# Patient Record
Sex: MALE | Race: WHITE | Employment: OTHER | URBAN - METROPOLITAN AREA
[De-identification: names, ages, dates, MRNs, and addresses within clinical notes are randomized per-mention and may not be internally consistent; named-entity substitution may affect disease eponyms.]

---

## 2017-01-10 ENCOUNTER — HOSPITAL ENCOUNTER (OUTPATIENT)
Dept: PHYSICAL THERAPY | Age: 67
Discharge: HOME | End: 2017-01-10
Admitting: PREVENTIVE MEDICINE

## 2017-01-13 ENCOUNTER — HOSPITAL ENCOUNTER (OUTPATIENT)
Dept: MRI IMAGING | Age: 67
Discharge: HOME | End: 2017-01-13
Attending: PREVENTIVE MEDICINE | Admitting: PREVENTIVE MEDICINE

## 2017-01-13 ENCOUNTER — HOSPITAL ENCOUNTER (OUTPATIENT)
Dept: MRI IMAGING | Age: 67
Discharge: HOME | End: 2017-01-13
Admitting: PREVENTIVE MEDICINE

## 2017-01-13 DIAGNOSIS — S83.91XA SPRAIN OF RIGHT KNEE, UNSPECIFIED LIGAMENT, INITIAL ENCOUNTER: ICD-10-CM

## 2017-01-13 DIAGNOSIS — S83.91XA SPRAIN OF RIGHT KNEE: ICD-10-CM

## 2017-01-17 ENCOUNTER — HOSPITAL ENCOUNTER (OUTPATIENT)
Dept: PHYSICAL THERAPY | Age: 67
Discharge: HOME | End: 2017-01-17
Admitting: PREVENTIVE MEDICINE

## 2017-02-01 ENCOUNTER — OFFICE VISIT (OUTPATIENT)
Dept: ORTHOPEDIC SURGERY | Age: 67
End: 2017-02-01

## 2017-02-01 VITALS — BODY MASS INDEX: 35.22 KG/M2 | RESPIRATION RATE: 16 BRPM | HEIGHT: 70 IN | WEIGHT: 246 LBS

## 2017-02-01 DIAGNOSIS — R52 PAIN: ICD-10-CM

## 2017-02-01 DIAGNOSIS — S83.91XA SPRAIN OF RIGHT KNEE, UNSPECIFIED LIGAMENT, INITIAL ENCOUNTER: Primary | ICD-10-CM

## 2017-02-01 DIAGNOSIS — S80.01XA CONTUSION OF RIGHT KNEE, INITIAL ENCOUNTER: ICD-10-CM

## 2017-02-01 DIAGNOSIS — S83.241A ACUTE MEDIAL MENISCAL TEAR, RIGHT, INITIAL ENCOUNTER: ICD-10-CM

## 2017-02-01 PROCEDURE — 99244 OFF/OP CNSLTJ NEW/EST MOD 40: CPT | Performed by: ORTHOPAEDIC SURGERY

## 2017-02-01 PROCEDURE — 73560 X-RAY EXAM OF KNEE 1 OR 2: CPT | Performed by: ORTHOPAEDIC SURGERY

## 2017-02-01 RX ORDER — NICOTINE 21 MG/24HR
1 PATCH, TRANSDERMAL 24 HOURS TRANSDERMAL
COMMUNITY
Start: 2015-10-13 | End: 2017-04-07

## 2017-02-01 RX ORDER — ASPIRIN 81 MG/1
81 TABLET, CHEWABLE ORAL
COMMUNITY
Start: 2016-06-24 | End: 2017-02-01 | Stop reason: SDUPTHER

## 2017-02-01 RX ORDER — FAMOTIDINE 20 MG/1
20 TABLET, FILM COATED ORAL
COMMUNITY
Start: 2015-09-15 | End: 2017-04-07

## 2017-02-01 RX ORDER — FUROSEMIDE 20 MG/1
20 TABLET ORAL
COMMUNITY
Start: 2015-08-10 | End: 2017-03-13 | Stop reason: SDUPTHER

## 2017-02-01 RX ORDER — LISINOPRIL 5 MG/1
5 TABLET ORAL
COMMUNITY
Start: 2015-09-15 | End: 2017-03-13 | Stop reason: SDUPTHER

## 2017-02-01 RX ORDER — AMLODIPINE BESYLATE 5 MG/1
5 TABLET ORAL
COMMUNITY
Start: 2016-05-26 | End: 2017-03-13 | Stop reason: SDUPTHER

## 2017-02-01 RX ORDER — SILDENAFIL 100 MG/1
100 TABLET, FILM COATED ORAL
COMMUNITY
Start: 2015-02-05 | End: 2017-04-07

## 2017-02-01 RX ORDER — HYDROCODONE BITARTRATE AND ACETAMINOPHEN 5; 325 MG/1; MG/1
1 TABLET ORAL
COMMUNITY
Start: 2015-09-15 | End: 2017-04-07

## 2017-02-01 RX ORDER — TAMSULOSIN HYDROCHLORIDE 0.4 MG/1
0.4 CAPSULE ORAL
COMMUNITY
Start: 2016-04-14 | End: 2017-04-07

## 2017-02-01 RX ORDER — ELECTROLYTES/DEXTROSE
1 SOLUTION, ORAL ORAL
COMMUNITY
End: 2017-04-07

## 2017-02-01 RX ORDER — FOLIC ACID 1 MG/1
1 TABLET ORAL
COMMUNITY
End: 2017-04-07

## 2017-02-01 RX ORDER — ATORVASTATIN CALCIUM 10 MG/1
10 TABLET, FILM COATED ORAL
COMMUNITY
Start: 2016-05-26 | End: 2017-04-07

## 2017-02-01 RX ORDER — THIAMINE MONONITRATE (VIT B1) 100 MG
1 TABLET ORAL
COMMUNITY
End: 2017-02-01 | Stop reason: SDUPTHER

## 2017-02-01 RX ORDER — FLUOXETINE HYDROCHLORIDE 20 MG/1
20 CAPSULE ORAL
COMMUNITY
Start: 2016-05-16 | End: 2017-02-01 | Stop reason: SDUPTHER

## 2017-02-01 RX ORDER — ALBUTEROL SULFATE 90 UG/1
2 AEROSOL, METERED RESPIRATORY (INHALATION)
COMMUNITY
Start: 2015-09-15 | End: 2017-02-01 | Stop reason: SDUPTHER

## 2017-02-02 ASSESSMENT — ENCOUNTER SYMPTOMS
EYES NEGATIVE: 1
RESPIRATORY NEGATIVE: 1
GASTROINTESTINAL NEGATIVE: 1

## 2017-02-14 ENCOUNTER — TELEPHONE (OUTPATIENT)
Dept: ORTHOPEDIC SURGERY | Age: 67
End: 2017-02-14

## 2017-03-13 RX ORDER — LISINOPRIL 5 MG/1
5 TABLET ORAL DAILY
Qty: 30 TABLET | Refills: 5 | Status: SHIPPED | OUTPATIENT
Start: 2017-03-13 | End: 2017-07-25 | Stop reason: SDUPTHER

## 2017-03-13 RX ORDER — AMLODIPINE BESYLATE 5 MG/1
5 TABLET ORAL DAILY
Qty: 30 TABLET | Refills: 5 | Status: SHIPPED | OUTPATIENT
Start: 2017-03-13 | End: 2017-07-25 | Stop reason: SDUPTHER

## 2017-03-13 RX ORDER — FUROSEMIDE 20 MG/1
20 TABLET ORAL DAILY
Qty: 30 TABLET | Refills: 5 | Status: SHIPPED | OUTPATIENT
Start: 2017-03-13 | End: 2017-07-25 | Stop reason: SDUPTHER

## 2017-04-05 ENCOUNTER — OFFICE VISIT (OUTPATIENT)
Dept: ORTHOPEDIC SURGERY | Age: 67
End: 2017-04-05

## 2017-04-05 VITALS — DIASTOLIC BLOOD PRESSURE: 81 MMHG | HEART RATE: 75 BPM | SYSTOLIC BLOOD PRESSURE: 130 MMHG

## 2017-04-05 DIAGNOSIS — S83.241A ACUTE MEDIAL MENISCAL TEAR, RIGHT, INITIAL ENCOUNTER: Primary | ICD-10-CM

## 2017-04-05 PROBLEM — S83.249A ACUTE MEDIAL MENISCAL TEAR: Status: ACTIVE | Noted: 2017-04-05

## 2017-04-05 PROCEDURE — 99213 OFFICE O/P EST LOW 20 MIN: CPT | Performed by: ORTHOPAEDIC SURGERY

## 2017-04-05 ASSESSMENT — ENCOUNTER SYMPTOMS
GASTROINTESTINAL NEGATIVE: 1
RESPIRATORY NEGATIVE: 1
EYES NEGATIVE: 1

## 2017-04-07 ENCOUNTER — HOSPITAL ENCOUNTER (OUTPATIENT)
Dept: PREADMISSION TESTING | Age: 67
Discharge: HOME | End: 2017-04-07
Attending: ORTHOPAEDIC SURGERY | Admitting: ORTHOPAEDIC SURGERY

## 2017-04-07 VITALS
TEMPERATURE: 98.2 F | DIASTOLIC BLOOD PRESSURE: 87 MMHG | BODY MASS INDEX: 38.1 KG/M2 | HEIGHT: 69 IN | HEART RATE: 88 BPM | SYSTOLIC BLOOD PRESSURE: 148 MMHG | OXYGEN SATURATION: 95 % | RESPIRATION RATE: 16 BRPM | WEIGHT: 257.25 LBS

## 2017-04-07 LAB
ANION GAP SERPL CALCULATED.3IONS-SCNC: 15 MMOL/L (ref 4–16)
BUN BLDV-MCNC: 12 MG/DL (ref 6–23)
CALCIUM SERPL-MCNC: 9 MG/DL (ref 8.3–10.6)
CHLORIDE BLD-SCNC: 100 MMOL/L (ref 99–110)
CO2: 23 MMOL/L (ref 21–32)
CREAT SERPL-MCNC: 0.7 MG/DL (ref 0.9–1.3)
EKG ATRIAL RATE: 75 BPM
EKG DIAGNOSIS: NORMAL
EKG P AXIS: 55 DEGREES
EKG P-R INTERVAL: 154 MS
EKG Q-T INTERVAL: 416 MS
EKG QRS DURATION: 140 MS
EKG QTC CALCULATION (BAZETT): 464 MS
EKG R AXIS: 9 DEGREES
EKG T AXIS: 2 DEGREES
EKG VENTRICULAR RATE: 75 BPM
GFR AFRICAN AMERICAN: >60 ML/MIN/1.73M2
GFR NON-AFRICAN AMERICAN: >60 ML/MIN/1.73M2
GLUCOSE BLD-MCNC: 118 MG/DL (ref 70–140)
HCT VFR BLD CALC: 48.9 % (ref 42–52)
HEMOGLOBIN: 16.7 GM/DL (ref 13.5–18)
MCH RBC QN AUTO: 32.7 PG (ref 27–31)
MCHC RBC AUTO-ENTMCNC: 34.2 % (ref 32–36)
MCV RBC AUTO: 95.9 FL (ref 78–100)
PDW BLD-RTO: 12.6 % (ref 11.7–14.9)
PLATELET # BLD: 281 K/CU MM (ref 140–440)
PMV BLD AUTO: 9.7 FL (ref 7.5–11.1)
POTASSIUM SERPL-SCNC: 4.8 MMOL/L (ref 3.5–5.1)
RBC # BLD: 5.1 M/CU MM (ref 4.6–6.2)
SODIUM BLD-SCNC: 138 MMOL/L (ref 135–145)
WBC # BLD: 11.9 K/CU MM (ref 4–10.5)

## 2017-04-07 RX ORDER — RANITIDINE 150 MG/1
150 TABLET ORAL 2 TIMES DAILY
COMMUNITY
End: 2017-04-19 | Stop reason: ALTCHOICE

## 2017-04-07 ASSESSMENT — PAIN SCALES - GENERAL: PAINLEVEL_OUTOF10: 0

## 2017-04-13 ENCOUNTER — HOSPITAL ENCOUNTER (OUTPATIENT)
Dept: SURGERY | Age: 67
Discharge: HOME | End: 2017-04-13
Attending: ORTHOPAEDIC SURGERY | Admitting: ORTHOPAEDIC SURGERY

## 2017-04-13 VITALS
HEART RATE: 89 BPM | HEIGHT: 70 IN | OXYGEN SATURATION: 94 % | DIASTOLIC BLOOD PRESSURE: 79 MMHG | SYSTOLIC BLOOD PRESSURE: 132 MMHG | WEIGHT: 249 LBS | TEMPERATURE: 97 F | RESPIRATION RATE: 16 BRPM | BODY MASS INDEX: 35.65 KG/M2

## 2017-04-13 DIAGNOSIS — S83.271A COMPLEX TEAR OF LATERAL MENISCUS OF RIGHT KNEE AS CURRENT INJURY, INITIAL ENCOUNTER: ICD-10-CM

## 2017-04-13 DIAGNOSIS — M22.41 CHONDROMALACIA PATELLAE, RIGHT KNEE: ICD-10-CM

## 2017-04-13 DIAGNOSIS — M17.11 PRIMARY OSTEOARTHRITIS OF RIGHT KNEE: ICD-10-CM

## 2017-04-13 LAB — GLUCOSE BLD-MCNC: 132 MG/DL (ref 70–99)

## 2017-04-13 RX ORDER — ONDANSETRON 2 MG/ML
4 INJECTION INTRAMUSCULAR; INTRAVENOUS
Status: COMPLETED | OUTPATIENT
Start: 2017-04-13 | End: 2017-04-13

## 2017-04-13 RX ORDER — SODIUM CHLORIDE 0.9 % (FLUSH) 0.9 %
10 SYRINGE (ML) INJECTION EVERY 12 HOURS SCHEDULED
Status: DISCONTINUED | OUTPATIENT
Start: 2017-04-13 | End: 2017-04-14 | Stop reason: HOSPADM

## 2017-04-13 RX ORDER — SODIUM CHLORIDE, SODIUM LACTATE, POTASSIUM CHLORIDE, CALCIUM CHLORIDE 600; 310; 30; 20 MG/100ML; MG/100ML; MG/100ML; MG/100ML
INJECTION, SOLUTION INTRAVENOUS CONTINUOUS
Status: DISCONTINUED | OUTPATIENT
Start: 2017-04-13 | End: 2017-04-14 | Stop reason: HOSPADM

## 2017-04-13 RX ORDER — SODIUM CHLORIDE 0.9 % (FLUSH) 0.9 %
10 SYRINGE (ML) INJECTION PRN
Status: DISCONTINUED | OUTPATIENT
Start: 2017-04-13 | End: 2017-04-14 | Stop reason: HOSPADM

## 2017-04-13 RX ORDER — FENTANYL CITRATE 50 UG/ML
25 INJECTION, SOLUTION INTRAMUSCULAR; INTRAVENOUS EVERY 5 MIN PRN
Status: DISCONTINUED | OUTPATIENT
Start: 2017-04-13 | End: 2017-04-14 | Stop reason: HOSPADM

## 2017-04-13 RX ORDER — HYDROCODONE BITARTRATE AND ACETAMINOPHEN 5; 325 MG/1; MG/1
1 TABLET ORAL EVERY 4 HOURS PRN
Qty: 40 TABLET | Refills: 0 | Status: SHIPPED | OUTPATIENT
Start: 2017-04-13 | End: 2017-06-16 | Stop reason: ALTCHOICE

## 2017-04-13 RX ORDER — HYDROCODONE BITARTRATE AND ACETAMINOPHEN 5; 325 MG/1; MG/1
1 TABLET ORAL ONCE
Status: COMPLETED | OUTPATIENT
Start: 2017-04-13 | End: 2017-04-13

## 2017-04-13 RX ORDER — IBUPROFEN 400 MG/1
800 TABLET ORAL ONCE
Status: COMPLETED | OUTPATIENT
Start: 2017-04-13 | End: 2017-04-13

## 2017-04-13 RX ORDER — ONDANSETRON 2 MG/ML
4 INJECTION INTRAMUSCULAR; INTRAVENOUS EVERY 6 HOURS PRN
Status: DISCONTINUED | OUTPATIENT
Start: 2017-04-13 | End: 2017-04-14 | Stop reason: HOSPADM

## 2017-04-13 RX ORDER — DIPHENHYDRAMINE HYDROCHLORIDE 50 MG/ML
12.5 INJECTION INTRAMUSCULAR; INTRAVENOUS
Status: ACTIVE | OUTPATIENT
Start: 2017-04-13 | End: 2017-04-13

## 2017-04-13 RX ADMIN — HYDROCODONE BITARTRATE AND ACETAMINOPHEN 1 TABLET: 5; 325 TABLET ORAL at 15:47

## 2017-04-13 RX ADMIN — IBUPROFEN 800 MG: 400 TABLET ORAL at 15:47

## 2017-04-13 RX ADMIN — ONDANSETRON 4 MG: 2 INJECTION INTRAMUSCULAR; INTRAVENOUS at 16:25

## 2017-04-13 RX ADMIN — SODIUM CHLORIDE, SODIUM LACTATE, POTASSIUM CHLORIDE, CALCIUM CHLORIDE: 600; 310; 30; 20 INJECTION, SOLUTION INTRAVENOUS at 13:40

## 2017-04-13 ASSESSMENT — PAIN SCALES - GENERAL
PAINLEVEL_OUTOF10: 0

## 2017-04-13 ASSESSMENT — PAIN - FUNCTIONAL ASSESSMENT: PAIN_FUNCTIONAL_ASSESSMENT: 0-10

## 2017-04-13 ASSESSMENT — PAIN DESCRIPTION - DESCRIPTORS: DESCRIPTORS: ACHING

## 2017-04-14 ENCOUNTER — HOSPITAL ENCOUNTER (OUTPATIENT)
Dept: PHYSICAL THERAPY | Age: 67
Discharge: HOME | End: 2017-04-30
Attending: ORTHOPAEDIC SURGERY | Admitting: ORTHOPAEDIC SURGERY

## 2017-04-14 ASSESSMENT — PAIN DESCRIPTION - ORIENTATION: ORIENTATION: RIGHT

## 2017-04-14 ASSESSMENT — PAIN SCALES - GENERAL: PAINLEVEL_OUTOF10: 4

## 2017-04-14 ASSESSMENT — PAIN DESCRIPTION - PAIN TYPE: TYPE: SURGICAL PAIN

## 2017-04-14 ASSESSMENT — PAIN DESCRIPTION - FREQUENCY: FREQUENCY: INTERMITTENT

## 2017-04-14 ASSESSMENT — PAIN DESCRIPTION - DESCRIPTORS: DESCRIPTORS: ACHING;TENDER

## 2017-04-14 ASSESSMENT — PAIN DESCRIPTION - PROGRESSION: CLINICAL_PROGRESSION: GRADUALLY IMPROVING

## 2017-04-14 ASSESSMENT — PAIN DESCRIPTION - LOCATION: LOCATION: KNEE

## 2017-04-14 ASSESSMENT — PAIN DESCRIPTION - ONSET: ONSET: PROGRESSIVE

## 2017-04-17 ENCOUNTER — TELEPHONE (OUTPATIENT)
Dept: ORTHOPEDIC SURGERY | Age: 67
End: 2017-04-17

## 2017-04-17 DIAGNOSIS — S83.206A TEAR OF MENISCUS OF RIGHT KNEE AS CURRENT INJURY, UNSPECIFIED MENISCUS, UNSPECIFIED TEAR TYPE, INITIAL ENCOUNTER: Primary | ICD-10-CM

## 2017-04-19 ENCOUNTER — OFFICE VISIT (OUTPATIENT)
Dept: FAMILY MEDICINE CLINIC | Age: 67
End: 2017-04-19

## 2017-04-19 VITALS
HEART RATE: 54 BPM | OXYGEN SATURATION: 94 % | WEIGHT: 247.6 LBS | DIASTOLIC BLOOD PRESSURE: 84 MMHG | SYSTOLIC BLOOD PRESSURE: 132 MMHG | BODY MASS INDEX: 37.53 KG/M2 | TEMPERATURE: 97.3 F | HEIGHT: 68 IN

## 2017-04-19 DIAGNOSIS — M25.561 CHRONIC PAIN OF RIGHT KNEE: ICD-10-CM

## 2017-04-19 DIAGNOSIS — R53.82 CHRONIC FATIGUE: ICD-10-CM

## 2017-04-19 DIAGNOSIS — G89.29 CHRONIC PAIN OF RIGHT KNEE: ICD-10-CM

## 2017-04-19 DIAGNOSIS — L85.9 HYPERKERATOSIS OF SKIN: ICD-10-CM

## 2017-04-19 DIAGNOSIS — I10 ESSENTIAL HYPERTENSION: Primary | ICD-10-CM

## 2017-04-19 DIAGNOSIS — M67.40 MUCOID CYST OF JOINT: ICD-10-CM

## 2017-04-19 DIAGNOSIS — R06.09 DYSPNEA ON EXERTION: ICD-10-CM

## 2017-04-19 DIAGNOSIS — K21.9 GASTROESOPHAGEAL REFLUX DISEASE, ESOPHAGITIS PRESENCE NOT SPECIFIED: Chronic | ICD-10-CM

## 2017-04-19 PROCEDURE — 99214 OFFICE O/P EST MOD 30 MIN: CPT | Performed by: FAMILY MEDICINE

## 2017-04-19 PROCEDURE — 36415 COLL VENOUS BLD VENIPUNCTURE: CPT | Performed by: FAMILY MEDICINE

## 2017-04-19 RX ORDER — HYDROCODONE BITARTRATE AND ACETAMINOPHEN 5; 325 MG/1; MG/1
1 TABLET ORAL EVERY 6 HOURS PRN
Qty: 25 TABLET | Refills: 0 | Status: SHIPPED | OUTPATIENT
Start: 2017-04-19 | End: 2017-06-16 | Stop reason: ALTCHOICE

## 2017-04-19 RX ORDER — FAMOTIDINE 40 MG/1
40 TABLET, FILM COATED ORAL EVERY EVENING
Qty: 30 TABLET | Refills: 5 | Status: SHIPPED | OUTPATIENT
Start: 2017-04-19 | End: 2017-07-25 | Stop reason: SDUPTHER

## 2017-04-24 LAB
TSH SERPL DL<=0.05 MIU/L-ACNC: 1.01 MICRO IU/ML (ref 0.4–4)
VITAMIN B-12: 517 PG/ML (ref 200–1100)

## 2017-04-26 ENCOUNTER — OFFICE VISIT (OUTPATIENT)
Dept: ORTHOPEDIC SURGERY | Age: 67
End: 2017-04-26

## 2017-04-26 VITALS — HEIGHT: 68 IN | RESPIRATION RATE: 18 BRPM | WEIGHT: 247 LBS | BODY MASS INDEX: 37.44 KG/M2

## 2017-04-26 DIAGNOSIS — S83.206A TEAR OF MENISCUS OF RIGHT KNEE AS CURRENT INJURY, UNSPECIFIED MENISCUS, UNSPECIFIED TEAR TYPE, INITIAL ENCOUNTER: ICD-10-CM

## 2017-04-26 DIAGNOSIS — Z09 POSTOP CHECK: Primary | ICD-10-CM

## 2017-04-26 PROCEDURE — 99024 POSTOP FOLLOW-UP VISIT: CPT | Performed by: ORTHOPAEDIC SURGERY

## 2017-05-01 ENCOUNTER — HOSPITAL ENCOUNTER (OUTPATIENT)
Dept: OTHER | Age: 67
Discharge: HOME | End: 2017-05-31
Attending: ORTHOPAEDIC SURGERY | Admitting: ORTHOPAEDIC SURGERY

## 2017-05-08 ENCOUNTER — OFFICE VISIT (OUTPATIENT)
Dept: ORTHOPEDIC SURGERY | Age: 67
End: 2017-05-08

## 2017-05-08 VITALS — WEIGHT: 247 LBS | HEIGHT: 68 IN | BODY MASS INDEX: 37.44 KG/M2 | RESPIRATION RATE: 16 BRPM

## 2017-05-08 DIAGNOSIS — S80.01XA CONTUSION OF RIGHT KNEE, INITIAL ENCOUNTER: ICD-10-CM

## 2017-05-08 DIAGNOSIS — S83.91XA SPRAIN OF RIGHT KNEE, UNSPECIFIED LIGAMENT, INITIAL ENCOUNTER: Primary | ICD-10-CM

## 2017-05-08 DIAGNOSIS — S83.206A TEAR OF MENISCUS OF RIGHT KNEE AS CURRENT INJURY, UNSPECIFIED MENISCUS, UNSPECIFIED TEAR TYPE, INITIAL ENCOUNTER: ICD-10-CM

## 2017-05-08 DIAGNOSIS — S83.241A ACUTE MEDIAL MENISCAL TEAR, RIGHT, INITIAL ENCOUNTER: ICD-10-CM

## 2017-05-08 PROCEDURE — 99024 POSTOP FOLLOW-UP VISIT: CPT | Performed by: ORTHOPAEDIC SURGERY

## 2017-05-23 ENCOUNTER — HOSPITAL ENCOUNTER (OUTPATIENT)
Dept: PULMONOLOGY | Age: 67
Discharge: HOME | End: 2017-05-23
Attending: FAMILY MEDICINE | Admitting: FAMILY MEDICINE

## 2017-06-01 ENCOUNTER — HOSPITAL ENCOUNTER (OUTPATIENT)
Dept: OTHER | Age: 67
Discharge: HOME | End: 2017-06-13
Attending: ORTHOPAEDIC SURGERY | Admitting: ORTHOPAEDIC SURGERY

## 2017-06-12 ENCOUNTER — OFFICE VISIT (OUTPATIENT)
Dept: ORTHOPEDIC SURGERY | Age: 67
End: 2017-06-12

## 2017-06-12 ENCOUNTER — TELEPHONE (OUTPATIENT)
Dept: ORTHOPEDIC SURGERY | Age: 67
End: 2017-06-12

## 2017-06-12 VITALS
RESPIRATION RATE: 16 BRPM | DIASTOLIC BLOOD PRESSURE: 82 MMHG | HEIGHT: 68 IN | WEIGHT: 247 LBS | BODY MASS INDEX: 37.44 KG/M2 | HEART RATE: 81 BPM | SYSTOLIC BLOOD PRESSURE: 127 MMHG

## 2017-06-12 DIAGNOSIS — Z01.818 PRE-OP EXAM: ICD-10-CM

## 2017-06-12 DIAGNOSIS — M17.11 PRIMARY OSTEOARTHRITIS OF RIGHT KNEE: ICD-10-CM

## 2017-06-12 DIAGNOSIS — M65.351 TRIGGER LITTLE FINGER OF RIGHT HAND: Primary | ICD-10-CM

## 2017-06-12 PROCEDURE — 99214 OFFICE O/P EST MOD 30 MIN: CPT | Performed by: ORTHOPAEDIC SURGERY

## 2017-06-16 ENCOUNTER — HOSPITAL ENCOUNTER (OUTPATIENT)
Dept: PREADMISSION TESTING | Age: 67
Discharge: HOME | End: 2017-07-15
Attending: ORTHOPAEDIC SURGERY | Admitting: ORTHOPAEDIC SURGERY

## 2017-06-16 ENCOUNTER — HOSPITAL ENCOUNTER (OUTPATIENT)
Dept: PREADMISSION TESTING | Age: 67
Discharge: HOME | End: 2017-06-16
Attending: ORTHOPAEDIC SURGERY | Admitting: ORTHOPAEDIC SURGERY

## 2017-06-16 VITALS
TEMPERATURE: 98 F | BODY MASS INDEX: 37.44 KG/M2 | DIASTOLIC BLOOD PRESSURE: 80 MMHG | SYSTOLIC BLOOD PRESSURE: 143 MMHG | HEART RATE: 96 BPM | WEIGHT: 247 LBS | OXYGEN SATURATION: 93 % | RESPIRATION RATE: 20 BRPM | HEIGHT: 68 IN

## 2017-06-16 RX ORDER — IBUPROFEN 200 MG
800 TABLET ORAL DAILY
Status: ON HOLD | COMMUNITY
End: 2021-11-10 | Stop reason: HOSPADM

## 2017-06-16 ASSESSMENT — PAIN DESCRIPTION - PAIN TYPE: TYPE: ACUTE PAIN

## 2017-06-16 ASSESSMENT — PAIN SCALES - GENERAL: PAINLEVEL_OUTOF10: 0

## 2017-06-22 ENCOUNTER — HOSPITAL ENCOUNTER (OUTPATIENT)
Dept: SURGERY | Age: 67
Discharge: HOME | End: 2017-06-22
Attending: ORTHOPAEDIC SURGERY | Admitting: ORTHOPAEDIC SURGERY

## 2017-06-22 VITALS
RESPIRATION RATE: 16 BRPM | OXYGEN SATURATION: 95 % | BODY MASS INDEX: 36.83 KG/M2 | TEMPERATURE: 98.3 F | SYSTOLIC BLOOD PRESSURE: 132 MMHG | WEIGHT: 243 LBS | HEIGHT: 68 IN | HEART RATE: 83 BPM | DIASTOLIC BLOOD PRESSURE: 88 MMHG

## 2017-06-22 PROBLEM — M65.9 FLEXOR TENOSYNOVITIS OF FINGER: Status: ACTIVE | Noted: 2017-06-12

## 2017-06-22 PROBLEM — M65.949 FLEXOR TENOSYNOVITIS OF FINGER: Status: ACTIVE | Noted: 2017-06-12

## 2017-06-22 PROCEDURE — 26440 RELEASE PALM/FINGER TENDON: CPT | Performed by: ORTHOPAEDIC SURGERY

## 2017-06-22 RX ORDER — SODIUM CHLORIDE 0.9 % (FLUSH) 0.9 %
10 SYRINGE (ML) INJECTION PRN
Status: DISCONTINUED | OUTPATIENT
Start: 2017-06-22 | End: 2017-06-23 | Stop reason: HOSPADM

## 2017-06-22 RX ORDER — SODIUM CHLORIDE, SODIUM LACTATE, POTASSIUM CHLORIDE, CALCIUM CHLORIDE 600; 310; 30; 20 MG/100ML; MG/100ML; MG/100ML; MG/100ML
INJECTION, SOLUTION INTRAVENOUS CONTINUOUS
Status: DISCONTINUED | OUTPATIENT
Start: 2017-06-22 | End: 2017-06-23 | Stop reason: HOSPADM

## 2017-06-22 RX ORDER — SODIUM CHLORIDE 0.9 % (FLUSH) 0.9 %
10 SYRINGE (ML) INJECTION EVERY 12 HOURS SCHEDULED
Status: DISCONTINUED | OUTPATIENT
Start: 2017-06-22 | End: 2017-06-23 | Stop reason: HOSPADM

## 2017-06-22 RX ORDER — IPRATROPIUM BROMIDE AND ALBUTEROL SULFATE 2.5; .5 MG/3ML; MG/3ML
1 SOLUTION RESPIRATORY (INHALATION) ONCE
Status: COMPLETED | OUTPATIENT
Start: 2017-06-22 | End: 2017-06-22

## 2017-06-22 RX ORDER — HYDROCODONE BITARTRATE AND ACETAMINOPHEN 5; 325 MG/1; MG/1
1 TABLET ORAL EVERY 4 HOURS PRN
Qty: 40 TABLET | Refills: 0 | Status: SHIPPED | OUTPATIENT
Start: 2017-06-22 | End: 2018-09-04

## 2017-06-22 RX ORDER — HYDROCODONE BITARTRATE AND ACETAMINOPHEN 5; 325 MG/1; MG/1
1 TABLET ORAL ONCE
Status: DISCONTINUED | OUTPATIENT
Start: 2017-06-22 | End: 2017-06-23 | Stop reason: HOSPADM

## 2017-06-22 RX ORDER — ONDANSETRON 2 MG/ML
4 INJECTION INTRAMUSCULAR; INTRAVENOUS EVERY 6 HOURS PRN
Status: DISCONTINUED | OUTPATIENT
Start: 2017-06-22 | End: 2017-06-23 | Stop reason: HOSPADM

## 2017-06-22 RX ORDER — IBUPROFEN 400 MG/1
800 TABLET ORAL ONCE
Status: DISCONTINUED | OUTPATIENT
Start: 2017-06-22 | End: 2017-06-23 | Stop reason: HOSPADM

## 2017-06-22 RX ADMIN — SODIUM CHLORIDE, SODIUM LACTATE, POTASSIUM CHLORIDE, CALCIUM CHLORIDE: 600; 310; 30; 20 INJECTION, SOLUTION INTRAVENOUS at 08:56

## 2017-06-22 RX ADMIN — IPRATROPIUM BROMIDE AND ALBUTEROL SULFATE 1 AMPULE: 2.5; .5 SOLUTION RESPIRATORY (INHALATION) at 09:16

## 2017-06-22 ASSESSMENT — ENCOUNTER SYMPTOMS
DYSPNEA ACTIVITY LEVEL: AFTER AMBULATING 2 FLIGHTS OF STAIRS
SHORTNESS OF BREATH: 1

## 2017-06-22 ASSESSMENT — PAIN DESCRIPTION - DESCRIPTORS: DESCRIPTORS: ACHING;CONSTANT

## 2017-06-22 ASSESSMENT — PAIN SCALES - GENERAL
PAINLEVEL_OUTOF10: 0

## 2017-06-22 ASSESSMENT — PAIN - FUNCTIONAL ASSESSMENT: PAIN_FUNCTIONAL_ASSESSMENT: 0-10

## 2017-06-28 ENCOUNTER — OFFICE VISIT (OUTPATIENT)
Dept: ORTHOPEDIC SURGERY | Age: 67
End: 2017-06-28

## 2017-06-28 VITALS — BODY MASS INDEX: 36.83 KG/M2 | HEIGHT: 68 IN | RESPIRATION RATE: 16 BRPM | WEIGHT: 243 LBS

## 2017-06-28 DIAGNOSIS — Z09 POSTOP CHECK: Primary | ICD-10-CM

## 2017-06-28 DIAGNOSIS — Z98.890 S/P TRIGGER FINGER RELEASE: ICD-10-CM

## 2017-06-28 PROCEDURE — 99024 POSTOP FOLLOW-UP VISIT: CPT | Performed by: ORTHOPAEDIC SURGERY

## 2017-07-05 ENCOUNTER — OFFICE VISIT (OUTPATIENT)
Dept: ORTHOPEDIC SURGERY | Age: 67
End: 2017-07-05

## 2017-07-05 VITALS — BODY MASS INDEX: 36.83 KG/M2 | HEIGHT: 68 IN | RESPIRATION RATE: 16 BRPM | WEIGHT: 243 LBS

## 2017-07-05 DIAGNOSIS — Z09 POSTOP CHECK: ICD-10-CM

## 2017-07-05 DIAGNOSIS — Z98.890 S/P TRIGGER FINGER RELEASE: Primary | ICD-10-CM

## 2017-07-05 PROCEDURE — 99024 POSTOP FOLLOW-UP VISIT: CPT | Performed by: ORTHOPAEDIC SURGERY

## 2017-07-25 ENCOUNTER — OFFICE VISIT (OUTPATIENT)
Dept: FAMILY MEDICINE CLINIC | Age: 67
End: 2017-07-25

## 2017-07-25 VITALS
SYSTOLIC BLOOD PRESSURE: 138 MMHG | WEIGHT: 245.2 LBS | HEART RATE: 72 BPM | TEMPERATURE: 98.2 F | DIASTOLIC BLOOD PRESSURE: 66 MMHG | OXYGEN SATURATION: 98 % | BODY MASS INDEX: 37.28 KG/M2

## 2017-07-25 DIAGNOSIS — Z13.6 SCREENING FOR AAA (ABDOMINAL AORTIC ANEURYSM): ICD-10-CM

## 2017-07-25 DIAGNOSIS — Z11.59 NEED FOR HEPATITIS C SCREENING TEST: ICD-10-CM

## 2017-07-25 DIAGNOSIS — I10 ESSENTIAL HYPERTENSION: Primary | ICD-10-CM

## 2017-07-25 DIAGNOSIS — Z23 NEED FOR PNEUMOCOCCAL VACCINATION: ICD-10-CM

## 2017-07-25 DIAGNOSIS — L98.9 SKIN LESION OF LEFT LEG: ICD-10-CM

## 2017-07-25 DIAGNOSIS — F17.200 TOBACCO DEPENDENCE: ICD-10-CM

## 2017-07-25 DIAGNOSIS — R22.9 LOCALIZED SKIN MASS, LUMP, OR SWELLING: ICD-10-CM

## 2017-07-25 DIAGNOSIS — J41.1 MUCOPURULENT CHRONIC BRONCHITIS (HCC): ICD-10-CM

## 2017-07-25 DIAGNOSIS — K21.9 GASTROESOPHAGEAL REFLUX DISEASE, ESOPHAGITIS PRESENCE NOT SPECIFIED: Chronic | ICD-10-CM

## 2017-07-25 DIAGNOSIS — R60.1 GENERALIZED EDEMA: ICD-10-CM

## 2017-07-25 DIAGNOSIS — Z12.11 COLON CANCER SCREENING: ICD-10-CM

## 2017-07-25 DIAGNOSIS — Z11.4 SCREENING FOR HIV (HUMAN IMMUNODEFICIENCY VIRUS): ICD-10-CM

## 2017-07-25 PROCEDURE — 36415 COLL VENOUS BLD VENIPUNCTURE: CPT | Performed by: FAMILY MEDICINE

## 2017-07-25 PROCEDURE — 90471 IMMUNIZATION ADMIN: CPT | Performed by: FAMILY MEDICINE

## 2017-07-25 PROCEDURE — 99214 OFFICE O/P EST MOD 30 MIN: CPT | Performed by: FAMILY MEDICINE

## 2017-07-25 PROCEDURE — 90670 PCV13 VACCINE IM: CPT | Performed by: FAMILY MEDICINE

## 2017-07-25 PROCEDURE — 17110 DESTRUCTION B9 LES UP TO 14: CPT | Performed by: FAMILY MEDICINE

## 2017-07-25 RX ORDER — LISINOPRIL 5 MG/1
5 TABLET ORAL EVERY MORNING
Qty: 30 TABLET | Refills: 5 | Status: SHIPPED | OUTPATIENT
Start: 2017-07-25 | End: 2018-03-26 | Stop reason: SDUPTHER

## 2017-07-25 RX ORDER — FUROSEMIDE 20 MG/1
20 TABLET ORAL DAILY
Qty: 30 TABLET | Refills: 5 | Status: SHIPPED | OUTPATIENT
Start: 2017-07-25 | End: 2018-09-04

## 2017-07-25 RX ORDER — SILDENAFIL 100 MG/1
100 TABLET, FILM COATED ORAL PRN
Qty: 6 TABLET | Refills: 11 | Status: SHIPPED | OUTPATIENT
Start: 2017-07-25 | End: 2017-09-01 | Stop reason: SDUPTHER

## 2017-07-25 RX ORDER — AMLODIPINE BESYLATE 5 MG/1
5 TABLET ORAL EVERY MORNING
Qty: 30 TABLET | Refills: 5 | Status: SHIPPED | OUTPATIENT
Start: 2017-07-25 | End: 2018-03-26 | Stop reason: SDUPTHER

## 2017-07-25 RX ORDER — FAMOTIDINE 40 MG/1
40 TABLET, FILM COATED ORAL 2 TIMES DAILY
Qty: 60 TABLET | Refills: 5 | Status: SHIPPED | OUTPATIENT
Start: 2017-07-25 | End: 2018-03-01 | Stop reason: SDUPTHER

## 2017-07-25 RX ORDER — ALBUTEROL SULFATE 90 UG/1
2 AEROSOL, METERED RESPIRATORY (INHALATION) EVERY 6 HOURS PRN
Qty: 1 INHALER | Refills: 5 | Status: SHIPPED | OUTPATIENT
Start: 2017-07-25 | End: 2018-09-04 | Stop reason: SDUPTHER

## 2017-07-26 LAB
BUN / CREAT RATIO: 16 (CALC) (ref 7–25)
BUN BLDV-MCNC: 11 MG/DL (ref 3–29)
CALCIUM SERPL-MCNC: 9.5 MG/DL (ref 8.5–10.5)
CHLORIDE BLD-SCNC: 97 MEQ/L (ref 96–110)
CO2: 20 MEQ/L (ref 19–32)
COPY(IES) SENT TO:: NORMAL
CREAT SERPL-MCNC: 0.7 MG/DL
GFR SERPL CREATININE-BSD FRML MDRD: 99 ML/MIN/1.73M2
GLUCOSE BLD-MCNC: 92 MG/DL
HEPATITIS C ANTIBODY: POSITIVE
HIV AG/AB: NORMAL
POTASSIUM SERPL-SCNC: 4.7 MEQ/L (ref 3.4–5.3)
SODIUM BLD-SCNC: 142 MEQ/L (ref 135–148)

## 2017-07-27 DIAGNOSIS — R76.8 HEPATITIS C ANTIBODY TEST POSITIVE: Primary | ICD-10-CM

## 2017-08-18 ENCOUNTER — OFFICE VISIT (OUTPATIENT)
Dept: FAMILY MEDICINE CLINIC | Age: 67
End: 2017-08-18

## 2017-08-18 VITALS
TEMPERATURE: 97.9 F | OXYGEN SATURATION: 94 % | DIASTOLIC BLOOD PRESSURE: 68 MMHG | SYSTOLIC BLOOD PRESSURE: 126 MMHG | BODY MASS INDEX: 38.16 KG/M2 | HEART RATE: 90 BPM | WEIGHT: 251 LBS

## 2017-08-18 DIAGNOSIS — B07.9 VIRAL WARTS, UNSPECIFIED TYPE: Primary | ICD-10-CM

## 2017-08-18 DIAGNOSIS — N52.9 ERECTILE DYSFUNCTION, UNSPECIFIED ERECTILE DYSFUNCTION TYPE: ICD-10-CM

## 2017-08-18 PROCEDURE — 17110 DESTRUCTION B9 LES UP TO 14: CPT | Performed by: FAMILY MEDICINE

## 2017-08-18 PROCEDURE — 99213 OFFICE O/P EST LOW 20 MIN: CPT | Performed by: FAMILY MEDICINE

## 2017-08-18 RX ORDER — SILDENAFIL 100 MG/1
100 TABLET, FILM COATED ORAL PRN
Qty: 2 TABLET | Refills: 0 | COMMUNITY
Start: 2017-08-18 | End: 2017-09-01 | Stop reason: SDUPTHER

## 2017-08-18 ASSESSMENT — PATIENT HEALTH QUESTIONNAIRE - PHQ9
2. FEELING DOWN, DEPRESSED OR HOPELESS: 0
SUM OF ALL RESPONSES TO PHQ QUESTIONS 1-9: 0
SUM OF ALL RESPONSES TO PHQ9 QUESTIONS 1 & 2: 0
1. LITTLE INTEREST OR PLEASURE IN DOING THINGS: 0

## 2017-08-30 ENCOUNTER — OFFICE VISIT (OUTPATIENT)
Dept: ORTHOPEDIC SURGERY | Age: 67
End: 2017-08-30

## 2017-08-30 ENCOUNTER — TELEPHONE (OUTPATIENT)
Dept: ORTHOPEDIC SURGERY | Age: 67
End: 2017-08-30

## 2017-08-30 VITALS — BODY MASS INDEX: 38.04 KG/M2 | RESPIRATION RATE: 16 BRPM | WEIGHT: 251 LBS | HEIGHT: 68 IN

## 2017-08-30 DIAGNOSIS — M17.11 PRIMARY OSTEOARTHRITIS OF RIGHT KNEE: Primary | ICD-10-CM

## 2017-08-30 PROCEDURE — 99214 OFFICE O/P EST MOD 30 MIN: CPT | Performed by: ORTHOPAEDIC SURGERY

## 2017-08-30 RX ORDER — POLYETHYLENE GLYCOL-3350 AND ELECTROLYTES WITH FLAVOR PACK 240; 5.84; 2.98; 6.72; 22.72 G/278.26G; G/278.26G; G/278.26G; G/278.26G; G/278.26G
POWDER, FOR SOLUTION ORAL
COMMUNITY
Start: 2017-08-29 | End: 2018-09-04

## 2017-08-30 NOTE — TELEPHONE ENCOUNTER
Patient wants to submit approval to [facility] for gel injections, if approved and when in stock please call and schedule, for right knee

## 2017-09-01 ENCOUNTER — OFFICE VISIT (OUTPATIENT)
Dept: FAMILY MEDICINE CLINIC | Age: 67
End: 2017-09-01

## 2017-09-01 VITALS
DIASTOLIC BLOOD PRESSURE: 70 MMHG | HEART RATE: 74 BPM | BODY MASS INDEX: 37.16 KG/M2 | HEIGHT: 68 IN | WEIGHT: 245.2 LBS | SYSTOLIC BLOOD PRESSURE: 130 MMHG

## 2017-09-01 DIAGNOSIS — B07.9 VIRAL WARTS, UNSPECIFIED TYPE: Primary | ICD-10-CM

## 2017-09-01 DIAGNOSIS — N52.9 VASCULOGENIC ERECTILE DYSFUNCTION, UNSPECIFIED VASCULOGENIC ERECTILE DYSFUNCTION TYPE: ICD-10-CM

## 2017-09-01 PROCEDURE — 99213 OFFICE O/P EST LOW 20 MIN: CPT | Performed by: FAMILY MEDICINE

## 2017-09-01 PROCEDURE — 17110 DESTRUCTION B9 LES UP TO 14: CPT | Performed by: FAMILY MEDICINE

## 2017-09-01 RX ORDER — SILDENAFIL 100 MG/1
100 TABLET, FILM COATED ORAL PRN
Qty: 2 TABLET | Refills: 0 | COMMUNITY
Start: 2017-09-01 | End: 2018-05-29 | Stop reason: SDUPTHER

## 2017-09-11 NOTE — TELEPHONE ENCOUNTER
I called  and was informed that these injections will not be covered by [facility], but still requested that I submit the form. Form was submitted last week.

## 2017-09-15 ENCOUNTER — OFFICE VISIT (OUTPATIENT)
Dept: FAMILY MEDICINE CLINIC | Age: 67
End: 2017-09-15

## 2017-09-15 VITALS
BODY MASS INDEX: 37.86 KG/M2 | TEMPERATURE: 97.5 F | DIASTOLIC BLOOD PRESSURE: 72 MMHG | SYSTOLIC BLOOD PRESSURE: 118 MMHG | OXYGEN SATURATION: 97 % | HEART RATE: 92 BPM | WEIGHT: 249 LBS

## 2017-09-15 DIAGNOSIS — N52.03 COMBINED ARTERIAL INSUFFICIENCY AND CORPORO-VENOUS OCCLUSIVE ERECTILE DYSFUNCTION: ICD-10-CM

## 2017-09-15 DIAGNOSIS — R22.9 LOCALIZED SKIN MASS, LUMP, OR SWELLING: ICD-10-CM

## 2017-09-15 DIAGNOSIS — B07.9 VIRAL WARTS, UNSPECIFIED TYPE: Primary | ICD-10-CM

## 2017-09-15 PROCEDURE — 99213 OFFICE O/P EST LOW 20 MIN: CPT | Performed by: FAMILY MEDICINE

## 2017-09-15 PROCEDURE — 17110 DESTRUCTION B9 LES UP TO 14: CPT | Performed by: FAMILY MEDICINE

## 2017-09-15 RX ORDER — SILDENAFIL 100 MG/1
100 TABLET, FILM COATED ORAL PRN
Qty: 2 TABLET | Refills: 0 | COMMUNITY
Start: 2017-09-15 | End: 2018-09-04

## 2017-09-21 ENCOUNTER — HOSPITAL ENCOUNTER (OUTPATIENT)
Dept: ULTRASOUND IMAGING | Age: 67
Discharge: HOME | End: 2017-09-21
Attending: FAMILY MEDICINE | Admitting: FAMILY MEDICINE

## 2017-09-21 DIAGNOSIS — Z13.6 SCREENING FOR AAA (ABDOMINAL AORTIC ANEURYSM): ICD-10-CM

## 2017-10-17 ENCOUNTER — TELEPHONE (OUTPATIENT)
Dept: ORTHOPEDIC SURGERY | Age: 67
End: 2017-10-17

## 2017-10-17 ENCOUNTER — OFFICE VISIT (OUTPATIENT)
Dept: ORTHOPEDIC SURGERY | Age: 67
End: 2017-10-17

## 2017-10-17 VITALS — HEIGHT: 68 IN | WEIGHT: 249 LBS | BODY MASS INDEX: 37.74 KG/M2 | RESPIRATION RATE: 16 BRPM

## 2017-10-17 DIAGNOSIS — Z98.890 S/P TRIGGER FINGER RELEASE: Primary | ICD-10-CM

## 2017-10-17 DIAGNOSIS — M72.0 DUPUYTREN'S CONTRACTURE OF HAND: ICD-10-CM

## 2017-10-17 PROCEDURE — 99212 OFFICE O/P EST SF 10 MIN: CPT | Performed by: PHYSICIAN ASSISTANT

## 2017-10-17 NOTE — PROGRESS NOTES
(NORVASC) 5 MG tablet Take 1 tablet by mouth every morning 30 tablet 5    lisinopril (PRINIVIL;ZESTRIL) 5 MG tablet Take 1 tablet by mouth every morning 30 tablet 5    furosemide (LASIX) 20 MG tablet Take 1 tablet by mouth daily 30 tablet 5    fluticasone-salmeterol (ADVAIR DISKUS) 250-50 MCG/DOSE AEPB INHALE 1 PUFF INTO THE LUNGS EVERY 12 HOURS 60 each 5    albuterol sulfate HFA (PROAIR HFA) 108 (90 Base) MCG/ACT inhaler Inhale 2 puffs into the lungs every 6 hours as needed for Wheezing 1 Inhaler 5    HYDROcodone-acetaminophen (NORCO) 5-325 MG per tablet Take 1 tablet by mouth every 4 hours as needed for Pain . 40 tablet 0    ibuprofen (ADVIL;MOTRIN) 200 MG tablet Take 800 mg by mouth daily      Flurandrenolide (CORDRAN) 4 MCG/SQCM TAPE Apply 1/2 inch tape daily to lesion 1 each 5     No current facility-administered medications for this visit.         Past Medical History:   Diagnosis Date    Acid reflux     Anxiety     Arthritis     \"Right Knee\"    Asthma     Chronic back pain     Cough     Occ Productive Cough \"Milky\" Sputum    Diverticulitis Dx Early 2000's    Hepatitis Dx 18's    \"In The [facility]\"    [place] (hard of hearing)     Bilateral Ears    HTN (hypertension)     Hyperlipidemia     Nocturia     Shortness of breath on exertion     Slow urinary stream     Tingling     \"Tingling Right Hand\"    Wears dentures     Full Upper, Partial Lower    Wears glasses     Wears partial dentures     Lower       Past Surgical History:   Procedure Laterality Date    APPENDECTOMY  1960's    CARPAL TUNNEL RELEASE Right 06/22/2017    COLONOSCOPY  Last Done Early 2000's    Polyps Removed In Past    DENTAL SURGERY      Teeth Extracted In Past    EYE SURGERY Left Late 1980's    Cataract With Lens Implant    FRACTURE SURGERY Left 2000's    Broken Left Wrist    HERNIA REPAIR  2974'C    Umbilical Hernia Repair    KNEE ARTHROSCOPY Right 04/13/2017    meniscus repair    TONSILLECTOMY AND Ecchymosis:  [] Abrasion:  [] Laceration:   [] Scar / Surgical incision:  [] Orthopedic appliance:     Range of Motion:  [x] Normal Hand AROM     [x] Normal Hand PROM  [x]Can make a fist    [x]Thumb tip can touch pinky tip  [] Deferred due to fracture  Active Wrist Flexion:  []AROM = PROM   Active Wrist Extension:  []AROM = PROM   Active Radial Deviation:  []AROM = PROM   Active Ulnar Deviation:  []AROM = PROM   Passive Wrist Flexion:  []AROM = PROM   Passive Wrist Extension:  []AROM = PROM   Passive Radial Deviation:  []AROM = PROM   Passive Ulnar Deviation:  []AROM = PROM   Finger AROM:  []AROM = PROM   Finger PROM:  []AROM = PROM     Motor Function:   [x] No gross motor weakness of wrist  [x] No gross motor weakness of hand  []Thumbs Up    []Pointer finger    []OK sign    []Cross fingers    []Number 5  [] Motor weakness:     Neurologic:  [x] Sensation to light touch intact. [] Impaired:  [] Decreased sensation to light touch over median nerve distribution. [] Decreased sensation to light touch over ulnar nerve distribution. [x] Deep tendon reflexes intact. [] Impaired:  [x] Coordination / proprioception intact. [] Impaired:     Palpation: (Not tested if not marked)     Normal Tenderness Swelling Crepitation Calor   Thumb: [x] [] [] [] []   Index: [x] [] [] [] []   Middle: [x] [] [] [] []   Ring: [x] [] [] [] []   Small: [x] [] [] [] []   A-1 Pulley: [x] [] [] [] []   MCP Joint: [x] [] [] [] []   Metacarpal: [x] [] [] [] []   Palmar Hand: [] [x] [] [] []   Snuff Box: [] [] [] [] []   Other: [] [] [] [] []     Comment:       ASSESSMENT:     1. S/P trigger finger release     2. Dupuytren's contracture of hand           PLAN:   · Diagnostic and treatment options discussed. Diagnosis explained. Natural history discussed. Patient's questions answered.   · No signs of infection or stitch abscess  · May be residual scar tissue or secondary to Dupuytren's contracture  · Continue hand/finger ROM  · Return to office

## 2017-10-20 ENCOUNTER — OFFICE VISIT (OUTPATIENT)
Dept: FAMILY MEDICINE CLINIC | Age: 67
End: 2017-10-20

## 2017-10-20 VITALS
WEIGHT: 247 LBS | HEART RATE: 92 BPM | BODY MASS INDEX: 37.44 KG/M2 | SYSTOLIC BLOOD PRESSURE: 122 MMHG | OXYGEN SATURATION: 93 % | HEIGHT: 68 IN | DIASTOLIC BLOOD PRESSURE: 70 MMHG

## 2017-10-20 DIAGNOSIS — B07.8 OTHER VIRAL WARTS: Primary | ICD-10-CM

## 2017-10-20 DIAGNOSIS — N52.9 VASCULOGENIC ERECTILE DYSFUNCTION, UNSPECIFIED VASCULOGENIC ERECTILE DYSFUNCTION TYPE: ICD-10-CM

## 2017-10-20 PROCEDURE — 17110 DESTRUCTION B9 LES UP TO 14: CPT | Performed by: FAMILY MEDICINE

## 2017-10-20 PROCEDURE — 99214 OFFICE O/P EST MOD 30 MIN: CPT | Performed by: FAMILY MEDICINE

## 2017-10-20 RX ORDER — SILDENAFIL 50 MG/1
50 TABLET, FILM COATED ORAL PRN
Qty: 2 TABLET | Refills: 0 | COMMUNITY
Start: 2017-10-20 | End: 2018-09-04

## 2017-10-20 ASSESSMENT — PATIENT HEALTH QUESTIONNAIRE - PHQ9
1. LITTLE INTEREST OR PLEASURE IN DOING THINGS: 0
2. FEELING DOWN, DEPRESSED OR HOPELESS: 0
SUM OF ALL RESPONSES TO PHQ QUESTIONS 1-9: 0
SUM OF ALL RESPONSES TO PHQ9 QUESTIONS 1 & 2: 0

## 2017-10-20 NOTE — PROGRESS NOTES
S: The patient complains of wart on the right knee present for several month(s). This was treated with liquid nitrogen 2 weeks ago and wart has decreased in size significantly    Erectile Dysfunction: Patient complains of erectile dysfunction. Onset of dysfunction was several years ago and was gradual in onset. Patient states the nature of difficulty is both attaining and maintaining erection. Full erections occur with intercourse. Partial erections occur with intercourse. Libido is not affected. Risk factors for ED include cardiovascular disease. Patient denies history of cranial, spinal, or pelvic trauma and pelvic radiation. Patient's expectations as to sexual function to return to normal.  Patient's description of relationship w/partner good. Previous treatment of ED includes Viagra with good results. He wants another sample. ROS: No TIA's or unusual headaches, no dysphagia. No prolonged cough. No dyspnea or chest pain on exertion. No abdominal pain, change in bowel habits, black or bloody stools. No urinary tract or BPH symptoms. No new or unusual musculoskeletal symptoms.     Past Medical History:   Diagnosis Date    Acid reflux     Anxiety     Arthritis     \"Right Knee\"    Asthma     Chronic back pain     Cough     Occ Productive Cough \"Milky\" Sputum    Diverticulitis Dx Early 2000's    Hepatitis Dx 18's    \"In [facility]\"    [place] (hard of hearing)     Bilateral Ears    HTN (hypertension)     Hyperlipidemia     Nocturia     Shortness of breath on exertion     Slow urinary stream     Tingling     \"Tingling Right Hand\"    Wears dentures     Full Upper, Partial Lower    Wears glasses     Wears partial dentures     Lower     Past Surgical History:   Procedure Laterality Date    APPENDECTOMY  1960's    CARPAL TUNNEL RELEASE Right 06/22/2017    COLONOSCOPY  Last Done Early 2000's    Polyps Removed In Past    DENTAL SURGERY      Teeth Extracted In Past    EYE SURGERY Left Late 1980's    Cataract With Lens Implant    FRACTURE SURGERY Left 2000's    Broken Left Wrist    HERNIA REPAIR  4323'G    Umbilical Hernia Repair    KNEE ARTHROSCOPY Right 04/13/2017    meniscus repair    TONSILLECTOMY AND ADENOIDECTOMY  1960's          O: Exam discloses typical wart on right knee. A: Viral wart  ED     P: The treatments, side effects and failure rates are discussed. Liquid nitrogen was applied to each wart. The expected skin reaction including erythema, pain, scabbing, blistering and hypopigmented scar formation was discussed. See at intervals until warts resolved.   viagra sample given

## 2017-11-17 NOTE — TELEPHONE ENCOUNTER
Patient scheduled for an appointment on 11/28/17 for knee injections. Please ensure medication is in stock.

## 2017-11-21 NOTE — TELEPHONE ENCOUNTER
Is he scheduled for three injections or 1? What medication does patient think hes getting? Dont know what medication you need me to stock.

## 2017-11-28 ENCOUNTER — OFFICE VISIT (OUTPATIENT)
Dept: ORTHOPEDIC SURGERY | Age: 67
End: 2017-11-28

## 2017-11-28 VITALS — HEIGHT: 67 IN | RESPIRATION RATE: 16 BRPM | BODY MASS INDEX: 38.77 KG/M2 | WEIGHT: 247 LBS

## 2017-11-28 DIAGNOSIS — M54.50 ACUTE RIGHT-SIDED LOW BACK PAIN WITHOUT SCIATICA: ICD-10-CM

## 2017-11-28 DIAGNOSIS — M17.11 PRIMARY OSTEOARTHRITIS OF RIGHT KNEE: Primary | ICD-10-CM

## 2017-11-28 PROCEDURE — 99213 OFFICE O/P EST LOW 20 MIN: CPT | Performed by: PHYSICIAN ASSISTANT

## 2017-11-28 PROCEDURE — 20610 DRAIN/INJ JOINT/BURSA W/O US: CPT | Performed by: PHYSICIAN ASSISTANT

## 2017-11-28 PROCEDURE — 73502 X-RAY EXAM HIP UNI 2-3 VIEWS: CPT | Performed by: PHYSICIAN ASSISTANT

## 2017-11-28 RX ORDER — CYCLOBENZAPRINE HYDROCHLORIDE 7.5 MG/1
7.5 TABLET, FILM COATED ORAL 3 TIMES DAILY PRN
Qty: 30 TABLET | Refills: 0 | Status: SHIPPED | OUTPATIENT
Start: 2017-11-28 | End: 2017-12-08

## 2017-11-28 RX ORDER — METHYLPREDNISOLONE 4 MG/1
TABLET ORAL
Qty: 1 KIT | Refills: 0 | Status: SHIPPED | OUTPATIENT
Start: 2017-11-28 | End: 2017-12-04

## 2017-11-28 NOTE — PATIENT INSTRUCTIONS
Plan:  Needs letter for Temp handicap placard. Continue taking Advil or Aleve along with tylenol at the same time. Medrol misha script sent to pharmacy. Monovisc injection given today. Rest knee for 24-48 hrs. Apply a compressive ACE bandage. Rest and elevate the affected painful area. Apply cold compresses intermittently as needed. As pain recedes, begin normal activities slowly as tolerated. Call if symptoms persist.  Follow up as needed for persistent pain.

## 2017-11-28 NOTE — PROGRESS NOTES
Scribe Authentication Statement  Nikolas Giordano Chirag LuisMis, scribed portions of this documentation for and in the presence of Teodoro Rey PA-C on 11/28/17 at 10:21 AM.    ORTHOPEDIC EVALUATION      History of Present Illness (HPI):   Patient's PCP is listed as:  Cielo Fontaine MD.    This is a:  []Consult Visit     []New Patient Visit     [x]Established Patient Visit    Chief Complaint   Patient presents with    Follow-up     Right knee pain       Informant patient   Setting when problem first occurred home   Mechanism Had a fall to the floor    Location where pain is most intense Right  Posterior of the right hip area   Quality / Description sharp, stabbing, throbbing and constant   Severity / Intensity 10 on scale of 1-10   Onset 11-26-17   Timing gradually worsening   Radiates does not radiate   Aggravating factors activity, stair climbing, sleeping, weight bearing   Relieving factors rest, avoiding painful activities   Associated manifestations denies erythema or warmth and denies numbness, tingling, weakness   Previous tests and diagnostic procedures none and (see below for result details)   Previous problems in this area underlying chronic DJD likely   Previous treatment none   Effect on patient's life difficulty with walking and difficulty with getting dressed     Review of Systems (ROS):   Problem = 1 , Extended = 2-9 , Complete = 10  Unless otherwise specified in this note, the R.O.S. is reviewed today with the patient and is as above-      PAST HISTORY:   Unless otherwise specified in this note, the past history is reviewed today with the patient and is as follows-    Allergies   Allergen Reactions    Sulfa Antibiotics Rash       Current Outpatient Prescriptions   Medication Sig Dispense Refill    sildenafil (VIAGRA) 50 MG tablet Take 1 tablet by mouth as needed for Erectile Dysfunction 2 tablet 0    sildenafil (VIAGRA) 100 MG tablet Take 1 tablet by mouth as needed for Erectile Dysfunction 2 tablet 0    sildenafil (VIAGRA) 100 MG tablet Take 1 tablet by mouth as needed for Erectile Dysfunction 2 tablet 0    GAVILYTE-C 240 g solution       famotidine (PEPCID) 40 MG tablet Take 1 tablet by mouth 2 times daily 60 tablet 5    amLODIPine (NORVASC) 5 MG tablet Take 1 tablet by mouth every morning 30 tablet 5    lisinopril (PRINIVIL;ZESTRIL) 5 MG tablet Take 1 tablet by mouth every morning 30 tablet 5    furosemide (LASIX) 20 MG tablet Take 1 tablet by mouth daily 30 tablet 5    fluticasone-salmeterol (ADVAIR DISKUS) 250-50 MCG/DOSE AEPB INHALE 1 PUFF INTO THE LUNGS EVERY 12 HOURS 60 each 5    albuterol sulfate HFA (PROAIR HFA) 108 (90 Base) MCG/ACT inhaler Inhale 2 puffs into the lungs every 6 hours as needed for Wheezing 1 Inhaler 5    HYDROcodone-acetaminophen (NORCO) 5-325 MG per tablet Take 1 tablet by mouth every 4 hours as needed for Pain . 40 tablet 0    ibuprofen (ADVIL;MOTRIN) 200 MG tablet Take 800 mg by mouth daily      Flurandrenolide (CORDRAN) 4 MCG/SQCM TAPE Apply 1/2 inch tape daily to lesion 1 each 5     No current facility-administered medications for this visit.         Past Medical History:   Diagnosis Date    Acid reflux     Anxiety     Arthritis     \"Right Knee\"    Asthma     Chronic back pain     Cough     Occ Productive Cough \"Milky\" Sputum    Diverticulitis Dx Early 2000's    Hepatitis Dx 18's    \"In [facility]\"    [place] (hard of hearing)     Bilateral Ears    HTN (hypertension)     Hyperlipidemia     Nocturia     Shortness of breath on exertion     Slow urinary stream     Tingling     \"Tingling Right Hand\"    Wears dentures     Full Upper, Partial Lower    Wears glasses     Wears partial dentures     Lower       Past Surgical History:   Procedure Laterality Date    APPENDECTOMY  1960's    CARPAL TUNNEL RELEASE Right 06/22/2017    COLONOSCOPY  Last Done Early 2000's    Polyps Removed In Past    DENTAL SURGERY      Teeth Extracted In Past    EYE SURGERY Left Late 1980's    Cataract With Lens Implant    FRACTURE SURGERY Left 2000's    Broken Left Wrist    HERNIA REPAIR  1056'F    Umbilical Hernia Repair    KNEE ARTHROSCOPY Right 04/13/2017    meniscus repair    TONSILLECTOMY AND ADENOIDECTOMY  26's       Family History   Problem Relation Age of Onset    Stroke Mother     Heart Disease Mother     Cancer Father      Prostate Cancer    Cancer Son      Leukemia       Social History     Social History    Marital status:      Spouse name: N/A    Number of children: 3    Years of education: N/A     Social History Main Topics    Smoking status: Current Every Day Smoker     Packs/day: 1.00     Years: 52.00     Types: Cigarettes     Start date: 1965    Smokeless tobacco: Never Used    Alcohol use Yes      Comment: \"Drink About 2 To 5 Beers A Day\"    Drug use: No    Sexual activity: Not Currently     Other Topics Concern    None     Social History Narrative    Single, 3 kids, works for [employer] as a          ORTHOPEDIC CONSTITUTION EXAM:     Vital Signs:  Resp 16   Ht 5' 7\" (1.702 m)   Wt 247 lb (112 kg)   BMI 38.69 kg/m²     Constitution:  Generally, the patient is [x] alert, [x] appears stated age, and [x] in no distress. General body habitus is:   []Cachectic  []Thin  []Normal  []Overweight  []Obese  []Morbidly Obese     Psychiatric:   Judgement and insight is:  [x]Good    []Poor  Oriented to:  [x]person, [x]place, [x]time. Mood for circumstances is:  [x]Appropriate   []Inappropriate    Gait and Station:   Gait is:  []Normal  [x]Antalgic   []Trendelenburg   []Wide   []Unsteady   []Other:  Assistive Device:  [x]Cane    []Crutches    []Walker    []Wheelchair    []Gurney  Weight bearing on injured extremity:  [x]Is able   []Is not able      ORTHOPEDIC HIP EXAM:     HIP EXAM:  [x]Right []Left  Circulation:  [x] The limb is warm and well perfused. [x] Capillary refill is intact.   [] Edema:    Inspection:  [x] Skin intact without abrasion or lacerations. [x] Leg lengths equal  [] Unequal leg length:  [] Ecchymosis:  [] Abrasion:  [] Laceration:   [] Scar / Surgical incision:  [] Orthopedic appliance:     Range of Motion:  [x] Normal Hip AROM     [x] Normal Hip PROM     [] Deferred due to fracture  Active Hip Flexion:  []AROM = PROM   Active Hip Extension:  []AROM = PROM   Active Hip Abduction:  []AROM = PROM   Active Hip Internal Rotation:  []AROM = PROM   Active Hip External Rotation:  []AROM = PROM   Passive Hip Flexion:  []AROM = PROM   Passive Hip Extension:  []AROM = PROM   Passive Hip Abduction:  []AROM = PROM   Passive Hip Internal Rotation:  []AROM = PROM   Passive Hip External Rotation:  []AROM = PROM     Motor Function:  [x] No gross motor weakness of hip [x] No gross motor weakness of knee  [x] No gross motor weakness of ankle    [x] No gross motor weakness of great toe  [] Motor weakness:     Neurologic:  [x] Sensation to light touch intact. [] Impaired:  [x] Deep tendon reflexes intact. [] Impaired:  [x] Coordination / proprioception intact. [] Impaired:     Palpation: (Not tested if not marked)     Normal Tenderness Swelling Crepitation Calor   Greater Trochanter: [x] [] [] [] []   Piriformis: [x] [] [] [] []   Sacro-Iliac Joint: [] [x] [] [] []   ASIS [x] [] [] [] []   Ischial Tuberosity [x] [] [] [] []   Proximal Quadriceps: [x] [] [] [] []   Symphysis Pubis: [] [] [] [] []   Other: [] [] [] [] []     Comment:     Provocative Tests: (Not tested if not marked)   Negative Positive Positive Findings   Straight Leg Raise: [x] []    [place]field Test: [x] []    [place] Test: [x] []    Piriformis Test: [x] []    [place] Test: [x] []    MORGAN: [x] []    FADIR: [x] []    Rob Test: [x] []    90-90 St. Leg (Hamstrings): [] []    Other: [] []    Other: [] []      Contralateral Examination:  Examination of the contralateral side is performed for comparison.   Unless otherwise specified HYDROcodone-acetaminophen (NORCO) 5-325 MG per tablet Take 1 tablet by mouth every 4 hours as needed for Pain . 40 tablet 0    ibuprofen (ADVIL;MOTRIN) 200 MG tablet Take 800 mg by mouth daily      Flurandrenolide (CORDRAN) 4 MCG/SQCM TAPE Apply 1/2 inch tape daily to lesion 1 each 5     No current facility-administered medications for this visit. PHYSICAL EXAM:   Resp 16   Ht 5' 7\" (1.702 m)   Wt 247 lb (112 kg)   BMI 38.69 kg/m²     The right knee is examined:  Inspection:  Skin is intact. No erythema. Palpation:  No swelling or acute tenderness. Neuro/Vascular/Motor:  Sensation to light touch intact. Capillary refill brisk. No focal motor deficits. DIAGNOSIS:     1. Primary osteoarthritis of right knee        PROCEDURE:   Right Knee Aspiration / Injection Procedure:  Multiple treatment options were discussed. Joint injection was recommended. Details of the procedure, potential risks, and potential benefits were discussed. Patient's questions were answered. Patient elected to proceed with procedure. Medication: Lot #: [id number]  Procedure:  Sterile technique was used as the skin over the injection site was double preped with betadine and alcohol. The knee joint was then injected with the above listed medication. A sterile bandage was placed over the injection site. The patient tolerated the procedure well without complication. The patient is scheduled for the second injection in one week. Plan:  Needs letter for Temp handicap placard. Continue taking Advil or Aleve along with Tylenol at the same time. Medrol misha script sent to pharmacy. Monovisc injection given today. Rest knee for 24-48 hrs. Apply a compressive ACE bandage. Rest and elevate the affected painful area. Apply cold compresses intermittently as needed. As pain recedes, begin normal activities slowly as tolerated. Call if symptoms persist.  Follow-up as-needed for persistent pain.     Lisa Estrada PA-C

## 2017-12-05 ENCOUNTER — OFFICE VISIT (OUTPATIENT)
Dept: FAMILY MEDICINE CLINIC | Age: 67
End: 2017-12-05

## 2017-12-05 VITALS
HEART RATE: 72 BPM | SYSTOLIC BLOOD PRESSURE: 140 MMHG | DIASTOLIC BLOOD PRESSURE: 80 MMHG | TEMPERATURE: 96.3 F | OXYGEN SATURATION: 98 % | WEIGHT: 253 LBS | BODY MASS INDEX: 39.63 KG/M2

## 2017-12-05 DIAGNOSIS — B07.8 OTHER VIRAL WARTS: Primary | ICD-10-CM

## 2017-12-05 PROCEDURE — 17110 DESTRUCTION B9 LES UP TO 14: CPT | Performed by: FAMILY MEDICINE

## 2017-12-05 PROCEDURE — 99212 OFFICE O/P EST SF 10 MIN: CPT | Performed by: FAMILY MEDICINE

## 2017-12-05 ASSESSMENT — PATIENT HEALTH QUESTIONNAIRE - PHQ9
2. FEELING DOWN, DEPRESSED OR HOPELESS: 0
1. LITTLE INTEREST OR PLEASURE IN DOING THINGS: 0
SUM OF ALL RESPONSES TO PHQ9 QUESTIONS 1 & 2: 0
SUM OF ALL RESPONSES TO PHQ QUESTIONS 1-9: 0

## 2017-12-05 NOTE — PROGRESS NOTES
S: The patient is here for follow up of warts. O: Exam discloses wart(s) on the left knee decreased in size. A: Warts, improved, not yet resolved  P: Repeat Liquid Nitrogen was applied; continue to see at intervals until resolved.

## 2017-12-06 ENCOUNTER — TELEPHONE (OUTPATIENT)
Dept: FAMILY MEDICINE CLINIC | Age: 67
End: 2017-12-06

## 2017-12-06 DIAGNOSIS — N52.9 VASCULOGENIC ERECTILE DYSFUNCTION, UNSPECIFIED VASCULOGENIC ERECTILE DYSFUNCTION TYPE: Primary | ICD-10-CM

## 2017-12-06 NOTE — TELEPHONE ENCOUNTER
Cialis comes in 5, 10, and 20 mg pills. The only ED medication that comes 100 mg prescription is Viagra. I can send a prescription for that to his pharmacy if he would like.

## 2017-12-06 NOTE — TELEPHONE ENCOUNTER
Pt called state he forgot to ask you for an Rx for Cialis 100 mg     Pharmacy  [facility]

## 2017-12-07 RX ORDER — TADALAFIL 20 MG/1
20 TABLET ORAL PRN
Qty: 6 TABLET | Refills: 11 | Status: SHIPPED | OUTPATIENT
Start: 2017-12-07 | End: 2018-09-04

## 2017-12-07 NOTE — TELEPHONE ENCOUNTER
Pt called back and stated the Viagra is too expensive. He is requesting Cialis 20mg be sent to his pharmacy.

## 2018-01-22 ENCOUNTER — OFFICE VISIT (OUTPATIENT)
Dept: ORTHOPEDIC SURGERY | Age: 68
End: 2018-01-22

## 2018-01-22 ENCOUNTER — TELEPHONE (OUTPATIENT)
Dept: ORTHOPEDIC SURGERY | Age: 68
End: 2018-01-22

## 2018-01-22 VITALS — BODY MASS INDEX: 39.71 KG/M2 | HEIGHT: 67 IN | WEIGHT: 253 LBS | RESPIRATION RATE: 17 BRPM

## 2018-01-22 DIAGNOSIS — M17.11 PRIMARY OSTEOARTHRITIS OF RIGHT KNEE: Primary | ICD-10-CM

## 2018-01-22 DIAGNOSIS — Z01.818 PREOPERATIVE TESTING: ICD-10-CM

## 2018-01-22 PROCEDURE — 99214 OFFICE O/P EST MOD 30 MIN: CPT | Performed by: ORTHOPAEDIC SURGERY

## 2018-01-22 NOTE — PATIENT INSTRUCTIONS
PLAN:   Diagnostic and treatment options discussed. Diagnosis explained. Natural history discussed. Patient's questions answered. Surgery for right TKA discussed   Obtain medical clearance   Will need dental clearance   CT [employer] for pre op planning   FULL PAT  Follow up once clearances and testing are complete for pre op exam     Knee DJD, Failure of non-surgical treatment, wants TKA:  Patient returns having failed conservative treatment to date. Patient requests knee replacement surgery. Total knee arthroplasty is planned.   The patient is instructed to schedule an appointment with their PCP for preoperative medical clearance / risk stratification to include: CBC, CMP, HgA1c, Type and Screen, EKG, CXR, Coags, MRSA screening, UA with culture, ESR, CRP, and any other studies deemed appropriate by their PCP.     Target Lab Values and Patient Optimization:  · No active infection (Normal WBC, CRP, ESR, UA, MRSA)  · No previous local surgery or prior local infection at planned surgical site  · No immunosuppressant drugs  · anti-TNF agents (e.g. infliximab, etanercept, adalimumab, certolizumab, golimumab)  · antimetabolites (e.g leflunomide)  · corticosteroids  · No immunosuppressive conditions (dysplasia or neoplasia)  · poorly controlled diabetes mellitus (HBA1c >7)  · chronic renal disease  · acute liver failure  · malnutrition (eg. albumin <3.5; total serum leukocytes <800)  · HIV (CD4 counts <400)  · No inflammatory arthropathy  · rheumatoid arthritis  · psoriasis  · ankylosis spondylitis  · No \"at risk\" lifestyle factors  · morbid obesity  · smoking  · excessvice alcohol consumption  · intravenous drug use  · poor oral hygiene

## 2018-01-22 NOTE — PROGRESS NOTES
shortness of breath or cough. Heme:  Negative for easy bleeding, easy bruising, or DVT. Neuro:  Negative for numbness, tingling, weakness or seizures. Skin:  Negative for lacerations or abrasions. Endocrine:  Negative for polyuria, polydipsia, excess sweating, unintended weight changes. GI:  Negative for diarrhea, constipation, nausea, vomiting. :  Negative for frequency, hesitancy, hematuria, dysuria. Psych:  Negative for anxiety, depression, memory changes. MSK:  Other than the above listed chief complaint, negative for pain, stiffness, swelling, or instability.       PAST HISTORY:   Unless otherwise specified in this note, the past history is reviewed today with the patient and is as follows-    Allergies   Allergen Reactions    Sulfa Antibiotics Rash       Current Outpatient Prescriptions   Medication Sig Dispense Refill    tadalafil (CIALIS) 20 MG tablet Take 1 tablet by mouth as needed for Erectile Dysfunction 6 tablet 11    sildenafil (VIAGRA) 50 MG tablet Take 1 tablet by mouth as needed for Erectile Dysfunction 2 tablet 0    sildenafil (VIAGRA) 100 MG tablet Take 1 tablet by mouth as needed for Erectile Dysfunction 2 tablet 0    sildenafil (VIAGRA) 100 MG tablet Take 1 tablet by mouth as needed for Erectile Dysfunction 2 tablet 0    GAVILYTE-C 240 g solution       famotidine (PEPCID) 40 MG tablet Take 1 tablet by mouth 2 times daily 60 tablet 5    amLODIPine (NORVASC) 5 MG tablet Take 1 tablet by mouth every morning 30 tablet 5    lisinopril (PRINIVIL;ZESTRIL) 5 MG tablet Take 1 tablet by mouth every morning 30 tablet 5    furosemide (LASIX) 20 MG tablet Take 1 tablet by mouth daily 30 tablet 5    fluticasone-salmeterol (ADVAIR DISKUS) 250-50 MCG/DOSE AEPB INHALE 1 PUFF INTO THE LUNGS EVERY 12 HOURS 60 each 5    albuterol sulfate HFA (PROAIR HFA) 108 (90 Base) MCG/ACT inhaler Inhale 2 puffs into the lungs every 6 hours as needed for Wheezing 1 Inhaler 5    HYDROcodone-acetaminophen During surgery, your surgeon will make an incision (surgical cut) on the front of your knee. He or she will then replace the damaged part of your femur with a metal replacement piece, replace the damaged part of your tibia with a metal replacement piece and plastic surface, and may replace your kneecap with plastic kneecap. At the end of surgery, your incision will be closed with stitches or staples. There are two types of total knee replacements, cemented and uncemented. With cemented replacements, cement acts as a glue to attach the new artificial joint to the bone. Uncemented replacements have a metal coating with many small openings. Over time new bone grows and fills up the bone. The new bone growth attaches the bone to the artificial joint. Your surgeon may also use a combination of cemented and uncemented parts. The practitioner may have assistants to help during the procedure(s). These assistants may include other licensed physicians, doctors in training (residents), surgical assistants, nurses, medical students and students in other health care training programs (e.g., nursing, physician assistant, operating room technician) or other health care industry representatives which provide equipment support. These assistants may perform a variety of significant tasks while generally facilitating the procedure. I understand and it has been explained to me that conditions may arise during the procedure necessitating a different procedure or additional procedures to be performed and I authorize the practitioner and his/her designated assistants to perform such procedures as they deem necessary. I consent to any and all emergency treatment and medication that the practitioner or his/her agents may deem advisable. GENERAL RISKS OF A PROCEDURE  They include:         A. Small areas of the lungs may collapse, increasing the risk of chest                        infection (pneumonia). B.     Blood clots in the legs with pain and swelling. Rarely parts of this clot                  may break off and go to the lungs which can be fatal.       C.     A heart attack because of strain on the heart or a stroke. D. The possibility of death as a result of the procedure. E.     Infection of the wound and joint which may require further surgery       F. Damage to nerves at or near the procedure area (It is expected that                      there will be an area of residual skin numbness adjacent to the incision               site.)       G. Allergic reactions to medication or surgical materials. H.     Pain may be more than anticipated following the surgery. RISKS OF THIS PROCEDURE  They include:    a. The knee joint can dislocate. Re-operation is required to correct this. b. The bones around the joint may break during or after surgery. Further surgery may be required to repair the break. c. The artificial joint may fail or wear out. Surgical revision of the knee joint replacement may be required. d. Damage to the peroneal nerve around the knee during surgery. This may be temporary or permanent.   e. Damage to the blood vessel behind the knee during surgery. Surgery on the blood vessel will be needed and sometimes leg amputation. f. Damage to the nerves may cause a burning pain and inability to straighten the leg. A nerve block may be used to relieve the pain and allow for the leg to be manipulated. g. Stiffening of the knee after surgery causing difficulty in walking and sitting and pain on movement. Manipulation and possibly further surgery may be required.  h. In some people, healing of the wound may be abnormal and the wound can be thickened and red and the scar may be painful.  i. Infection to the prosthesis via the bloodstream in the years following joint replacement surgery. The knee may have to be removed if this occurs.   To prevent this, you will

## 2018-01-29 ENCOUNTER — OFFICE VISIT (OUTPATIENT)
Dept: FAMILY MEDICINE CLINIC | Age: 68
End: 2018-01-29

## 2018-01-29 VITALS
TEMPERATURE: 97.3 F | BODY MASS INDEX: 36.99 KG/M2 | OXYGEN SATURATION: 94 % | WEIGHT: 258.4 LBS | HEART RATE: 92 BPM | SYSTOLIC BLOOD PRESSURE: 120 MMHG | HEIGHT: 70 IN | DIASTOLIC BLOOD PRESSURE: 88 MMHG

## 2018-01-29 DIAGNOSIS — Z01.818 PRE-PROCEDURAL EXAMINATION: Primary | ICD-10-CM

## 2018-01-29 DIAGNOSIS — M17.11 PRIMARY OSTEOARTHRITIS OF RIGHT KNEE: ICD-10-CM

## 2018-01-29 LAB
A/G RATIO: 1.2 (CALC) (ref 0.8–2.6)
ALBUMIN SERPL-MCNC: 4.3 GM/DL (ref 3.5–5.2)
ALP BLD-CCNC: 77 U/L (ref 23–144)
ALT SERPL-CCNC: 23 U/L (ref 0–60)
AST SERPL-CCNC: 23 U/L (ref 0–46)
BASOPHILS ABSOLUTE: 0.1 K/MM3 (ref 0–0.3)
BASOPHILS RELATIVE PERCENT: 0.6 % (ref 0–2)
BILIRUB SERPL-MCNC: 0.2 MG/DL (ref 0–1.2)
BUN / CREAT RATIO: 15 (CALC) (ref 7–25)
BUN BLDV-MCNC: 12 MG/DL (ref 3–29)
CALCIUM SERPL-MCNC: 9.5 MG/DL (ref 8.5–10.5)
CHLORIDE BLD-SCNC: 100 MEQ/L (ref 96–110)
CO2: 26 MEQ/L (ref 19–32)
COPY(IES) SENT TO:: NORMAL
CREAT SERPL-MCNC: 0.8 MG/DL
EOSINOPHILS ABSOLUTE: 0.3 K/MM3 (ref 0–0.6)
EOSINOPHILS RELATIVE PERCENT: 2.6 % (ref 0–7)
GFR SERPL CREATININE-BSD FRML MDRD: 94 ML/MIN/1.73M2
GLOBULIN: 3.6 GM/DL (CALC) (ref 1.9–3.6)
GLUCOSE BLD-MCNC: 88 MG/DL
HCT VFR BLD CALC: 51.3 % (ref 41–50)
HEMOGLOBIN: 17.1 G/DL (ref 13.8–17.2)
LEUKOCYTES, UA: 11.9 K/MM3 (ref 3.8–10.8)
LYMPHOCYTES ABSOLUTE: 3.9 K/MM3 (ref 0.9–4.1)
LYMPHOCYTES RELATIVE PERCENT: 32.9 % (ref 18–47)
MCH RBC QN AUTO: 31.9 PG (ref 27–33)
MCHC RBC AUTO-ENTMCNC: 33.2 G/DL (ref 32–36)
MCV RBC AUTO: 95.8 FL (ref 80–100)
MONOCYTES ABSOLUTE: 1.5 K/MM3 (ref 0.2–1.1)
MONOCYTES RELATIVE PERCENT: 12.9 % (ref 0–14)
NEUTROPHILS ABSOLUTE: 6.1 K/MM3 (ref 1.5–7.8)
PDW BLD-RTO: 13.2 % (ref 9–15)
PLATELET # BLD: 298 K/MM3 (ref 130–400)
POTASSIUM SERPL-SCNC: 4.9 MEQ/L (ref 3.4–5.3)
RBC # BLD: 5.36 M/MM3 (ref 4.4–5.8)
SEGMENTED NEUTROPHILS RELATIVE PERCENT: 51 % (ref 40–75)
SODIUM BLD-SCNC: 141 MEQ/L (ref 135–148)
TOTAL PROTEIN: 7.9 GM/DL (ref 6–8.3)

## 2018-01-29 PROCEDURE — 99214 OFFICE O/P EST MOD 30 MIN: CPT | Performed by: FAMILY MEDICINE

## 2018-01-29 PROCEDURE — 93040 RHYTHM ECG WITH REPORT: CPT | Performed by: FAMILY MEDICINE

## 2018-01-29 ASSESSMENT — PATIENT HEALTH QUESTIONNAIRE - PHQ9
1. LITTLE INTEREST OR PLEASURE IN DOING THINGS: 0
SUM OF ALL RESPONSES TO PHQ9 QUESTIONS 1 & 2: 0
2. FEELING DOWN, DEPRESSED OR HOPELESS: 0
SUM OF ALL RESPONSES TO PHQ QUESTIONS 1-9: 0

## 2018-02-21 NOTE — TELEPHONE ENCOUNTER
Called the number for [id number] and spoke to a physician representative. The request was denied at the [id number] level. The representative stated that [facility] no longer approves CT pre-operatively for total knee arthroplasty. I requested the appeal get sent to the next level of review.

## 2018-02-21 NOTE — TELEPHONE ENCOUNTER
Called [facility] AIM member dept to obtain PA for CT, was denied, a peer to peer is necessary for further approval. Call [phone], member ID: Alex Rodrigez [phone]

## 2018-02-27 ENCOUNTER — TELEPHONE (OUTPATIENT)
Dept: ORTHOPEDIC SURGERY | Age: 68
End: 2018-02-27

## 2018-03-01 DIAGNOSIS — K21.9 GASTROESOPHAGEAL REFLUX DISEASE, ESOPHAGITIS PRESENCE NOT SPECIFIED: Chronic | ICD-10-CM

## 2018-03-01 RX ORDER — FAMOTIDINE 40 MG/1
40 TABLET, FILM COATED ORAL 2 TIMES DAILY
Qty: 60 TABLET | Refills: 5 | Status: SHIPPED | OUTPATIENT
Start: 2018-03-01 | End: 2018-08-08 | Stop reason: SDUPTHER

## 2018-03-13 DIAGNOSIS — M17.11 PRIMARY OSTEOARTHRITIS OF RIGHT KNEE: Primary | ICD-10-CM

## 2018-03-26 DIAGNOSIS — I10 ESSENTIAL HYPERTENSION: ICD-10-CM

## 2018-03-26 RX ORDER — LISINOPRIL 5 MG/1
5 TABLET ORAL EVERY MORNING
Qty: 30 TABLET | Refills: 5 | Status: SHIPPED | OUTPATIENT
Start: 2018-03-26 | End: 2018-09-04 | Stop reason: SDUPTHER

## 2018-03-26 RX ORDER — AMLODIPINE BESYLATE 5 MG/1
5 TABLET ORAL EVERY MORNING
Qty: 30 TABLET | Refills: 5 | Status: SHIPPED | OUTPATIENT
Start: 2018-03-26 | End: 2018-09-04 | Stop reason: SDUPTHER

## 2018-03-29 ENCOUNTER — TELEPHONE (OUTPATIENT)
Dept: FAMILY MEDICINE CLINIC | Age: 68
End: 2018-03-29

## 2018-03-29 DIAGNOSIS — N52.9 ERECTILE DYSFUNCTION, UNSPECIFIED ERECTILE DYSFUNCTION TYPE: Primary | ICD-10-CM

## 2018-03-29 RX ORDER — SILDENAFIL CITRATE 20 MG/1
20 TABLET ORAL PRN
Qty: 30 TABLET | Refills: 5 | Status: SHIPPED | OUTPATIENT
Start: 2018-03-29 | End: 2018-09-04

## 2018-04-09 ENCOUNTER — OFFICE VISIT (OUTPATIENT)
Dept: ORTHOPEDIC SURGERY | Age: 68
End: 2018-04-09

## 2018-04-09 VITALS — HEIGHT: 68 IN | WEIGHT: 258 LBS | BODY MASS INDEX: 39.1 KG/M2 | RESPIRATION RATE: 16 BRPM

## 2018-04-09 DIAGNOSIS — M17.11 PRIMARY OSTEOARTHRITIS OF RIGHT KNEE: Primary | ICD-10-CM

## 2018-04-09 PROCEDURE — 20610 DRAIN/INJ JOINT/BURSA W/O US: CPT | Performed by: ORTHOPAEDIC SURGERY

## 2018-04-09 PROCEDURE — 99999 PR OFFICE/OUTPT VISIT,PROCEDURE ONLY: CPT | Performed by: ORTHOPAEDIC SURGERY

## 2018-04-19 ENCOUNTER — OFFICE VISIT (OUTPATIENT)
Dept: FAMILY MEDICINE CLINIC | Age: 68
End: 2018-04-19

## 2018-04-19 VITALS
BODY MASS INDEX: 38.1 KG/M2 | OXYGEN SATURATION: 96 % | SYSTOLIC BLOOD PRESSURE: 130 MMHG | WEIGHT: 250.6 LBS | HEART RATE: 92 BPM | DIASTOLIC BLOOD PRESSURE: 60 MMHG | TEMPERATURE: 97.5 F

## 2018-04-19 DIAGNOSIS — N52.9 ERECTILE DYSFUNCTION, UNSPECIFIED ERECTILE DYSFUNCTION TYPE: ICD-10-CM

## 2018-04-19 DIAGNOSIS — Z72.0 TOBACCO USE: ICD-10-CM

## 2018-04-19 DIAGNOSIS — B07.8 OTHER VIRAL WARTS: Primary | ICD-10-CM

## 2018-04-19 DIAGNOSIS — M17.11 PRIMARY OSTEOARTHRITIS OF RIGHT KNEE: ICD-10-CM

## 2018-04-19 DIAGNOSIS — R22.9 LOCALIZED SKIN MASS, LUMP, OR SWELLING: ICD-10-CM

## 2018-04-19 PROCEDURE — 17111 DESTRUCTION B9 LESIONS 15/>: CPT | Performed by: FAMILY MEDICINE

## 2018-04-19 PROCEDURE — 99214 OFFICE O/P EST MOD 30 MIN: CPT | Performed by: FAMILY MEDICINE

## 2018-04-19 RX ORDER — SILDENAFIL 100 MG/1
100 TABLET, FILM COATED ORAL PRN
Qty: 2 TABLET | Refills: 0 | COMMUNITY
Start: 2018-04-19 | End: 2018-09-04

## 2018-04-19 ASSESSMENT — PATIENT HEALTH QUESTIONNAIRE - PHQ9
SUM OF ALL RESPONSES TO PHQ QUESTIONS 1-9: 0
1. LITTLE INTEREST OR PLEASURE IN DOING THINGS: 0
SUM OF ALL RESPONSES TO PHQ9 QUESTIONS 1 & 2: 0
2. FEELING DOWN, DEPRESSED OR HOPELESS: 0

## 2018-05-29 DIAGNOSIS — N52.9 VASCULOGENIC ERECTILE DYSFUNCTION, UNSPECIFIED VASCULOGENIC ERECTILE DYSFUNCTION TYPE: ICD-10-CM

## 2018-05-29 RX ORDER — SILDENAFIL 100 MG/1
100 TABLET, FILM COATED ORAL PRN
Qty: 9 TABLET | Refills: 5 | Status: SHIPPED | OUTPATIENT
Start: 2018-05-29 | End: 2018-09-04 | Stop reason: SDUPTHER

## 2018-08-08 ENCOUNTER — TELEPHONE (OUTPATIENT)
Dept: FAMILY MEDICINE CLINIC | Age: 68
End: 2018-08-08

## 2018-08-08 DIAGNOSIS — K21.9 GASTROESOPHAGEAL REFLUX DISEASE, ESOPHAGITIS PRESENCE NOT SPECIFIED: Chronic | ICD-10-CM

## 2018-08-08 RX ORDER — FAMOTIDINE 40 MG/1
40 TABLET, FILM COATED ORAL 2 TIMES DAILY
Qty: 60 TABLET | Refills: 0 | Status: SHIPPED | OUTPATIENT
Start: 2018-08-08 | End: 2018-08-28 | Stop reason: SDUPTHER

## 2018-08-28 DIAGNOSIS — K21.9 GASTROESOPHAGEAL REFLUX DISEASE, ESOPHAGITIS PRESENCE NOT SPECIFIED: Chronic | ICD-10-CM

## 2018-08-28 RX ORDER — FAMOTIDINE 40 MG/1
40 TABLET, FILM COATED ORAL 2 TIMES DAILY
Qty: 60 TABLET | Refills: 0 | Status: SHIPPED | OUTPATIENT
Start: 2018-08-28 | End: 2018-09-04 | Stop reason: SDUPTHER

## 2018-08-31 ENCOUNTER — TELEPHONE (OUTPATIENT)
Dept: ORTHOPEDIC SURGERY | Age: 68
End: 2018-08-31

## 2018-09-04 ENCOUNTER — OFFICE VISIT (OUTPATIENT)
Dept: FAMILY MEDICINE CLINIC | Age: 68
End: 2018-09-04

## 2018-09-04 VITALS
BODY MASS INDEX: 36.83 KG/M2 | TEMPERATURE: 97.9 F | OXYGEN SATURATION: 97 % | HEART RATE: 87 BPM | DIASTOLIC BLOOD PRESSURE: 80 MMHG | WEIGHT: 242.2 LBS | SYSTOLIC BLOOD PRESSURE: 122 MMHG

## 2018-09-04 DIAGNOSIS — J41.1 MUCOPURULENT CHRONIC BRONCHITIS (HCC): ICD-10-CM

## 2018-09-04 DIAGNOSIS — I10 ESSENTIAL HYPERTENSION: Primary | ICD-10-CM

## 2018-09-04 DIAGNOSIS — M17.11 PRIMARY OSTEOARTHRITIS OF RIGHT KNEE: ICD-10-CM

## 2018-09-04 DIAGNOSIS — K21.9 GASTROESOPHAGEAL REFLUX DISEASE, ESOPHAGITIS PRESENCE NOT SPECIFIED: Chronic | ICD-10-CM

## 2018-09-04 DIAGNOSIS — N52.9 VASCULOGENIC ERECTILE DYSFUNCTION, UNSPECIFIED VASCULOGENIC ERECTILE DYSFUNCTION TYPE: ICD-10-CM

## 2018-09-04 DIAGNOSIS — Z13.6 SCREENING FOR CARDIOVASCULAR CONDITION: ICD-10-CM

## 2018-09-04 DIAGNOSIS — M75.22 BICEPS TENDONITIS ON LEFT: ICD-10-CM

## 2018-09-04 DIAGNOSIS — K43.9 VENTRAL HERNIA WITHOUT OBSTRUCTION OR GANGRENE: ICD-10-CM

## 2018-09-04 LAB
ALBUMIN SERPL-MCNC: 3.9 G/DL
ALP BLD-CCNC: 63 U/L
ALT SERPL-CCNC: 25 U/L
ANION GAP SERPL CALCULATED.3IONS-SCNC: 1 MMOL/L
AST SERPL-CCNC: 25 U/L
BILIRUB SERPL-MCNC: 0.5 MG/DL (ref 0.1–1.4)
BUN BLDV-MCNC: 13 MG/DL
CALCIUM SERPL-MCNC: 9.2 MG/DL
CHLORIDE BLD-SCNC: 100 MMOL/L
CHOLESTEROL, TOTAL: 147 MG/DL
CHOLESTEROL/HDL RATIO: NORMAL
CO2: 22 MMOL/L
CREAT SERPL-MCNC: 0.7 MG/DL
GFR CALCULATED: 98
GLUCOSE BLD-MCNC: 94 MG/DL
HDLC SERPL-MCNC: 42 MG/DL (ref 35–70)
LDL CHOLESTEROL CALCULATED: 89 MG/DL (ref 0–160)
POTASSIUM SERPL-SCNC: 4.7 MMOL/L
SODIUM BLD-SCNC: 137 MMOL/L
TOTAL PROTEIN: 7.8
TRIGL SERPL-MCNC: 81 MG/DL
VLDLC SERPL CALC-MCNC: 16 MG/DL

## 2018-09-04 PROCEDURE — 99214 OFFICE O/P EST MOD 30 MIN: CPT | Performed by: FAMILY MEDICINE

## 2018-09-04 PROCEDURE — 90732 PPSV23 VACC 2 YRS+ SUBQ/IM: CPT | Performed by: FAMILY MEDICINE

## 2018-09-04 PROCEDURE — 90471 IMMUNIZATION ADMIN: CPT | Performed by: FAMILY MEDICINE

## 2018-09-04 RX ORDER — FAMOTIDINE 40 MG/1
40 TABLET, FILM COATED ORAL 2 TIMES DAILY
Qty: 60 TABLET | Refills: 5 | Status: SHIPPED | OUTPATIENT
Start: 2018-09-04 | End: 2019-04-27 | Stop reason: SDUPTHER

## 2018-09-04 RX ORDER — MELOXICAM 15 MG/1
15 TABLET ORAL DAILY
Qty: 30 TABLET | Refills: 5 | Status: SHIPPED | OUTPATIENT
Start: 2018-09-04 | End: 2020-01-14 | Stop reason: SDUPTHER

## 2018-09-04 RX ORDER — SILDENAFIL 100 MG/1
100 TABLET, FILM COATED ORAL PRN
Qty: 9 TABLET | Refills: 5 | Status: SHIPPED | OUTPATIENT
Start: 2018-09-04 | End: 2019-07-09 | Stop reason: SDUPTHER

## 2018-09-04 RX ORDER — AMLODIPINE BESYLATE 5 MG/1
5 TABLET ORAL EVERY MORNING
Qty: 30 TABLET | Refills: 5 | Status: SHIPPED | OUTPATIENT
Start: 2018-09-04 | End: 2019-04-14 | Stop reason: SDUPTHER

## 2018-09-04 RX ORDER — LISINOPRIL 5 MG/1
5 TABLET ORAL EVERY MORNING
Qty: 30 TABLET | Refills: 5 | Status: SHIPPED | OUTPATIENT
Start: 2018-09-04 | End: 2019-04-14 | Stop reason: SDUPTHER

## 2018-09-04 RX ORDER — ALBUTEROL SULFATE 90 UG/1
2 AEROSOL, METERED RESPIRATORY (INHALATION) EVERY 6 HOURS PRN
Qty: 1 INHALER | Refills: 5 | Status: SHIPPED | OUTPATIENT
Start: 2018-09-04 | End: 2020-01-14 | Stop reason: SDUPTHER

## 2018-09-04 ASSESSMENT — PATIENT HEALTH QUESTIONNAIRE - PHQ9
2. FEELING DOWN, DEPRESSED OR HOPELESS: 0
SUM OF ALL RESPONSES TO PHQ9 QUESTIONS 1 & 2: 0
1. LITTLE INTEREST OR PLEASURE IN DOING THINGS: 0
SUM OF ALL RESPONSES TO PHQ QUESTIONS 1-9: 0
SUM OF ALL RESPONSES TO PHQ QUESTIONS 1-9: 0

## 2018-09-04 NOTE — PROGRESS NOTES
joint line and stiffness. Pain is aggravated by any weight bearing. Patient has had prior knee problems. Evaluation to date: Ortho consult: and he is scheduled to see a new orthopedist in 2 weeks. Treatment to date: corticosteroid injection which was somewhat effective and Synvisc. Shoulder Pain: Patient complaints of bilateral shoulder pain. This is evaluated as a personal injury. The pain is described as aching. The onset of the pain was sudden, related to increased yardwork. Mechanism of injury: repetitive use pattern. The pain occurs continuously. Location is anterior, acromioclavicular joint. No history of dislocation. Symptoms are aggravated by all activities, difficulty sleeping on affected side. Symptoms are diminished by  rest.   Limited activities include: no limitations. mild stiffness, no weakness, no swelling, crepitus left AC region noted is reported. Patient is retired . Patient thinks he has an abdominal hernia. Symptoms began several months ago. ROS: No TIA's or unusual headaches, no dysphagia. No prolonged cough. No dyspnea or chest pain on exertion. No abdominal pain, change in bowel habits, black or bloody stools. No urinary tract or BPH symptoms.          Current Outpatient Prescriptions   Medication Sig Dispense Refill    famotidine (PEPCID) 40 MG tablet Take 1 tablet by mouth 2 times daily 60 tablet 0    sildenafil (VIAGRA) 100 MG tablet Take 1 tablet by mouth as needed for Erectile Dysfunction 9 tablet 5    amLODIPine (NORVASC) 5 MG tablet TAKE 1 TABLET BY MOUTH EVERY MORNING 30 tablet 5    lisinopril (PRINIVIL;ZESTRIL) 5 MG tablet TAKE 1 TABLET BY MOUTH EVERY MORNING 30 tablet 5    fluticasone-salmeterol (ADVAIR DISKUS) 250-50 MCG/DOSE AEPB INHALE 1 PUFF INTO THE LUNGS EVERY 12 HOURS 60 each 5    albuterol sulfate HFA (PROAIR HFA) 108 (90 Base) MCG/ACT inhaler Inhale 2 puffs into the lungs every 6 hours as needed for Wheezing 1 Inhaler 5    ibuprofen (ADVIL;MOTRIN) 200

## 2018-09-17 ENCOUNTER — IMMUNIZATION (OUTPATIENT)
Dept: FAMILY MEDICINE CLINIC | Age: 68
End: 2018-09-17

## 2018-09-17 DIAGNOSIS — S83.241A ACUTE MEDIAL MENISCUS TEAR OF RIGHT KNEE, INITIAL ENCOUNTER: ICD-10-CM

## 2018-09-17 DIAGNOSIS — Z23 NEED FOR INFLUENZA VACCINATION: Primary | ICD-10-CM

## 2018-09-17 DIAGNOSIS — M17.11 PRIMARY OSTEOARTHRITIS OF RIGHT KNEE: Primary | ICD-10-CM

## 2018-09-17 PROCEDURE — 90662 IIV NO PRSV INCREASED AG IM: CPT | Performed by: FAMILY MEDICINE

## 2018-09-17 PROCEDURE — 90471 IMMUNIZATION ADMIN: CPT | Performed by: FAMILY MEDICINE

## 2018-09-17 NOTE — PROGRESS NOTES
Vaccine Information Sheet, \"Influenza - Inactivated\"  given to Justino Stevenson, or parent/legal guardian of  Justino Stevenson and verbalized understanding. Patient responses:    Have you ever had a reaction to a flu vaccine? No  Are you able to eat eggs without adverse effects? Yes  Do you have any current illness? No  Have you ever had Guillian [place] Syndrome? No    Flu vaccine given per order. Please see immunization tab.

## 2018-12-06 NOTE — TELEPHONE ENCOUNTER
CT was denied by [employer]. We will need to refer him out to somewhere that takes his insurance.
Pt called checking on status of surgery and CT scan.
5

## 2019-05-03 ENCOUNTER — TELEPHONE (OUTPATIENT)
Dept: FAMILY MEDICINE CLINIC | Age: 69
End: 2019-05-03

## 2019-05-03 NOTE — TELEPHONE ENCOUNTER
He cancelled his appointment for today, he is scheduled to have stem cell next Tuesday and they advised against him getting a steroid injection in his knee. He is asking if he can get a script for handicap placard? Please call and let him know if approved and when ready to .

## 2019-05-21 ENCOUNTER — TELEPHONE (OUTPATIENT)
Dept: FAMILY MEDICINE CLINIC | Age: 69
End: 2019-05-21

## 2019-05-21 DIAGNOSIS — I10 ESSENTIAL HYPERTENSION: ICD-10-CM

## 2019-05-21 RX ORDER — AMLODIPINE BESYLATE 5 MG/1
TABLET ORAL
Qty: 30 TABLET | Refills: 0 | Status: SHIPPED | OUTPATIENT
Start: 2019-05-21 | End: 2019-05-28 | Stop reason: SDUPTHER

## 2019-05-21 RX ORDER — LISINOPRIL 5 MG/1
TABLET ORAL
Qty: 30 TABLET | Refills: 0 | Status: SHIPPED | OUTPATIENT
Start: 2019-05-21 | End: 2019-05-28 | Stop reason: SDUPTHER

## 2019-05-21 NOTE — TELEPHONE ENCOUNTER
Patient LVM on the front line today stating that the pharmacy told him that they could not fill his medication until he was seen by us or we authorized it. Patient stated that he is out of his bp medication and has been the past two days.

## 2019-05-23 DIAGNOSIS — K21.9 GASTROESOPHAGEAL REFLUX DISEASE, ESOPHAGITIS PRESENCE NOT SPECIFIED: Chronic | ICD-10-CM

## 2019-05-23 RX ORDER — FAMOTIDINE 40 MG/1
TABLET, FILM COATED ORAL
Qty: 60 TABLET | Refills: 0 | Status: SHIPPED | OUTPATIENT
Start: 2019-05-23 | End: 2019-05-28 | Stop reason: SDUPTHER

## 2019-05-28 ENCOUNTER — HOSPITAL ENCOUNTER (OUTPATIENT)
Dept: ULTRASOUND IMAGING | Age: 69
Discharge: HOME | End: 2019-05-28
Payer: MEDICARE

## 2019-05-28 ENCOUNTER — HOSPITAL ENCOUNTER (OUTPATIENT)
Age: 69
Discharge: HOME | End: 2019-05-28
Payer: MEDICARE

## 2019-05-28 ENCOUNTER — OFFICE VISIT (OUTPATIENT)
Dept: FAMILY MEDICINE CLINIC | Age: 69
End: 2019-05-28
Payer: MEDICARE

## 2019-05-28 VITALS
BODY MASS INDEX: 37.37 KG/M2 | WEIGHT: 245.8 LBS | HEART RATE: 88 BPM | OXYGEN SATURATION: 95 % | SYSTOLIC BLOOD PRESSURE: 132 MMHG | DIASTOLIC BLOOD PRESSURE: 80 MMHG | TEMPERATURE: 94.8 F

## 2019-05-28 DIAGNOSIS — G62.9 NEUROPATHY: ICD-10-CM

## 2019-05-28 DIAGNOSIS — M79.89 RIGHT LEG SWELLING: ICD-10-CM

## 2019-05-28 DIAGNOSIS — K21.9 GASTROESOPHAGEAL REFLUX DISEASE, ESOPHAGITIS PRESENCE NOT SPECIFIED: Chronic | ICD-10-CM

## 2019-05-28 DIAGNOSIS — F51.04 PSYCHOPHYSIOLOGICAL INSOMNIA: ICD-10-CM

## 2019-05-28 DIAGNOSIS — F41.9 ANXIETY: ICD-10-CM

## 2019-05-28 DIAGNOSIS — R60.0 EDEMA OF BOTH LOWER EXTREMITIES: Primary | ICD-10-CM

## 2019-05-28 DIAGNOSIS — F10.20 UNCOMPLICATED ALCOHOL DEPENDENCE (HCC): ICD-10-CM

## 2019-05-28 DIAGNOSIS — I10 ESSENTIAL HYPERTENSION: ICD-10-CM

## 2019-05-28 DIAGNOSIS — K21.9 GASTROESOPHAGEAL REFLUX DISEASE WITHOUT ESOPHAGITIS: ICD-10-CM

## 2019-05-28 DIAGNOSIS — Z13.6 SCREENING FOR CARDIOVASCULAR CONDITION: ICD-10-CM

## 2019-05-28 LAB
ALBUMIN SERPL-MCNC: 3.9 GM/DL (ref 3.4–5)
ALP BLD-CCNC: 60 IU/L (ref 40–128)
ALT SERPL-CCNC: 25 U/L (ref 10–40)
ANION GAP SERPL CALCULATED.3IONS-SCNC: 13 MMOL/L (ref 4–16)
AST SERPL-CCNC: 28 IU/L (ref 15–37)
BILIRUB SERPL-MCNC: 0.3 MG/DL (ref 0–1)
BUN BLDV-MCNC: 12 MG/DL (ref 6–23)
CALCIUM SERPL-MCNC: 8.7 MG/DL (ref 8.3–10.6)
CHLORIDE BLD-SCNC: 98 MMOL/L (ref 99–110)
CHOLESTEROL: 129 MG/DL
CO2: 22 MMOL/L (ref 21–32)
CREAT SERPL-MCNC: 0.7 MG/DL (ref 0.9–1.3)
GFR AFRICAN AMERICAN: >60 ML/MIN/1.73M2
GFR NON-AFRICAN AMERICAN: >60 ML/MIN/1.73M2
GLUCOSE FASTING: 85 MG/DL (ref 70–99)
HDLC SERPL-MCNC: 44 MG/DL
LDL CHOLESTEROL DIRECT: 82 MG/DL
POTASSIUM SERPL-SCNC: 4.9 MMOL/L (ref 3.5–5.1)
PRO-BNP: 59.52 PG/ML
SODIUM BLD-SCNC: 133 MMOL/L (ref 135–145)
TOTAL PROTEIN: 7 GM/DL (ref 6.4–8.2)
TRIGL SERPL-MCNC: 59 MG/DL
VITAMIN B-12: 463.9 PG/ML (ref 211–911)

## 2019-05-28 PROCEDURE — 80061 LIPID PANEL: CPT

## 2019-05-28 PROCEDURE — 4040F PNEUMOC VAC/ADMIN/RCVD: CPT | Performed by: FAMILY MEDICINE

## 2019-05-28 PROCEDURE — 4004F PT TOBACCO SCREEN RCVD TLK: CPT | Performed by: FAMILY MEDICINE

## 2019-05-28 PROCEDURE — 93971 EXTREMITY STUDY: CPT

## 2019-05-28 PROCEDURE — 83880 ASSAY OF NATRIURETIC PEPTIDE: CPT

## 2019-05-28 PROCEDURE — 82607 VITAMIN B-12: CPT

## 2019-05-28 PROCEDURE — 36415 COLL VENOUS BLD VENIPUNCTURE: CPT

## 2019-05-28 PROCEDURE — 99214 OFFICE O/P EST MOD 30 MIN: CPT | Performed by: FAMILY MEDICINE

## 2019-05-28 PROCEDURE — G8428 CUR MEDS NOT DOCUMENT: HCPCS | Performed by: FAMILY MEDICINE

## 2019-05-28 PROCEDURE — 80053 COMPREHEN METABOLIC PANEL: CPT

## 2019-05-28 PROCEDURE — 1123F ACP DISCUSS/DSCN MKR DOCD: CPT | Performed by: FAMILY MEDICINE

## 2019-05-28 PROCEDURE — G8417 CALC BMI ABV UP PARAM F/U: HCPCS | Performed by: FAMILY MEDICINE

## 2019-05-28 PROCEDURE — 83721 ASSAY OF BLOOD LIPOPROTEIN: CPT

## 2019-05-28 PROCEDURE — 3017F COLORECTAL CA SCREEN DOC REV: CPT | Performed by: FAMILY MEDICINE

## 2019-05-28 RX ORDER — LISINOPRIL 5 MG/1
TABLET ORAL
Qty: 30 TABLET | Refills: 5 | Status: SHIPPED | OUTPATIENT
Start: 2019-05-28 | End: 2019-08-16 | Stop reason: SDUPTHER

## 2019-05-28 RX ORDER — AMLODIPINE BESYLATE 5 MG/1
TABLET ORAL
Qty: 30 TABLET | Refills: 5 | Status: SHIPPED | OUTPATIENT
Start: 2019-05-28 | End: 2019-08-16 | Stop reason: SDUPTHER

## 2019-05-28 RX ORDER — TRAZODONE HYDROCHLORIDE 50 MG/1
50 TABLET ORAL NIGHTLY PRN
Qty: 30 TABLET | Refills: 5 | Status: SHIPPED | OUTPATIENT
Start: 2019-05-28 | End: 2020-01-14

## 2019-05-28 RX ORDER — FAMOTIDINE 40 MG/1
TABLET, FILM COATED ORAL
Qty: 60 TABLET | Refills: 5 | Status: SHIPPED | OUTPATIENT
Start: 2019-05-28 | End: 2019-08-16 | Stop reason: SDUPTHER

## 2019-05-28 ASSESSMENT — PATIENT HEALTH QUESTIONNAIRE - PHQ9
SUM OF ALL RESPONSES TO PHQ QUESTIONS 1-9: 0
SUM OF ALL RESPONSES TO PHQ QUESTIONS 1-9: 0
1. LITTLE INTEREST OR PLEASURE IN DOING THINGS: 0
SUM OF ALL RESPONSES TO PHQ9 QUESTIONS 1 & 2: 0
2. FEELING DOWN, DEPRESSED OR HOPELESS: 0

## 2019-05-28 NOTE — PROGRESS NOTES
disab  Subjective:      Yumi Burleson is a 81 y.o. male who presents for evaluation of hypertension. He indicates that he is feeling well and denies any symptoms referable to his elevated blood pressure. Specifically denies chest pain, palpitations, dyspnea, orthopnea, PND or peripheral edema. Current medication regimen is as listed below. Patient denies any side effects of medication. GERD: Patient complains of heartburn. This has been associated with no other symptoms. He denies abdominal bloating and chest pain. Symptoms have been present for several years. He denies dysphagia. He has not lost weight. He denies melena, hematochezia, hematemesis, and coffee ground emesis. Medical therapy in the past has included H2 antagonists. Right calf pain for the past week after stem cell injection into right knee three weeks ago. Mild swelling. Pain feels like a charley horse. deneis chest pain or shortness of breath. Bilateral lower extremity swelling for the past for years, slowly worsening. Numbness in bilateral feet for several months. Does not seem to be progressing. The patient complains of insomnia. Onset was several months ago. Patient describes symptoms as difficulty falling asleep. Patient has found minimal relief with decreasing caffeine consumption. Associated symptoms include: none. Patient denies irritability, leg cramps, restless legs, snoring and stress. Symptoms have progressed to a point and plateaued. \"A friend gave me some Valium and worked really well. \"    Anxiety: Patient complains of evaluation of anxiety. He has the following anxiety symptoms: difficulty concentrating, irritable, racing thoughts. Onset of symptoms was approximately several months ago, gradually worsening since that time. He denies current suicidal and homicidal ideation. Family history significant for no psychiatric illness. Possible organic causes contributing are: none.  Risk factors: none Previous treatment includes none . He complains of the following side effects from the treatment: none. ROS: No TIA's or unusual headaches, no dysphagia. No prolonged cough. No dyspnea or chest pain on exertion. No abdominal pain, change in bowel habits, black or bloody stools. No urinary tract or BPH symptoms. No new or unusual musculoskeletal symptoms.     Past Medical History:   Diagnosis Date    Acid reflux     Anxiety     Arthritis     \"Right Knee\"    Asthma     Chronic back pain     Cough     Occ Productive Cough \"Milky\" Sputum    Diverticulitis Dx Early 2000's    Hepatitis Dx 18's    \"In The Hospital A Few Days\"    [place] (hard of hearing)     Bilateral Ears    HTN (hypertension)     Hyperlipidemia     Nocturia     Shortness of breath on exertion     Slow urinary stream     Tingling     \"Tingling Right Hand\"    Wears dentures     Full Upper, Partial Lower    Wears glasses     Wears partial dentures     Lower     Past Surgical History:   Procedure Laterality Date    APPENDECTOMY  1960's    CARPAL TUNNEL RELEASE Right 06/22/2017    COLONOSCOPY  Last Done Early 2000's    Polyps Removed In Past    DENTAL SURGERY      Teeth Extracted In Past    EYE SURGERY Left Late 1980's    Cataract With Lens Implant    FRACTURE SURGERY Left 2000's    Broken Left Wrist    HERNIA REPAIR  7291'L    Umbilical Hernia Repair    KNEE ARTHROSCOPY Right 04/13/2017    meniscus repair    TONSILLECTOMY AND ADENOIDECTOMY  1960's           Current Outpatient Medications   Medication Sig Dispense Refill    famotidine (PEPCID) 40 MG tablet TAKE 1 TABLET BY MOUTH TWICE A DAY 60 tablet 0    amLODIPine (NORVASC) 5 MG tablet TAKE 1 TABLET BY MOUTH EVERY DAY IN THE MORNING 30 tablet 0    lisinopril (PRINIVIL;ZESTRIL) 5 MG tablet TAKE 1 TABLET BY MOUTH EVERY DAY IN THE MORNING 30 tablet 0    fluticasone-salmeterol (ADVAIR DISKUS) 250-50 MCG/DOSE AEPB INHALE 1 PUFF INTO THE LUNGS EVERY 12 HOURS 60 each 5    albuterol sulfate HFA (PROAIR HFA) 108 (90 Base) MCG/ACT inhaler Inhale 2 puffs into the lungs every 6 hours as needed for Wheezing 1 Inhaler 5    sildenafil (VIAGRA) 100 MG tablet Take 1 tablet by mouth as needed for Erectile Dysfunction 9 tablet 5    meloxicam (MOBIC) 15 MG tablet Take 1 tablet by mouth daily 30 tablet 5    ibuprofen (ADVIL;MOTRIN) 200 MG tablet Take 800 mg by mouth daily       No current facility-administered medications for this visit. Allergies   Allergen Reactions    Sulfa Antibiotics Rash       Social History     Tobacco Use    Smoking status: Current Every Day Smoker     Packs/day: 1.00     Years: 52.00     Pack years: 52.00     Types: Cigarettes     Start date: 1965    Smokeless tobacco: Never Used   Substance Use Topics    Alcohol use: Yes     Comment: \"Drink About 2 To 5 Beers A Day\"          Objective:      /80 (Site: Left Upper Arm, Position: Sitting, Cuff Size: Large Adult)   Pulse 88   Temp 94.8 °F (34.9 °C) (Temporal)   Wt 245 lb 12.8 oz (111.5 kg)   SpO2 95%   BMI 37.37 kg/m²   General: Appears well, in no apparent distress  S1 and S2 normal, no murmurs, clicks, gallops or rubs. Regular rate and rhythm. Chest is clear; no wheezes or rales. Bilateral LE with edema, 2+ on right, 1+ on left. Right calf with tenderness. Assessment:       Diagnosis Orders   1. Edema of both lower extremities  Brain Natriuretic Peptide   2. Neuropathy  Vitamin B12   3. Essential hypertension  Comprehensive Metabolic Panel    amLODIPine (NORVASC) 5 MG tablet    lisinopril (PRINIVIL;ZESTRIL) 5 MG tablet   4. Screening for cardiovascular condition  Lipid Panel   5. Gastroesophageal reflux disease without esophagitis     6. Psychophysiological insomnia  traZODone (DESYREL) 50 MG tablet   7. Right leg swelling     8.  Gastroesophageal reflux disease, esophagitis presence not specified  famotidine (PEPCID) 40 MG tablet            Plan:     DVT U/S right leg today at 1430  Trazodone for sleep  1)  Per Orders  2)  Regular aerobic exercise  3)  Sodium Restriction in diet     Sai received counseling on the following healthy behaviors: nutrition, exercise and medication adherence    Patient given educational materials on Hypertension    I have instructed Isaías Castle to complete a self tracking handout on Blood Pressures  and instructed them to bring it with them to his next appointment. Discussed use, benefit, and side effects of prescribed medications. Barriers to medication compliance addressed. All patient questions answered. Pt voiced understanding.

## 2019-05-28 NOTE — PATIENT INSTRUCTIONS
Patient Education        Leg and Ankle Edema: Care Instructions  Your Care Instructions  Swelling in the legs, ankles, and feet is called edema. It is common after you sit or stand for a while. Long plane flights or car rides often cause swelling in the legs and feet. You may also have swelling if you have to stand for long periods of time at your job. Problems with the veins in the legs (varicose veins) and changes in hormones can also cause swelling. Sometimes the swelling in the ankles and feet is caused by a more serious problem, such as heart failure, infection, blood clots, or liver or kidney disease. Follow-up care is a key part of your treatment and safety. Be sure to make and go to all appointments, and call your doctor if you are having problems. It's also a good idea to know your test results and keep a list of the medicines you take. How can you care for yourself at home? · If your doctor gave you medicine, take it as prescribed. Call your doctor if you think you are having a problem with your medicine. · Whenever you are resting, raise your legs up. Try to keep the swollen area higher than the level of your heart. · Take breaks from standing or sitting in one position. ? Walk around to increase the blood flow in your lower legs. ? Move your feet and ankles often while you stand, or tighten and relax your leg muscles. · Wear support stockings. Put them on in the morning, before swelling gets worse. · Eat a balanced diet. Lose weight if you need to. · Limit the amount of salt (sodium) in your diet. Salt holds fluid in the body and may increase swelling. When should you call for help? Call 911 anytime you think you may need emergency care. For example, call if:    · You have symptoms of a blood clot in your lung (called a pulmonary embolism). These may include:  ? Sudden chest pain. ? Trouble breathing. ?  Coughing up blood.    Call your doctor now or seek immediate medical care if:    · You have signs of a blood clot, such as:  ? Pain in your calf, back of the knee, thigh, or groin. ? Redness and swelling in your leg or groin.     · You have symptoms of infection, such as:  ? Increased pain, swelling, warmth, or redness. ? Red streaks or pus. ? A fever.    Watch closely for changes in your health, and be sure to contact your doctor if:    · Your swelling is getting worse.     · You have new or worsening pain in your legs.     · You do not get better as expected. Where can you learn more? Go to https://RECESS.[employer]CommonKeyeb.[employer]. org and sign in to your [employer] account. Enter L012 in the [employer] box to learn more about \"Leg and Ankle Edema: Care Instructions. \"     If you do not have an account, please click on the \"Sign Up Now\" link. Current as of: September 23, 2018  Content Version: 12.0  © 0248-4889 [employer], Incorporated. Care instructions adapted under license by [facility] [place] ([facility]). If you have questions about a medical condition or this instruction, always ask your healthcare professional. [place] [address] any warranty or liability for your use of this information.

## 2019-07-09 ENCOUNTER — OFFICE VISIT (OUTPATIENT)
Dept: FAMILY MEDICINE CLINIC | Age: 69
End: 2019-07-09
Payer: MEDICARE

## 2019-07-09 VITALS
TEMPERATURE: 96.3 F | BODY MASS INDEX: 38.07 KG/M2 | WEIGHT: 250.4 LBS | SYSTOLIC BLOOD PRESSURE: 124 MMHG | OXYGEN SATURATION: 98 % | HEART RATE: 74 BPM | DIASTOLIC BLOOD PRESSURE: 80 MMHG

## 2019-07-09 DIAGNOSIS — Z12.5 SCREENING PSA (PROSTATE SPECIFIC ANTIGEN): ICD-10-CM

## 2019-07-09 DIAGNOSIS — F10.20 UNCOMPLICATED ALCOHOL DEPENDENCE (HCC): ICD-10-CM

## 2019-07-09 DIAGNOSIS — N52.9 VASCULOGENIC ERECTILE DYSFUNCTION, UNSPECIFIED VASCULOGENIC ERECTILE DYSFUNCTION TYPE: ICD-10-CM

## 2019-07-09 DIAGNOSIS — R60.0 EDEMA OF LOWER EXTREMITY: Primary | ICD-10-CM

## 2019-07-09 DIAGNOSIS — R35.1 BPH ASSOCIATED WITH NOCTURIA: ICD-10-CM

## 2019-07-09 DIAGNOSIS — N40.1 BPH ASSOCIATED WITH NOCTURIA: ICD-10-CM

## 2019-07-09 PROCEDURE — 99214 OFFICE O/P EST MOD 30 MIN: CPT | Performed by: FAMILY MEDICINE

## 2019-07-09 PROCEDURE — 1123F ACP DISCUSS/DSCN MKR DOCD: CPT | Performed by: FAMILY MEDICINE

## 2019-07-09 PROCEDURE — G8427 DOCREV CUR MEDS BY ELIG CLIN: HCPCS | Performed by: FAMILY MEDICINE

## 2019-07-09 PROCEDURE — 4004F PT TOBACCO SCREEN RCVD TLK: CPT | Performed by: FAMILY MEDICINE

## 2019-07-09 PROCEDURE — 4040F PNEUMOC VAC/ADMIN/RCVD: CPT | Performed by: FAMILY MEDICINE

## 2019-07-09 PROCEDURE — 3017F COLORECTAL CA SCREEN DOC REV: CPT | Performed by: FAMILY MEDICINE

## 2019-07-09 PROCEDURE — G8417 CALC BMI ABV UP PARAM F/U: HCPCS | Performed by: FAMILY MEDICINE

## 2019-07-09 RX ORDER — TAMSULOSIN HYDROCHLORIDE 0.4 MG/1
0.4 CAPSULE ORAL DAILY
Qty: 30 CAPSULE | Refills: 5 | Status: SHIPPED | OUTPATIENT
Start: 2019-07-09 | End: 2020-01-14 | Stop reason: SDUPTHER

## 2019-07-09 RX ORDER — GABAPENTIN 300 MG/1
300 CAPSULE ORAL 2 TIMES DAILY
Qty: 60 CAPSULE | Refills: 0 | Status: CANCELLED | OUTPATIENT
Start: 2019-07-09 | End: 2019-08-08

## 2019-07-09 RX ORDER — SILDENAFIL 100 MG/1
100 TABLET, FILM COATED ORAL PRN
Qty: 30 TABLET | Refills: 2 | Status: SHIPPED | OUTPATIENT
Start: 2019-07-09 | End: 2020-07-14 | Stop reason: SDUPTHER

## 2019-07-09 RX ORDER — FUROSEMIDE 20 MG/1
20 TABLET ORAL DAILY
Qty: 10 TABLET | Refills: 0 | Status: SHIPPED | OUTPATIENT
Start: 2019-07-09 | End: 2020-01-14

## 2019-07-09 ASSESSMENT — PATIENT HEALTH QUESTIONNAIRE - PHQ9
2. FEELING DOWN, DEPRESSED OR HOPELESS: 0
SUM OF ALL RESPONSES TO PHQ QUESTIONS 1-9: 0
1. LITTLE INTEREST OR PLEASURE IN DOING THINGS: 0
SUM OF ALL RESPONSES TO PHQ9 QUESTIONS 1 & 2: 0
SUM OF ALL RESPONSES TO PHQ QUESTIONS 1-9: 0

## 2019-07-09 NOTE — PROGRESS NOTES
Edema: Patient complains of edema. The location of the edema is lower leg(s) bilateral.  The edema has been mild. Onset of symptoms was several months ago, unchanged since that time. The edema is present in the afternoon and in the evening. The patient states the problem is long-standing. The swelling has been aggravated by dependency of involved area and increased salt intake, relieved by elevation of involved area, and been associated with nothing. Cardiac risk factors include advanced age (older than 54 for men, 72 for women), dyslipidemia, hypertension, male gender, obesity (BMI >= 30 kg/m2) and smoking/ tobacco exposure. Patient drinks 6-10 beers daily. Benign Prostatic Hypertrophy: Patient complains of lower urinary tract symptoms. Patient reports moderate symptoms of BPH. Onset of symptoms was several years ago and was gradual in onset. He has no personal history and no family history of prostate cancer. He reports frequency, nocturia three times a night, urgency and weak stream.  He denies incomplete emptying and intermittency. He reports a  history of no complicating symptoms. He denies flank pain, gross hematuria, kidney stones and recurrent UTI. Patient is concerned about prostate cancer. He recently has a friend that was diagnosed with prostate cancer. He wants to be checked. Erectile Dysfunction: Patient complains of erectile dysfunction. Onset of dysfunction was several years ago and was gradual in onset. Patient states the nature of difficulty is both attaining and maintaining erection. Full erections occur never. Partial erections occur with intercourse. Libido is not affected. Risk factors for ED include cardiovascular disease. Patient denies history of cranial, spinal, or pelvic trauma and pelvic radiation. Patient's expectations as to sexual function to return to normal.  Patient's description of relationship w/partner good. Previous treatment of ED includes Viagra. .     ROS: No
at this time  HTN:  Continue norvasc 5 mg tablet, one po daily, continue lisinopril 5mg tablet one po daily  ED:  Patient requesting samples of Viagra, because the drug is generic will send rx at pts request    Follow up: 2 months

## 2019-07-11 LAB — PROSTATE SPECIFIC ANTIGEN: 2.3 NG/ML

## 2019-08-06 ENCOUNTER — OFFICE VISIT (OUTPATIENT)
Dept: FAMILY MEDICINE CLINIC | Age: 69
End: 2019-08-06
Payer: MEDICARE

## 2019-08-06 VITALS
WEIGHT: 248 LBS | BODY MASS INDEX: 37.71 KG/M2 | OXYGEN SATURATION: 97 % | HEART RATE: 104 BPM | DIASTOLIC BLOOD PRESSURE: 66 MMHG | TEMPERATURE: 96.3 F | SYSTOLIC BLOOD PRESSURE: 112 MMHG

## 2019-08-06 DIAGNOSIS — M17.12 PRIMARY OSTEOARTHRITIS OF LEFT KNEE: Primary | ICD-10-CM

## 2019-08-06 DIAGNOSIS — M17.0 PRIMARY OSTEOARTHRITIS OF BOTH KNEES: ICD-10-CM

## 2019-08-06 PROCEDURE — G8417 CALC BMI ABV UP PARAM F/U: HCPCS | Performed by: FAMILY MEDICINE

## 2019-08-06 PROCEDURE — 99213 OFFICE O/P EST LOW 20 MIN: CPT | Performed by: FAMILY MEDICINE

## 2019-08-06 PROCEDURE — 20610 DRAIN/INJ JOINT/BURSA W/O US: CPT | Performed by: FAMILY MEDICINE

## 2019-08-06 PROCEDURE — 1123F ACP DISCUSS/DSCN MKR DOCD: CPT | Performed by: FAMILY MEDICINE

## 2019-08-06 PROCEDURE — 3017F COLORECTAL CA SCREEN DOC REV: CPT | Performed by: FAMILY MEDICINE

## 2019-08-06 PROCEDURE — G8428 CUR MEDS NOT DOCUMENT: HCPCS | Performed by: FAMILY MEDICINE

## 2019-08-06 PROCEDURE — 4040F PNEUMOC VAC/ADMIN/RCVD: CPT | Performed by: FAMILY MEDICINE

## 2019-08-06 PROCEDURE — 4004F PT TOBACCO SCREEN RCVD TLK: CPT | Performed by: FAMILY MEDICINE

## 2019-08-16 ENCOUNTER — TELEPHONE (OUTPATIENT)
Dept: FAMILY MEDICINE CLINIC | Age: 69
End: 2019-08-16

## 2019-08-16 DIAGNOSIS — K21.9 GASTROESOPHAGEAL REFLUX DISEASE, ESOPHAGITIS PRESENCE NOT SPECIFIED: Chronic | ICD-10-CM

## 2019-08-16 DIAGNOSIS — I10 ESSENTIAL HYPERTENSION: ICD-10-CM

## 2019-08-16 RX ORDER — FAMOTIDINE 40 MG/1
TABLET, FILM COATED ORAL
Qty: 180 TABLET | Refills: 1 | Status: SHIPPED | OUTPATIENT
Start: 2019-08-16 | End: 2020-01-14 | Stop reason: SDUPTHER

## 2019-08-16 RX ORDER — LISINOPRIL 5 MG/1
TABLET ORAL
Qty: 90 TABLET | Refills: 1 | Status: SHIPPED | OUTPATIENT
Start: 2019-08-16 | End: 2020-01-14 | Stop reason: SDUPTHER

## 2019-08-16 RX ORDER — AMLODIPINE BESYLATE 5 MG/1
TABLET ORAL
Qty: 90 TABLET | Refills: 1 | Status: SHIPPED | OUTPATIENT
Start: 2019-08-16 | End: 2020-01-14 | Stop reason: SDUPTHER

## 2019-08-16 NOTE — TELEPHONE ENCOUNTER
[facility] mail away faxed a request for 90 supply for patient's medications:  Amlodipine 5 mg, Famotidine 40 mg, Lisinopril 5 mg.   This is patient's new mail away pharmacy

## 2019-10-01 ENCOUNTER — TELEPHONE (OUTPATIENT)
Dept: FAMILY MEDICINE CLINIC | Age: 69
End: 2019-10-01

## 2019-10-07 ENCOUNTER — TELEPHONE (OUTPATIENT)
Dept: FAMILY MEDICINE CLINIC | Age: 69
End: 2019-10-07

## 2019-10-07 ENCOUNTER — NURSE ONLY (OUTPATIENT)
Dept: FAMILY MEDICINE CLINIC | Age: 69
End: 2019-10-07
Payer: MEDICARE

## 2019-10-07 DIAGNOSIS — Z23 NEED FOR INFLUENZA VACCINATION: Primary | ICD-10-CM

## 2019-10-07 PROCEDURE — 90653 IIV ADJUVANT VACCINE IM: CPT | Performed by: FAMILY MEDICINE

## 2019-10-07 PROCEDURE — G0008 ADMIN INFLUENZA VIRUS VAC: HCPCS | Performed by: FAMILY MEDICINE

## 2020-01-14 ENCOUNTER — OFFICE VISIT (OUTPATIENT)
Dept: FAMILY MEDICINE CLINIC | Age: 70
End: 2020-01-14
Payer: MEDICARE

## 2020-01-14 VITALS
BODY MASS INDEX: 38.32 KG/M2 | TEMPERATURE: 97 F | WEIGHT: 252 LBS | OXYGEN SATURATION: 95 % | SYSTOLIC BLOOD PRESSURE: 122 MMHG | HEART RATE: 82 BPM | DIASTOLIC BLOOD PRESSURE: 80 MMHG

## 2020-01-14 PROCEDURE — 3023F SPIROM DOC REV: CPT | Performed by: FAMILY MEDICINE

## 2020-01-14 PROCEDURE — 4004F PT TOBACCO SCREEN RCVD TLK: CPT | Performed by: FAMILY MEDICINE

## 2020-01-14 PROCEDURE — 99214 OFFICE O/P EST MOD 30 MIN: CPT | Performed by: FAMILY MEDICINE

## 2020-01-14 PROCEDURE — G0296 VISIT TO DETERM LDCT ELIG: HCPCS | Performed by: FAMILY MEDICINE

## 2020-01-14 PROCEDURE — 20610 DRAIN/INJ JOINT/BURSA W/O US: CPT | Performed by: FAMILY MEDICINE

## 2020-01-14 PROCEDURE — 3017F COLORECTAL CA SCREEN DOC REV: CPT | Performed by: FAMILY MEDICINE

## 2020-01-14 PROCEDURE — G8428 CUR MEDS NOT DOCUMENT: HCPCS | Performed by: FAMILY MEDICINE

## 2020-01-14 PROCEDURE — G8482 FLU IMMUNIZE ORDER/ADMIN: HCPCS | Performed by: FAMILY MEDICINE

## 2020-01-14 PROCEDURE — G8417 CALC BMI ABV UP PARAM F/U: HCPCS | Performed by: FAMILY MEDICINE

## 2020-01-14 PROCEDURE — G8926 SPIRO NO PERF OR DOC: HCPCS | Performed by: FAMILY MEDICINE

## 2020-01-14 PROCEDURE — 1123F ACP DISCUSS/DSCN MKR DOCD: CPT | Performed by: FAMILY MEDICINE

## 2020-01-14 PROCEDURE — 4040F PNEUMOC VAC/ADMIN/RCVD: CPT | Performed by: FAMILY MEDICINE

## 2020-01-14 RX ORDER — LISINOPRIL 5 MG/1
TABLET ORAL
Qty: 90 TABLET | Refills: 1 | Status: SHIPPED | OUTPATIENT
Start: 2020-01-14 | End: 2020-07-14 | Stop reason: SDUPTHER

## 2020-01-14 RX ORDER — ALBUTEROL SULFATE 90 UG/1
2 AEROSOL, METERED RESPIRATORY (INHALATION) EVERY 6 HOURS PRN
Qty: 3 INHALER | Refills: 1 | Status: SHIPPED | OUTPATIENT
Start: 2020-01-14

## 2020-01-14 RX ORDER — AMLODIPINE BESYLATE 5 MG/1
TABLET ORAL
Qty: 90 TABLET | Refills: 1 | Status: SHIPPED | OUTPATIENT
Start: 2020-01-14 | End: 2020-07-14 | Stop reason: SDUPTHER

## 2020-01-14 RX ORDER — MELOXICAM 15 MG/1
15 TABLET ORAL DAILY
Qty: 90 TABLET | Refills: 1 | Status: SHIPPED | OUTPATIENT
Start: 2020-01-14 | End: 2020-07-14

## 2020-01-14 RX ORDER — TAMSULOSIN HYDROCHLORIDE 0.4 MG/1
0.4 CAPSULE ORAL DAILY
Qty: 90 CAPSULE | Refills: 1 | Status: SHIPPED | OUTPATIENT
Start: 2020-01-14 | End: 2020-07-14

## 2020-01-14 RX ORDER — FAMOTIDINE 40 MG/1
TABLET, FILM COATED ORAL
Qty: 180 TABLET | Refills: 1 | Status: SHIPPED | OUTPATIENT
Start: 2020-01-14 | End: 2020-07-06 | Stop reason: SDUPTHER

## 2020-01-14 ASSESSMENT — PATIENT HEALTH QUESTIONNAIRE - PHQ9
SUM OF ALL RESPONSES TO PHQ QUESTIONS 1-9: 0
2. FEELING DOWN, DEPRESSED OR HOPELESS: 0
1. LITTLE INTEREST OR PLEASURE IN DOING THINGS: 0
SUM OF ALL RESPONSES TO PHQ9 QUESTIONS 1 & 2: 0
SUM OF ALL RESPONSES TO PHQ QUESTIONS 1-9: 0

## 2020-01-14 NOTE — PROGRESS NOTES
Hypertension: Patient here for follow-up of elevated blood pressure. He is not exercising and is not adherent to low salt diet. Blood pressure is well controlled at home. Cardiac symptoms none. Patient denies chest pain and dyspnea. Cardiovascular risk factors: advanced age (older than 54 for men, 72 for women), hypertension, male gender, obesity (BMI >= 30 kg/m2) and sedentary lifestyle. Use of agents associated with hypertension: none. History of target organ damage: none. Knee Pain: Patient presents with knee pain involving the  right knee. Onset of the symptoms was several years ago. Inciting event: none known. Current symptoms include pain located globally and stiffness. Pain is aggravated by any weight bearing. Patient has had prior knee problems. Evaluation to date: Ortho consult: He has known osteoarthritis. . Treatment to date: He had stem cell injections last year which did not help. Patient would like to have a corticosteroid injection to his knee today. They have helped before. .    Edema: Patient complains of edema. The location of the edema is lower leg(s) bilateral.  The edema has been mild. Onset of symptoms was several months ago, unchanged since that time. The edema is present in the afternoon and in the evening. The patient states the problem is long-standing. The swelling has been aggravated by dependency of involved area and increased salt intake, relieved by elevation of involved area, and been associated with nothing. Cardiac risk factors include advanced age (older than 54 for men, 72 for women), dyslipidemia, hypertension, male gender, obesity (BMI >= 30 kg/m2) and smoking/ tobacco exposure. Patient drinks 6-10 beers daily. Benign Prostatic Hypertrophy: Patient complains of lower urinary tract symptoms. Patient reports moderate symptoms of BPH. Onset of symptoms was several years ago and was gradual in onset. He has no personal history and no family history of prostate cancer.  Acid reflux     Anxiety     Arthritis     \"Right Knee\"    Asthma     Chronic back pain     Cough     Occ Productive Cough \"Milky\" Sputum    Diverticulitis Dx Early 2000's    Hepatitis Dx 18's    \"In The Hospital A Few Days\"    [place]     Bilateral Ears    HTN (hypertension)     Hyperlipidemia     Nocturia     Shortness of breath on exertion     Slow urinary stream     Tingling     \"Tingling Right Hand\"    Wears dentures     Full Upper, Partial Lower    Wears glasses     Wears partial dentures     Lower     Past Surgical History:   Procedure Laterality Date    APPENDECTOMY  1960's    CARPAL TUNNEL RELEASE Right 06/22/2017    COLONOSCOPY  Last Done Early 2000's    Polyps Removed In Past    DENTAL SURGERY      Teeth Extracted In Past    EYE SURGERY Left Late 1980's    Cataract With Lens Implant    FRACTURE SURGERY Left 2000's    Broken Left Wrist    HERNIA REPAIR  5147'D    Umbilical Hernia Repair    KNEE ARTHROSCOPY Right 04/13/2017    meniscus repair    TONSILLECTOMY AND ADENOIDECTOMY  1960's     O:   Vitals:    01/14/20 0757   BP: 122/80   Pulse: 82   Temp: 97 °F (36.1 °C)   SpO2: 95%     No acute distress. Alert and Oriented x 3  HEENT: Atraumatic. Normocephalic. PERRLA, EOMI, Conjunctiva clear  NECK: without thyromegaly, lymphadenopathy, JVD  LUNGS:inspiratory and expiratory wheezing throughout. Breathing comfortably  CARDIOVASCULAR:  Regular rate and rhythm, no murmurs, rubs, or gallops  EXTREMITY: Full range of motion. No clubbing/cyanosis. NEURO: Cranial nerves II-XII grossly intact. Strength 5/5, DTR 2/4. SKIN: Warm, Dry, No rash. PSYCH: Mood and Affect normal.    A:    Diagnosis Orders   1. Essential hypertension  amLODIPine (NORVASC) 5 MG tablet    lisinopril (PRINIVIL;ZESTRIL) 5 MG tablet    Comprehensive Metabolic Panel   2. Gastroesophageal reflux disease, esophagitis presence not specified  famotidine (PEPCID) 40 MG tablet   3.  Primary osteoarthritis of right knee  meloxicam (MOBIC) 15 MG tablet   4. Biceps tendonitis on left  meloxicam (MOBIC) 15 MG tablet   5. BPH associated with nocturia  tamsulosin (FLOMAX) 0.4 MG capsule   6. Mucopurulent chronic bronchitis (HCC)  fluticasone-salmeterol (ADVAIR DISKUS) 250-50 MCG/DOSE AEPB    albuterol sulfate HFA (PROAIR HFA) 108 (90 Base) MCG/ACT inhaler   7. Personal history of tobacco use  UT VISIT TO DISCUSS LUNG CA SCREEN W LDCT    CT Lung Screen (Annual)   8. Encounter for screening for cancer of respiratory organs      [facility] [place] [facility]   9. Screening for cardiovascular condition  Lipid Panel   10. Screening PSA (prostate specific antigen)  Psa screening     P: see orders  Recommend patient decrease alcohol intake and preferably discontinue. After sterile prep,a steroid injection was performed at right knee using 1% plain Lidocaine and 40 mg of Kenalog. This was well tolerated. Follow-up 6 months. Darrius Torres received counseling on the following healthy behaviors: nutrition, exercise and medication adherence    Patient given educational materials on Hypertension    I have instructed Darrius Torres to complete a self tracking handout on Blood Pressures  and instructed them to bring it with them to his next appointment. Discussed use, benefit, and side effects of prescribed medications. Barriers to medication compliance addressed. All patient questions answered. Pt voiced understanding. Low Dose CT (LDCT) Lung Screening criteria met   Age 50-69   Pack year smoking >30   Still smoking or less than 15 year since quit   No sign or symptoms of lung cancer   > 11 months since last LDCT     Risks and benefits of lung cancer screening with LDCT scans discussed:    Significance of positive screen - False-positive LDCT results often occur. 95% of all positive results do not lead to a diagnosis of cancer.  Usually further imaging can resolve most false-positive results; however, some patients may require invasive procedures. Over diagnosis risk - 10% to 12% of screen-detected lung cancer cases are over diagnosed--that is, the cancer would not have been detected in the patient's lifetime without the screening. Need for follow up screens annually to continue lung cancer screening effectiveness     Risks associated with radiation from annual LDCT- Radiation exposure is about the same as for a mammogram, which is about 1/3 of the annual background radiation exposure from everyday life. Starting screening at age 54 is not likely to increase cancer risk from radiation exposure. Patients with comorbidities resulting in life expectancy of < 10 years, or that would preclude treatment of an abnormality identified on CT, should not be screened due to lack of benefit.     To obtain maximal benefit from this screening, smoking cessation and long-term abstinence from smoking is critical

## 2020-01-15 ENCOUNTER — TELEPHONE (OUTPATIENT)
Dept: FAMILY MEDICINE CLINIC | Age: 70
End: 2020-01-15

## 2020-01-15 LAB
A/G RATIO: 1 (CALC) (ref 0.8–2.6)
ALBUMIN SERPL-MCNC: 3.9 GM/DL (ref 3.5–5.2)
ALP BLD-CCNC: 68 U/L (ref 23–144)
ALT SERPL-CCNC: 20 U/L (ref 0–60)
AST SERPL-CCNC: 28 U/L (ref 0–55)
BILIRUB SERPL-MCNC: 0.4 MG/DL (ref 0–1.2)
BUN / CREAT RATIO: 21 (CALC) (ref 7–25)
BUN BLDV-MCNC: 15 MG/DL (ref 3–29)
CALCIUM SERPL-MCNC: 9 MG/DL (ref 8.5–10.5)
CHLORIDE BLD-SCNC: 100 MEQ/L (ref 96–110)
CHOLESTEROL, TOTAL: 150 MG/DL
CO2: 19 MEQ/L (ref 19–32)
CREAT SERPL-MCNC: 0.7 MG/DL
GFR SERPL CREATININE-BSD FRML MDRD: 98 ML/MIN/1.73M2
GLOBULIN: 4 GM/DL (CALC) (ref 1.9–3.6)
GLUCOSE BLD-MCNC: 98 MG/DL
HDLC SERPL-MCNC: 43 MG/DL
LDL CHOLESTEROL: 94 MG/DL (CALC)
POTASSIUM SERPL-SCNC: 4.7 MEQ/L (ref 3.4–5.3)
PROSTATE SPECIFIC ANTIGEN: 2.24 NG/ML
SODIUM BLD-SCNC: 136 MEQ/L (ref 135–148)
TOTAL PROTEIN: 7.9 GM/DL (ref 6–8.3)
TRIGL SERPL-MCNC: 65 MG/DL
VLDLC SERPL CALC-MCNC: 13 MG/DL (CALC) (ref 4–38)

## 2020-01-15 NOTE — TELEPHONE ENCOUNTER
Yris with [facility] Scheduling left a message stating she scheduled Denise Ing for his lung CT. She stated when scheduling it the appointment instructions said results of chest x-ray needed. She wanted to know if a chest x-ray needed to be ordered. She is able to be reached at [phone] if there is any questions.

## 2020-01-22 ENCOUNTER — HOSPITAL ENCOUNTER (OUTPATIENT)
Dept: CT IMAGING | Age: 70
Discharge: HOME | End: 2020-01-22
Payer: MEDICARE

## 2020-01-22 PROCEDURE — G0297 LDCT FOR LUNG CA SCREEN: HCPCS

## 2020-07-06 RX ORDER — FAMOTIDINE 40 MG/1
TABLET, FILM COATED ORAL
Qty: 60 TABLET | Refills: 0 | Status: SHIPPED | OUTPATIENT
Start: 2020-07-06 | End: 2020-07-14 | Stop reason: ALTCHOICE

## 2020-07-13 ENCOUNTER — TELEPHONE (OUTPATIENT)
Dept: FAMILY MEDICINE CLINIC | Age: 70
End: 2020-07-13

## 2020-07-14 ENCOUNTER — OFFICE VISIT (OUTPATIENT)
Dept: FAMILY MEDICINE CLINIC | Age: 70
End: 2020-07-14
Payer: MEDICARE

## 2020-07-14 VITALS
BODY MASS INDEX: 41.51 KG/M2 | DIASTOLIC BLOOD PRESSURE: 88 MMHG | SYSTOLIC BLOOD PRESSURE: 138 MMHG | OXYGEN SATURATION: 97 % | TEMPERATURE: 97.8 F | WEIGHT: 273 LBS | HEART RATE: 69 BPM

## 2020-07-14 PROCEDURE — 4040F PNEUMOC VAC/ADMIN/RCVD: CPT | Performed by: FAMILY MEDICINE

## 2020-07-14 PROCEDURE — 3023F SPIROM DOC REV: CPT | Performed by: FAMILY MEDICINE

## 2020-07-14 PROCEDURE — 4004F PT TOBACCO SCREEN RCVD TLK: CPT | Performed by: FAMILY MEDICINE

## 2020-07-14 PROCEDURE — G8427 DOCREV CUR MEDS BY ELIG CLIN: HCPCS | Performed by: FAMILY MEDICINE

## 2020-07-14 PROCEDURE — 99214 OFFICE O/P EST MOD 30 MIN: CPT | Performed by: FAMILY MEDICINE

## 2020-07-14 PROCEDURE — G8926 SPIRO NO PERF OR DOC: HCPCS | Performed by: FAMILY MEDICINE

## 2020-07-14 PROCEDURE — 1123F ACP DISCUSS/DSCN MKR DOCD: CPT | Performed by: FAMILY MEDICINE

## 2020-07-14 PROCEDURE — G8417 CALC BMI ABV UP PARAM F/U: HCPCS | Performed by: FAMILY MEDICINE

## 2020-07-14 PROCEDURE — 3017F COLORECTAL CA SCREEN DOC REV: CPT | Performed by: FAMILY MEDICINE

## 2020-07-14 RX ORDER — SILDENAFIL 100 MG/1
100 TABLET, FILM COATED ORAL PRN
Qty: 30 TABLET | Refills: 2 | Status: SHIPPED | OUTPATIENT
Start: 2020-07-14

## 2020-07-14 RX ORDER — LISINOPRIL 5 MG/1
TABLET ORAL
Qty: 90 TABLET | Refills: 1 | Status: SHIPPED | OUTPATIENT
Start: 2020-07-14 | End: 2021-01-12 | Stop reason: SDUPTHER

## 2020-07-14 RX ORDER — PANTOPRAZOLE SODIUM 40 MG/1
40 TABLET, DELAYED RELEASE ORAL
Qty: 90 TABLET | Refills: 1 | Status: SHIPPED | OUTPATIENT
Start: 2020-07-14 | End: 2020-12-08 | Stop reason: SDUPTHER

## 2020-07-14 RX ORDER — AMLODIPINE BESYLATE 5 MG/1
TABLET ORAL
Qty: 90 TABLET | Refills: 1 | Status: SHIPPED | OUTPATIENT
Start: 2020-07-14 | End: 2021-01-12 | Stop reason: SDUPTHER

## 2020-07-14 SDOH — HEALTH STABILITY: MENTAL HEALTH: HOW MANY STANDARD DRINKS CONTAINING ALCOHOL DO YOU HAVE ON A TYPICAL DAY?: 3 OR 4

## 2020-07-14 SDOH — HEALTH STABILITY: MENTAL HEALTH: HOW OFTEN DO YOU HAVE A DRINK CONTAINING ALCOHOL?: 4 OR MORE TIMES A WEEK

## 2020-07-14 ASSESSMENT — PATIENT HEALTH QUESTIONNAIRE - PHQ9
SUM OF ALL RESPONSES TO PHQ QUESTIONS 1-9: 0
SUM OF ALL RESPONSES TO PHQ QUESTIONS 1-9: 0
SUM OF ALL RESPONSES TO PHQ9 QUESTIONS 1 & 2: 0
2. FEELING DOWN, DEPRESSED OR HOPELESS: 0
1. LITTLE INTEREST OR PLEASURE IN DOING THINGS: 0

## 2020-07-14 NOTE — PROGRESS NOTES
Hypertension: Patient here for follow-up of elevated blood pressure. He is not exercising and is not adherent to low salt diet. Blood pressure is well controlled at home. Cardiac symptoms none. Patient denies chest pain and dyspnea. Cardiovascular risk factors: advanced age (older than 54 for men, 72 for women), hypertension, male gender, obesity (BMI >= 30 kg/m2) and sedentary lifestyle. Use of agents associated with hypertension: none. History of target organ damage: none. Knee Pain: Patient presents with knee pain involving the  right knee. Onset of the symptoms was several years ago. Inciting event: none known. Current symptoms include pain located globally and stiffness. Pain is aggravated by any weight bearing. Patient has had prior knee problems. Evaluation to date: Ortho consult: He has known osteoarthritis. . Treatment to date: He had stem cell injections about 18 months which did not help. Patient would like to have a corticosteroid injection to his knee today. They have helped before. Edema: Patient complains of edema. The location of the edema is lower leg(s) bilateral.  The edema has been mild. Onset of symptoms was several months ago, unchanged since that time. The edema is present in the afternoon and in the evening. The patient states the problem is long-standing. The swelling has been aggravated by dependency of involved area and increased salt intake, relieved by elevation of involved area, and been associated with nothing. Cardiac risk factors include advanced age (older than 54 for men, 72 for women), dyslipidemia, hypertension, male gender, obesity (BMI >= 30 kg/m2) and smoking/ tobacco exposure. Patient drinks 3-4 beers daily. Benign Prostatic Hypertrophy: Patient complains of lower urinary tract symptoms. Patient reports moderate symptoms of BPH. Onset of symptoms was several years ago and was gradual in onset.  He has no personal history and no family history of prostate cancer. He reports frequency, nocturia three times a night, urgency and weak stream.  He denies incomplete emptying and intermittency. He reports a  history of no complicating symptoms. He denies flank pain, gross hematuria, kidney stones and recurrent UTI. He is not not taking Flomax. He states his symptoms are OK with him without it. .    Erectile Dysfunction: Patient complains of erectile dysfunction. Onset of dysfunction was several years ago and was gradual in onset. Patient states the nature of difficulty is both attaining and maintaining erection. Full erections occur never. Partial erections occur with intercourse. Libido is not affected. Risk factors for ED include cardiovascular disease. Patient denies history of cranial, spinal, or pelvic trauma and pelvic radiation. Patient's expectations as to sexual function to return to normal.  Patient's description of relationship w/partner good. Previous treatment of ED includes Viagra. The patient complains of insomnia. Onset was several months ago. Patient describes symptoms as difficulty falling asleep. Patient has found minimal relief with decreasing caffeine consumption. Associated symptoms include: none. Patient denies irritability, leg cramps, restless legs, snoring and stress. Symptoms have progressed to a point and plateaued. He did not like the way trazodone made hm feel, so he stopped taking it. GERD: Patient complains of heartburn. This has been associated with no other symptoms. He denies abdominal bloating and chest pain. Symptoms have been present for several years. He denies dysphagia. He has not lost weight. He denies melena, hematochezia, hematemesis, and coffee ground emesis. Medical therapy in the past has included H2 antagonists but these no longer work. COPD: Patient complains of dyspnea. Symptoms began several years ago. Symptoms chronic dyspnea does worsen with exertion. Sputum is white in small amounts.  Fever has been throughout. Breathing comfortably  CARDIOVASCULAR:  Regular rate and rhythm, no murmurs, rubs, or gallops  EXTREMITY: Full range of motion. No clubbing/cyanosis. NEURO: Cranial nerves II-XII grossly intact. Strength 5/5, DTR 2/4. SKIN: Warm, Dry, No rash. Multiple benign nevi and seborrheic keratosis. No suspicious lesions  PSYCH: Mood and Affect normal.    A:    Diagnosis Orders   1. Gastroesophageal reflux disease, esophagitis presence not specified     We will stop Pepcid and start Protonix pantoprazole (PROTONIX) 40 MG tablet   2. Essential hypertension  amLODIPine (NORVASC) 5 MG tablet   Controlled lisinopril (PRINIVIL;ZESTRIL) 5 MG tablet    Comprehensive Metabolic Panel   3. Mucopurulent chronic bronchitis ([facility] [address]     4. Vasculogenic erectile dysfunction, unspecified vasculogenic erectile dysfunction type  sildenafil (VIAGRA) 100 MG tablet   5. Screening for cardiovascular condition  Lipid Panel   6. Uncomplicated alcohol dependence ([facility] [address]     7. Tobacco dependence     8. Primary osteoarthritis of right knee       P: see orders  Strongly encouraged to stop smoking. Unable to perform knee injection at this time due to time. Patient will reschedule in the near future for injection. Recommend patient decrease alcohol intake and preferably discontinue. Follow-up 6 months. Muriel Bernard received counseling on the following healthy behaviors: nutrition, exercise and medication adherence    Patient given educational materials on Hypertension    I have instructed Muriel Bernard to complete a self tracking handout on Blood Pressures  and instructed them to bring it with them to his next appointment. Discussed use, benefit, and side effects of prescribed medications. Barriers to medication compliance addressed. All patient questions answered. Pt voiced understanding.

## 2020-07-15 LAB
ALBUMIN SERPL-MCNC: 4 G/DL
ALP BLD-CCNC: 66 U/L
ALT SERPL-CCNC: 25 U/L
ANION GAP SERPL CALCULATED.3IONS-SCNC: 1.2 MMOL/L
AST SERPL-CCNC: 25 U/L
BILIRUB SERPL-MCNC: 0.3 MG/DL (ref 0.1–1.4)
BUN BLDV-MCNC: 9 MG/DL
CALCIUM SERPL-MCNC: 9.1 MG/DL
CHLORIDE BLD-SCNC: 99 MMOL/L
CHOLESTEROL, TOTAL: 144 MG/DL
CHOLESTEROL/HDL RATIO: NORMAL
CO2: 27 MMOL/L
CREAT SERPL-MCNC: 0.8 MG/DL
GFR CALCULATED: 92
GLUCOSE BLD-MCNC: 117 MG/DL
HDLC SERPL-MCNC: 42 MG/DL (ref 35–70)
LDL CHOLESTEROL CALCULATED: 90 MG/DL (ref 0–160)
POTASSIUM SERPL-SCNC: 4.6 MMOL/L
SODIUM BLD-SCNC: 138 MMOL/L
TOTAL PROTEIN: 7.4
TRIGL SERPL-MCNC: 58 MG/DL
VLDLC SERPL CALC-MCNC: 12 MG/DL

## 2020-07-30 ENCOUNTER — OFFICE VISIT (OUTPATIENT)
Dept: FAMILY MEDICINE CLINIC | Age: 70
End: 2020-07-30
Payer: MEDICARE

## 2020-07-30 VITALS
TEMPERATURE: 98.1 F | WEIGHT: 277 LBS | BODY MASS INDEX: 42.12 KG/M2 | OXYGEN SATURATION: 97 % | SYSTOLIC BLOOD PRESSURE: 144 MMHG | HEART RATE: 78 BPM | DIASTOLIC BLOOD PRESSURE: 82 MMHG

## 2020-07-30 PROCEDURE — 20610 DRAIN/INJ JOINT/BURSA W/O US: CPT | Performed by: FAMILY MEDICINE

## 2020-10-06 ENCOUNTER — TELEPHONE (OUTPATIENT)
Dept: FAMILY MEDICINE CLINIC | Age: 70
End: 2020-10-06

## 2020-10-06 NOTE — TELEPHONE ENCOUNTER
He needs to have x-rays done of his knees. Last x-rays were for several years ago and it just showed mild arthritis. X-rays have been ordered. Or would he like to have them done?

## 2020-10-07 ENCOUNTER — HOSPITAL ENCOUNTER (OUTPATIENT)
Age: 70
Discharge: HOME | End: 2020-10-07
Payer: MEDICARE

## 2020-10-07 ENCOUNTER — HOSPITAL ENCOUNTER (OUTPATIENT)
Dept: GENERAL RADIOLOGY | Age: 70
Discharge: HOME | End: 2020-10-07
Payer: MEDICARE

## 2020-10-07 PROCEDURE — 73562 X-RAY EXAM OF KNEE 3: CPT

## 2020-10-08 ENCOUNTER — TELEPHONE (OUTPATIENT)
Dept: FAMILY MEDICINE CLINIC | Age: 70
End: 2020-10-08

## 2020-10-08 NOTE — TELEPHONE ENCOUNTER
Please see previous telephone consult. It is too soon to have another injection. x-rays show worsening arthritis.   I sent a consult to Dr. Johanna Bearden

## 2020-10-08 NOTE — TELEPHONE ENCOUNTER
Pt called requesting results of xray's and wanting to know if he can get a cortisone injection in his knees because he is in a lot of pain.

## 2020-10-15 ENCOUNTER — OFFICE VISIT (OUTPATIENT)
Dept: ORTHOPEDIC SURGERY | Age: 70
End: 2020-10-15
Payer: MEDICARE

## 2020-10-15 VITALS — HEART RATE: 86 BPM | BODY MASS INDEX: 42.59 KG/M2 | OXYGEN SATURATION: 96 % | WEIGHT: 281 LBS | HEIGHT: 68 IN

## 2020-10-15 PROCEDURE — G8427 DOCREV CUR MEDS BY ELIG CLIN: HCPCS | Performed by: PHYSICIAN ASSISTANT

## 2020-10-15 PROCEDURE — G8484 FLU IMMUNIZE NO ADMIN: HCPCS | Performed by: PHYSICIAN ASSISTANT

## 2020-10-15 PROCEDURE — 20610 DRAIN/INJ JOINT/BURSA W/O US: CPT | Performed by: PHYSICIAN ASSISTANT

## 2020-10-15 PROCEDURE — G8417 CALC BMI ABV UP PARAM F/U: HCPCS | Performed by: PHYSICIAN ASSISTANT

## 2020-10-15 PROCEDURE — 99202 OFFICE O/P NEW SF 15 MIN: CPT | Performed by: PHYSICIAN ASSISTANT

## 2020-10-15 ASSESSMENT — ENCOUNTER SYMPTOMS
GASTROINTESTINAL NEGATIVE: 1
RESPIRATORY NEGATIVE: 1
EYES NEGATIVE: 1

## 2020-10-15 NOTE — PROGRESS NOTES
 Wears dentures     Full Upper, Partial Lower    Wears glasses     Wears partial dentures     Lower       Past Surgical History:   Procedure Laterality Date    APPENDECTOMY  1960's    CARPAL TUNNEL RELEASE Right 06/22/2017    COLONOSCOPY  Last Done Early 2000's    Polyps Removed In Past    DENTAL SURGERY      Teeth Extracted In Past    EYE SURGERY Left Late 1980's    Cataract With Lens Implant    FRACTURE SURGERY Left 2000's    Broken Left Wrist    HERNIA REPAIR  2358'I    Umbilical Hernia Repair    KNEE ARTHROSCOPY Right 04/13/2017    meniscus repair    TONSILLECTOMY AND ADENOIDECTOMY  26's       Family History   Problem Relation Age of Onset    Stroke Mother     Heart Disease Mother     Cancer Father         Prostate Cancer    Cancer Son         Leukemia       Social History     Socioeconomic History    Marital status:      Spouse name: None    Number of children: 3    Years of education: None    Highest education level: None   Occupational History    None   Social Needs    Financial resource strain: None    Food insecurity     Worry: None     Inability: None    Transportation needs     Medical: None     Non-medical: None   Tobacco Use    Smoking status: Current Every Day Smoker     Packs/day: 0.50     Years: 52.00     Pack years: 26.00     Types: Cigarettes     Start date: 1965    Smokeless tobacco: Never Used   Substance and Sexual Activity    Alcohol use:  Yes     Alcohol/week: 3.0 - 6.0 standard drinks     Types: 3 - 6 Cans of beer per week     Frequency: 4 or more times a week     Drinks per session: 3 or 4     Binge frequency: Weekly     Comment: \"Drink About 2 To 5 Beers A Day\"    Drug use: No    Sexual activity: Not Currently   Lifestyle    Physical activity     Days per week: None     Minutes per session: None    Stress: None   Relationships    Social connections     Talks on phone: None     Gets together: None     Attends Hindu service: None     Active nourished. Body habitus is obese     Lymph:  No lymphedema in bilateral lower extremities     Skin intact in bilateral lower extremities with no ulcerations, lesions, rash, erythema. Vascular: There are no varicosities in bilateral lower extremities, sensation intact to light touch over bilateral lower extremities. bilateral leg/knee exam:  Leg alignment:     neutral  Quadriceps/hamstring atrophy:   no  Knee effusion:    no   Knee erythema:   no  ROM:     0-125 degrees  Varus laxity at 0 and 30 deg's: no  Valguslaxity at 0 and 30 deg's: no  Recurvatum:    no  Tenderness at:   Diffuse tenderness    Bilateral Knee strength is 5/5 flexion and extension  There is + L2-S1 motor and sensory function in bilateral lower extremities    Outside record review: office notes and outside x-rays reviewed     Imaging studies:  3 views of the left knee reviewed in the office today which show severe tricompartmental osteoarthritis primarily affecting the medial compartment the most where there is near bone-on-bone articulation and osteophyte formation. 3 views of the right knee reviewed in the office today show severe tricompartmental osteoarthritis where there is bone-on-bone articulation in the medial compartment. There are no fractures or dislocations. Impression:  right knee DJD-severe, left knee DJD-moderate to severe      Plan:  Natural history and expected course discussed. Questions answered. Patient Instructions   Steroid injection   Rest left knee for 24-48 hours  Work on ROM and strengthening of knees and legs  May take OTC pain relievers or anti-inflammatories as needed  Weightbearing and activities as tolerated  Follow up with Dr. Jonathan Darden to discuss surgery for the right knee      Left knee injection procedure note    Pre-procedure diagnosis:  Left knee DJD    Post-procedure diagnosis:  same    Procedure: The planned procedure/risks/benefits/alternatives were discussed with the patient.   Risks include, but are not limited to, increased pain, drug reaction, infection, bleeding, lack of improvement, neurovascular injury, and increased blood sugar levels. The patient understood and all of their questions were answered. The Left knee was prepped with alcohol then a 22 gauge needle was advanced into the inferior-lateral joint without difficulty. The joint was then injected with 3 ml 1% lidocaine, 2ml of Kenalog (40 mg/ml). The injection site was cleansed with isopropyl alcohol and a band-aid was placed. Complications:  None, the patient tolerated the procedure well. Instructions: The patient was advised to rest the knee and decrease activity for the next 24 to 48 hours. May use prescription or OTC pain relievers as needed for any post-injection pain as well as ice.

## 2020-10-15 NOTE — PATIENT INSTRUCTIONS
Steroid injection   Rest left knee for 24-48 hours  Work on ROM and strengthening of knees and legs  May take OTC pain relievers or anti-inflammatories as needed  Weightbearing and activities as tolerated  Follow up with Dr. Tom Johnson to discuss surgery for the right knee

## 2020-10-29 ENCOUNTER — OFFICE VISIT (OUTPATIENT)
Dept: ORTHOPEDIC SURGERY | Age: 70
End: 2020-10-29
Payer: MEDICARE

## 2020-10-29 VITALS
RESPIRATION RATE: 20 BRPM | OXYGEN SATURATION: 99 % | BODY MASS INDEX: 42.59 KG/M2 | HEIGHT: 68 IN | WEIGHT: 281 LBS | HEART RATE: 100 BPM

## 2020-10-29 PROCEDURE — 4004F PT TOBACCO SCREEN RCVD TLK: CPT | Performed by: ORTHOPAEDIC SURGERY

## 2020-10-29 PROCEDURE — 20610 DRAIN/INJ JOINT/BURSA W/O US: CPT | Performed by: ORTHOPAEDIC SURGERY

## 2020-10-29 PROCEDURE — 99214 OFFICE O/P EST MOD 30 MIN: CPT | Performed by: ORTHOPAEDIC SURGERY

## 2020-10-29 PROCEDURE — 4040F PNEUMOC VAC/ADMIN/RCVD: CPT | Performed by: ORTHOPAEDIC SURGERY

## 2020-10-29 PROCEDURE — G8427 DOCREV CUR MEDS BY ELIG CLIN: HCPCS | Performed by: ORTHOPAEDIC SURGERY

## 2020-10-29 PROCEDURE — G8484 FLU IMMUNIZE NO ADMIN: HCPCS | Performed by: ORTHOPAEDIC SURGERY

## 2020-10-29 PROCEDURE — G8417 CALC BMI ABV UP PARAM F/U: HCPCS | Performed by: ORTHOPAEDIC SURGERY

## 2020-10-29 PROCEDURE — 1123F ACP DISCUSS/DSCN MKR DOCD: CPT | Performed by: ORTHOPAEDIC SURGERY

## 2020-10-29 PROCEDURE — 3017F COLORECTAL CA SCREEN DOC REV: CPT | Performed by: ORTHOPAEDIC SURGERY

## 2020-10-29 NOTE — PATIENT INSTRUCTIONS
Steroid injection  Rest the right knee for 24-48 hours  Weightbearing and activities as tolerated  Work on ROM and strengthening of the right knee and leg  May take OTC pain relievers as needed for pain  Rest, ice, and elevate as needed  Referred to Dr. Mis Wallace for weight loss management  Follow up in 3 months

## 2020-10-29 NOTE — PROGRESS NOTES
Patient presents in office to discuss treatment options regarding his chronic right knee issues. Patient continues to have sharp pain along the lateral aspect of the right knee and patella which is usually worse when it rains. The knee continues to act if it wants to shift sideways leaving the knee unstable causing patient to lose his balance. History of gel injections, steroid injections and meniscus repair performed by Dr. Rebel Gong in 2018 which offered him no relief for chronic knee issues. Patient rates his pain at a 10/10 today and wishes to discuss replacement to improve his quality of life so he can return to his previous active lifestyle.

## 2020-10-30 ASSESSMENT — ENCOUNTER SYMPTOMS
BACK PAIN: 1
SHORTNESS OF BREATH: 1
WHEEZING: 1
EYE PAIN: 0
CHEST TIGHTNESS: 0
EYE REDNESS: 0
VOMITING: 0
COLOR CHANGE: 0

## 2020-10-30 NOTE — PROGRESS NOTES
10/29/2020   Chief Complaint   Patient presents with    Knee Pain     right        History of Present Illness:                             Thee Chalres is a 78 y.o. male who presents today for evaluation of his bilateral right more severe than left knee pain. He has a long history of knee pain and stiffness issues related to degenerative joint disease. He has had multiple treatments in the past but feels that his knee has continued to progress. His pain is constant throughout the day and worse with any types of prolonged standing, walking, or deep knee flexion activities. He feels like his knee is unstable and gets grinding and catching sensations in the knee. He has previously had arthroscopic surgery on the knee in 2018 by Dr. Carolee Brown. He has also tried steroid injections and gel injections and what he states are stem cell injections. He feels that his quality of life is deteriorating and he is having difficult time remaining active because of constant severe right knee pain. He has noticed that he has become more sedentary and has gained weight recently which has complicated his problems. He is also a heavy smoker and heavy drinker. Patient presents in office to discuss treatment options regarding his chronic right knee issues. Patient continues to have sharp pain along the lateral aspect of the right knee and patella which is usually worse when it rains. The knee continues to act if it wants to shift sideways leaving the knee unstable causing patient to lose his balance. History of gel injections, steroid injections and meniscus repair performed by Dr. Leonor Arthur in 2018 which offered him no relief for chronic knee issues. Patient rates his pain at a 10/10 today and wishes to discuss replacement to improve his quality of life so he can return to his previous active lifestyle.          Medical History  Patient's medications, allergies, past medical, surgical, social and family histories were reviewed and updated as appropriate. Past Medical History:   Diagnosis Date    Acid reflux     Anxiety     Arthritis     \"Right Knee\"    Asthma     Chronic back pain     Cough     Occ Productive Cough \"Milky\" Sputum    Diverticulitis Dx Early 2000's    Hepatitis Dx 18's    \"In The Hospital A [facility] Days\"    [place] (hard of hearing)     Bilateral Ears    HTN (hypertension)     Hyperlipidemia     Nocturia     Obesity, Class II, BMI 35-39.9, with comorbidity 8/23/2016    Shortness of breath on exertion     Slow urinary stream     Tingling     \"Tingling Right Hand\"    Wears dentures     Full Upper, Partial Lower    Wears glasses     Wears partial dentures     Lower     Family History   Problem Relation Age of Onset    Stroke Mother     Heart Disease Mother     Cancer Father         Prostate Cancer    Cancer Son         Leukemia     Social History     Socioeconomic History    Marital status:      Spouse name: None    Number of children: 3    Years of education: None    Highest education level: None   Occupational History    None   Social Needs    Financial resource strain: None    Food insecurity     Worry: None     Inability: None    Transportation needs     Medical: None     Non-medical: None   Tobacco Use    Smoking status: Current Every Day Smoker     Packs/day: 0.50     Years: 52.00     Pack years: 26.00     Types: Cigarettes     Start date: 1965    Smokeless tobacco: Never Used   Substance and Sexual Activity    Alcohol use:  Yes     Alcohol/week: 3.0 - 6.0 standard drinks     Types: 3 - 6 Cans of beer per week     Frequency: 4 or more times a week     Drinks per session: 3 or 4     Binge frequency: Weekly     Comment: \"Drink About 2 To 5 Beers A Day\"    Drug use: No    Sexual activity: Not Currently   Lifestyle    Physical activity     Days per week: None     Minutes per session: None    Stress: None   Relationships    Social connections     Talks on phone: None Gets together: None     Attends Synagogue service: None     Active member of club or organization: None     Attends meetings of clubs or organizations: None     Relationship status: None    Intimate partner violence     Fear of current or ex partner: None     Emotionally abused: None     Physically abused: None     Forced sexual activity: None   Other Topics Concern    None   Social History Narrative    Single, 3 kids, works for [employer] as a      Current Outpatient Medications   Medication Sig Dispense Refill    pantoprazole (PROTONIX) 40 MG tablet Take 1 tablet by mouth every morning (before breakfast) 90 tablet 1    amLODIPine (NORVASC) 5 MG tablet TAKE 1 TABLET BY MOUTH EVERY DAY IN THE MORNING 90 tablet 1    lisinopril (PRINIVIL;ZESTRIL) 5 MG tablet TAKE 1 TABLET BY MOUTH EVERY DAY IN THE MORNING 90 tablet 1    sildenafil (VIAGRA) 100 MG tablet Take 1 tablet by mouth as needed for Erectile Dysfunction 30 tablet 2    fluticasone-salmeterol (ADVAIR DISKUS) 250-50 MCG/DOSE AEPB INHALE 1 PUFF INTO THE LUNGS EVERY 12 HOURS 180 each 1    albuterol sulfate HFA (PROAIR HFA) 108 (90 Base) MCG/ACT inhaler Inhale 2 puffs into the lungs every 6 hours as needed for Wheezing 3 Inhaler 1    Handicap Placard MISC by Does not apply route Please provide disability placard for five years for an orthopedic condition. 1 each 0    ibuprofen (ADVIL;MOTRIN) 200 MG tablet Take 800 mg by mouth daily       No current facility-administered medications for this visit. Allergies   Allergen Reactions    Sulfa Antibiotics Rash       Review of Systems:   Review of Systems   Constitutional: Negative for chills and fever. HENT: Negative for congestion and sneezing. Eyes: Negative for pain and redness. Respiratory: Positive for shortness of breath and wheezing. Negative for chest tightness. Cardiovascular: Negative for chest pain and palpitations.    Gastrointestinal: Negative for vomiting. Musculoskeletal: Positive for arthralgias, back pain, gait problem and joint swelling. Skin: Negative for color change and rash. Neurological: Negative for weakness and numbness. Psychiatric/Behavioral: Negative for agitation. The patient is not nervous/anxious. Examination:  General Exam:  Vitals: Pulse 100   Resp 20   Ht 5' 8\" (1.727 m)   Wt 281 lb (127.5 kg)   SpO2 99%   BMI 42.73 kg/m²    Physical Exam  Vitals signs and nursing note reviewed. Constitutional:       Appearance: Normal appearance. He is obese. HENT:      Head: Normocephalic and atraumatic. Eyes:      Conjunctiva/sclera: Conjunctivae normal.      Pupils: Pupils are equal, round, and reactive to light. Neck:      Musculoskeletal: Normal range of motion. Pulmonary:      Effort: Pulmonary effort is normal.   Musculoskeletal:      Right hip: He exhibits normal range of motion, normal strength, no tenderness, no bony tenderness, no swelling, no crepitus and no deformity. Left hip: Normal.      Left knee: He exhibits decreased range of motion and swelling. He exhibits no effusion, no ecchymosis, no deformity, no laceration, normal alignment, no LCL laxity, normal meniscus and no MCL laxity. Tenderness found. Medial joint line and lateral joint line tenderness noted. Comments: Right Lower Extremity:    There is moderate to severe tenderness to palpation diffusely throughout the knee, most significant along the medial joint line. There is a moderate knee joint effusion present and mild global swelling anteriorly. Mild restricted range of motion at the knee with approximately 5 degrees lack of full extension and knee flexion up to 120 degrees with pain at the extremes of motion. There is mild crepitation during active range of motion. There is mild varus knee alignment. There is 5 out of 5 strength with knee flexion and extension.   There is no instability to his primary care physician to determine if any prescriptions may be helpful for him. I also addressed his obesity and explained that I believe he would need to lose weight prior to undergoing elective right total knee replacement. I sent a referral to Dr. Gerardo Duarte for him to be evaluated by the bariatric team for assistance in weight loss prior to surgery. I also addressed his alcohol intake. I explained that I am concerned about the amount that he is consuming as believe that this will be a problem for him in the perioperative timeframe. I explained that he would be at risk to have falls or instability on his feet if he is a heavy drinker around the time of surgery. Due to these multiple issues I recommend that we hold off on surgical intervention. I recommended that we continue with conservative measures at this time. We discussed injection therapy and he would like to proceed with an injection today. Procedure Note, Right Knee Intra-articular Injection:    The right knee was prepped with alcohol. A 22 gauge needle was inserted into the knee joint. The knee was then injected with 80 mg of Kenalog and 4 mL of 1% plain lidocaine. A sterile Band-Aid was applied. The patient tolerated the procedure well with no complications. I would like him to follow-up in 3 months for check of his response to weight loss, smoking cessation, and decreased alcohol intake.     Electronically signed by Katrin Weaver MD on 10/30/2020 at 8:38 AM

## 2020-11-03 ENCOUNTER — VIRTUAL VISIT (OUTPATIENT)
Dept: FAMILY MEDICINE CLINIC | Age: 70
End: 2020-11-03
Payer: MEDICARE

## 2020-11-03 PROBLEM — I10 HTN (HYPERTENSION): Status: RESOLVED | Noted: 2020-11-03 | Resolved: 2020-11-03

## 2020-11-03 PROCEDURE — G8428 CUR MEDS NOT DOCUMENT: HCPCS | Performed by: FAMILY MEDICINE

## 2020-11-03 PROCEDURE — 4040F PNEUMOC VAC/ADMIN/RCVD: CPT | Performed by: FAMILY MEDICINE

## 2020-11-03 PROCEDURE — 1123F ACP DISCUSS/DSCN MKR DOCD: CPT | Performed by: FAMILY MEDICINE

## 2020-11-03 PROCEDURE — 99214 OFFICE O/P EST MOD 30 MIN: CPT | Performed by: FAMILY MEDICINE

## 2020-11-03 PROCEDURE — 3017F COLORECTAL CA SCREEN DOC REV: CPT | Performed by: FAMILY MEDICINE

## 2020-11-03 RX ORDER — NICOTINE 21 MG/24HR
1 PATCH, TRANSDERMAL 24 HOURS TRANSDERMAL DAILY
Qty: 30 PATCH | Refills: 0 | Status: SHIPPED | OUTPATIENT
Start: 2020-11-03 | End: 2021-01-12 | Stop reason: SDUPTHER

## 2020-11-03 ASSESSMENT — ENCOUNTER SYMPTOMS
EYES NEGATIVE: 1
GASTROINTESTINAL NEGATIVE: 1
ALLERGIC/IMMUNOLOGIC NEGATIVE: 1
RESPIRATORY NEGATIVE: 1

## 2020-11-03 ASSESSMENT — PATIENT HEALTH QUESTIONNAIRE - PHQ9
SUM OF ALL RESPONSES TO PHQ9 QUESTIONS 1 & 2: 0
2. FEELING DOWN, DEPRESSED OR HOPELESS: 0
SUM OF ALL RESPONSES TO PHQ QUESTIONS 1-9: 0
1. LITTLE INTEREST OR PLEASURE IN DOING THINGS: 0

## 2020-11-03 NOTE — PROGRESS NOTES
11/3/2020    TELEHEALTH EVALUATION -- Audio/Visual (During HQREJ-42 public health emergency)    HPI:    Kameron Waldron (:  1950) has requested an audio/video evaluation for the following concern(s):    Tobacco use:  Currently smoking 1 ppd. Is hoping to have a right knee replacement, but the orthopedist wants him to stop smoking prior to surgery. He was given a Kenalog injection on 10/29/2020 and is due to follow-up in 3 months. Alcohol abuse: Patient states he was told he needed to quit drinking prior to his surgery. He is working on this. Review of Systems   Constitutional: Negative. HENT: Negative. Eyes: Negative. Respiratory: Negative. Cardiovascular: Negative. Gastrointestinal: Negative. Endocrine: Negative. Genitourinary: Negative. Musculoskeletal: Positive for arthralgias. Skin: Negative. Allergic/Immunologic: Negative. Neurological: Negative. Hematological: Negative. Psychiatric/Behavioral: Negative. Prior to Visit Medications    Medication Sig Taking? Authorizing Provider   pantoprazole (PROTONIX) 40 MG tablet Take 1 tablet by mouth every morning (before breakfast)  Angela Segundo MD   amLODIPine (NORVASC) 5 MG tablet TAKE 1 TABLET BY MOUTH EVERY DAY IN THE MORNING  Angela Segundo MD   lisinopril (PRINIVIL;ZESTRIL) 5 MG tablet TAKE 1 TABLET BY MOUTH EVERY DAY IN THE MORNING  Angela Segundo MD   sildenafil (VIAGRA) 100 MG tablet Take 1 tablet by mouth as needed for Erectile Dysfunction  Angela Segundo MD   fluticasone-salmeterol (ADVAIR DISKUS) 250-50 MCG/DOSE AEPB INHALE 1 PUFF INTO THE LUNGS EVERY 12 HOURS  Angela Segundo MD   albuterol sulfate HFA (PROAIR HFA) 108 (90 Base) MCG/ACT inhaler Inhale 2 puffs into the lungs every 6 hours as needed for Wheezing  Angela Segundo MD   Handicap Placard MISC by Does not apply route Please provide disability placard for five years for an orthopedic condition.   Angela Segundo MD   ibuprofen 26.00     Types: Cigarettes     Start date: 1965    Smokeless tobacco: Never Used   Substance Use Topics    Alcohol use: Yes     Alcohol/week: 3.0 - 6.0 standard drinks     Types: 3 - 6 Cans of beer per week     Frequency: 4 or more times a week     Drinks per session: 3 or 4     Binge frequency: Weekly     Comment: \"Drink About 2 To 5 Beers A Day\"    Drug use: No       PHYSICAL EXAMINATION:  [ INSTRUCTIONS:  \"[x]\" Indicates a positive item  \"[]\" Indicates a negative item  -- DELETE ALL ITEMS NOT EXAMINED]  Vital Signs: (As obtained by patient/caregiver or practitioner observation)    Not available    Constitutional: [x] Appears well-developed and well-nourished [x] No apparent distress      [] Abnormal-   Mental status  [x] Alert and awake  [x] Oriented to person/place/time [x]Able to follow commands      Eyes:  EOM    [x]  Normal  [] Abnormal-  Sclera  [x]  Normal  [] Abnormal -         Discharge [x]  None visible  [] Abnormal -    HENT:   [x] Normocephalic, atraumatic. [] Abnormal   [x] Mouth/Throat: Mucous membranes are moist.     External Ears [x] Normal  [] Abnormal-     Neck: [x] No visualized mass     Pulmonary/Chest: [x] Respiratory effort normal.  [x] No visualized signs of difficulty breathing or respiratory distress        [] Abnormal-      Musculoskeletal:            [x] Normal range of motion of neck        [] Abnormal-       Neurological:        [x] No Facial Asymmetry (Cranial nerve 7 motor function) (limited exam to video visit)          [x] No gaze palsy        [] Abnormal-         Skin:        [x] No significant exanthematous lesions or discoloration noted on facial skin         [] Abnormal-            Psychiatric:       [x] Normal Affect [x] No Hallucinations        [] Abnormal-         ASSESSMENT/PLAN:  1. Tobacco dependence  Information given on smoking cessation classes and greatly encouraged patient to attend  - nicotine (NICODERM CQ) 21 MG/24HR;  Place 1 patch onto the skin daily Dispense: 30 patch; Refill: 0    2. Primary osteoarthritis of right knee  Follow-up with orthopedist    3. Uncomplicated alcohol dependence ([address]  Work on weaning off alcohol. Return if symptoms worsen or fail to improve. Lord Herrmann is a 78 y.o. male being evaluated by a Virtual Visit (video visit) encounter to address concerns as mentioned above. A caregiver was present when appropriate. Due to this being a TeleHealth encounter (During [facility]), evaluation of the following organ systems was limited: Vitals/Constitutional/EENT/Resp/CV/GI//MS/Neuro/Skin/Heme-Lymph-Imm. Pursuant to the emergency declaration under the [address] [place] [address] authority and the [employer] Act, this Virtual Visit was conducted with patient's (and/or legal guardian's) consent, to reduce the patient's risk of exposure to COVID-19 and provide necessary medical care. The patient (and/or legal guardian) has also been advised to contact this office for worsening conditions or problems, and seek emergency medical treatment and/or call 911 if deemed necessary. Patient identification was verified at the start of the visit: Yes    Total time spent on this encounter: Not billed by time    Services were provided through a video synchronous discussion virtually to substitute for in-person clinic visit. Patient and provider were located at their individual homes. --Dontae Bobby MD on 11/3/2020 at 8:10 AM    An electronic signature was used to authenticate this note.

## 2020-11-03 NOTE — PATIENT INSTRUCTIONS
Patient Education        Stopping Smoking: Care Instructions  Your Care Instructions     Cigarette smokers crave the nicotine in cigarettes. Giving it up is much harder than simply changing a habit. Your body has to stop craving the nicotine. It is hard to quit, but you can do it. There are many tools that people use to quit smoking. You may find that combining tools works best for you. There are several steps to quitting. First you get ready to quit. Then you get support to help you. After that, you learn new skills and behaviors to become a nonsmoker. For many people, a necessary step is getting and using medicine. Your doctor will help you set up the plan that best meets your needs. You may want to attend a smoking cessation program to help you quit smoking. When you choose a program, look for one that has proven success. Ask your doctor for ideas. You will greatly increase your chances of success if you take medicine as well as get counseling or join a cessation program.  Some of the changes you feel when you first quit tobacco are uncomfortable. Your body will miss the nicotine at first, and you may feel short-tempered and grumpy. You may have trouble sleeping or concentrating. Medicine can help you deal with these symptoms. You may struggle with changing your smoking habits and rituals. The last step is the tricky one: Be prepared for the smoking urge to continue for a time. This is a lot to deal with, but keep at it. You will feel better. Follow-up care is a key part of your treatment and safety. Be sure to make and go to all appointments, and call your doctor if you are having problems. It's also a good idea to know your test results and keep a list of the medicines you take. How can you care for yourself at home? · Ask your family, friends, and coworkers for support. You have a better chance of quitting if you have help and support.   · Join a support group, such as Nicotine Anonymous, for people who are trying to quit smoking. · Consider signing up for a smoking cessation program, such as the American Lung Association's Freedom from Smoking program.  · Get text messaging support. Go to the website at www.smokefree. gov to sign up for the [facility] program.  · Set a quit date. Pick your date carefully so that it is not right in the middle of a big deadline or stressful time. Once you quit, do not even take a puff. Get rid of all ashtrays and lighters after your last cigarette. Clean your house and your clothes so that they do not smell of smoke. · Learn how to be a nonsmoker. Think about ways you can avoid those things that make you reach for a cigarette. ? Avoid situations that put you at greatest risk for smoking. For some people, it is hard to have a drink with friends without smoking. For others, they might skip a coffee break with coworkers who smoke. ? Change your daily routine. Take a different route to work or eat a meal in a different place. · Cut down on stress. Calm yourself or release tension by doing an activity you enjoy, such as reading a book, taking a hot bath, or gardening. · Talk to your doctor or pharmacist about nicotine replacement therapy, which replaces the nicotine in your body. You still get nicotine but you do not use tobacco. Nicotine replacement products help you slowly reduce the amount of nicotine you need. These products come in several forms, many of them available over-the-counter:  ? Nicotine patches  ? Nicotine gum and lozenges  ? Nicotine inhaler  · Ask your doctor about bupropion (Wellbutrin) or varenicline (Chantix), which are prescription medicines. They do not contain nicotine. They help you by reducing withdrawal symptoms, such as stress and anxiety. · Some people find hypnosis, acupuncture, and massage helpful for ending the smoking habit. · Eat a healthy diet and get regular exercise.  Having healthy habits will help your body move past its craving for nicotine. · Be prepared to keep trying. Most people are not successful the first few times they try to quit. Do not get mad at yourself if you smoke again. Make a list of things you learned and think about when you want to try again, such as next week, next month, or next year. Where can you learn more? Go to https://[employer]. org and sign in to your [employer] account. Enter M987 in the [employer] box to learn more about \"Stopping Smoking: Care Instructions. \"     If you do not have an account, please click on the \"Sign Up Now\" link. Current as of: March 12, 2020               Content Version: 12.6  © 2006-2020 [employer]. Care instructions adapted under license by [facility] ([facility]). If you have questions about a medical condition or this instruction, always ask your healthcare professional. [address] any warranty or liability for your use of this information.

## 2020-11-19 ENCOUNTER — NURSE ONLY (OUTPATIENT)
Dept: FAMILY MEDICINE CLINIC | Age: 70
End: 2020-11-19
Payer: MEDICARE

## 2020-11-19 ENCOUNTER — HOSPITAL ENCOUNTER (OUTPATIENT)
Age: 70
Setting detail: SPECIMEN
Discharge: HOME | End: 2020-11-19
Payer: MEDICARE

## 2020-11-19 PROCEDURE — U0002 COVID-19 LAB TEST NON-CDC: HCPCS

## 2020-11-19 PROCEDURE — 99211 OFF/OP EST MAY X REQ PHY/QHP: CPT | Performed by: FAMILY MEDICINE

## 2020-11-21 LAB
SARS-COV-2: NOT DETECTED
SOURCE: NORMAL

## 2020-12-08 ENCOUNTER — TELEPHONE (OUTPATIENT)
Dept: FAMILY MEDICINE CLINIC | Age: 70
End: 2020-12-08

## 2020-12-08 RX ORDER — PANTOPRAZOLE SODIUM 40 MG/1
40 TABLET, DELAYED RELEASE ORAL
Qty: 90 TABLET | Refills: 1 | Status: SHIPPED | OUTPATIENT
Start: 2020-12-08 | End: 2021-06-10 | Stop reason: SDUPTHER

## 2020-12-08 NOTE — TELEPHONE ENCOUNTER
Patient requesting refill on Pantoprazole. States he only has 3 tablets left. Scheduled for f/u 1/12/21. This is the only medication he is in need of.  Would like this sent to [facility]

## 2021-01-12 ENCOUNTER — OFFICE VISIT (OUTPATIENT)
Dept: FAMILY MEDICINE CLINIC | Age: 71
End: 2021-01-12
Payer: MEDICARE

## 2021-01-12 VITALS
DIASTOLIC BLOOD PRESSURE: 78 MMHG | OXYGEN SATURATION: 94 % | HEART RATE: 65 BPM | SYSTOLIC BLOOD PRESSURE: 120 MMHG | TEMPERATURE: 95.6 F | BODY MASS INDEX: 44.61 KG/M2 | WEIGHT: 293.4 LBS

## 2021-01-12 DIAGNOSIS — F17.200 TOBACCO DEPENDENCE: ICD-10-CM

## 2021-01-12 DIAGNOSIS — Z13.6 SCREENING FOR CARDIOVASCULAR CONDITION: ICD-10-CM

## 2021-01-12 DIAGNOSIS — I10 ESSENTIAL HYPERTENSION: Primary | ICD-10-CM

## 2021-01-12 DIAGNOSIS — F10.20 UNCOMPLICATED ALCOHOL DEPENDENCE (HCC): ICD-10-CM

## 2021-01-12 DIAGNOSIS — E66.01 CLASS 3 SEVERE OBESITY DUE TO EXCESS CALORIES WITH SERIOUS COMORBIDITY AND BODY MASS INDEX (BMI) OF 40.0 TO 44.9 IN ADULT (HCC): ICD-10-CM

## 2021-01-12 DIAGNOSIS — J41.1 MUCOPURULENT CHRONIC BRONCHITIS (HCC): ICD-10-CM

## 2021-01-12 DIAGNOSIS — M17.12 PRIMARY OSTEOARTHRITIS OF LEFT KNEE: ICD-10-CM

## 2021-01-12 DIAGNOSIS — K21.9 GASTROESOPHAGEAL REFLUX DISEASE WITHOUT ESOPHAGITIS: ICD-10-CM

## 2021-01-12 DIAGNOSIS — M17.11 PRIMARY OSTEOARTHRITIS OF RIGHT KNEE: ICD-10-CM

## 2021-01-12 DIAGNOSIS — Z12.5 SCREENING PSA (PROSTATE SPECIFIC ANTIGEN): ICD-10-CM

## 2021-01-12 PROCEDURE — G8427 DOCREV CUR MEDS BY ELIG CLIN: HCPCS | Performed by: FAMILY MEDICINE

## 2021-01-12 PROCEDURE — G8926 SPIRO NO PERF OR DOC: HCPCS | Performed by: FAMILY MEDICINE

## 2021-01-12 PROCEDURE — G8484 FLU IMMUNIZE NO ADMIN: HCPCS | Performed by: FAMILY MEDICINE

## 2021-01-12 PROCEDURE — 99214 OFFICE O/P EST MOD 30 MIN: CPT | Performed by: FAMILY MEDICINE

## 2021-01-12 PROCEDURE — 4004F PT TOBACCO SCREEN RCVD TLK: CPT | Performed by: FAMILY MEDICINE

## 2021-01-12 PROCEDURE — 4040F PNEUMOC VAC/ADMIN/RCVD: CPT | Performed by: FAMILY MEDICINE

## 2021-01-12 PROCEDURE — 3017F COLORECTAL CA SCREEN DOC REV: CPT | Performed by: FAMILY MEDICINE

## 2021-01-12 PROCEDURE — 1123F ACP DISCUSS/DSCN MKR DOCD: CPT | Performed by: FAMILY MEDICINE

## 2021-01-12 PROCEDURE — G8417 CALC BMI ABV UP PARAM F/U: HCPCS | Performed by: FAMILY MEDICINE

## 2021-01-12 PROCEDURE — 3023F SPIROM DOC REV: CPT | Performed by: FAMILY MEDICINE

## 2021-01-12 RX ORDER — LISINOPRIL 5 MG/1
TABLET ORAL
Qty: 90 TABLET | Refills: 1 | Status: SHIPPED | OUTPATIENT
Start: 2021-01-12 | End: 2021-08-13 | Stop reason: SDUPTHER

## 2021-01-12 RX ORDER — AMLODIPINE BESYLATE 5 MG/1
TABLET ORAL
Qty: 90 TABLET | Refills: 1 | Status: SHIPPED | OUTPATIENT
Start: 2021-01-12 | End: 2021-08-13 | Stop reason: SDUPTHER

## 2021-01-12 RX ORDER — NICOTINE 21 MG/24HR
1 PATCH, TRANSDERMAL 24 HOURS TRANSDERMAL DAILY
Qty: 30 PATCH | Refills: 0 | Status: SHIPPED | OUTPATIENT
Start: 2021-01-12 | End: 2021-08-25 | Stop reason: SDUPTHER

## 2021-01-12 RX ORDER — TOPIRAMATE 50 MG/1
50 TABLET, FILM COATED ORAL 2 TIMES DAILY
Qty: 60 TABLET | Refills: 5 | Status: SHIPPED | OUTPATIENT
Start: 2021-01-12 | End: 2021-02-04 | Stop reason: SINTOL

## 2021-01-12 ASSESSMENT — PATIENT HEALTH QUESTIONNAIRE - PHQ9
SUM OF ALL RESPONSES TO PHQ9 QUESTIONS 1 & 2: 0
1. LITTLE INTEREST OR PLEASURE IN DOING THINGS: 0
SUM OF ALL RESPONSES TO PHQ QUESTIONS 1-9: 0

## 2021-01-12 NOTE — PATIENT INSTRUCTIONS
Patient Education        Learning About Chronic Bronchitis  What is chronic bronchitis? Chronic bronchitis is long-term swelling and the buildup of mucus in the airways of your lungs. The airways (bronchial tubes) get inflamed and make a lot of mucus. This can narrow or block the airways, making it hard for you to breathe. It is a form of COPD (chronic obstructive pulmonary disease). Chronic bronchitis is usually caused by smoking. But chemical fumes, dust, or air pollution also can cause it over time. What can you expect when you have chronic bronchitis? Chronic bronchitis gets worse over time. You cannot undo the damage to your lungs. Over time, you may find that:  · You get short of breath even when you do simple things like get dressed or fix a meal.  · It is hard to eat or exercise. · You lose weight and feel weaker. Over many years, the swelling and mucus from chronic bronchitis make it more likely that you will get lung infections. But there are things you can do to prevent more damage and feel better. What are the symptoms? The main symptoms of chronic bronchitis are:  · A cough that will not go away. · Mucus that comes up when you cough. · Shortness of breath that gets worse when you exercise. At times, your symptoms may suddenly flare up and get much worse. This is a called an exacerbation (say \"egg-SAYDA-er-BAY-lanette\"). When this happens, your usual symptoms quickly get worse and stay bad. This can be dangerous. You may have to go to the hospital.  How can you keep chronic bronchitis from getting worse? Don't smoke. That is the best way to keep chronic bronchitis from getting worse. If you already smoke, it is never too late to stop. If you need help quitting, talk to your doctor about stop-smoking programs and medicines. These can increase your chances of quitting for good. You can do other things to keep chronic bronchitis from getting worse:  · Avoid bad air.  Air pollution, chemical fumes, and dust also can make chronic bronchitis worse. · Get a flu shot every year. A shot may keep the flu from turning into something more serious, like pneumonia. A flu shot also may lower your chances of having a flare-up. · Get a pneumococcal shot. A shot can prevent some of the serious complications of pneumonia. Ask your doctor how often you should get this shot. How is chronic bronchitis treated? Chronic bronchitis is treated with medicines and oxygen. You also can take steps at home to stay healthy and keep your condition from getting worse. Medicines and oxygen therapy  · You may be taking medicines such as:  ? Bronchodilators. These help open your airways and make breathing easier. Bronchodilators are either short-acting (work for 6 to 9 hours) or long-acting (work for 24 hours). You inhale most bronchodilators, so they start to act quickly. Always carry your quick-relief inhaler with you in case you need it while you are away from home. ? Corticosteroids. These reduce airway inflammation. They come in pill or inhaled form. You must take these medicines every day for them to work well. ? Antibiotics. These medicines are used when you have a bacterial lung infection. · Take your medicines exactly as prescribed. Call your doctor if you think you are having a problem with your medicine. · Oxygen therapy boosts the amount of oxygen in your blood and helps you breathe easier. Use the flow rate your doctor has recommended, and do not change it without talking to your doctor first.  Other care at home  · If your doctor recommends it, get more exercise. Walking is a good choice. Bit by bit, increase the amount you walk every day. Try for at least 30 minutes on most days of the week. · Learn breathing methods--such as breathing through pursed lips--to help you become less short of breath.   · If your doctor has not set you up with a pulmonary rehabilitation program, talk to him or her about whether rehab is right for you. Rehab includes exercise programs, education about your disease and how to manage it, help with diet and other changes, and emotional support. · Eat regular, healthy meals. Use bronchodilators about 1 hour before you eat to make it easier to eat. Eat several small meals instead of three large ones. Drink beverages at the end of the meal. Avoid foods that are hard to chew. Follow-up care is a key part of your treatment and safety. Be sure to make and go to all appointments, and call your doctor if you are having problems. It's also a good idea to know your test results and keep a list of the medicines you take. Where can you learn more? Go to https://[employer]LegCyte. org and sign in to your Quandoo account. Enter L318 in the [employer] box to learn more about \"Learning About Chronic Bronchitis. \"     If you do not have an account, please click on the \"Sign Up Now\" link. Current as of: February 24, 2020               Content Version: 12.6  © 2006-2020 [employer]. Care instructions adapted under license by [facility] ([facility]). If you have questions about a medical condition or this instruction, always ask your healthcare professional. [place] [address] any warranty or liability for your use of this information.

## 2021-01-12 NOTE — PROGRESS NOTES
Subjective:      Hiro Brown is a 78 y.o. male who presents for evaluation of hypertension. He indicates that he is feeling well and denies any symptoms referable to his elevated blood pressure. Specifically denies chest pain, palpitations, dyspnea, orthopnea, PND or peripheral edema. Current medication regimen is as listed below. Patient denies any side effects of medication. COPD: Patient complains of dyspnea. Symptoms began several years ago. Symptoms chronic dyspnea does worsen with exertion. Sputum is clear  in small amounts. Fever has been absent. Patient uses 2 pillows at night. Patient can walk 500 feet before resting. Patient currently is not on home oxygen therapy. [facility] history: chronic bronchitis and COPD. Has not been using his inhalers due to cost.    Patient with GERD. Patient takes his Protonix 40 mg daily which controls his symptoms. Patient with chronic bilateral knee pain. He has gotten steroid injections in the past which has helped. His last injection was 10/29/2020. He has also had gel injections through orthopedics in the past.  He is supposed to have knee replacements but he has to quit smoking and lose weight prior to his surgery. Patient states he is down to about 10 cigarettes/day. He did not call for refill on his nicotine patch but did get help when he used them. Patient has gained 41 pounds in the past year. Patient states he does not exercise at all, and he eats because he is bored. He also drinks about 4 beers per day which is a significant decrease from his normal.  \"I want a diet pill. \"    Current Outpatient Medications   Medication Sig Dispense Refill    pantoprazole (PROTONIX) 40 MG tablet Take 1 tablet by mouth every morning (before breakfast) 90 tablet 1    nicotine (NICODERM CQ) 21 MG/24HR Place 1 patch onto the skin daily 30 patch 0    amLODIPine (NORVASC) 5 MG tablet TAKE 1 TABLET BY MOUTH EVERY DAY IN THE MORNING 90 tablet 1    lisinopril (PRINIVIL;ZESTRIL) 5 MG tablet TAKE 1 TABLET BY MOUTH EVERY DAY IN THE MORNING 90 tablet 1    sildenafil (VIAGRA) 100 MG tablet Take 1 tablet by mouth as needed for Erectile Dysfunction 30 tablet 2    fluticasone-salmeterol (ADVAIR DISKUS) 250-50 MCG/DOSE AEPB INHALE 1 PUFF INTO THE LUNGS EVERY 12 HOURS 180 each 1    albuterol sulfate HFA (PROAIR HFA) 108 (90 Base) MCG/ACT inhaler Inhale 2 puffs into the lungs every 6 hours as needed for Wheezing 3 Inhaler 1    Handicap Placard MISC by Does not apply route Please provide disability placard for five years for an orthopedic condition. 1 each 0    ibuprofen (ADVIL;MOTRIN) 200 MG tablet Take 800 mg by mouth daily       No current facility-administered medications for this visit. Allergies   Allergen Reactions    Sulfa Antibiotics Rash       Social History     Tobacco Use    Smoking status: Current Every Day Smoker     Packs/day: 0.50     Years: 52.00     Pack years: 26.00     Types: Cigarettes     Start date: 1965    Smokeless tobacco: Never Used   Substance Use Topics    Alcohol use: Yes     Alcohol/week: 3.0 - 6.0 standard drinks     Types: 3 - 6 Cans of beer per week     Frequency: 4 or more times a week     Drinks per session: 3 or 4     Binge frequency: Weekly     Comment: \"Drink About 2 To 5 Beers A Day\"          Objective:      /78 (Site: Left Upper Arm, Position: Sitting, Cuff Size: Large Adult)   Pulse 65   Temp 95.6 °F (35.3 °C) (Infrared)   Wt 293 lb 6.4 oz (133.1 kg)   SpO2 94%   BMI 44.61 kg/m²   General: Appears well, in no apparent distress, obese  S1 and S2 normal, no murmurs, clicks, gallops or rubs. Regular rate and rhythm. Chest is clear at this time; no wheezes or rales. No edema or JVD. Antalgic gait. Assessment:       Diagnosis Orders   1. Essential hypertension  amLODIPine (NORVASC) 5 MG tablet    lisinopril (PRINIVIL;ZESTRIL) 5 MG tablet   Controlled Comprehensive Metabolic Panel   2.  Mucopurulent chronic bronchitis (HCC)   Stable    3. Gastroesophageal reflux disease without esophagitis   Controlled    4. Tobacco dependence  nicotine (NICODERM CQ) 21 MG/24HR   5. Primary osteoarthritis of left knee     6. Primary osteoarthritis of right knee     7. Class 3 severe obesity due to excess calories with serious comorbidity and body mass index (BMI) of 40.0 to 44.9 in adult ([facility])  topiramate (TOPAMAX) 50 MG tablet   8. Uncomplicated alcohol dependence ([address]     9. Screening PSA (prostate specific antigen)     10. Screening for cardiovascular condition  Lipid Panel            Plan: Will begin Topamax for weight loss for now. We encouraged patient to follow a 1200 -calorie/day diet, stop drinking alcohol, and increase exercise to try to achieve 180 minutes of cardio per week. Strongly encourage patient to quit smoking. Will restart nicotine patches. Patient should call in 1 month for refill. Follow-up with orthopedics. If patient would like a another corticosteroid injection in 1 month, he should call back at that time. Strongly encourage use of inhalers for COPD. 1)  Per Orders  2)  Regular aerobic exercise  3)  Sodium Restriction in diet       Sai received counseling on the following healthy behaviors: nutrition, exercise, medication adherence and tobacco cessation    Patient given educational materials on Hypertension    I have instructed Karo Davidson to complete a self tracking handout on Blood Pressures  and instructed them to bring it with them to his next appointment. Discussed use, benefit, and side effects of prescribed medications. Barriers to medication compliance addressed. All patient questions answered. Pt voiced understanding.

## 2021-01-13 LAB
ALBUMIN/GLOBULIN RATIO: 1.3 RATIO (ref 0.8–2.6)
ALBUMIN: 4.3 G/DL (ref 3.5–5.2)
ALP BLD-CCNC: 63 U/L (ref 23–144)
ALT SERPL-CCNC: 27 U/L (ref 0–60)
AST SERPL-CCNC: 26 U/L (ref 0–55)
BILIRUB SERPL-MCNC: 0.5 MG/DL (ref 0–1.2)
BUN BLDV-MCNC: 16 MG/DL (ref 3–29)
BUN/CREAT BLD: 18 (ref 7–25)
CALCIUM SERPL-MCNC: 9.5 MG/DL (ref 8.5–10.5)
CHLORIDE BLD-SCNC: 100 MEQ/L (ref 96–110)
CHOLESTEROL: 160 MG/DL
CO2: 26 MEQ/L (ref 19–32)
CREAT SERPL-MCNC: 0.9 MG/DL (ref 0.5–1.4)
FASTING STATUS: ABNORMAL
GFR AFRICAN AMERICAN: 101 MLS/MIN/1.73M2
GFR NON-AFRICAN AMERICAN: 87 MLS/MIN/1.73M2
GLOBULIN: 3.4 G/DL (ref 1.9–3.6)
GLUCOSE BLD-MCNC: 131 MG/DL (ref 70–99)
HDLC SERPL-MCNC: 45 MG/DL
LDL CHOLESTEROL CALCULATED: 93 MG/DL
POTASSIUM SERPL-SCNC: 4.8 MEQ/L (ref 3.4–5.3)
SODIUM BLD-SCNC: 140 MEQ/L (ref 135–148)
STATUS: ABNORMAL
TOTAL PROTEIN: 7.7 G/DL (ref 6–8.3)
TRIGL SERPL-MCNC: 109 MG/DL
VLDLC SERPL CALC-MCNC: 22 MG/DL (ref 4–38)

## 2021-02-04 ENCOUNTER — OFFICE VISIT (OUTPATIENT)
Dept: FAMILY MEDICINE CLINIC | Age: 71
End: 2021-02-04
Payer: MEDICARE

## 2021-02-04 VITALS
TEMPERATURE: 97.1 F | BODY MASS INDEX: 45.43 KG/M2 | DIASTOLIC BLOOD PRESSURE: 82 MMHG | HEART RATE: 96 BPM | OXYGEN SATURATION: 93 % | SYSTOLIC BLOOD PRESSURE: 130 MMHG | WEIGHT: 298.8 LBS

## 2021-02-04 DIAGNOSIS — M17.12 PRIMARY OSTEOARTHRITIS OF LEFT KNEE: Primary | ICD-10-CM

## 2021-02-04 DIAGNOSIS — F10.10 ALCOHOL ABUSE: ICD-10-CM

## 2021-02-04 DIAGNOSIS — M17.11 PRIMARY OSTEOARTHRITIS OF RIGHT KNEE: ICD-10-CM

## 2021-02-04 DIAGNOSIS — E66.01 CLASS 3 SEVERE OBESITY DUE TO EXCESS CALORIES WITH SERIOUS COMORBIDITY AND BODY MASS INDEX (BMI) OF 45.0 TO 49.9 IN ADULT (HCC): ICD-10-CM

## 2021-02-04 PROCEDURE — 1123F ACP DISCUSS/DSCN MKR DOCD: CPT | Performed by: FAMILY MEDICINE

## 2021-02-04 PROCEDURE — 99214 OFFICE O/P EST MOD 30 MIN: CPT | Performed by: FAMILY MEDICINE

## 2021-02-04 PROCEDURE — G8417 CALC BMI ABV UP PARAM F/U: HCPCS | Performed by: FAMILY MEDICINE

## 2021-02-04 PROCEDURE — 3017F COLORECTAL CA SCREEN DOC REV: CPT | Performed by: FAMILY MEDICINE

## 2021-02-04 PROCEDURE — G8428 CUR MEDS NOT DOCUMENT: HCPCS | Performed by: FAMILY MEDICINE

## 2021-02-04 PROCEDURE — 4040F PNEUMOC VAC/ADMIN/RCVD: CPT | Performed by: FAMILY MEDICINE

## 2021-02-04 PROCEDURE — 20610 DRAIN/INJ JOINT/BURSA W/O US: CPT | Performed by: FAMILY MEDICINE

## 2021-02-04 PROCEDURE — 4004F PT TOBACCO SCREEN RCVD TLK: CPT | Performed by: FAMILY MEDICINE

## 2021-02-04 PROCEDURE — G8484 FLU IMMUNIZE NO ADMIN: HCPCS | Performed by: FAMILY MEDICINE

## 2021-02-04 RX ORDER — NALTREXONE HYDROCHLORIDE 50 MG/1
50 TABLET, FILM COATED ORAL DAILY
Qty: 90 TABLET | Refills: 1 | Status: SHIPPED | OUTPATIENT
Start: 2021-02-04 | End: 2021-08-13 | Stop reason: SDUPTHER

## 2021-02-04 NOTE — PATIENT INSTRUCTIONS
Patient Education        Learning About Alcohol Use Disorder  What is alcohol use disorder? Alcohol use disorder means that a person drinks alcohol even though it causes harm to themselves or others. It can range from mild to severe. The more signs of this disorder you have, the more severe it may be. Moderate to severe alcohol use disorder is sometimes called addiction. People who have it may find it hard to control their use of alcohol. People who have this disorder may argue with others about how much they're drinking. Their job may be affected because of drinking. They may drink when it's dangerous or illegal, such as when they drive. They also may have a strong need, or craving, to drink. They may feel like they must drink just to get by. Their drinking may increase their risk of getting hurt or being in a car crash. Over time, drinking too much alcohol may cause health problems. These may include high blood pressure, liver problems, or problems with digestion. What are the signs? Maybe you've wondered about your alcohol habits, or how to tell if your drinking is becoming a problem. Here are some of the signs of alcohol use disorder. You may have it if you have two or more of the following signs:  · You drink larger amounts of alcohol than you ever meant to. Or you've been drinking for a longer time than you ever meant to. · You can't cut down or control your use. Or you constantly wish you could cut down. · You spend a lot of time getting or drinking alcohol or recovering from its effects. · You have strong cravings for alcohol. · You can no longer do your main jobs at work, at school, or at home. · You keep drinking alcohol, even though your use hurts your relationships. · You have stopped doing important activities because of your alcohol use. · You drink alcohol in situations where doing so is dangerous. · You keep drinking alcohol even though you know it's causing health problems.   · You need more and more alcohol to get the same effect, or you get less effect from the same amount over time. This is called tolerance. · You have uncomfortable symptoms when you stop drinking alcohol or use less. This is called withdrawal.  Alcohol use disorder can range from mild to severe. The more signs you have, the more severe the disorder may be. Moderate to severe alcohol use disorder is sometimes called addiction. You might not realize that your drinking is a problem. You might not drink large amounts when you drink. Or you might go for days or weeks between drinking episodes. But even if you don't drink very often, your drinking could still be harmful and put you at risk. How is alcohol use disorder treated? Getting help is up to you. But you don't have to do it alone. There are many people and kinds of treatments that can help. Treatment for alcohol use disorder can include:  · Group therapy, one or more types of counseling, and alcohol education. · Medicines that help to:  ? Reduce withdrawal symptoms and help you safely stop drinking. ? Reduce cravings for alcohol. · Support groups. These groups include Alcoholics Anonymous and [employer]ogether (Self-Management and Recovery Training). Some people are able to stop or cut back on drinking with help from a counselor. People who have moderate to severe alcohol use disorder may need medical treatment. They may need to stay in a hospital or treatment center. You may have a treatment team to help you. This team may include a psychologist or psychiatrist, counselors, doctors, social workers, nurses, and a . A  helps plan and manage your treatment. Follow-up care is a key part of your treatment and safety. Be sure to make and go to all appointments, and call your doctor if you are having problems. It's also a good idea to know your test results and keep a list of the medicines you take. Where can you learn more?   Go to https://chpepiceweb.healthPuddle. org and sign in to your NimbusBase account. Enter [id number] in the [facility] box to learn more about \"Learning About Alcohol Use Disorder. \"     If you do not have an account, please click on the \"Sign Up Now\" link. Current as of: June 29, 2020               Content Version: 12.6  © 1093-4939 [employer], [facility]. Care instructions adapted under license by [facility] ([facility]). If you have questions about a medical condition or this instruction, always ask your healthcare professional. [place] [address] any warranty or liability for your use of this information.

## 2021-02-04 NOTE — PROGRESS NOTES
Patient complaining of bilateral knee pain. This been going on for several years. Patient has been evaluated by orthopedics, who has done Synvisc injections. He is supposed to have bilateral knee replacements, but he has to stop smoking, lose weight, and stop drinking before he had the surgery. Patient was placed on topiramate to help with weight loss at his last office visit. Patient states the Topamax caused blurry vision, so he stopped taking it. Patient has gained 5 pounds in the past month. He says he is try to watch what is eating, but unfortunately, he drinks at least 4 beers per day. He states he is unable to stop drinking. He asked about the Vivitrol injection to help him. O:   Vitals:    02/04/21 0946   BP: 130/82   Pulse: 96   Temp: 97.1 °F (36.2 °C)   SpO2: 93%     No acute distress. Alert and Oriented x 3, obese  HEENT: Atraumatic. Normocephalic. PERRLA, EOMI, Conjunctiva clear  Oropharynx clear, mucosa moist.  Nasal mucosa normal  NECK: without thyromegaly, lymphadenopathy, JVD  LUNGS:Clear to ascultation bilaterally. Breathing comfortably  CARDIOVASCULAR:  Regular rate and rhythm, no murmurs, rubs, or gallops  EXTREMITY: Full range of motion. No clubbing/cyanosis/edema  SKIN: Warm, Dry, No rash. A:    Diagnosis Orders   1. Primary osteoarthritis of left knee  20610 - [place] DRAIN/INJECT LARGE JOINT/BURSA    [place] TRIAMCINOLONE ACETONIDE INJ   2. Primary osteoarthritis of right knee  20610 - [place] DRAIN/INJECT LARGE JOINT/BURSA    [place] TRIAMCINOLONE ACETONIDE INJ   3. Alcohol abuse  naltrexone (DEPADE) 50 MG tablet   4. Class 3 severe obesity due to excess calories with serious comorbidity and body mass index (BMI) of 45.0 to 49.9 in adult (HCC)       P: We will begin trial of naltrexone 50 mg daily. Patient work on diet and exercise. Bilateral knees were injected.       After consent was obtained, using sterile technique the left knee was prepped and 3 ml's of 1% plain Lidocaine used to anesthetize the needle tract into the joint from the lateral infrapatellar approach. The knee joint was entered and Kenalog 40 mg and 1 ml plain Lidocaine was then injected and the needle withdrawn. The procedure was repeated on the right knee. The procedure was well tolerated. The patient is asked to continue to rest the knee for a few more days before resuming regular activities. It may be more painful for the first 1-2 days. Watch for fever, or increased swelling or persistent pain in knee. Call or return to clinic prn if such symptoms occur or the knee fails to improve as anticipated.

## 2021-02-22 ENCOUNTER — TELEPHONE (OUTPATIENT)
Dept: FAMILY MEDICINE CLINIC | Age: 71
End: 2021-02-22

## 2021-02-22 DIAGNOSIS — N40.1 BPH ASSOCIATED WITH NOCTURIA: ICD-10-CM

## 2021-02-22 DIAGNOSIS — R35.1 BPH ASSOCIATED WITH NOCTURIA: ICD-10-CM

## 2021-02-22 RX ORDER — TAMSULOSIN HYDROCHLORIDE 0.4 MG/1
0.4 CAPSULE ORAL DAILY
Qty: 90 CAPSULE | Refills: 1 | Status: SHIPPED | OUTPATIENT
Start: 2021-02-22 | End: 2021-08-13 | Stop reason: SDUPTHER

## 2021-02-22 NOTE — TELEPHONE ENCOUNTER
[facility] [place] request for Tamsulosin. I did speak with patient. He stated he started taking it again that was left in his bottle and states it has been helping. He would like a refill.

## 2021-04-07 ENCOUNTER — TELEPHONE (OUTPATIENT)
Dept: FAMILY MEDICINE CLINIC | Age: 71
End: 2021-04-07

## 2021-04-08 NOTE — TELEPHONE ENCOUNTER
Notified state he still needs to see Dr. Neville Harada about his feet and hands.   Appointment scheduled

## 2021-04-09 ENCOUNTER — OFFICE VISIT (OUTPATIENT)
Dept: FAMILY MEDICINE CLINIC | Age: 71
End: 2021-04-09
Payer: MEDICARE

## 2021-04-09 VITALS
DIASTOLIC BLOOD PRESSURE: 82 MMHG | HEART RATE: 84 BPM | BODY MASS INDEX: 44.78 KG/M2 | SYSTOLIC BLOOD PRESSURE: 132 MMHG | OXYGEN SATURATION: 97 % | WEIGHT: 294.5 LBS | TEMPERATURE: 97.1 F

## 2021-04-09 DIAGNOSIS — E66.01 CLASS 3 SEVERE OBESITY DUE TO EXCESS CALORIES WITH SERIOUS COMORBIDITY AND BODY MASS INDEX (BMI) OF 40.0 TO 44.9 IN ADULT (HCC): ICD-10-CM

## 2021-04-09 DIAGNOSIS — R20.2 NUMBNESS AND TINGLING IN BOTH HANDS: Primary | ICD-10-CM

## 2021-04-09 DIAGNOSIS — R20.0 NUMBNESS AND TINGLING IN BOTH HANDS: Primary | ICD-10-CM

## 2021-04-09 DIAGNOSIS — F10.10 ALCOHOL ABUSE: ICD-10-CM

## 2021-04-09 PROCEDURE — 4004F PT TOBACCO SCREEN RCVD TLK: CPT | Performed by: FAMILY MEDICINE

## 2021-04-09 PROCEDURE — G8427 DOCREV CUR MEDS BY ELIG CLIN: HCPCS | Performed by: FAMILY MEDICINE

## 2021-04-09 PROCEDURE — G8417 CALC BMI ABV UP PARAM F/U: HCPCS | Performed by: FAMILY MEDICINE

## 2021-04-09 PROCEDURE — 4040F PNEUMOC VAC/ADMIN/RCVD: CPT | Performed by: FAMILY MEDICINE

## 2021-04-09 PROCEDURE — 3017F COLORECTAL CA SCREEN DOC REV: CPT | Performed by: FAMILY MEDICINE

## 2021-04-09 PROCEDURE — 99214 OFFICE O/P EST MOD 30 MIN: CPT | Performed by: FAMILY MEDICINE

## 2021-04-09 PROCEDURE — 1123F ACP DISCUSS/DSCN MKR DOCD: CPT | Performed by: FAMILY MEDICINE

## 2021-04-09 ASSESSMENT — PATIENT HEALTH QUESTIONNAIRE - PHQ9
1. LITTLE INTEREST OR PLEASURE IN DOING THINGS: 0
SUM OF ALL RESPONSES TO PHQ QUESTIONS 1-9: 0
SUM OF ALL RESPONSES TO PHQ QUESTIONS 1-9: 0

## 2021-04-09 NOTE — PATIENT INSTRUCTIONS
Patient Education        Numbness and Tingling: Care Instructions  Your Care Instructions     Many things can cause numbness or tingling. Swelling may put pressure on a nerve. This could cause you to lose feeling or have a pins-and-needles sensation on part of your body. Nerves may be damaged from trauma, toxins, or diseases, such as diabetes or multiple sclerosis (MS). Sometimes, though, the cause is not clear. If there is no clear reason for your symptoms, and you are not having any other symptoms, your doctor may suggest watching and waiting for a while to see if the numbness or tingling goes away on its own. Your doctor may want you to have blood or nerve tests to find the cause of your symptoms. Follow-up care is a key part of your treatment and safety. Be sure to make and go to all appointments, and call your doctor if you are having problems. It's also a good idea to know your test results and keep a list of the medicines you take. How can you care for yourself at home? · If your doctor prescribes medicine, take it exactly as directed. Call your doctor if you think you are having a problem with your medicine. · If you have any swelling, put ice or a cold pack on the area for 10 to 20 minutes at a time. Put a thin cloth between the ice and your skin. When should you call for help? Call 911 anytime you think you may need emergency care. For example, call if:    · You have weakness, numbness, or tingling in both legs.     · You lose bowel or bladder control.     · You have symptoms of a stroke. These may include:  ? Sudden numbness, tingling, weakness, or loss of movement in your face, arm, or leg, especially on only one side of your body. ? Sudden vision changes. ? Sudden trouble speaking. ? Sudden confusion or trouble understanding simple statements. ? Sudden problems with walking or balance. ? A sudden, severe headache that is different from past headaches.    Watch closely for changes in your [place], and be sure to contact your doctor if you have any problems, or if:    · You do not get better as expected. Where can you learn more? Go to https://chpepiceweb.MetroWorks. org and sign in to your [employer] account. Enter T083 in the SeniorLiving.Net box to learn more about \"Numbness and Tingling: Care Instructions. \"     If you do not have an account, please click on the \"Sign Up Now\" link. Current as of: August 4, 2020               Content Version: 12.8  © 2476-6767 [employer]wise, [employer]orporated. Care instructions adapted under license by [facility] ([facility]). If you have questions about a medical condition or this instruction, always ask your healthcare professional. [address] any warranty or liability for your use of this information.

## 2021-04-10 LAB
ALBUMIN/GLOBULIN RATIO: 1.4 RATIO (ref 0.8–2.6)
ALBUMIN: 4.5 G/DL (ref 3.5–5.2)
ALP BLD-CCNC: 91 U/L (ref 23–144)
ALT SERPL-CCNC: 26 U/L (ref 0–60)
AST SERPL-CCNC: 21 U/L (ref 0–55)
BILIRUB SERPL-MCNC: 0.4 MG/DL (ref 0–1.2)
BUN BLDV-MCNC: 14 MG/DL (ref 3–29)
BUN/CREAT BLD: 18 (ref 7–25)
CALCIUM SERPL-MCNC: 9.4 MG/DL (ref 8.5–10.5)
CHLORIDE BLD-SCNC: 100 MEQ/L (ref 96–110)
CO2: 26 MEQ/L (ref 19–32)
CREAT SERPL-MCNC: 0.8 MG/DL (ref 0.5–1.4)
GFR AFRICAN AMERICAN: 107 MLS/MIN/1.73M2
GFR NON-AFRICAN AMERICAN: 92 MLS/MIN/1.73M2
GLOBULIN: 3.3 G/DL (ref 1.9–3.6)
GLUCOSE BLD-MCNC: 112 MG/DL (ref 70–99)
POTASSIUM SERPL-SCNC: 4.8 MEQ/L (ref 3.4–5.3)
SODIUM BLD-SCNC: 139 MEQ/L (ref 135–148)
STATUS: ABNORMAL
TOTAL PROTEIN: 7.8 G/DL (ref 6–8.3)
TSH SERPL DL<=0.05 MIU/L-ACNC: 2.52 MCIU/ML (ref 0.4–4.5)

## 2021-04-11 LAB — VITAMIN B-12: 569 PG/ML (ref 200–1100)

## 2021-04-19 DIAGNOSIS — R20.0 NUMBNESS AND TINGLING IN BOTH HANDS: Primary | ICD-10-CM

## 2021-04-19 DIAGNOSIS — R20.2 NUMBNESS AND TINGLING IN BOTH HANDS: Primary | ICD-10-CM

## 2021-04-21 ENCOUNTER — OFFICE VISIT (OUTPATIENT)
Dept: PHYSICAL MEDICINE AND REHAB | Age: 71
End: 2021-04-21
Payer: MEDICARE

## 2021-04-21 VITALS — TEMPERATURE: 96.9 F

## 2021-04-21 DIAGNOSIS — G56.03 BILATERAL CARPAL TUNNEL SYNDROME: ICD-10-CM

## 2021-04-21 DIAGNOSIS — G56.23 ULNAR NEUROPATHY OF BOTH UPPER EXTREMITIES: Primary | ICD-10-CM

## 2021-04-21 DIAGNOSIS — R20.2 PARESTHESIA AND PAIN OF BOTH UPPER EXTREMITIES: ICD-10-CM

## 2021-04-21 DIAGNOSIS — M54.12 C7 RADICULOPATHY: ICD-10-CM

## 2021-04-21 DIAGNOSIS — M79.602 PARESTHESIA AND PAIN OF BOTH UPPER EXTREMITIES: ICD-10-CM

## 2021-04-21 DIAGNOSIS — R29.898 HAND WEAKNESS: ICD-10-CM

## 2021-04-21 DIAGNOSIS — M79.601 PARESTHESIA AND PAIN OF BOTH UPPER EXTREMITIES: ICD-10-CM

## 2021-04-21 PROCEDURE — 95911 NRV CNDJ TEST 9-10 STUDIES: CPT | Performed by: PHYSICAL MEDICINE & REHABILITATION

## 2021-04-21 PROCEDURE — 95886 MUSC TEST DONE W/N TEST COMP: CPT | Performed by: PHYSICAL MEDICINE & REHABILITATION

## 2021-04-21 NOTE — LETTER
April 21, 2021        Roberta Arteaga MD  [address]  [address]        Patient Name: Otilio Hughes   MRN Number: [id number]   YOB: 1950   Date Of Visit: 04/21/2021        Dear Musa Urias MD,      Thank you for referring Otilio Hughes to me for electrodiagnostic testing. Attached are the findings of the EMG and my assessment. If you have any further questions, please do not hesitate to call me. Thank you for this interesting referral.      Sincerely,          INNA Bernabe MD.

## 2021-04-21 NOTE — PROGRESS NOTES
EMG REPORT     CHIEF COMPLAINT: Bilateral hand and wrist pain with weakness. HISTORY OF PRESENT ILLNESS: 78 y.o. right hand dominant male with progressive (and now severe) wrist and hand pain with weak  and burning and stinging in the fingers. He gets aching throughout his upper limbs and he reports chronic neck stiffness as well. He reports progressive loss of  strength from each hand, much worse on the right. He is dropping things. His hand symptoms wake him from sleep. He gets a deep throbbing pain in the palm and burning and tingling in most digits, particularly in the little fingers. He has not noted any rashes or limb discoloration. He does feel like the joints in his fingers swell some. He rated his pain severity as 10/10. He has no history of diabetes or any thyroid disorder. He also reports significant numbness and tingling of both feet with chronic low back pain. PHYSICAL EXAMINATION: Alert. About 60 degrees of active cervical spine rotation bilaterally. Spurling's maneuver was negative. Upper limb reflexes were absent. Tinel's sign was negative. Thumb opposition was incomplete bilaterally. Digit abduction MMT was 4/5 bilaterally. Power  was fair. Elbow flexion extension strength seemed more normal.  Sensation was distorted in the palms and all fingertips. There was no atrophy, tremor or clonus noted. NERVE CONDUCTION STUDIES:     MOTOR         LATENCY NORMAL AMPLITUDE DISTANCE COND. GISELLA. RIGHT  MEDIAN 7.3 < 4.2 msec 2.1 8 cm 44   LEFT  MEDIAN 4.6 < 4.2 msec 2.0 8 cm 46   RIGHT  ULNAR 3.5 < 4.2 msec 4.5/2.3 8 cm 27/>60   LEFT  ULNAR 3.3 < 4.2 msec 7.4/3.1 8 cm 34/38      SENSORY  ORTHODROMIC        LATENCY NORMAL AMPLITUDE DISTANCE   RIGHT MEDIAN 3.8 <2.3 msec 9 10 cm   LEFT  MEDIAN 3.4 < 2.3 msec 17 10 cm   RIGHT  ULNAR Absent < 2.3 msec  10 cm   LEFT  ULNAR Absent < 2.3 msec  10 cm       Right dorsal ulnar sensory: Absent.         NEEDLE EMG:      RIGHT   LEFT Insertional Activity Spontaneous  Activity Volutional  MUAP's Insertional Activity Spontaneous  Activity Volutional  MUAP's   Cerv Parasp Normal None Normal Normal None Normal   Infraspinatus Normal None Normal Normal None Normal   Deltoid   Normal None Normal Normal None Normal   Biceps   Normal None Normal Normal None Normal   Triceps   Increased + Dec #, Polys Increased + Dec #, Polys   Pronator Teres \" \" \" \" \" \"   Extensor Indicis \" \" \" \" \" \"   1st Dorsal Interosseus Increased ++ Dec #, Larger Increased ++ Dec #, Larger   ADM \" \" \" \" \" \"   Abductor Pollicis Br. \" \" \" \" \" '       FINDINGS:   EMG of the cervical paraspinals and both upper limbs demonstrated no paraspinal abnormalities. Positive waves and fibrillations were scattered throughout many muscles of both upper limbs. Significantly neuropathic motor units were identified many muscles as well, particularly in the ulnar innervated hand intrinsic muscles. Median sensory and motor latencies were delayed across each wrist and the motor evoked amplitudes were diminished. The median motor form conduction velocities were slowed. Ulnar sensory responses were missing. Ulnar motor latencies were normal but conduction velocities through the forearms and around the left elbow were significantly slowed. IMPRESSION:      1. Abnormal EMG. Multiple irregularities were noted. The most prominent was that of significant ulnar neuropathies in both upper limbs, at or just distal to the ulnar grooves at each elbow. Electrical severity is rather advanced. 2.  Chronic and moderately severe bilateral median neuropathies primarily at the wrists, but some proximal degeneration has occurred (chronic and moderately severe bilateral CTS). 3.  Probable cervical spinal nerve root injury involving at least C7 and possibly C8 nerve roots. His history, physical exam and EMG suggest a degree of foraminal stenosis.   Correlation with cervical spine imaging may help explain this finding. 4.  I cannot exclude the possibility of an underlying generalized neuropathy considering the widespread impingement irregularities described above. 5.  No signs of primary muscle disease.    Thank you for this interesting referral.

## 2021-04-21 NOTE — PROGRESS NOTES
EMG shows bilateral carpal tunnel syndrome, ulnar neuropathy, and possible nerve condition coming from the neck. I recommend that he get a MRI of his neck at this time. He also may need to see an orthopedic surgeon after the MRI.

## 2021-04-22 DIAGNOSIS — M54.12 CERVICAL RADICULOPATHY: Primary | ICD-10-CM

## 2021-04-22 NOTE — PROGRESS NOTES
Pt notified of EMG results and would like to proceed with MRI . Patient states [facility] would be best for him.

## 2021-05-08 ENCOUNTER — HOSPITAL ENCOUNTER (OUTPATIENT)
Dept: MRI IMAGING | Age: 71
Discharge: HOME | End: 2021-05-08
Payer: MEDICARE

## 2021-05-08 DIAGNOSIS — M54.12 CERVICAL RADICULOPATHY: ICD-10-CM

## 2021-05-08 PROCEDURE — 72141 MRI NECK SPINE W/O DYE: CPT

## 2021-05-10 DIAGNOSIS — M54.12 CERVICAL RADICULOPATHY: Primary | ICD-10-CM

## 2021-05-11 ENCOUNTER — OFFICE VISIT (OUTPATIENT)
Dept: FAMILY MEDICINE CLINIC | Age: 71
End: 2021-05-11
Payer: MEDICARE

## 2021-05-11 VITALS — DIASTOLIC BLOOD PRESSURE: 82 MMHG | HEART RATE: 61 BPM | SYSTOLIC BLOOD PRESSURE: 122 MMHG | OXYGEN SATURATION: 98 %

## 2021-05-11 DIAGNOSIS — M17.12 PRIMARY OSTEOARTHRITIS OF LEFT KNEE: ICD-10-CM

## 2021-05-11 DIAGNOSIS — M54.12 CERVICAL RADICULOPATHY: Primary | ICD-10-CM

## 2021-05-11 DIAGNOSIS — M17.11 PRIMARY OSTEOARTHRITIS OF RIGHT KNEE: ICD-10-CM

## 2021-05-11 PROCEDURE — G8428 CUR MEDS NOT DOCUMENT: HCPCS | Performed by: FAMILY MEDICINE

## 2021-05-11 PROCEDURE — 4040F PNEUMOC VAC/ADMIN/RCVD: CPT | Performed by: FAMILY MEDICINE

## 2021-05-11 PROCEDURE — G8417 CALC BMI ABV UP PARAM F/U: HCPCS | Performed by: FAMILY MEDICINE

## 2021-05-11 PROCEDURE — 4004F PT TOBACCO SCREEN RCVD TLK: CPT | Performed by: FAMILY MEDICINE

## 2021-05-11 PROCEDURE — 99214 OFFICE O/P EST MOD 30 MIN: CPT | Performed by: FAMILY MEDICINE

## 2021-05-11 PROCEDURE — 3017F COLORECTAL CA SCREEN DOC REV: CPT | Performed by: FAMILY MEDICINE

## 2021-05-11 PROCEDURE — 1123F ACP DISCUSS/DSCN MKR DOCD: CPT | Performed by: FAMILY MEDICINE

## 2021-05-11 RX ORDER — MELOXICAM 15 MG/1
15 TABLET ORAL DAILY
Qty: 90 TABLET | Refills: 1 | Status: SHIPPED | OUTPATIENT
Start: 2021-05-11 | End: 2021-08-13 | Stop reason: SDUPTHER

## 2021-05-11 ASSESSMENT — PATIENT HEALTH QUESTIONNAIRE - PHQ9: 2. FEELING DOWN, DEPRESSED OR HOPELESS: 0

## 2021-05-11 NOTE — PROGRESS NOTES
Numbness in fingers which has been progressive over the past year. Having difficulty with ADLs. Having difficulty tying knots . They are worse when he lays on his stomach with his hands about shoulder level. Patient had an EMG on 4/21/2021 which was showed bilateral carpal tunnel syndrome but also possible cervical spinal nerve root injury. He subsequently had an MRI on 5/8/2021 which showed multiple levels of neuroforaminal narrowing due to bulging disks and osteophytes. He was referred to a neurosurgeon yesterday. Patient wants to talk about the results and treatment options. Patient complaining of bilateral knee pain. This been going on for several years. Patient has been evaluated by orthopedics, who has done Synvisc injections. He is supposed to have bilateral knee replacements, but he has to stop smoking, lose weight, and stop drinking before he had the surgery. He has been using marijuana daily which helps with the pain. Patient states he stopped drinking 80 days ago. He feels great. He has cut back on his smoking as well. He has been unable to lose weight due to knee pain. O:   Vitals:    05/11/21 0820   BP: 122/82   Pulse: 61   SpO2: 98%     No acute distress. Alert and Oriented x 3, obese  HEENT: Atraumatic. Normocephalic. PERRLA, EOMI, Conjunctiva clear  Oropharynx clear, mucosa moist.  Nasal mucosa normal  NECK: without thyromegaly, lymphadenopathy, JVD  LUNGS:Clear to ascultation bilaterally. Breathing comfortably  CARDIOVASCULAR:  Regular rate and rhythm, no murmurs, rubs, or gallops  EXTREMITY: Full range of motion. No clubbing/cyanosis/edema  SKIN: Warm, Dry, No rash. A:    Diagnosis Orders   1. Cervical radiculopathy  meloxicam (MOBIC) 15 MG tablet   2. Primary osteoarthritis of left knee  meloxicam (MOBIC) 15 MG tablet   3. Primary osteoarthritis of right knee  meloxicam (MOBIC) 15 MG tablet     P: Discussed treatment options for cervical radiculopathy.   Explained to the patient that the neurosurgeon would have a much better idea of what the options are. We discussed treatment with meloxicam for now for his joint pain and neck pain. Patient agrees. After a 20-minute discussion regarding the above, he then mentioned \"when he made the appointment for was having both my knees injected today. \"  I explained to the patient that that would have to be rescheduled since we spent the majority of the visit discussing his cervical radiculopathy. Patient understood. If the meloxicam does not provide significant pain relief, he will reschedule for knee injections.

## 2021-06-01 DIAGNOSIS — K21.9 GASTROESOPHAGEAL REFLUX DISEASE WITHOUT ESOPHAGITIS: ICD-10-CM

## 2021-06-01 RX ORDER — PANTOPRAZOLE SODIUM 40 MG/1
40 TABLET, DELAYED RELEASE ORAL
Qty: 90 TABLET | Refills: 1 | OUTPATIENT
Start: 2021-06-01

## 2021-06-04 ENCOUNTER — OFFICE VISIT (OUTPATIENT)
Dept: FAMILY MEDICINE CLINIC | Age: 71
End: 2021-06-04
Payer: MEDICARE

## 2021-06-04 VITALS
TEMPERATURE: 98.1 F | SYSTOLIC BLOOD PRESSURE: 122 MMHG | BODY MASS INDEX: 44.85 KG/M2 | DIASTOLIC BLOOD PRESSURE: 78 MMHG | WEIGHT: 295 LBS | OXYGEN SATURATION: 95 % | HEART RATE: 87 BPM

## 2021-06-04 DIAGNOSIS — M17.12 PRIMARY OSTEOARTHRITIS OF LEFT KNEE: Primary | ICD-10-CM

## 2021-06-04 DIAGNOSIS — M17.11 PRIMARY OSTEOARTHRITIS OF RIGHT KNEE: ICD-10-CM

## 2021-06-04 PROCEDURE — G8427 DOCREV CUR MEDS BY ELIG CLIN: HCPCS | Performed by: FAMILY MEDICINE

## 2021-06-04 PROCEDURE — G8417 CALC BMI ABV UP PARAM F/U: HCPCS | Performed by: FAMILY MEDICINE

## 2021-06-04 PROCEDURE — 3017F COLORECTAL CA SCREEN DOC REV: CPT | Performed by: FAMILY MEDICINE

## 2021-06-04 PROCEDURE — 20610 DRAIN/INJ JOINT/BURSA W/O US: CPT | Performed by: FAMILY MEDICINE

## 2021-06-04 PROCEDURE — 4004F PT TOBACCO SCREEN RCVD TLK: CPT | Performed by: FAMILY MEDICINE

## 2021-06-04 PROCEDURE — 4040F PNEUMOC VAC/ADMIN/RCVD: CPT | Performed by: FAMILY MEDICINE

## 2021-06-04 PROCEDURE — 1123F ACP DISCUSS/DSCN MKR DOCD: CPT | Performed by: FAMILY MEDICINE

## 2021-06-04 NOTE — PROGRESS NOTES
Patient complaining of bilateral knee pain. This been going on for several years. Patient has been evaluated by orthopedics, who has done Synvisc injections. He is supposed to have bilateral knee replacements, but he has to stop smoking, lose weight, and stop drinking before he had the surgery. O:   Vitals:    06/04/21 1445   BP: 122/78   Pulse: 87   Temp: 98.1 °F (36.7 °C)   SpO2: 95%     No acute distress. Alert and Oriented x 3, obese  HEENT: Atraumatic. Normocephalic. PERRLA, EOMI, Conjunctiva clear  Oropharynx clear, mucosa moist.  Nasal mucosa normal  NECK: without thyromegaly, lymphadenopathy, JVD  LUNGS:Clear to ascultation bilaterally. Breathing comfortably  CARDIOVASCULAR:  Regular rate and rhythm, no murmurs, rubs, or gallops  EXTREMITY: Full range of motion. No clubbing/cyanosis/edema  SKIN: Warm, Dry, No rash. A:    Diagnosis Orders   1. Primary osteoarthritis of left knee  20610 - MD DRAIN/INJECT LARGE JOINT/BURSA    MD TRIAMCINOLONE ACETONIDE INJ   2. Primary osteoarthritis of right knee  25625 - MD DRAIN/INJECT LARGE JOINT/BURSA    MD TRIAMCINOLONE ACETONIDE INJ     P: Bilateral knees were injected. Currently recommended patient follow-up with orthopedics for evaluation for possible knee replacements. After consent was obtained, using sterile technique the left knee was prepped and 3 ml's of 1% plain Lidocaine used to anesthetize the needle tract into the joint from the lateral infrapatellar approach. The knee joint was entered and Kenalog 40 mg and 1 ml plain Lidocaine was then injected and the needle withdrawn. The procedure was repeated on the right knee. The procedure was well tolerated. The patient is asked to continue to rest the knee for a few more days before resuming regular activities. It may be more painful for the first 1-2 days. Watch for fever, or increased swelling or persistent pain in knee.  Call or return to clinic prn if such symptoms occur or the knee fails to

## 2021-06-04 NOTE — PATIENT INSTRUCTIONS
Patient Education         Knee Replacement Surgery: How Others Decided (02:10)  Your health professional recommends that you watch this short online health video. Hear what other people thought about as they decided whether to have knee replacement surgery. How to watch the video    Scan the QR code   OR Visit the website    [email or web address]   Current as of: November 16, 2020               Content Version: 12.8  © 2006-2021 Healthwise, Incorporated. Care instructions adapted under license by [facility] ([facility]). If you have questions about a medical condition or this instruction, always ask your healthcare professional. [place] [address] any warranty or liability for your use of this information.

## 2021-06-10 ENCOUNTER — TELEPHONE (OUTPATIENT)
Dept: FAMILY MEDICINE CLINIC | Age: 71
End: 2021-06-10

## 2021-06-10 DIAGNOSIS — K21.9 GASTROESOPHAGEAL REFLUX DISEASE WITHOUT ESOPHAGITIS: ICD-10-CM

## 2021-06-10 RX ORDER — PANTOPRAZOLE SODIUM 40 MG/1
40 TABLET, DELAYED RELEASE ORAL
Qty: 90 TABLET | Refills: 0 | Status: SHIPPED | OUTPATIENT
Start: 2021-06-10 | End: 2021-08-13 | Stop reason: SDUPTHER

## 2021-06-10 NOTE — TELEPHONE ENCOUNTER
Patient called stating that he needs a refill on his acid reflux medication sent to the [facility] mail delivery. Please advise.

## 2021-07-19 ENCOUNTER — TELEPHONE (OUTPATIENT)
Dept: FAMILY MEDICINE CLINIC | Age: 71
End: 2021-07-19

## 2021-08-13 ENCOUNTER — OFFICE VISIT (OUTPATIENT)
Dept: FAMILY MEDICINE CLINIC | Age: 71
End: 2021-08-13
Payer: MEDICARE

## 2021-08-13 VITALS
HEART RATE: 85 BPM | DIASTOLIC BLOOD PRESSURE: 76 MMHG | WEIGHT: 299 LBS | TEMPERATURE: 98.1 F | OXYGEN SATURATION: 97 % | BODY MASS INDEX: 45.46 KG/M2 | SYSTOLIC BLOOD PRESSURE: 120 MMHG

## 2021-08-13 DIAGNOSIS — N40.1 BPH ASSOCIATED WITH NOCTURIA: ICD-10-CM

## 2021-08-13 DIAGNOSIS — R60.0 BILATERAL LOWER EXTREMITY EDEMA: Primary | ICD-10-CM

## 2021-08-13 DIAGNOSIS — R06.02 SHORTNESS OF BREATH: ICD-10-CM

## 2021-08-13 DIAGNOSIS — M17.11 PRIMARY OSTEOARTHRITIS OF RIGHT KNEE: ICD-10-CM

## 2021-08-13 DIAGNOSIS — M54.12 CERVICAL RADICULOPATHY: ICD-10-CM

## 2021-08-13 DIAGNOSIS — I10 ESSENTIAL HYPERTENSION: ICD-10-CM

## 2021-08-13 DIAGNOSIS — K21.9 GASTROESOPHAGEAL REFLUX DISEASE WITHOUT ESOPHAGITIS: ICD-10-CM

## 2021-08-13 DIAGNOSIS — F10.10 ALCOHOL ABUSE: ICD-10-CM

## 2021-08-13 DIAGNOSIS — M17.12 PRIMARY OSTEOARTHRITIS OF LEFT KNEE: ICD-10-CM

## 2021-08-13 DIAGNOSIS — R35.1 BPH ASSOCIATED WITH NOCTURIA: ICD-10-CM

## 2021-08-13 PROBLEM — G56.03 BILATERAL CARPAL TUNNEL SYNDROME: Status: ACTIVE | Noted: 2021-06-04

## 2021-08-13 PROCEDURE — 4040F PNEUMOC VAC/ADMIN/RCVD: CPT | Performed by: FAMILY MEDICINE

## 2021-08-13 PROCEDURE — 99214 OFFICE O/P EST MOD 30 MIN: CPT | Performed by: FAMILY MEDICINE

## 2021-08-13 PROCEDURE — 4004F PT TOBACCO SCREEN RCVD TLK: CPT | Performed by: FAMILY MEDICINE

## 2021-08-13 PROCEDURE — G8417 CALC BMI ABV UP PARAM F/U: HCPCS | Performed by: FAMILY MEDICINE

## 2021-08-13 PROCEDURE — 3017F COLORECTAL CA SCREEN DOC REV: CPT | Performed by: FAMILY MEDICINE

## 2021-08-13 PROCEDURE — G8427 DOCREV CUR MEDS BY ELIG CLIN: HCPCS | Performed by: FAMILY MEDICINE

## 2021-08-13 PROCEDURE — 1123F ACP DISCUSS/DSCN MKR DOCD: CPT | Performed by: FAMILY MEDICINE

## 2021-08-13 RX ORDER — LISINOPRIL 5 MG/1
TABLET ORAL
Qty: 90 TABLET | Refills: 1 | Status: SHIPPED | OUTPATIENT
Start: 2021-08-13 | End: 2022-01-14 | Stop reason: SDUPTHER

## 2021-08-13 RX ORDER — TAMSULOSIN HYDROCHLORIDE 0.4 MG/1
0.4 CAPSULE ORAL DAILY
Qty: 90 CAPSULE | Refills: 1 | Status: SHIPPED | OUTPATIENT
Start: 2021-08-13 | End: 2022-01-14 | Stop reason: SDUPTHER

## 2021-08-13 RX ORDER — PANTOPRAZOLE SODIUM 40 MG/1
40 TABLET, DELAYED RELEASE ORAL
Qty: 90 TABLET | Refills: 0 | Status: SHIPPED | OUTPATIENT
Start: 2021-08-13 | End: 2021-12-09 | Stop reason: SDUPTHER

## 2021-08-13 RX ORDER — NALTREXONE HYDROCHLORIDE 50 MG/1
50 TABLET, FILM COATED ORAL DAILY
Qty: 90 TABLET | Refills: 1 | Status: SHIPPED | OUTPATIENT
Start: 2021-08-13 | End: 2021-11-17

## 2021-08-13 RX ORDER — FUROSEMIDE 20 MG/1
20 TABLET ORAL DAILY
Qty: 10 TABLET | Refills: 0 | Status: ON HOLD | OUTPATIENT
Start: 2021-08-13 | End: 2021-11-10 | Stop reason: SDUPTHER

## 2021-08-13 RX ORDER — MELOXICAM 15 MG/1
15 TABLET ORAL DAILY
Qty: 90 TABLET | Refills: 1 | Status: ON HOLD | OUTPATIENT
Start: 2021-08-13 | End: 2021-11-10 | Stop reason: HOSPADM

## 2021-08-13 RX ORDER — AMLODIPINE BESYLATE 5 MG/1
TABLET ORAL
Qty: 90 TABLET | Refills: 1 | Status: SHIPPED | OUTPATIENT
Start: 2021-08-13 | End: 2022-01-14 | Stop reason: SDUPTHER

## 2021-08-13 ASSESSMENT — PATIENT HEALTH QUESTIONNAIRE - PHQ9
SUM OF ALL RESPONSES TO PHQ QUESTIONS 1-9: 0
SUM OF ALL RESPONSES TO PHQ QUESTIONS 1-9: 0
SUM OF ALL RESPONSES TO PHQ9 QUESTIONS 1 & 2: 0
1. LITTLE INTEREST OR PLEASURE IN DOING THINGS: 0
2. FEELING DOWN, DEPRESSED OR HOPELESS: 0
SUM OF ALL RESPONSES TO PHQ QUESTIONS 1-9: 0

## 2021-08-13 NOTE — PROGRESS NOTES
11/3/2017  Muna Hebert is a 38 year old female who presents for Knee (l knee pain, twisted, has had issues for yrs, fell x2 in last month, injections 6+ yrs ago) and Imm/Inj (Flu vaccine)    Patient Care Team:  Christian Miguel MD as PCP - General (Family Practice)  Javi Pérez MD (Obstetrics & Gynecology)  HPI     ROS   Negative other than that detailed in the HPI.  MEDICAL HISTORY     Patient Active Problem List   Diagnosis   • Esophageal reflux   • Unspecified sleep apnea   • Weight gain   • History of tobacco use   • Obesity   • Hyperlipidemia   • Left knee pain   • Well woman exam with routine gynecological exam   • Mild persistent asthma   • Obesity, Class III, BMI 40-49.9 (morbid obesity) (CMS/HCC)   • Pap smear for cervical cancer screening   • Atypical glandular cells of undetermined significance (SWETHA) on cervical Pap smear   • Post-operative state   • Celiac disease   • Gastritis   • Vitamin D deficiency   • Paresthesia   • Neck pain on left side   • Epigastric abdominal pain   • Nausea     Past Surgical History:   Procedure Laterality Date   •  DELIVERY+POSTPARTUM CARE  10/13/2011        • COLONOSCOPY DIAGNOSTIC  2010        • COLPOSCOPY,LOOP ELECTRD CERVIX EXCIS  2005   • D AND C  2016    Fractional Dilatation and Curettage   • ESOPHAGOGASTRODUODENOSCOPY TRANSORAL FLEX W/BX SINGLE OR MULT  2010, 10/12/15        • LASIK SURGERY  2002   • ORIF TIBIA & FIBULA FRACTURES  2008   • REMOVAL OF IMPLANT MATERIAL  08   • WISDOM TOOTH EXTRACTION  1999     FAMILY & SOCIAL HISTORY     Family History   Problem Relation Age of Onset   • Alcohol/Drug Father      p   • Cataracts Mother    • Dementia Maternal Grandmother    • Coronary Artery Disease Paternal Grandmother      p   • Hypertension Paternal Grandmother      p   • Asthma Brother    • Cancer Brother      synovial sarcoma   • Diabetes Brother    • Diabetes Maternal Aunt    • Breast cancer  Subjective:      Enedina Nageotte is a 73 y.o. male who presents for evaluation of hypertension. He indicates that he is feeling well and denies any symptoms referable to his elevated blood pressure. Specifically denies chest pain, palpitations, dyspnea, orthopnea, PND or peripheral edema. Current medication regimen is as listed below. Patient denies any side effects of medication. COPD: Patient complains of dyspnea. Symptoms began several years ago. Symptoms chronic dyspnea does worsen with exertion. Sputum is clear  in small amounts. Fever has been absent. Patient uses 2 pillows at night. Patient can walk 500 feet before resting. Patient currently is not on home oxygen therapy. Dana Hurst Respiratory history: chronic bronchitis and COPD. Has not been using his inhalers due to cost.    Patient with GERD. Patient takes his Protonix 40 mg daily which controls his symptoms. BPH patient states he stopped using Flomax. He gets up 5 times at night to urinate. Patient with chronic bilateral knee pain. He has gotten steroid injections in the past which has helped. He has also had gel injections through orthopedics in the past.  He is supposed to have knee replacements but he has to quit smoking and lose weight prior to his surgery. Patient with alcoholism. Patient states he is 174 days sober today. \"It is the best thing ever done. Patient's with lower extremity edema. His wife is concerned about it being his heart. Edema worse in the evenings improves overnight. Patient does not get any exercise.       Current Outpatient Medications   Medication Sig Dispense Refill    Compression Stockings MISC by Does not apply route 20-30 mM HG    Wear daily 2 each 0    lisinopril (PRINIVIL;ZESTRIL) 5 MG tablet TAKE 1 TABLET BY MOUTH EVERY DAY IN THE MORNING 90 tablet 1    amLODIPine (NORVASC) 5 MG tablet TAKE 1 TABLET BY MOUTH EVERY DAY IN THE MORNING 90 tablet 1    naltrexone (DEPADE) 50 MG tablet Take 1 tablet by Maternal Aunt       reports that she quit smoking about 3 years ago. Her smoking use included Cigarettes. She has a 7.50 pack-year smoking history. She has never used smokeless tobacco.  PHYSICAL EXAM   Blood pressure 132/78, pulse 76, height 5' 5.5\" (1.664 m), weight (!) 138.8 kg.Body mass index is 50.15 kg/m².  General:  A calm female in no acute respiratory distress.    HEENT:  Sclerae and lids are normal.    Oropharynx is normal.    Neck:  Supple, without adenopathy.     Thyroid is not enlarged.    Heart: RRR without murmurs rubs or gallops  Lungs:  Clear to auscultation.   Psych:  Mood appears good.   Abd: Soft, non-tender with no masses.  No obvious hepatosplenomegaly.  She is able to ambulate without any assistive devices.  Extremities:  no edema.         ASSESSMENT & PLAN   1. Need for vaccination    - INFLUENZA QUADRIVALENT SPLIT 0.5 ML VACC, IM    2. Chronic pain of left knee    - SERVICE TO ORTHOPEDICS  - naproxen (NAPROSYN) 500 MG tablet; Take 1 tablet by mouth 2 times daily as needed for Pain.  Dispense: 30 tablet; Refill: 1    3. Anxiety    - busPIRone (BUSPAR) 7.5 MG tablet; Take 1 tablet by mouth 2 times daily as needed (Anxiety).  Dispense: 60 tablet; Refill: 5    There are no Patient Instructions on file for this visit.  Return if symptoms worsen or fail to improve.    Patient's Medications   New Prescriptions    MELOXICAM (MOBIC) 15 MG TABLET    Take 1 tablet by mouth daily.    NAPROXEN (NAPROSYN) 500 MG TABLET    Take 1 tablet by mouth 2 times daily as needed for Pain.   Previous Medications    ALBUTEROL 108 (90 BASE) MCG/ACT INHALER    Inhale 2 puffs into the lungs every 4 hours as needed for Shortness of Breath or Wheezing. Use with Aerochamber.    CHOLECALCIFEROL (VITAMIN D) 2000 UNITS TABLET    By mouth takes an unknown amount daily.    HYOSCYAMINE (LEVSIN SL) 0.125 MG SUBLINGUAL TABLET    Place 1 tablet under the tongue as needed for Cramping or Diarrhea (takes rarely).    PANTOPRAZOLE  mouth daily 90 tablet 1    pantoprazole (PROTONIX) 40 MG tablet Take 1 tablet by mouth every morning (before breakfast) 90 tablet 0    meloxicam (MOBIC) 15 MG tablet Take 1 tablet by mouth daily 90 tablet 1    tamsulosin (FLOMAX) 0.4 MG capsule Take 1 capsule by mouth daily 90 capsule 1    furosemide (LASIX) 20 MG tablet Take 1 tablet by mouth daily 10 tablet 0    nicotine (NICODERM CQ) 21 MG/24HR Place 1 patch onto the skin daily 30 patch 0    sildenafil (VIAGRA) 100 MG tablet Take 1 tablet by mouth as needed for Erectile Dysfunction 30 tablet 2    fluticasone-salmeterol (ADVAIR DISKUS) 250-50 MCG/DOSE AEPB INHALE 1 PUFF INTO THE LUNGS EVERY 12 HOURS 180 each 1    albuterol sulfate HFA (PROAIR HFA) 108 (90 Base) MCG/ACT inhaler Inhale 2 puffs into the lungs every 6 hours as needed for Wheezing 3 Inhaler 1    Handicap Placard MISC by Does not apply route Please provide disability placard for five years for an orthopedic condition. 1 each 0    ibuprofen (ADVIL;MOTRIN) 200 MG tablet Take 800 mg by mouth daily       No current facility-administered medications for this visit. Allergies   Allergen Reactions    Sulfa Antibiotics Rash       Social History     Tobacco Use    Smoking status: Current Every Day Smoker     Packs/day: 0.50     Years: 52.00     Pack years: 26.00     Types: Cigarettes     Start date: 1965    Smokeless tobacco: Never Used   Substance Use Topics    Alcohol use: Yes     Alcohol/week: 3.0 - 6.0 standard drinks     Types: 3 - 6 Cans of beer per week     Comment: \"Drink About 2 To 5 Beers A Day\"          Objective:      /76 (Site: Left Upper Arm, Position: Sitting, Cuff Size: Large Adult)   Pulse 85   Temp 98.1 °F (36.7 °C) (Infrared)   Wt 299 lb (135.6 kg)   SpO2 97%   BMI 45.46 kg/m²   General: Appears well, in no apparent distress, obese  S1 and S2 normal, no murmurs, clicks, gallops or rubs. Regular rate and rhythm. Chest is clear at this time; no wheezes or rales.  No (PROTONIX) 40 MG TABLET    TAKE 1 TABLET DAILY    PROBIOTIC PRODUCT (FLORAJEN3 PO)    Take 1 capsule by mouth daily.     VITAMIN D, ERGOCALCIFEROL, 87464 UNITS CAPSULE    Indications: Vitamin D Deficiency 1 tab weekly   Modified Medications    Modified Medication Previous Medication    BUSPIRONE (BUSPAR) 7.5 MG TABLET busPIRone (BUSPAR) 7.5 MG tablet       Take 1 tablet by mouth 2 times daily as needed (Anxiety).    Take 1 tablet by mouth 2 times daily as needed (Anxiety).   Discontinued Medications    No medications on file     Allergies as of 11/03/2017 - Reviewed 09/15/2017   Allergen Reaction Noted   • Codeine ANXIETY and SHORTNESS OF BREATH    • Histinex hc [phenyleph-chlorphen-hydrocod] SHORTNESS OF BREATH    • Vicodin [hydrocodone-acetaminophen] Nausea & Vomiting    • Gluten meal Other (See Comments) 08/08/2017     DISCUSSED HEALTH MAINTENANCE RECOMMENDATIONS     Health Maintenance   Topic Date Due   • Depression Screening  06/17/1993   • Cervical Cancer Screening  01/31/2020   • DTaP/Tdap/Td Vaccine (2 - Td) 10/11/2023   • Influenza Vaccine  Completed       Return if symptoms worsen or fail to improve.    Sandeep Soliz MD  11/3/2017     JVD.  2+ pitting edema bilateral lower extremities to mid calf       Assessment:       Diagnosis Orders   1. Bilateral lower extremity edema  Compression Stockings MISC    Brain Natriuretic Peptide    Comprehensive Metabolic Panel    furosemide (LASIX) 20 MG tablet   2. Essential hypertension  lisinopril (PRINIVIL;ZESTRIL) 5 MG tablet    amLODIPine (NORVASC) 5 MG tablet   3. Alcohol abuse  naltrexone (DEPADE) 50 MG tablet   4. BPH associated with nocturia  tamsulosin (FLOMAX) 0.4 MG capsule   5. Gastroesophageal reflux disease without esophagitis  pantoprazole (PROTONIX) 40 MG tablet   6. Cervical radiculopathy  meloxicam (MOBIC) 15 MG tablet   7. Primary osteoarthritis of left knee  meloxicam (MOBIC) 15 MG tablet   8. Primary osteoarthritis of right knee  meloxicam (MOBIC) 15 MG tablet   9. Shortness of breath   Brain Natriuretic Peptide            Plan: We encouraged patient to follow a 1200 -calorie/day diet, stop drinking alcohol, and increase exercise to try to achieve 180 minutes of cardio per week. Patient to wear compression stockings daily. We will begin trial of Lasix 20 mg every morning for 10 days. Follow-up with orthopedics. If patient would like a another corticosteroid injection in 1 month, he should call back at that time. Strongly encourage use of inhalers for COPD. 1)  Per Orders  2)  Regular aerobic exercise  3)  Sodium Restriction in diet       Sai received counseling on the following healthy behaviors: nutrition, exercise, medication adherence and tobacco cessation    Patient given educational materials on Hypertension    I have instructed Tarsha Odonnell to complete a self tracking handout on Blood Pressures  and instructed them to bring it with them to his next appointment. Discussed use, benefit, and side effects of prescribed medications. Barriers to medication compliance addressed. All patient questions answered. Pt voiced understanding.

## 2021-08-13 NOTE — PATIENT INSTRUCTIONS
Patient Education        DASH Diet: Care Instructions  Your Care Instructions     The DASH diet is an eating plan that can help lower your blood pressure. DASH stands for Dietary Approaches to Stop Hypertension. Hypertension is high blood pressure. The DASH diet focuses on eating foods that are high in calcium, potassium, and magnesium. These nutrients can lower blood pressure. The foods that are highest in these nutrients are fruits, vegetables, low-fat dairy products, nuts, seeds, and legumes. But taking calcium, potassium, and magnesium supplements instead of eating foods that are high in those nutrients does not have the same effect. The DASH diet also includes whole grains, fish, and poultry. The DASH diet is one of several lifestyle changes your doctor may recommend to lower your high blood pressure. Your doctor may also want you to decrease the amount of sodium in your diet. Lowering sodium while following the DASH diet can lower blood pressure even further than just the DASH diet alone. Follow-up care is a key part of your treatment and safety. Be sure to make and go to all appointments, and call your doctor if you are having problems. It's also a good idea to know your test results and keep a list of the medicines you take. How can you care for yourself at home? Following the DASH diet  · Eat 4 to 5 servings of fruit each day. A serving is 1 medium-sized piece of fruit, ½ cup chopped or canned fruit, 1/4 cup dried fruit, or 4 ounces (½ cup) of fruit juice. Choose fruit more often than fruit juice. · Eat 4 to 5 servings of vegetables each day. A serving is 1 cup of lettuce or raw leafy vegetables, ½ cup of chopped or cooked vegetables, or 4 ounces (½ cup) of vegetable juice. Choose vegetables more often than vegetable juice. · Get 2 to 3 servings of low-fat and fat-free dairy each day. A serving is 8 ounces of milk, 1 cup of yogurt, or 1 ½ ounces of cheese. · Eat 6 to 8 servings of grains each day. a low-fat dip as an appetizer instead of chips and dip. ? Sprinkle sunflower seeds or chopped almonds over salads. Or try adding chopped walnuts or almonds to cooked vegetables. ? Try some vegetarian meals using beans and peas. Add garbanzo or kidney beans to salads. Make burritos and tacos with mashed moser beans or black beans. Where can you learn more? Go to https://51wan.[employer]. org and sign in to your [employer] account. Enter W954 in the [employer] box to learn more about \"DASH Diet: Care Instructions. \"     If you do not have an account, please click on the \"Sign Up Now\" link. Current as of: August 31, 2020               Content Version: 12.9  © 3705-1010 Healthwise, Incorporated. Care instructions adapted under license by [facility] ([facility]). If you have questions about a medical condition or this instruction, always ask your healthcare professional. [address] any warranty or liability for your use of this information.

## 2021-08-14 ENCOUNTER — HOSPITAL ENCOUNTER (OUTPATIENT)
Age: 71
Discharge: HOME | End: 2021-08-14
Payer: MEDICARE

## 2021-08-14 DIAGNOSIS — R60.0 BILATERAL LOWER EXTREMITY EDEMA: ICD-10-CM

## 2021-08-14 DIAGNOSIS — R06.02 SHORTNESS OF BREATH: ICD-10-CM

## 2021-08-14 LAB
ALBUMIN SERPL-MCNC: 3.7 GM/DL (ref 3.4–5)
ALP BLD-CCNC: 133 IU/L (ref 40–128)
ALT SERPL-CCNC: 23 U/L (ref 10–40)
ANION GAP SERPL CALCULATED.3IONS-SCNC: 10 MMOL/L (ref 4–16)
AST SERPL-CCNC: 18 IU/L (ref 15–37)
BILIRUB SERPL-MCNC: 0.3 MG/DL (ref 0–1)
BUN BLDV-MCNC: 11 MG/DL (ref 6–23)
CALCIUM SERPL-MCNC: 8.9 MG/DL (ref 8.3–10.6)
CHLORIDE BLD-SCNC: 101 MMOL/L (ref 99–110)
CO2: 28 MMOL/L (ref 21–32)
CREAT SERPL-MCNC: 0.6 MG/DL (ref 0.9–1.3)
GFR AFRICAN AMERICAN: >60 ML/MIN/1.73M2
GFR NON-AFRICAN AMERICAN: >60 ML/MIN/1.73M2
GLUCOSE BLD-MCNC: 138 MG/DL (ref 70–99)
POTASSIUM SERPL-SCNC: 5 MMOL/L (ref 3.5–5.1)
PRO-BNP: 88.2 PG/ML
SODIUM BLD-SCNC: 139 MMOL/L (ref 135–145)
TOTAL PROTEIN: 6.6 GM/DL (ref 6.4–8.2)

## 2021-08-14 PROCEDURE — 83880 ASSAY OF NATRIURETIC PEPTIDE: CPT

## 2021-08-14 PROCEDURE — 36415 COLL VENOUS BLD VENIPUNCTURE: CPT

## 2021-08-14 PROCEDURE — 80053 COMPREHEN METABOLIC PANEL: CPT

## 2021-08-25 DIAGNOSIS — F17.200 TOBACCO DEPENDENCE: ICD-10-CM

## 2021-08-25 RX ORDER — NICOTINE 21 MG/24HR
1 PATCH, TRANSDERMAL 24 HOURS TRANSDERMAL DAILY
Qty: 30 PATCH | Refills: 0 | Status: SHIPPED | OUTPATIENT
Start: 2021-08-25 | End: 2021-12-20

## 2021-10-13 ENCOUNTER — TELEPHONE (OUTPATIENT)
Dept: FAMILY MEDICINE CLINIC | Age: 71
End: 2021-10-13

## 2021-10-13 NOTE — TELEPHONE ENCOUNTER
----- Message from Diamondkate Haq sent at 10/13/2021  1:53 PM EDT -----  Subject: Message to Provider    QUESTIONS  Information for Provider? pt has a question about a referral he received   to attend the orthopedic doctor. he would like the name of the ortho   doctor he was referred to and number. ---------------------------------------------------------------------------  --------------  Ziggy JACOBSEN  What is the best way for the office to contact you? Do not leave any   message, patient will call back for answer  Preferred Call Back Phone Number? [phone]  ---------------------------------------------------------------------------  --------------  SCRIPT ANSWERS  Relationship to Patient?  Self

## 2021-10-27 ENCOUNTER — OFFICE VISIT (OUTPATIENT)
Dept: ORTHOPEDIC SURGERY | Age: 71
End: 2021-10-27
Payer: MEDICARE

## 2021-10-27 VITALS
BODY MASS INDEX: 45.31 KG/M2 | WEIGHT: 299 LBS | HEART RATE: 56 BPM | OXYGEN SATURATION: 100 % | RESPIRATION RATE: 16 BRPM | HEIGHT: 68 IN

## 2021-10-27 DIAGNOSIS — M17.11 PRIMARY OSTEOARTHRITIS OF RIGHT KNEE: Primary | ICD-10-CM

## 2021-10-27 DIAGNOSIS — M17.12 PRIMARY OSTEOARTHRITIS OF LEFT KNEE: ICD-10-CM

## 2021-10-27 PROCEDURE — 20610 DRAIN/INJ JOINT/BURSA W/O US: CPT | Performed by: PHYSICIAN ASSISTANT

## 2021-10-27 RX ORDER — GABAPENTIN 100 MG/1
100 CAPSULE ORAL 3 TIMES DAILY
Qty: 21 CAPSULE | Refills: 0 | Status: SHIPPED | OUTPATIENT
Start: 2021-10-27 | End: 2022-01-10

## 2021-10-27 ASSESSMENT — ENCOUNTER SYMPTOMS
GASTROINTESTINAL NEGATIVE: 1
EYES NEGATIVE: 1
RESPIRATORY NEGATIVE: 1

## 2021-10-27 NOTE — PROGRESS NOTES
Review of Systems   Constitutional: Negative. HENT: Negative. Eyes: Negative. Respiratory: Negative. Cardiovascular: Negative. Gastrointestinal: Negative. Genitourinary: Negative. Musculoskeletal: Positive for arthralgias. Skin: Negative. Negative for rash and wound. Neurological: Negative. Psychiatric/Behavioral: Negative. HPI:  Gloria Rodriguez is a 73 y.o. male that presents the office today complaining of bilateral knee pain. He has had multiple steroid injections in the past which helped his pain very little in both knees. He would like to discuss options such as viscosupplementation injections in the knee as well as surgery.       Past Medical History:   Diagnosis Date    Acid reflux     Anxiety     Arthritis     \"Right Knee\"    Asthma     Chronic back pain     Cough     Occ Productive Cough \"Milky\" Sputum    Diverticulitis Dx Early 2000's    Hepatitis Dx 18's    \"In The Hospital [facility]\"    [place] (hard of hearing)     Bilateral Ears    HTN (hypertension)     Hyperlipidemia     Nocturia     Shortness of breath on exertion     Slow urinary stream     Tingling     \"Tingling Right Hand\"    Wears dentures     Full Upper, Partial Lower    Wears glasses     Wears partial dentures     Lower       Past Surgical History:   Procedure Laterality Date    APPENDECTOMY  1960's    CARPAL TUNNEL RELEASE Right 06/22/2017    COLONOSCOPY  Last Done Early 2000's    Polyps Removed In Past    DENTAL SURGERY      Teeth Extracted In Past    EYE SURGERY Left Late 1980's    Cataract With Lens Implant    FRACTURE SURGERY Left 2000's    Broken Left Wrist    HERNIA REPAIR  [address]    Umbilical Hernia Repair    KNEE ARTHROSCOPY Right 04/13/2017    meniscus repair    TONSILLECTOMY AND ADENOIDECTOMY  1960's       Family History   Problem Relation Age of Onset    Stroke Mother     Heart Disease Mother     Cancer Father         Prostate Cancer    Cancer Son Leukemia       Social History     Socioeconomic History    Marital status:      Spouse name: None    Number of children: 3    Years of education: None    Highest education level: None   Occupational History    None   Tobacco Use    Smoking status: Current Every Day Smoker     Packs/day: 0.25     Years: 52.00     Pack years: 13.00     Types: Cigarettes     Start date: 1965    Smokeless tobacco: Never Used   Substance and Sexual Activity    Alcohol use: Not Currently     Alcohol/week: 3.0 - 6.0 standard drinks     Types: 3 - 6 Cans of beer per week    Drug use: No    Sexual activity: Not Currently   Other Topics Concern    None   Social History Narrative    Single, 3 kids, works for [employer] as a      Social Determinants of Health     Financial Resource Strain:     Difficulty of Paying Living Expenses:    Food Insecurity:     Worried About [address] in the Last Year:    [address] in the Last Year:    Transportation Needs:     Lack of Transportation (Medical):      Lack of Transportation (Non-Medical):    Physical Activity:     Days of Exercise per Week:     Minutes of Exercise per Session:    Stress:     Feeling of Stress :    Social Connections:     Frequency of Communication with Friends and Family:     Frequency of Social Gatherings with Friends and Family:     Attends Bahai Services:     Active Member of Clubs or Organizations:     Attends Club or Organization Meetings:     Marital Status:    Intimate Partner Violence:     Fear of Current or Ex-Partner:     Emotionally Abused:     Physically Abused:     Sexually Abused:        Current Outpatient Medications   Medication Sig Dispense Refill    Compression Stockings MISC by Does not apply route 20-30 mM HG    Wear daily 2 each 0    lisinopril (PRINIVIL;ZESTRIL) 5 MG tablet TAKE 1 TABLET BY MOUTH EVERY DAY IN THE MORNING 90 tablet 1    amLODIPine (NORVASC) 5 MG tablet TAKE 1 TABLET BY MOUTH EVERY DAY IN THE MORNING 90 tablet 1    naltrexone (DEPADE) 50 MG tablet Take 1 tablet by mouth daily 90 tablet 1    pantoprazole (PROTONIX) 40 MG tablet Take 1 tablet by mouth every morning (before breakfast) 90 tablet 0    meloxicam (MOBIC) 15 MG tablet Take 1 tablet by mouth daily 90 tablet 1    tamsulosin (FLOMAX) 0.4 MG capsule Take 1 capsule by mouth daily 90 capsule 1    furosemide (LASIX) 20 MG tablet Take 1 tablet by mouth daily 10 tablet 0    sildenafil (VIAGRA) 100 MG tablet Take 1 tablet by mouth as needed for Erectile Dysfunction 30 tablet 2    fluticasone-salmeterol (ADVAIR DISKUS) 250-50 MCG/DOSE AEPB INHALE 1 PUFF INTO THE LUNGS EVERY 12 HOURS 180 each 1    albuterol sulfate HFA (PROAIR HFA) 108 (90 Base) MCG/ACT inhaler Inhale 2 puffs into the lungs every 6 hours as needed for Wheezing 3 Inhaler 1    Handicap Placard MISC by Does not apply route Please provide disability placard for five years for an orthopedic condition. 1 each 0    ibuprofen (ADVIL;MOTRIN) 200 MG tablet Take 800 mg by mouth daily      nicotine (NICODERM CQ) 21 MG/24HR Place 1 patch onto the skin daily 30 patch 0     No current facility-administered medications for this visit. Allergies   Allergen Reactions    Sulfa Antibiotics Rash       Review of Systems:  See above      Physical Exam:   Pulse 56   Resp 16   Ht 5' 8\" (1.727 m)   Wt 299 lb (135.6 kg)   SpO2 100%   BMI 45.46 kg/m²        Gait is Antalgic. The patient can bear weight on the injured extremity. Gen/Psych:Examination reveals a pleasant individual in no acute distress. The patient is oriented to time, place and person. The patient's mood and affect are appropriate. Patient appears well nourished. Body habitus is obese     Lymph:  no lymphedema in bilateral lower extremities     Skin intact in bilateral lower extremities with no ulcerations, lesions, rash, erythema. Vascular:  There are no varicosities in bilateral lower extremities, sensation intact to light touch over bilateral lower extremities. bilateral leg/knee exam:  Leg alignment:     neutral  Quadriceps/hamstring atrophy:   no  Knee effusion:    no   Knee erythema:   no  ROM:     Full, 0-120 degrees  Varus laxity at 0 and 30 deg's: no  Valguslaxity at 0 and 30 deg's: no  Recurvatum:    no  Tenderness at:   Medial joint line    Bilateral Knee strength is 5/5 flexion and extension  There is + L2-S1 motor and sensory function in bilateral lower extremities    Outside record review: office notes and x-rays reviewed    Imaging studies:  4 views of the right knee taken and reviewed in the office today show severe degenerative changes with the medial compartment being most severely affected where there is bone-on-bone articulation the medial compartment of the right knee. There are no acute fractures or dislocations of the right knee. 4 views of the left knee taken and reviewed in the office today show moderate to severe degenerative change primarily affecting the medial compartment of the left knee where there is near bone-on-bone articulation, osteophyte formation. No acute fractures or dislocations noted. The official read and interpretation of these x-rays will be done by the the [facility]       Impression:     Diagnosis Orders   1. Primary osteoarthritis of right knee     2. Primary osteoarthritis of left knee             Plan:   We discussed surgical and nonsurgical options. Patient would like to consider surgery with his right knee but he would like to start on viscosupplementation injections.     Patient Instructions     Weightbearing and activities as tolerated  May take Ibuprofen or Motrin as needed  Rest, ice, and elevate as needed  Work on ROM and strengthening exercises as discussed  Follow up in 1 week for injection #2  Follow up in 6 weeks with Dr. Jeanna Rasheed to discuss surgery      Right knee injection procedure note    Pre-procedure diagnosis:  Right knee DJD    Post-procedure diagnosis:  same    Procedure: The planned procedure/risks/benefits/alternatives were discussed with the patient. Risks include, but are not limited to, increased pain, drug reaction, infection, bleeding, lack of improvement, neurovascular injury, and increased blood sugar levels. The patient understood and all of their questions were answered. The right knee was prepped with alcohol, then a 22 gauge needle was advanced into the inferior-medial joint without difficulty. The joint was then injected with Orthovisc, 2 mL. Complications:  None, the patient tolerated the procedure well. Instructions: The patient was advised to rest the knee and decrease activity for the next 24 to 48 hours. May use prescription or OTC pain relievers as needed for any post-injection pain as well as ice. Left knee injection procedure note    Pre-procedure diagnosis:  Left knee DJD    Post-procedure diagnosis:  Same    Procedure: The planned procedure/risks/benefits/alternatives were discussed with the patient. Risks include, but are not limited to, increased pain, drug reaction, infection, bleeding, lack of improvement, neurovascular injury, and increased blood sugar levels. The patient understood and all of their questions were answered. The left knee was prepped with alcohol, then a 22 gauge needle was advanced into the inferior-medial joint without difficulty. The joint was then injected with Orthovisc, 2 mL. Complications:  None, the patient tolerated the procedure well. Instructions: The patient was advised to rest the knee and decrease activity for the next 24 to 48 hours. May use prescription or OTC pain relievers as needed for any post-injection pain as well as ice. Complications:  None, the patient tolerated the procedure well. Instructions: The patient was advised to rest the knee and decrease activity for the next 24 to 48 hours.

## 2021-11-02 ENCOUNTER — HOSPITAL ENCOUNTER (OUTPATIENT)
Dept: MRI IMAGING | Age: 71
Discharge: HOME | End: 2021-11-02
Payer: MEDICARE

## 2021-11-02 DIAGNOSIS — M48.062 SPINAL STENOSIS, LUMBAR REGION, WITH NEUROGENIC CLAUDICATION: ICD-10-CM

## 2021-11-02 PROCEDURE — 72148 MRI LUMBAR SPINE W/O DYE: CPT

## 2021-11-03 ENCOUNTER — APPOINTMENT (OUTPATIENT)
Dept: GENERAL RADIOLOGY | Age: 71
DRG: 871 | End: 2021-11-03
Payer: MEDICARE

## 2021-11-03 ENCOUNTER — APPOINTMENT (OUTPATIENT)
Dept: CT IMAGING | Age: 71
DRG: 871 | End: 2021-11-03
Payer: MEDICARE

## 2021-11-03 ENCOUNTER — HOSPITAL ENCOUNTER (INPATIENT)
Age: 71
LOS: 7 days | Discharge: HOME | DRG: 871 | End: 2021-11-10
Attending: STUDENT IN AN ORGANIZED HEALTH CARE EDUCATION/TRAINING PROGRAM | Admitting: HOSPITALIST
Payer: MEDICARE

## 2021-11-03 DIAGNOSIS — A41.9 SEPTICEMIA (HCC): ICD-10-CM

## 2021-11-03 DIAGNOSIS — J18.9 PNEUMONIA OF RIGHT LOWER LOBE DUE TO INFECTIOUS ORGANISM: ICD-10-CM

## 2021-11-03 DIAGNOSIS — R60.0 BILATERAL LOWER EXTREMITY EDEMA: ICD-10-CM

## 2021-11-03 DIAGNOSIS — J96.01 ACUTE RESPIRATORY FAILURE WITH HYPOXIA (HCC): Primary | ICD-10-CM

## 2021-11-03 LAB
ALBUMIN SERPL-MCNC: 2.9 GM/DL (ref 3.4–5)
ALP BLD-CCNC: 93 IU/L (ref 40–128)
ALT SERPL-CCNC: 14 U/L (ref 10–40)
ANION GAP SERPL CALCULATED.3IONS-SCNC: 16 MMOL/L (ref 4–16)
AST SERPL-CCNC: 27 IU/L (ref 15–37)
BACTERIA: ABNORMAL /HPF
BANDED NEUTROPHILS ABSOLUTE COUNT: 4.13 K/CU MM
BANDED NEUTROPHILS RELATIVE PERCENT: 13 % (ref 5–11)
BASE EXCESS: 3 (ref 0–3.3)
BILIRUB SERPL-MCNC: 1.6 MG/DL (ref 0–1)
BILIRUBIN URINE: NEGATIVE MG/DL
BLASTS ABSOLUTE COUNT: 0.32 K/CU MM
BLASTS RELATIVE PERCENT: 1 %
BLOOD, URINE: NEGATIVE
BUN BLDV-MCNC: 21 MG/DL (ref 6–23)
CALCIUM SERPL-MCNC: 8.4 MG/DL (ref 8.3–10.6)
CHLORIDE BLD-SCNC: 96 MMOL/L (ref 99–110)
CLARITY: CLEAR
CO2: 19 MMOL/L (ref 21–32)
COLOR: ABNORMAL
COMMENT: ABNORMAL
CREAT SERPL-MCNC: 0.8 MG/DL (ref 0.9–1.3)
DIFFERENTIAL TYPE: ABNORMAL
DOHLE BODIES: PRESENT
GFR AFRICAN AMERICAN: >60 ML/MIN/1.73M2
GFR NON-AFRICAN AMERICAN: >60 ML/MIN/1.73M2
GLUCOSE BLD-MCNC: 150 MG/DL (ref 70–99)
GLUCOSE, URINE: NEGATIVE MG/DL
HCO3 VENOUS: 21.2 MMOL/L (ref 19–25)
HCT VFR BLD CALC: 44.2 % (ref 42–52)
HEMOGLOBIN: 14.8 GM/DL (ref 13.5–18)
INR BLD: 1.31 INDEX
KETONES, URINE: ABNORMAL MG/DL
LACTATE: 1.7 MMOL/L (ref 0.4–2)
LACTATE: 2.7 MMOL/L (ref 0.4–2)
LEUKOCYTE ESTERASE, URINE: NEGATIVE
LIPASE: 8 IU/L (ref 13–60)
LYMPHOCYTES ABSOLUTE: 2.2 K/CU MM
LYMPHOCYTES RELATIVE PERCENT: 7 % (ref 24–44)
MCH RBC QN AUTO: 30.8 PG (ref 27–31)
MCHC RBC AUTO-ENTMCNC: 33.5 % (ref 32–36)
MCV RBC AUTO: 91.9 FL (ref 78–100)
METAMYELOCYTES ABSOLUTE COUNT: 0.32 K/CU MM
METAMYELOCYTES PERCENT: 1 %
MONOCYTES ABSOLUTE: 1 K/CU MM
MONOCYTES RELATIVE PERCENT: 3 % (ref 0–4)
MUCUS: ABNORMAL HPF
MYELOCYTE PERCENT: 1 %
MYELOCYTES ABSOLUTE COUNT: 0.32 K/CU MM
NITRITE URINE, QUANTITATIVE: NEGATIVE
O2 SAT, VEN: 94.3 % (ref 50–70)
PCO2, VEN: 35 MMHG (ref 38–52)
PDW BLD-RTO: 12.6 % (ref 11.7–14.9)
PH VENOUS: 7.39 (ref 7.32–7.42)
PH, URINE: 5 (ref 5–8)
PLATELET # BLD: 218 K/CU MM (ref 140–440)
PMV BLD AUTO: 11.5 FL (ref 7.5–11.1)
PO2, VEN: 129 MMHG (ref 28–48)
POTASSIUM SERPL-SCNC: 3.7 MMOL/L (ref 3.5–5.1)
PRO-BNP: 662.8 PG/ML
PROCALCITONIN: 0.83
PROTEIN UA: 30 MG/DL
PROTHROMBIN TIME: 16.9 SECONDS (ref 11.7–14.5)
RBC # BLD: 4.81 M/CU MM (ref 4.6–6.2)
RBC URINE: <1 /HPF (ref 0–3)
SARS-COV-2, NAAT: NOT DETECTED
SEGMENTED NEUTROPHILS ABSOLUTE COUNT: 23.5 K/CU MM
SEGMENTED NEUTROPHILS RELATIVE PERCENT: 74 % (ref 36–66)
SODIUM BLD-SCNC: 131 MMOL/L (ref 135–145)
SOURCE: NORMAL
SPECIFIC GRAVITY UA: 1.03 (ref 1–1.03)
SQUAMOUS EPITHELIAL: 1 /HPF
TOTAL CK: 597 IU/L (ref 38–174)
TOTAL PROTEIN: 7 GM/DL (ref 6.4–8.2)
TOXIC GRANULATION: PRESENT
TRANSITIONAL EPITHELIAL: <1 /HPF
TRICHOMONAS: ABNORMAL /HPF
TROPONIN T: <0.01 NG/ML
UROBILINOGEN, URINE: 2 MG/DL (ref 0.2–1)
WBC # BLD: 31.8 K/CU MM (ref 4–10.5)
WBC UA: 2 /HPF (ref 0–2)

## 2021-11-03 PROCEDURE — 2580000003 HC RX 258: Performed by: NURSE PRACTITIONER

## 2021-11-03 PROCEDURE — 2580000003 HC RX 258: Performed by: STUDENT IN AN ORGANIZED HEALTH CARE EDUCATION/TRAINING PROGRAM

## 2021-11-03 PROCEDURE — 87635 SARS-COV-2 COVID-19 AMP PRB: CPT

## 2021-11-03 PROCEDURE — 82805 BLOOD GASES W/O2 SATURATION: CPT

## 2021-11-03 PROCEDURE — 94640 AIRWAY INHALATION TREATMENT: CPT

## 2021-11-03 PROCEDURE — 80053 COMPREHEN METABOLIC PANEL: CPT

## 2021-11-03 PROCEDURE — 99285 EMERGENCY DEPT VISIT HI MDM: CPT

## 2021-11-03 PROCEDURE — 87040 BLOOD CULTURE FOR BACTERIA: CPT

## 2021-11-03 PROCEDURE — 87086 URINE CULTURE/COLONY COUNT: CPT

## 2021-11-03 PROCEDURE — 94761 N-INVAS EAR/PLS OXIMETRY MLT: CPT

## 2021-11-03 PROCEDURE — 93005 ELECTROCARDIOGRAM TRACING: CPT | Performed by: STUDENT IN AN ORGANIZED HEALTH CARE EDUCATION/TRAINING PROGRAM

## 2021-11-03 PROCEDURE — 82550 ASSAY OF CK (CPK): CPT

## 2021-11-03 PROCEDURE — 6360000004 HC RX CONTRAST MEDICATION: Performed by: STUDENT IN AN ORGANIZED HEALTH CARE EDUCATION/TRAINING PROGRAM

## 2021-11-03 PROCEDURE — 87449 NOS EACH ORGANISM AG IA: CPT

## 2021-11-03 PROCEDURE — 96375 TX/PRO/DX INJ NEW DRUG ADDON: CPT

## 2021-11-03 PROCEDURE — 6370000000 HC RX 637 (ALT 250 FOR IP): Performed by: NURSE PRACTITIONER

## 2021-11-03 PROCEDURE — 36415 COLL VENOUS BLD VENIPUNCTURE: CPT

## 2021-11-03 PROCEDURE — 96365 THER/PROPH/DIAG IV INF INIT: CPT

## 2021-11-03 PROCEDURE — 87899 AGENT NOS ASSAY W/OPTIC: CPT

## 2021-11-03 PROCEDURE — 6360000002 HC RX W HCPCS: Performed by: NURSE PRACTITIONER

## 2021-11-03 PROCEDURE — 81001 URINALYSIS AUTO W/SCOPE: CPT

## 2021-11-03 PROCEDURE — 6370000000 HC RX 637 (ALT 250 FOR IP): Performed by: STUDENT IN AN ORGANIZED HEALTH CARE EDUCATION/TRAINING PROGRAM

## 2021-11-03 PROCEDURE — 6360000002 HC RX W HCPCS: Performed by: STUDENT IN AN ORGANIZED HEALTH CARE EDUCATION/TRAINING PROGRAM

## 2021-11-03 PROCEDURE — 71275 CT ANGIOGRAPHY CHEST: CPT

## 2021-11-03 PROCEDURE — 83690 ASSAY OF LIPASE: CPT

## 2021-11-03 PROCEDURE — 84484 ASSAY OF TROPONIN QUANT: CPT

## 2021-11-03 PROCEDURE — 83880 ASSAY OF NATRIURETIC PEPTIDE: CPT

## 2021-11-03 PROCEDURE — 85007 BL SMEAR W/DIFF WBC COUNT: CPT

## 2021-11-03 PROCEDURE — 73552 X-RAY EXAM OF FEMUR 2/>: CPT

## 2021-11-03 PROCEDURE — 83605 ASSAY OF LACTIC ACID: CPT

## 2021-11-03 PROCEDURE — 72170 X-RAY EXAM OF PELVIS: CPT

## 2021-11-03 PROCEDURE — 2140000000 HC CCU INTERMEDIATE R&B

## 2021-11-03 PROCEDURE — 84145 PROCALCITONIN (PCT): CPT

## 2021-11-03 PROCEDURE — 85027 COMPLETE CBC AUTOMATED: CPT

## 2021-11-03 PROCEDURE — 71045 X-RAY EXAM CHEST 1 VIEW: CPT

## 2021-11-03 PROCEDURE — 85610 PROTHROMBIN TIME: CPT

## 2021-11-03 PROCEDURE — 2700000000 HC OXYGEN THERAPY PER DAY

## 2021-11-03 RX ORDER — VANCOMYCIN 1.75 G/350ML
1250 INJECTION, SOLUTION INTRAVENOUS EVERY 12 HOURS
Status: DISCONTINUED | OUTPATIENT
Start: 2021-11-04 | End: 2021-11-06

## 2021-11-03 RX ORDER — GABAPENTIN 100 MG/1
100 CAPSULE ORAL 3 TIMES DAILY
Status: DISCONTINUED | OUTPATIENT
Start: 2021-11-03 | End: 2021-11-10 | Stop reason: HOSPADM

## 2021-11-03 RX ORDER — ACETAMINOPHEN 325 MG/1
650 TABLET ORAL ONCE
Status: COMPLETED | OUTPATIENT
Start: 2021-11-03 | End: 2021-11-03

## 2021-11-03 RX ORDER — ALBUTEROL SULFATE 90 UG/1
2 AEROSOL, METERED RESPIRATORY (INHALATION) EVERY 6 HOURS PRN
Status: DISCONTINUED | OUTPATIENT
Start: 2021-11-03 | End: 2021-11-10 | Stop reason: HOSPADM

## 2021-11-03 RX ORDER — SODIUM CHLORIDE 9 MG/ML
25 INJECTION, SOLUTION INTRAVENOUS PRN
Status: DISCONTINUED | OUTPATIENT
Start: 2021-11-03 | End: 2021-11-10 | Stop reason: HOSPADM

## 2021-11-03 RX ORDER — TAMSULOSIN HYDROCHLORIDE 0.4 MG/1
0.4 CAPSULE ORAL DAILY
Status: DISCONTINUED | OUTPATIENT
Start: 2021-11-03 | End: 2021-11-10 | Stop reason: HOSPADM

## 2021-11-03 RX ORDER — ACETAMINOPHEN 325 MG/1
650 TABLET ORAL EVERY 6 HOURS PRN
Status: DISCONTINUED | OUTPATIENT
Start: 2021-11-03 | End: 2021-11-10 | Stop reason: HOSPADM

## 2021-11-03 RX ORDER — VANCOMYCIN 1 G/200ML
1000 INJECTION, SOLUTION INTRAVENOUS EVERY 12 HOURS
Status: DISCONTINUED | OUTPATIENT
Start: 2021-11-03 | End: 2021-11-03 | Stop reason: DRUGHIGH

## 2021-11-03 RX ORDER — POLYETHYLENE GLYCOL 3350 17 G/17G
17 POWDER, FOR SOLUTION ORAL DAILY PRN
Status: DISCONTINUED | OUTPATIENT
Start: 2021-11-03 | End: 2021-11-10 | Stop reason: HOSPADM

## 2021-11-03 RX ORDER — PANTOPRAZOLE SODIUM 40 MG/1
40 TABLET, DELAYED RELEASE ORAL
Status: DISCONTINUED | OUTPATIENT
Start: 2021-11-04 | End: 2021-11-10 | Stop reason: HOSPADM

## 2021-11-03 RX ORDER — PROMETHAZINE HYDROCHLORIDE 25 MG/ML
12.5 INJECTION, SOLUTION INTRAMUSCULAR; INTRAVENOUS EVERY 6 HOURS PRN
Status: DISCONTINUED | OUTPATIENT
Start: 2021-11-03 | End: 2021-11-10 | Stop reason: HOSPADM

## 2021-11-03 RX ORDER — ONDANSETRON 4 MG/1
4 TABLET, ORALLY DISINTEGRATING ORAL EVERY 8 HOURS PRN
Status: DISCONTINUED | OUTPATIENT
Start: 2021-11-03 | End: 2021-11-03 | Stop reason: ALTCHOICE

## 2021-11-03 RX ORDER — NICOTINE 21 MG/24HR
1 PATCH, TRANSDERMAL 24 HOURS TRANSDERMAL DAILY
Status: DISCONTINUED | OUTPATIENT
Start: 2021-11-03 | End: 2021-11-10 | Stop reason: HOSPADM

## 2021-11-03 RX ORDER — SODIUM CHLORIDE 9 MG/ML
INJECTION, SOLUTION INTRAVENOUS CONTINUOUS
Status: DISCONTINUED | OUTPATIENT
Start: 2021-11-03 | End: 2021-11-05

## 2021-11-03 RX ORDER — SODIUM CHLORIDE 0.9 % (FLUSH) 0.9 %
5-40 SYRINGE (ML) INJECTION EVERY 12 HOURS SCHEDULED
Status: DISCONTINUED | OUTPATIENT
Start: 2021-11-03 | End: 2021-11-10 | Stop reason: HOSPADM

## 2021-11-03 RX ORDER — SODIUM CHLORIDE, SODIUM LACTATE, POTASSIUM CHLORIDE, AND CALCIUM CHLORIDE .6; .31; .03; .02 G/100ML; G/100ML; G/100ML; G/100ML
2000 INJECTION, SOLUTION INTRAVENOUS ONCE
Status: COMPLETED | OUTPATIENT
Start: 2021-11-03 | End: 2021-11-03

## 2021-11-03 RX ORDER — ACETAMINOPHEN 650 MG/1
650 SUPPOSITORY RECTAL EVERY 6 HOURS PRN
Status: DISCONTINUED | OUTPATIENT
Start: 2021-11-03 | End: 2021-11-10 | Stop reason: HOSPADM

## 2021-11-03 RX ORDER — SODIUM CHLORIDE 0.9 % (FLUSH) 0.9 %
5-40 SYRINGE (ML) INJECTION PRN
Status: DISCONTINUED | OUTPATIENT
Start: 2021-11-03 | End: 2021-11-10 | Stop reason: HOSPADM

## 2021-11-03 RX ORDER — ONDANSETRON 2 MG/ML
4 INJECTION INTRAMUSCULAR; INTRAVENOUS EVERY 6 HOURS PRN
Status: DISCONTINUED | OUTPATIENT
Start: 2021-11-03 | End: 2021-11-03 | Stop reason: ALTCHOICE

## 2021-11-03 RX ORDER — PROMETHAZINE HYDROCHLORIDE 12.5 MG/1
12.5 TABLET ORAL EVERY 6 HOURS PRN
Status: DISCONTINUED | OUTPATIENT
Start: 2021-11-03 | End: 2021-11-10 | Stop reason: HOSPADM

## 2021-11-03 RX ORDER — NALTREXONE HYDROCHLORIDE 50 MG/1
50 TABLET, FILM COATED ORAL DAILY
Status: DISCONTINUED | OUTPATIENT
Start: 2021-11-03 | End: 2021-11-10 | Stop reason: HOSPADM

## 2021-11-03 RX ORDER — BUDESONIDE AND FORMOTEROL FUMARATE DIHYDRATE 160; 4.5 UG/1; UG/1
2 AEROSOL RESPIRATORY (INHALATION) 2 TIMES DAILY
Status: DISCONTINUED | OUTPATIENT
Start: 2021-11-03 | End: 2021-11-10 | Stop reason: HOSPADM

## 2021-11-03 RX ADMIN — TAMSULOSIN HYDROCHLORIDE 0.4 MG: 0.4 CAPSULE ORAL at 23:26

## 2021-11-03 RX ADMIN — ALBUTEROL SULFATE 2 PUFF: 90 AEROSOL, METERED RESPIRATORY (INHALATION) at 16:39

## 2021-11-03 RX ADMIN — VANCOMYCIN HYDROCHLORIDE 3000 MG: 1 INJECTION, POWDER, LYOPHILIZED, FOR SOLUTION INTRAVENOUS at 12:03

## 2021-11-03 RX ADMIN — AZITHROMYCIN MONOHYDRATE 500 MG: 500 INJECTION, POWDER, LYOPHILIZED, FOR SOLUTION INTRAVENOUS at 18:54

## 2021-11-03 RX ADMIN — SODIUM CHLORIDE, POTASSIUM CHLORIDE, SODIUM LACTATE AND CALCIUM CHLORIDE 20 ML: 600; 310; 30; 20 INJECTION, SOLUTION INTRAVENOUS at 13:39

## 2021-11-03 RX ADMIN — ACETAMINOPHEN 650 MG: 325 TABLET ORAL at 11:07

## 2021-11-03 RX ADMIN — ENOXAPARIN SODIUM 40 MG: 100 INJECTION SUBCUTANEOUS at 23:27

## 2021-11-03 RX ADMIN — NALTREXONE HYDROCHLORIDE 50 MG: 50 TABLET, FILM COATED ORAL at 23:26

## 2021-11-03 RX ADMIN — CEFEPIME 2000 MG: 2 INJECTION, POWDER, FOR SOLUTION INTRAVENOUS at 23:15

## 2021-11-03 RX ADMIN — GABAPENTIN 100 MG: 100 CAPSULE ORAL at 23:25

## 2021-11-03 RX ADMIN — IOPAMIDOL 80 ML: 755 INJECTION, SOLUTION INTRAVENOUS at 13:26

## 2021-11-03 RX ADMIN — SODIUM CHLORIDE, POTASSIUM CHLORIDE, SODIUM LACTATE AND CALCIUM CHLORIDE 2000 ML: 600; 310; 30; 20 INJECTION, SOLUTION INTRAVENOUS at 11:51

## 2021-11-03 RX ADMIN — CEFEPIME 2000 MG: 2 INJECTION, POWDER, FOR SOLUTION INTRAVENOUS at 11:08

## 2021-11-03 RX ADMIN — ACETAMINOPHEN 650 MG: 325 TABLET ORAL at 22:12

## 2021-11-03 ASSESSMENT — PAIN SCALES - GENERAL
PAINLEVEL_OUTOF10: 10
PAINLEVEL_OUTOF10: 5
PAINLEVEL_OUTOF10: 10

## 2021-11-03 ASSESSMENT — PAIN DESCRIPTION - LOCATION
LOCATION: KNEE
LOCATION: HIP;KNEE

## 2021-11-03 ASSESSMENT — PAIN DESCRIPTION - DESCRIPTORS: DESCRIPTORS: ACHING

## 2021-11-03 ASSESSMENT — PAIN DESCRIPTION - PAIN TYPE
TYPE: ACUTE PAIN;CHRONIC PAIN
TYPE: ACUTE PAIN

## 2021-11-03 ASSESSMENT — PAIN DESCRIPTION - ORIENTATION
ORIENTATION: LEFT;RIGHT
ORIENTATION: RIGHT;LEFT

## 2021-11-03 NOTE — ED PROVIDER NOTES
EMERGENCY DEPARTMENT ENCOUNTER      CHIEF COMPLAINT    Chief Complaint   Patient presents with    Fall     Bilateral Knee pain, bilateral hip pain    Shortness of Breath     Fever       HPI    Azalia Goodman is a 73 y.o. male with history significant for history of chronic back pains, remote history of hep B hepatitis, hypertension, hyperlipidemia who presents with shortness of breath fever and fall. Patient's initial chief complaint was a mechanical fall that he sustained yesterday while standing up from a chair in the straddling positions and landed on his hips and since then has been having bilateral hip pain and knee pain with not able to get up due to the pain and patient stated that since last night has been sitting on the ground was not able to move around much, and patient also stated that for the last 3 days he has been having increased cough and also shortness of breath and noticed a fever at home as well, patient has already received both dose of Covid vaccinations but is due for the booster dose, and denies any known contact       REVIEW OF SYSTEMS    Constitutional: Fever chills fatigue  HENT: Denies sore throat or rhinorrhea. Eyes: Denies vision changes. Respiratory: shortness of breath or cough. Cardiovascular: Denies chest pain, leg swelling or palpitations. Gastrointestinal: Denies abdominal pain, diarrhea, nausea and vomiting. Genitourinary: Denies dysuria or hematuria. Skin: Denies rashes or wounds. MSK: Bilateral hip and knee pain  Neurologic: Denies headache, lightheadedness, numbness, or weakness.    Hematologic/lymphatic: Denies unexpected weight loss, night sweats  Endocrine: No polyuria, polydipsia, or polyphagia      Pertinent positives and negatives are delineated in HPI and ROS above, all other systems are reviewed and are negative    PAST MEDICAL HISTORY    Past Medical History:   Diagnosis Date    Acid reflux     Anxiety     Arthritis     \"Right Knee\"    Asthma  Chronic back pain     Cough     Occ Productive Cough \"Milky\" Sputum    Diverticulitis Dx Early 2000's    Hepatitis Dx 18's    \"In The Hospital [facility]\"    [place] (hard of hearing)     Bilateral Ears    HTN (hypertension)     Hyperlipidemia     Nocturia     Shortness of breath on exertion     Slow urinary stream     Tingling     \"Tingling Right Hand\"    Wears dentures     Full Upper, Partial Lower    Wears glasses     Wears partial dentures     Lower     Medical history reviewed and no pertinent past medical history other than the ones mentioned above    SURGICAL HISTORY    Past Surgical History:   Procedure Laterality Date    APPENDECTOMY  1960's    CARPAL TUNNEL RELEASE Right 06/22/2017    COLONOSCOPY  Last Done Early 2000's    Polyps Removed In Past    DENTAL SURGERY      Teeth Extracted In Past    EYE SURGERY Left Late 1980's    Cataract With Lens Implant    FRACTURE SURGERY Left 2000's    Broken Left Wrist    HERNIA REPAIR  [address]    Umbilical Hernia Repair    KNEE ARTHROSCOPY Right 04/13/2017    meniscus repair    TONSILLECTOMY AND ADENOIDECTOMY  1960's     Surgical history reviewed and no pertinent surgical history other than the ones mentioned above    CURRENT MEDICATIONS    Current Outpatient Rx   Medication Sig Dispense Refill    gabapentin (NEURONTIN) 100 MG capsule Take 1 capsule by mouth 3 times daily for 7 days.  21 capsule 0    nicotine (NICODERM CQ) 21 MG/24HR Place 1 patch onto the skin daily 30 patch 0    Compression Stockings MISC by Does not apply route 20-30 mM HG    Wear daily 2 each 0    lisinopril (PRINIVIL;ZESTRIL) 5 MG tablet TAKE 1 TABLET BY MOUTH EVERY DAY IN THE MORNING 90 tablet 1    amLODIPine (NORVASC) 5 MG tablet TAKE 1 TABLET BY MOUTH EVERY DAY IN THE MORNING 90 tablet 1    naltrexone (DEPADE) 50 MG tablet Take 1 tablet by mouth daily 90 tablet 1    pantoprazole (PROTONIX) 40 MG tablet Take 1 tablet by mouth every morning (before breakfast) 90 tablet 0    meloxicam (MOBIC) 15 MG tablet Take 1 tablet by mouth daily 90 tablet 1    tamsulosin (FLOMAX) 0.4 MG capsule Take 1 capsule by mouth daily 90 capsule 1    furosemide (LASIX) 20 MG tablet Take 1 tablet by mouth daily 10 tablet 0    sildenafil (VIAGRA) 100 MG tablet Take 1 tablet by mouth as needed for Erectile Dysfunction 30 tablet 2    fluticasone-salmeterol (ADVAIR DISKUS) 250-50 MCG/DOSE AEPB INHALE 1 PUFF INTO THE LUNGS EVERY 12 HOURS 180 each 1    albuterol sulfate HFA (PROAIR HFA) 108 (90 Base) MCG/ACT inhaler Inhale 2 puffs into the lungs every 6 hours as needed for Wheezing 3 Inhaler 1    Handicap Placard MISC by Does not apply route Please provide disability placard for five years for an orthopedic condition.  1 each 0    ibuprofen (ADVIL;MOTRIN) 200 MG tablet Take 800 mg by mouth daily       Medication is reviewed    ALLERGIES    Allergies   Allergen Reactions    Sulfa Antibiotics Rash     Allergy is reviewed    FAMILY HISTORY    Family History   Problem Relation Age of Onset    Stroke Mother     Heart Disease Mother     Cancer Father         Prostate Cancer    Cancer Son         Leukemia     Family history reviewed and no pertinent family history other than the ones mentioned above    SOCIAL HISTORY    Social History     Socioeconomic History    Marital status:      Spouse name: Not on file    Number of children: 3    Years of education: Not on file    Highest education level: Not on file   Occupational History    Not on file   Tobacco Use    Smoking status: Current Every Day Smoker     Packs/day: 0.25     Years: 52.00     Pack years: 13.00     Types: Cigarettes     Start date: 1965    Smokeless tobacco: Never Used   Substance and Sexual Activity    Alcohol use: Not Currently     Alcohol/week: 3.0 - 6.0 standard drinks     Types: 3 - 6 Cans of beer per week    Drug use: No    Sexual activity: Not Currently   Other Topics Concern    Not on file   Social History Narrative Single, 3 kids, works for [employer] as a      Social Determinants of Health     Financial Resource Strain:     Difficulty of Paying Living Expenses:    Food Insecurity:     Worried About [address] in the Last Year:    [address] in the Last Year:    Transportation Needs:     Lack of Transportation (Medical):  Lack of Transportation (Non-Medical):    Physical Activity:     Days of Exercise per Week:     Minutes of Exercise per Session:    Stress:     Feeling of Stress :    Social Connections:     Frequency of Communication with Friends and Family:     Frequency of Social Gatherings with Friends and Family:     Attends Faith Services:     Active Member of Clubs or Organizations:     Attends Club or Organization Meetings:     Marital Status:    Intimate Partner Violence:     Fear of Current or Ex-Partner:     Emotionally Abused:     Physically Abused:     Sexually Abused:      Live with self  Alcohol and recreational drug use: denies  Social history reviewed and no pertinent social history other than the ones mentioned above    PHYSICAL EXAM    Vital Signs:/63   Pulse 127   Temp 100.7 °F (38.2 °C) (Oral)   Resp 28   SpO2 93%   I have reviewed the triage vital signs. Constitutional: Chronically ill-appearing, in no respiratory distress  Eyes: PERRL, no conjunctival injection  HENT: NCAT, Neck supple without meningismus   CV: RRR, Warm, no edema  RESP: Normal RR, no increased respiratory efforts, decreased breath sounds bilaterally  GI: soft, non-distended, no abdominal pain  MSK: No gross deformities, bilateral hip and distal femur and knee pain, range of motion is limited by pain. Skin: Warm, dry. No rashes  Neuro: Alert, CNs II-XII grossly intact. Moving all 4 extremities  Psych: Appropriate mood and affect.     Labs:   Labs Reviewed   COVID-19, RAPID   CULTURE, URINE   CULTURE, BLOOD 1   CULTURE, BLOOD 2   CBC WITH AUTO DIFFERENTIAL   COMPREHENSIVE METABOLIC PANEL W/ REFLEX TO MG FOR LOW K   LIPASE   TROPONIN   BRAIN NATRIURETIC PEPTIDE   PROTIME-INR   URINE RT REFLEX TO CULTURE   CK   LACTIC ACID, PLASMA   BLOOD GAS, VENOUS   URINE RT REFLEX TO CULTURE   POCT GLUCOSE       Radiology:  XR CHEST PORTABLE    (Results Pending)   XR FEMUR LEFT (MIN 2 VIEWS)    (Results Pending)   XR FEMUR RIGHT (MIN 2 VIEWS)    (Results Pending)   XR PELVIS (1-2 VIEWS)    (Results Pending)       I directly reviewed the images and radiology interpretation    EKG interpreted by myself: See ED course    ED COURSE  Assessment & Medical Decision Making:  Paulino Tariq is a 73 y.o. male who presents with mechanical fall also fever shortness of breath and cough. Regarding patient mechanical fall it is concerning that he has been on the ground for all night has not been able to stand up, he does have previous pain with the hip and the knee but will obtain x-rays again given patient not ambulatory, if his x-ray is unremarkable patient may still require MRI, regarding patient shortness of breath and cough patient was hypoxic to 86% was placed on 2 L of oxygen satting 91 to 94% and patient is febrile and tachycardic as well likely has respiratory source of sepsis, will give patient Tylenol will swab him for COVID-19, will hold off on fluid resuscitation given patient appears to be euvolemic right now and if patient is COVID-19 positive, additional fluids may actually worsen patient's respiratory status will wait for Covid results unless patient become unstable we will hold off on fluids for now, will obtain sepsis labs, and will start broad-spectrum antibiotics for now, but if the Covid is positive, we can stop antibiotics if patient does not deteriorate.     ED Course as of Nov 04 1459   Wed Nov 03, 2021   1127 EKG shows sinus tachycardia with a rate of 125 with right bundle branch block, no significant ST-T wave changes, the right bundle branch block is seen in previous EKG in 2017 as well    [YQ]   1329 CTA on my interpretation did not demonstrate any large central PE however on my interpretation the right lower lobe does appear to have a large consolidation with infiltrates as well. Which I think is consistent with patient's presentation of shortness of breath cough and leukocytosis. [YQ]      ED Course User Index  [YQ] Susan Berry MD        DDx includes but not limited to:  PNA, PE, HF exacerbation, volume overload from cirrhosis, volume overload from renal dysfunction, anemia, CNS, acidosis, ACS, anxiety, PTX, pleural effusion, bronchiectasis, airway obstruction, reactive airway disease exacerbation (asthma/COPD/WARI), anaphylaxis/anaphylactoid reaction/allergic reaction  Fracture, dislocation, fracture negative on Xray    Workup includes but not limited to: XR, cultures, labs, EKG    Treatment includes but not limited to: BS Abx    Critical care time:   Critical Care  I provided 35 minutes of critical care time including initial history and exam, development of the treatment plan, order management, review of treatment plan with nursing, frequent reevaluation of cardiopulmonary status, discussion with consultants and family, arranging admission, review of the medical record. Total number of minutes spent in direct care of this critically ill patients excludes separately reportable procedures. Impression:   Hypoxia  Hip/knee pain  Mechanical fall  SIRS    Disposition: Admission    This note dictated using Dragon medical voice recognition software. Attempts at proofreading were made, but errors may occasionally still occur.            Susan Berry MD  11/03/21 9480       Susan Berry MD  11/04/21 3754 - holding metoprolol in setting of sepsis- will plan to restart tomorrow given stability  - Continue home amiodarone 200 mg PO daily  - currently doing well off dobutamine - holding metoprolol in setting of sepsis- may restart tomorrow given stability  - Continue home amiodarone 200 mg PO daily  - currently doing well off dobutamine

## 2021-11-03 NOTE — Clinical Note
Patient Class: Inpatient [101]   REQUIRED: Diagnosis: PNA (pneumonia) [469226]   Estimated Length of Stay: Estimated stay of more than 2 midnights

## 2021-11-03 NOTE — ED NOTES
The patient states that he has chronic knee pain but it is worse from the fall last night. The patient states that he did the \"splits\" when he fell.      Ladi Griffin RN  11/03/21 4648

## 2021-11-03 NOTE — ED TRIAGE NOTES
The patients O2 saturation decreased into the upper 80's. The patients oxygen cannula was increased to 6 LPM with and O2 saturation of 90% to 91%.

## 2021-11-03 NOTE — H&P
History and Physical      Name:  Vani Decker /Age/Sex: 1950  (73 y.o. male)   MRN & CSN:  [id number] & [id number] Admission Date/Time: 11/3/2021  8:13 AM   Location:  [place] [address] PCP: Sandra Fisher MD       Hospital Day: 1    Assessment and Plan:   Vani Decker is a 73 y.o.  male  who presents with <principal problem not specified>    #  Sepsis  Presented with fever and shortness of breath. Labs showed severe leukocytosis. CT of chest showed a large pneumonia. Lactic acidosis. BP stable currently. Patient was started on broad-spectrum antibiotic in the ED, will continue cefepime and vancomycin. Started patient on IV fluid, closely monitoring vital signs. Continue trending lactic acid. Pending blood culture. #  Pneumonia  Pending blood culture and respiratory panel. COVID-19 negative. Continue cefepime and vancomycin. #  Mechanical fall  Patient denies loss of consciousness, or any prodrome symptoms such as chest pain or syncope. Mild rhabdomyolysis, will continue IV fluid. PT/OT. #  Lactic acidosis  Same as above. #  Rhabdomyolysis  Mild elevation of CK, will continue IV fluid, will trending CK. #  Other chronic comorbidities:  -Hypertension, BP controlled, hold BP medication for now in the setting of sepsis, may resume BP medications once blood pressure stable. -Hyperlipidemia, continue home medication.  -Hepatitis B, normal liver enzymes study. -COPD, VBG reassuring, continue home medication. Case discussed with ED provider.       Diet No diet orders on file   DVT Prophylaxis [x] Lovenox, []  Heparin, [] SCDs, [] Ambulation   GI Prophylaxis [x] PPI,  [] H2 Blocker,  [] Carafate,  [] Diet/Tube Feeds   Code Status Prior   Disposition Patient requires continued admission due to PNA   MDM [] Low, [] Moderate,[x]  High  Patient's risk as above due to PNA     History of Present Illness:     Chief Complaint: <principal problem not specified>  Vani Decker is a 73 y.o. male with history significant for history of chronic back pains, remote history of hep B hepatitis, hypertension, hyperlipidemia who presents with shortness of breath, fever, and fall. Patient's initial chief complaint was a mechanical fall that he sustained yesterday while standing up from a chair in the straddling positions and landed on his hips and since then has been having bilateral hip pain and knee pain with not able to get up due to the and vancomycin and patient stated that since last night has been sitting on the ground was not able to move around much, and patient also stated that for the last 3 days he has been having increased cough and also shortness of breath and noticed a fever at home as well, patient has already received both dose of Covid vaccinations but is due for the booster dose, and denies any known contact    Ten point ROS reviewed negative, unless as noted above    Objective:   No intake or output data in the 24 hours ending 11/03/21 1417   Vitals:   Vitals:    11/03/21 1208   BP:    Pulse: 122   Resp:    Temp: 98.9 °F (37.2 °C)   SpO2:      Physical Exam:   GEN Awake male, sitting upright in bed in no apparent distress. Appears given age. EYES Pupils are equally round. No scleral erythema, discharge, or conjunctivitis. HENT Mucous membranes are moist. Oral pharynx without exudates, no evidence of thrush. NECK Supple, no apparent thyromegaly or masses. RESP Clear to auscultation, no wheezes, rales or rhonchi. Symmetric chest movement while on room air. CARDIO/VASC S1/S2 auscultated. Regular rate without appreciable murmurs, rubs, or gallops. No JVD or carotid bruits. Peripheral pulses equal bilaterally and palpable. No peripheral edema. GI Abdomen is soft without significant tenderness, masses, or guarding. Bowel sounds are normoactive. Rectal exam deferred.  No costovertebral angle tenderness. Normal appearing external genitalia.  Romero catheter is not present. HEME/LYMPH No palpable cervical lymphadenopathy and no hepatosplenomegaly. No petechiae or ecchymoses. MSK No gross joint deformities. SKIN Normal coloration, warm, dry. NEURO Cranial nerves appear grossly intact, normal speech, no lateralizing weakness. PSYCH Awake, alert, oriented x 4. Affect appropriate. Past Medical History:      Past Medical History:   Diagnosis Date    Acid reflux     Anxiety     Arthritis     \"Right Knee\"    Asthma     Chronic back pain     Cough     Occ Productive Cough \"Milky\" Sputum    Diverticulitis Dx Early 2000's    Hepatitis Dx 18's    \"In The Hospital A Few Days\"    [place] (hard of hearing)     Bilateral Ears    HTN (hypertension)     Hyperlipidemia     Nocturia     Shortness of breath on exertion     Slow urinary stream     Tingling     \"Tingling Right Hand\"    Wears dentures     Full Upper, Partial Lower    Wears glasses     Wears partial dentures     Lower     PSHX:  has a past surgical history that includes eye surgery (Left, Late 1980's); Dental surgery; Colonoscopy (Last Done Early 2000's); Tonsillectomy and adenoidectomy (1960's); hernia repair (1980's); Appendectomy (1960's); fracture surgery (Left, 2000's); Knee arthroscopy (Right, 04/13/2017); and Carpal tunnel release (Right, 06/22/2017). Allergies: Allergies   Allergen Reactions    Sulfa Antibiotics Rash       FAM HX: family history includes Cancer in his father and son; Heart Disease in his mother; Stroke in his mother.   Soc HX:   Social History     Socioeconomic History    Marital status:      Spouse name: Not on file    Number of children: 3    Years of education: Not on file    Highest education level: Not on file   Occupational History    Not on file   Tobacco Use    Smoking status: Current Every Day Smoker     Packs/day: 0.25     Years: 52.00     Pack years: 13.00     Types: Cigarettes     Start date: 1965    Smokeless tobacco: Never Used   Substance and Sexual Activity    Alcohol use: Not Currently     Alcohol/week: 3.0 - 6.0 standard drinks     Types: 3 - 6 Cans of beer per week    Drug use: No    Sexual activity: Not Currently   Other Topics Concern    Not on file   Social History Narrative    Single, 3 kids, works for [employer] as a      Social Determinants of Health     Financial Resource Strain:     Difficulty of Paying Living Expenses:    Food Insecurity:     Worried About [address] in the Last Year:    [address] in the Last Year:    Transportation Needs:     Lack of Transportation (Medical):      Lack of Transportation (Non-Medical):    Physical Activity:     Days of Exercise per Week:     Minutes of Exercise per Session:    Stress:     Feeling of Stress :    Social Connections:     Frequency of Communication with Friends and Family:     Frequency of Social Gatherings with Friends and Family:     Attends Denominational Services:     Active Member of Clubs or Organizations:     Attends Club or Organization Meetings:     Marital Status:    Intimate Partner Violence:     Fear of Current or Ex-Partner:     Emotionally Abused:     Physically Abused:     Sexually Abused:        Medications:   Medications:    vancomycin  3,000 mg IntraVENous Once    azithromycin  500 mg IntraVENous Once    [COMPLETED] lactated ringers bolus  2,000 mL IntraVENous Once      Infusions:   PRN Meds:        Electronically signed by BERNARDINO Chisholm CNP on 11/3/2021 at 2:17 PM

## 2021-11-03 NOTE — ED NOTES
Upon evaluation of the client, they are observed to be alert and oriented to person, place and situation. The head of the bed is elevated above 30 degrees. The client verbalizes appropriately to all questions and/or comments. The client also makes eye contact when prompted. The client also exhibits unlabored breathing, their skin is pink, warm & dry, and are observed to have relaxed extremities. When communicating, the client speaks with clear speech and normal tone and is in no apparent distress. The call light is within reach, the bed is in the low position and both side rails are up.      Iqra Mccormack RN  11/03/21 5007

## 2021-11-03 NOTE — ED NOTES
The patient presents to the emergency department alert and oriented with a complaint of shortness of breath a fever and the patient fell last night and was on the floor through the night. The patient denies any chest pain. The patient does say that his knees hurt. The patient placed on the monitor with vital signs taken. Assessment as follows; Skin is pink, warm and dry. No obvious knee injury noted. O2 saturation was 86 % on room air per EMS.       Ashley Shields RN  11/03/21 [id number] Margarito Kramer RN  11/03/21 1548

## 2021-11-03 NOTE — ED NOTES
Upon evaluation of the client, they are observed to be alert and oriented to person, place and situation. The head of the bed is elevated above 30 degrees. The client verbalizes appropriately to all questions and/or comments. The client also makes eye contact when prompted. The client also exhibits unlabored breathing, their skin is pink, warm & dry, and are observed to have relaxed extremities. When communicating, the client speaks with clear speech and normal tone and is in no apparent distress. The call light is within reach, the bed is in the low position and both side rails are up.      Ashley Shields RN  11/03/21 9810

## 2021-11-04 ENCOUNTER — APPOINTMENT (OUTPATIENT)
Dept: GENERAL RADIOLOGY | Age: 71
DRG: 871 | End: 2021-11-04
Payer: MEDICARE

## 2021-11-04 ENCOUNTER — TELEPHONE (OUTPATIENT)
Dept: ORTHOPEDIC SURGERY | Age: 71
End: 2021-11-04

## 2021-11-04 LAB
ALBUMIN SERPL-MCNC: 2.9 GM/DL (ref 3.4–5)
ALP BLD-CCNC: 77 IU/L (ref 40–129)
ALT SERPL-CCNC: 16 U/L (ref 10–40)
ANION GAP SERPL CALCULATED.3IONS-SCNC: 16 MMOL/L (ref 4–16)
AST SERPL-CCNC: 32 IU/L (ref 15–37)
BASE EXCESS: 1 (ref 0–3.3)
BILIRUB SERPL-MCNC: 1.5 MG/DL (ref 0–1)
BILIRUBIN DIRECT: 1.3 MG/DL (ref 0–0.3)
BILIRUBIN, INDIRECT: 0.2 MG/DL (ref 0–0.7)
BUN BLDV-MCNC: 22 MG/DL (ref 6–23)
CALCIUM SERPL-MCNC: 8.1 MG/DL (ref 8.3–10.6)
CARBON MONOXIDE, BLOOD: 2.1 % (ref 0–5)
CHLORIDE BLD-SCNC: 95 MMOL/L (ref 99–110)
CO2 CONTENT: 26.8 MMOL/L (ref 19–24)
CO2: 20 MMOL/L (ref 21–32)
COMMENT: ABNORMAL
CREAT SERPL-MCNC: 0.7 MG/DL (ref 0.9–1.3)
CULTURE: NORMAL
GFR AFRICAN AMERICAN: >60 ML/MIN/1.73M2
GFR NON-AFRICAN AMERICAN: >60 ML/MIN/1.73M2
GLUCOSE BLD-MCNC: 128 MG/DL (ref 70–99)
HCO3 ARTERIAL: 25.4 MMOL/L (ref 18–23)
HCT VFR BLD CALC: 43.7 % (ref 42–52)
HEMOGLOBIN: 13.8 GM/DL (ref 13.5–18)
HIGH SENSITIVE C-REACTIVE PROTEIN: >300 MG/L
LACTATE: 2.4 MMOL/L (ref 0.4–2)
LACTATE: 2.6 MMOL/L (ref 0.4–2)
LACTATE: 2.9 MMOL/L (ref 0.4–2)
LEGIONELLA URINARY AG: POSITIVE
LV EF: 58 %
LVEF MODALITY: NORMAL
Lab: NORMAL
MCH RBC QN AUTO: 30.4 PG (ref 27–31)
MCHC RBC AUTO-ENTMCNC: 31.6 % (ref 32–36)
MCV RBC AUTO: 96.3 FL (ref 78–100)
METHEMOGLOBIN ARTERIAL: 1 %
O2 SATURATION: 80.4 % (ref 96–97)
PCO2 ARTERIAL: 47 MMHG (ref 32–45)
PDW BLD-RTO: 12.9 % (ref 11.7–14.9)
PH BLOOD: 7.34 (ref 7.34–7.45)
PLATELET # BLD: 234 K/CU MM (ref 140–440)
PMV BLD AUTO: 11.3 FL (ref 7.5–11.1)
PO2 ARTERIAL: 48 MMHG (ref 75–100)
POTASSIUM SERPL-SCNC: 4.2 MMOL/L (ref 3.5–5.1)
PROCALCITONIN: 1.85
RBC # BLD: 4.54 M/CU MM (ref 4.6–6.2)
SODIUM BLD-SCNC: 131 MMOL/L (ref 135–145)
SPECIMEN: NORMAL
STREP PNEUMONIAE ANTIGEN: NORMAL
TOTAL PROTEIN: 5.8 GM/DL (ref 6.4–8.2)
WBC # BLD: 31.3 K/CU MM (ref 4–10.5)

## 2021-11-04 PROCEDURE — 94640 AIRWAY INHALATION TREATMENT: CPT

## 2021-11-04 PROCEDURE — 99222 1ST HOSP IP/OBS MODERATE 55: CPT | Performed by: INTERNAL MEDICINE

## 2021-11-04 PROCEDURE — 6370000000 HC RX 637 (ALT 250 FOR IP): Performed by: NURSE PRACTITIONER

## 2021-11-04 PROCEDURE — 82248 BILIRUBIN DIRECT: CPT

## 2021-11-04 PROCEDURE — 71045 X-RAY EXAM CHEST 1 VIEW: CPT

## 2021-11-04 PROCEDURE — 94761 N-INVAS EAR/PLS OXIMETRY MLT: CPT

## 2021-11-04 PROCEDURE — 2140000000 HC CCU INTERMEDIATE R&B

## 2021-11-04 PROCEDURE — 36600 WITHDRAWAL OF ARTERIAL BLOOD: CPT

## 2021-11-04 PROCEDURE — 85027 COMPLETE CBC AUTOMATED: CPT

## 2021-11-04 PROCEDURE — 6360000002 HC RX W HCPCS: Performed by: HOSPITALIST

## 2021-11-04 PROCEDURE — 6360000002 HC RX W HCPCS: Performed by: NURSE PRACTITIONER

## 2021-11-04 PROCEDURE — 94660 CPAP INITIATION&MGMT: CPT

## 2021-11-04 PROCEDURE — 86141 C-REACTIVE PROTEIN HS: CPT

## 2021-11-04 PROCEDURE — 84145 PROCALCITONIN (PCT): CPT

## 2021-11-04 PROCEDURE — 2580000003 HC RX 258: Performed by: NURSE PRACTITIONER

## 2021-11-04 PROCEDURE — 93306 TTE W/DOPPLER COMPLETE: CPT

## 2021-11-04 PROCEDURE — 82803 BLOOD GASES ANY COMBINATION: CPT

## 2021-11-04 PROCEDURE — 36415 COLL VENOUS BLD VENIPUNCTURE: CPT

## 2021-11-04 PROCEDURE — 83605 ASSAY OF LACTIC ACID: CPT

## 2021-11-04 PROCEDURE — 2700000000 HC OXYGEN THERAPY PER DAY

## 2021-11-04 PROCEDURE — 80053 COMPREHEN METABOLIC PANEL: CPT

## 2021-11-04 RX ORDER — FUROSEMIDE 10 MG/ML
40 INJECTION INTRAMUSCULAR; INTRAVENOUS ONCE
Status: COMPLETED | OUTPATIENT
Start: 2021-11-04 | End: 2021-11-04

## 2021-11-04 RX ORDER — GUAIFENESIN 600 MG/1
600 TABLET, EXTENDED RELEASE ORAL 2 TIMES DAILY
Status: DISCONTINUED | OUTPATIENT
Start: 2021-11-04 | End: 2021-11-10 | Stop reason: HOSPADM

## 2021-11-04 RX ORDER — OXYCODONE HYDROCHLORIDE AND ACETAMINOPHEN 5; 325 MG/1; MG/1
1 TABLET ORAL EVERY 4 HOURS PRN
Status: DISCONTINUED | OUTPATIENT
Start: 2021-11-04 | End: 2021-11-10 | Stop reason: HOSPADM

## 2021-11-04 RX ORDER — LORAZEPAM 2 MG/ML
1 INJECTION INTRAMUSCULAR EVERY 6 HOURS PRN
Status: DISCONTINUED | OUTPATIENT
Start: 2021-11-04 | End: 2021-11-04

## 2021-11-04 RX ORDER — GUAIFENESIN/DEXTROMETHORPHAN 100-10MG/5
10 SYRUP ORAL EVERY 4 HOURS PRN
Status: DISCONTINUED | OUTPATIENT
Start: 2021-11-04 | End: 2021-11-10 | Stop reason: HOSPADM

## 2021-11-04 RX ORDER — FUROSEMIDE 10 MG/ML
40 INJECTION INTRAMUSCULAR; INTRAVENOUS DAILY
Status: DISCONTINUED | OUTPATIENT
Start: 2021-11-04 | End: 2021-11-04

## 2021-11-04 RX ORDER — METHYLPREDNISOLONE SODIUM SUCCINATE 40 MG/ML
INJECTION, POWDER, LYOPHILIZED, FOR SOLUTION INTRAMUSCULAR; INTRAVENOUS
Status: DISCONTINUED
Start: 2021-11-04 | End: 2021-11-04

## 2021-11-04 RX ORDER — IPRATROPIUM BROMIDE AND ALBUTEROL SULFATE 2.5; .5 MG/3ML; MG/3ML
1 SOLUTION RESPIRATORY (INHALATION)
Status: DISCONTINUED | OUTPATIENT
Start: 2021-11-04 | End: 2021-11-06

## 2021-11-04 RX ORDER — METHYLPREDNISOLONE SODIUM SUCCINATE 40 MG/ML
40 INJECTION, POWDER, LYOPHILIZED, FOR SOLUTION INTRAMUSCULAR; INTRAVENOUS DAILY
Status: DISCONTINUED | OUTPATIENT
Start: 2021-11-04 | End: 2021-11-07

## 2021-11-04 RX ORDER — METHYLPREDNISOLONE SODIUM SUCCINATE 125 MG/2ML
60 INJECTION, POWDER, LYOPHILIZED, FOR SOLUTION INTRAMUSCULAR; INTRAVENOUS ONCE
Status: COMPLETED | OUTPATIENT
Start: 2021-11-04 | End: 2021-11-04

## 2021-11-04 RX ORDER — FUROSEMIDE 10 MG/ML
40 INJECTION INTRAMUSCULAR; INTRAVENOUS DAILY
Status: DISCONTINUED | OUTPATIENT
Start: 2021-11-04 | End: 2021-11-05

## 2021-11-04 RX ORDER — LORAZEPAM 2 MG/ML
2 INJECTION INTRAMUSCULAR EVERY 6 HOURS PRN
Status: DISCONTINUED | OUTPATIENT
Start: 2021-11-04 | End: 2021-11-10 | Stop reason: HOSPADM

## 2021-11-04 RX ADMIN — FUROSEMIDE 40 MG: 10 INJECTION, SOLUTION INTRAVENOUS at 07:03

## 2021-11-04 RX ADMIN — PANTOPRAZOLE SODIUM 40 MG: 40 TABLET, DELAYED RELEASE ORAL at 11:29

## 2021-11-04 RX ADMIN — OXYCODONE AND ACETAMINOPHEN 1 TABLET: 5; 325 TABLET ORAL at 10:37

## 2021-11-04 RX ADMIN — CEFEPIME 2000 MG: 2 INJECTION, POWDER, FOR SOLUTION INTRAVENOUS at 11:17

## 2021-11-04 RX ADMIN — ENOXAPARIN SODIUM 40 MG: 100 INJECTION SUBCUTANEOUS at 11:30

## 2021-11-04 RX ADMIN — GUAIFENESIN AND DEXTROMETHORPHAN 10 ML: 100; 10 SYRUP ORAL at 05:28

## 2021-11-04 RX ADMIN — VANCOMYCIN 1250 MG: 1.75 INJECTION, SOLUTION INTRAVENOUS at 13:55

## 2021-11-04 RX ADMIN — PANTOPRAZOLE SODIUM 40 MG: 40 TABLET, DELAYED RELEASE ORAL at 05:28

## 2021-11-04 RX ADMIN — SODIUM CHLORIDE, PRESERVATIVE FREE 10 ML: 5 INJECTION INTRAVENOUS at 11:32

## 2021-11-04 RX ADMIN — IPRATROPIUM BROMIDE AND ALBUTEROL SULFATE 1 AMPULE: .5; 2.5 SOLUTION RESPIRATORY (INHALATION) at 12:08

## 2021-11-04 RX ADMIN — OXYCODONE AND ACETAMINOPHEN 1 TABLET: 5; 325 TABLET ORAL at 05:29

## 2021-11-04 RX ADMIN — SODIUM CHLORIDE, PRESERVATIVE FREE 10 ML: 5 INJECTION INTRAVENOUS at 23:02

## 2021-11-04 RX ADMIN — OXYCODONE AND ACETAMINOPHEN 1 TABLET: 5; 325 TABLET ORAL at 20:22

## 2021-11-04 RX ADMIN — METHYLPREDNISOLONE SODIUM SUCCINATE 40 MG: 40 INJECTION, POWDER, FOR SOLUTION INTRAMUSCULAR; INTRAVENOUS at 11:31

## 2021-11-04 RX ADMIN — CEFEPIME 2000 MG: 2 INJECTION, POWDER, FOR SOLUTION INTRAVENOUS at 23:01

## 2021-11-04 RX ADMIN — IPRATROPIUM BROMIDE AND ALBUTEROL SULFATE 1 AMPULE: .5; 2.5 SOLUTION RESPIRATORY (INHALATION) at 20:20

## 2021-11-04 RX ADMIN — ALBUTEROL SULFATE 2 PUFF: 90 AEROSOL, METERED RESPIRATORY (INHALATION) at 03:38

## 2021-11-04 RX ADMIN — GUAIFENESIN 600 MG: 600 TABLET, EXTENDED RELEASE ORAL at 20:23

## 2021-11-04 RX ADMIN — BUDESONIDE AND FORMOTEROL FUMARATE DIHYDRATE 2 PUFF: 160; 4.5 AEROSOL RESPIRATORY (INHALATION) at 20:28

## 2021-11-04 RX ADMIN — NALTREXONE HYDROCHLORIDE 50 MG: 50 TABLET, FILM COATED ORAL at 11:35

## 2021-11-04 RX ADMIN — GUAIFENESIN 600 MG: 600 TABLET, EXTENDED RELEASE ORAL at 11:29

## 2021-11-04 RX ADMIN — GABAPENTIN 100 MG: 100 CAPSULE ORAL at 11:30

## 2021-11-04 RX ADMIN — AZITHROMYCIN MONOHYDRATE 500 MG: 500 INJECTION, POWDER, LYOPHILIZED, FOR SOLUTION INTRAVENOUS at 17:19

## 2021-11-04 RX ADMIN — METHYLPREDNISOLONE SODIUM SUCCINATE 60 MG: 125 INJECTION, POWDER, LYOPHILIZED, FOR SOLUTION INTRAMUSCULAR; INTRAVENOUS at 04:27

## 2021-11-04 RX ADMIN — VANCOMYCIN 1250 MG: 1.75 INJECTION, SOLUTION INTRAVENOUS at 03:08

## 2021-11-04 RX ADMIN — GABAPENTIN 100 MG: 100 CAPSULE ORAL at 14:00

## 2021-11-04 RX ADMIN — TAMSULOSIN HYDROCHLORIDE 0.4 MG: 0.4 CAPSULE ORAL at 11:28

## 2021-11-04 RX ADMIN — BUDESONIDE AND FORMOTEROL FUMARATE DIHYDRATE 2 PUFF: 160; 4.5 AEROSOL RESPIRATORY (INHALATION) at 08:01

## 2021-11-04 RX ADMIN — GABAPENTIN 100 MG: 100 CAPSULE ORAL at 20:23

## 2021-11-04 RX ADMIN — GUAIFENESIN 600 MG: 600 TABLET, EXTENDED RELEASE ORAL at 05:06

## 2021-11-04 ASSESSMENT — ENCOUNTER SYMPTOMS
VOMITING: 0
NAUSEA: 0
DIARRHEA: 0
COUGH: 1
SHORTNESS OF BREATH: 1

## 2021-11-04 ASSESSMENT — PAIN - FUNCTIONAL ASSESSMENT: PAIN_FUNCTIONAL_ASSESSMENT: PREVENTS OR INTERFERES SOME ACTIVE ACTIVITIES AND ADLS

## 2021-11-04 ASSESSMENT — PAIN DESCRIPTION - LOCATION
LOCATION: KNEE
LOCATION: KNEE

## 2021-11-04 ASSESSMENT — PAIN DESCRIPTION - ORIENTATION
ORIENTATION: RIGHT;LEFT
ORIENTATION: RIGHT;LEFT

## 2021-11-04 ASSESSMENT — PAIN DESCRIPTION - PROGRESSION
CLINICAL_PROGRESSION: NOT CHANGED
CLINICAL_PROGRESSION: NOT CHANGED

## 2021-11-04 ASSESSMENT — PAIN DESCRIPTION - PAIN TYPE
TYPE: CHRONIC PAIN
TYPE: CHRONIC PAIN

## 2021-11-04 ASSESSMENT — PAIN SCALES - GENERAL
PAINLEVEL_OUTOF10: 10
PAINLEVEL_OUTOF10: 0
PAINLEVEL_OUTOF10: 5
PAINLEVEL_OUTOF10: 3
PAINLEVEL_OUTOF10: 0
PAINLEVEL_OUTOF10: 6

## 2021-11-04 ASSESSMENT — PAIN DESCRIPTION - ONSET
ONSET: ON-GOING
ONSET: ON-GOING

## 2021-11-04 ASSESSMENT — PAIN DESCRIPTION - FREQUENCY
FREQUENCY: CONTINUOUS
FREQUENCY: CONTINUOUS

## 2021-11-04 ASSESSMENT — PAIN DESCRIPTION - DESCRIPTORS
DESCRIPTORS: ACHING
DESCRIPTORS: ACHING

## 2021-11-04 NOTE — PROGRESS NOTES
Per patient's pO2 of 48 mmHg on blood gas drawn, he's given 40L 90% on heated humidified oxygen. Will continue to monitor.

## 2021-11-04 NOTE — PROGRESS NOTES
Progress Note  Date:2021       DS:[id number]  Patient Lio Albert     YOB: 1950     Age:71 y.o. Patient seen and examined in the room. He is diaphoretic and sweating, his breathing was fast and a monitor showed heart rate about 110-120. Patient stated the high flow oxygen was too hot, he seems anxious. He also reported shortness of breath and a productive cough with yellow mucus. Subjective    Subjective:  Symptoms:  Stable. He reports shortness of breath, cough, weakness and anxiety. No malaise, chest pain, headache, chest pressure, anorexia or diarrhea. Diet:  Adequate intake. No nausea or vomiting. Activity level: Normal.    Pain:  He reports no pain. Review of Systems   Constitutional: Negative for fever. Respiratory: Positive for cough and shortness of breath. Cardiovascular: Negative for chest pain. Gastrointestinal: Negative for anorexia, diarrhea, nausea and vomiting. Neurological: Positive for weakness. Objective         Vitals Last 24 Hours:  TEMPERATURE:  Temp  Av.5 °F (37.5 °C)  Min: 98.2 °F (36.8 °C)  Max: 103.1 °F (39.5 °C)  RESPIRATIONS RANGE: Resp  Av.4  Min: 18  Max: 37  PULSE OXIMETRY RANGE: SpO2  Av.3 %  Min: 90 %  Max: 95 %  PULSE RANGE: Pulse  Av.7  Min: 100  Max: 147  BLOOD PRESSURE RANGE: Systolic (28NBL), QUC:484 , Min:121 , KKW:663   ; Diastolic (98KYP), ACP:23, Min:53, Max:91    I/O (24Hr): Intake/Output Summary (Last 24 hours) at 2021 1106  Last data filed at 2021 0830  Gross per 24 hour   Intake 2000 ml   Output 250 ml   Net 1750 ml     Objective:  General Appearance:  Ill-appearing. Vital signs: (most recent): Blood pressure 121/81, pulse 100, temperature 98.9 °F (37.2 °C), temperature source Oral, resp. rate 18, height 5' 8\" (1.727 m), weight 300 lb (136.1 kg), SpO2 94 %. Vital signs are normal.  No fever. Output: Producing urine and producing stool.     Lungs:  Normal effort and tachypnea. There are wheezes and rhonchi. Heart: Tachycardia. Regular rhythm. S1 normal.    Abdomen: Abdomen is soft. Bowel sounds are normal.   There is no abdominal tenderness. Extremities: Normal range of motion. Neurological: Patient is alert. Skin:  Warm and diaphoretic. Labs/Imaging/Diagnostics    Labs:  CBC:  Recent Labs     11/03/21  1045 11/04/21  0703   WBC 31.8* 31.3*   RBC 4.81 4.54*   HGB 14.8 13.8   HCT 44.2 43.7   MCV 91.9 96.3   RDW 12.6 12.9    234     CHEMISTRIES:  Recent Labs     11/03/21  1045 11/04/21  0703   * 131*   K 3.7 4.2   CL 96* 95*   CO2 19* 20*   BUN 21 22   CREATININE 0.8* 0.7*   GLUCOSE 150* 128*     PT/INR:  Recent Labs     11/03/21  1045   PROTIME 16.9*   INR 1.31     APTT:No results for input(s): APTT in the last 72 hours. LIVER PROFILE:  Recent Labs     11/03/21  1045 11/04/21  0703   AST 27 32   ALT 14 16   BILIDIR  --  1.3*   BILITOT 1.6* 1.5*   ALKPHOS 93 77       Imaging Last 24 Hours:  XR PELVIS (1-2 VIEWS)    Result Date: 11/3/2021  EXAMINATION: 2 XRAY VIEWS OF THE RIGHT FEMUR; 2 XRAY VIEWS OF THE LEFT FEMUR; ONE XRAY VIEW OF THE PELVIS 11/3/2021 9:54 am COMPARISON: None. HISTORY: ORDERING SYSTEM PROVIDED HISTORY: hip pain, knee pain TECHNOLOGIST PROVIDED HISTORY: Reason for exam:->hip pain, knee pain Reason for Exam: hip pain, knee pain FINDINGS: PELVIS: The bones are osteopenic. No acute fractures or dislocations are seen. There is no diastases of the pubic symphysis or sacroiliac joints. RIGHT AND LEFT FEMURS: The bones are osteopenic. There are degenerative changes involving the knees bilaterally. No acute fractures or dislocations are seen. No bony erosions are noted. 1. No acute abnormality involving the pelvis. 2. No acute abnormality involving the right or left knees. 3. Bilateral tricompartmental osteoarthritis of the knees.      XR FEMUR LEFT (MIN 2 VIEWS)    Result Date: 11/3/2021  EXAMINATION: 2 XRAY VIEWS OF THE RIGHT FEMUR; 2 XRAY VIEWS OF THE LEFT FEMUR; ONE XRAY VIEW OF THE PELVIS 11/3/2021 9:54 am COMPARISON: None. HISTORY: ORDERING SYSTEM PROVIDED HISTORY: hip pain, knee pain TECHNOLOGIST PROVIDED HISTORY: Reason for exam:->hip pain, knee pain Reason for Exam: hip pain, knee pain FINDINGS: PELVIS: The bones are osteopenic. No acute fractures or dislocations are seen. There is no diastases of the pubic symphysis or sacroiliac joints. RIGHT AND LEFT FEMURS: The bones are osteopenic. There are degenerative changes involving the knees bilaterally. No acute fractures or dislocations are seen. No bony erosions are noted. 1. No acute abnormality involving the pelvis. 2. No acute abnormality involving the right or left knees. 3. Bilateral tricompartmental osteoarthritis of the knees. XR FEMUR RIGHT (MIN 2 VIEWS)    Result Date: 11/3/2021  EXAMINATION: 2 XRAY VIEWS OF THE RIGHT FEMUR; 2 XRAY VIEWS OF THE LEFT FEMUR; ONE XRAY VIEW OF THE PELVIS 11/3/2021 9:54 am COMPARISON: None. HISTORY: ORDERING SYSTEM PROVIDED HISTORY: hip pain, knee pain TECHNOLOGIST PROVIDED HISTORY: Reason for exam:->hip pain, knee pain Reason for Exam: hip pain, knee pain FINDINGS: PELVIS: The bones are osteopenic. No acute fractures or dislocations are seen. There is no diastases of the pubic symphysis or sacroiliac joints. RIGHT AND LEFT FEMURS: The bones are osteopenic. There are degenerative changes involving the knees bilaterally. No acute fractures or dislocations are seen. No bony erosions are noted. 1. No acute abnormality involving the pelvis. 2. No acute abnormality involving the right or left knees. 3. Bilateral tricompartmental osteoarthritis of the knees.      MRI LUMBAR SPINE WO CONTRAST    Result Date: 11/2/2021  EXAMINATION: MRI OF THE LUMBAR SPINE WITHOUT CONTRAST, 11/2/2021 3:09 pm TECHNIQUE: Multiplanar multisequence MRI of the lumbar spine was performed without the administration of intravenous contrast. COMPARISON: None HISTORY: ORDERING SYSTEM PROVIDED HISTORY: Spinal stenosis, lumbar region, with neurogenic claudication TECHNOLOGIST PROVIDED HISTORY: Reason for Exam: Spinal stenosis, lumbar region, with neurogenic claudication Acuity: Acute Type of Exam: Initial Additional signs and symptoms: Pt states chronic LBP x2-3 years. Pt states NKI and pain radiates down right leg and into his foot. Pt states no ca, no sx, no recent imaging Relevant Medical/Surgical History: none FINDINGS: BONES/ALIGNMENT: There is normal alignment of the spine. The vertebral body heights are maintained. The bone marrow signal appears unremarkable. SPINAL CORD: The conus terminates normally. SOFT TISSUES: No paraspinal mass identified. L1-L2: Epidural lipomatosis causes moderate thecal sac stenosis. L2-L3: Epidural lipomatosis causes moderate thecal sac stenosis. L3-L4: Epidural lipomatosis causes mild thecal sac stenosis. L4-L5: Epidural lipomatosis causes severe thecal sac stenosis. Moderate facet hypertrophy. L5-S1: Epidural lipomatosis causes severe thecal sac stenosis. Moderate right worse than left facet hypertrophy causes right foraminal stenosis. There is mild disc degeneration throughout the lumbar spine with vacuum disc phenomenon, disc height loss, and osteophyte formation. Epidural lipomatosis causing moderate to severe thecal sac stenosis worst from L4-S1. XR CHEST PORTABLE    Result Date: 11/4/2021  EXAMINATION: ONE XRAY VIEW OF THE CHEST 11/4/2021 4:12 am COMPARISON: Chest radiograph dated November 3, 2021 HISTORY: ORDERING SYSTEM PROVIDED HISTORY: tachypnea TECHNOLOGIST PROVIDED HISTORY: Reason for exam:->tachypnea Reason for Exam: tachypnea Acuity: Acute Type of Exam: Initial Mechanism of Injury: tachypnea Relevant Medical/Surgical History: tachypnea FINDINGS: Cardiomegaly is noted. Diffuse interstitial prominence is seen. There is a right pleural effusion. There is a right lower lobe consolidation.      1. Right lower lobe consolidation with a small right pleural effusion. Findings have progressed. 2. Cardiomegaly with pulmonary vascular congestion. XR CHEST PORTABLE    Result Date: 11/3/2021  EXAMINATION: ONE XRAY VIEW OF THE CHEST 11/3/2021 9:54 am COMPARISON: 01/22/2020 CT chest and 10/08/2015 chest radiograph HISTORY: ORDERING SYSTEM PROVIDED HISTORY: SOB, hypoxia TECHNOLOGIST PROVIDED HISTORY: Reason for exam:->SOB, hypoxia Reason for Exam: SOB, hypoxia FINDINGS: The cardiomediastinal silhouette is enlarged, increased in size since January 2020 CT. No focal airspace disease or overt pulmonary edema. Left basilar subsegmental atelectasis. No pleural effusion or pneumothorax. Degenerative changes of the thoracic spine. Interval enlargement of the cardiomediastinal silhouette which may be exaggerated by patient rotation. No focal airspace disease or overt pulmonary edema. CTA PULMONARY W CONTRAST    Result Date: 11/3/2021  EXAMINATION: CTA OF THE CHEST 11/3/2021 1:24 pm TECHNIQUE: CTA of the chest was performed after the administration of intravenous contrast.  Multiplanar reformatted images are provided for review. MIP images are provided for review. Dose modulation, iterative reconstruction, and/or weight based adjustment of the mA/kV was utilized to reduce the radiation dose to as low as reasonably achievable. COMPARISON: 01/22/2020 HISTORY: ORDERING SYSTEM PROVIDED HISTORY: hypoxia TECHNOLOGIST PROVIDED HISTORY: Reason for exam:->hypoxia Decision Support Exception - unselect if not a suspected or confirmed emergency medical condition->Emergency Medical Condition (MA) Reason for Exam: hypoxia Acuity: Acute Type of Exam: Initial Relevant Medical/Surgical History: hx smoker FINDINGS: Pulmonary Arteries: Pulmonary arteries are adequately opacified for evaluation. No evidence of intraluminal filling defect to suggest pulmonary embolism. Main pulmonary artery is normal in caliber.  Mediastinum: Atherosclerosis of the thoracic aorta and coronary arteries noted. No adenopathy. No pericardial effusion. Lungs/pleura: No pneumothorax. Trace right-sided pleural effusion. Large consolidation involving majority of the right lower lobe, compatible with pneumonia. Punctate peripheral calcified granuloma in the left lower lobe. Upper Abdomen: Calcified granulomas in the spleen. Soft Tissues/Bones: Multilevel thoracic spondylosis. Diffuse osteopenia. Old healed rib fractures bilaterally. No evidence of pulmonary embolism. Large pneumonia involving majority of the right lower lobe. Follow-up chest x-ray to resolution is recommended to rule out the possibility of an underlying neoplasm. Assessment//Plan           Hospital Problems         Last Modified POA    PNA (pneumonia) 11/3/2021 Yes        Assessment & Plan  #  Sepsis  Presented with fever and shortness of breath. Labs showed severe leukocytosis. CT of chest showed large right-sided pneumonia. Lactic acidosis. BP stable. Patient still tachypnea, requiring high flow oxygen, tachycardia, diaphoretic. ABG this morning pH 7.34, PO2 48, PCO2 47, base excess 1. Continue cefepime and vancomycin. on IV fluid, closely monitoring vital signs. Lactic acid stable at 2.5. Pending blood culture. Pulmonology consult, appreciate help.     #  Pneumonia  Pending blood culture and respiratory panel. Legionella antigen positive. COVID-19 negative. Added Azithromycin for legionella, Continue cefepime and vancomycin.     #  COPD exacerbation  Patient still tachypnea, tachycardia, requiring high flow oxygen. Diffuse wheezing on both lung field. On IV steroids, will change albuterol inhaler to DuoNeb nebulizer. Mucinex for cough. Continue IV antibiotics. Pulmonology following, appreciate help. #  Mechanical fall  Patient denies loss of consciousness, or any prodrome symptoms such as chest pain or syncope.   Mild rhabdomyolysis, no renal function, continue IV fluid.  PT/OT.     #  Lactic acidosis  Same as above.     #  Rhabdomyolysis  Mild elevation of CK, continue IV fluid.     #  Other chronic comorbidities:  -Hypertension, BP controlled, hold BP medication for now in the setting of sepsis, may resume BP medications once blood pressure stable.   -Hyperlipidemia, continue home medication.  -Hepatitis B, normal liver enzymes study.  -Chronic back pain, continue naltrexone.     Electronically signed by BERNARDINO Ly CNP on 11/4/21 at 11:06 AM EDT

## 2021-11-04 NOTE — PROGRESS NOTES
Noy Gabriel is a 73 y.o. male started on vancomycin for sepsis secondary to pneumonia. Pharmacy consulted by NP [facility] for monitoring and adjustment. Indication for treatment: Sepsis secondary to pneumonia   Goal trough: 15 mcg/mL / 400-600 AUC/MELISSA  Other antimicrobials: Cefepime    Ht Readings from Last 1 Encounters:   10/27/21 5' 8\" (1.727 m)     Wt Readings from Last 3 Encounters:   11/03/21 299 lb (135.6 kg)   10/27/21 299 lb (135.6 kg)   08/13/21 299 lb (135.6 kg)        Pertinent Laboratory Values:   Temp Readings from Last 3 Encounters:   11/03/21 98.9 °F (37.2 °C) (Oral)   08/13/21 98.1 °F (36.7 °C) (Infrared)   06/04/21 98.1 °F (36.7 °C) (Infrared)     Recent Labs     11/03/21  1045   WBC 31.8*   LACTATE 2.7*     Recent Labs     11/03/21  1045   BUN 21   CREATININE 0.8*     Estimated Creatinine Clearance: 117 mL/min (A) (based on SCr of 0.8 mg/dL (L)). Intake/Output Summary (Last 24 hours) at 11/3/2021 2114  Last data filed at 11/3/2021 1536  Gross per 24 hour   Intake 2000 ml   Output --   Net 2000 ml       Previous vancomycin levels:  TROUGH:  No results for input(s): VANCOTROUGH in the last 72 hours. RANDOM:  No results for input(s): VANCORANDOM in the last 72 hours. Plan:  A loading dose of vancomycin 3,000mg was given in the ED prior to inpatient admission. A regimen of vancomycin 1,250mg q12h will be initiated   Pharmacy will continue to monitor patient and adjust therapy as indicated    [address] OR 5TH DOSE    Thank you for the consult and the opportunity to serve your patient.   Ellis Meeks, PharmD, BCPS  11/3/2021 9:14 PM

## 2021-11-04 NOTE — CONSULTS
Pulmonary Consult Note      Reason for Consult: acute respiratory failure, requires high flow O2, tachy and tachypnea  Requesting Physician: BERNARDINO Vizcarra CNP  Subjective:   CHIEF COMPLAINT :Fall    Patient Active Problem List    Diagnosis Date Noted    PNA (pneumonia) 11/03/2021    Bilateral carpal tunnel syndrome 06/04/2021    Flexor tenosynovitis of finger 06/12/2017    Complex tear of lateral meniscus of right knee as current injury 04/13/2017    Chondromalacia patellae, right knee 04/13/2017    Primary osteoarthritis of right knee 04/13/2017    Acute medial meniscal tear 04/05/2017    Cataract 08/23/2016    Hyperlipidemia 05/26/2016    Edema 03/17/2015    Depression, neurotic 05/01/2014    Pain in the wrist 08/13/2013    Benign essential HTN 07/11/2013    Tobacco dependence 06/05/2012    Right bundle branch block 06/05/2012    GERD (gastroesophageal reflux disease) 68/21/0283    Uncomplicated alcohol dependence ([address] 06/05/2012    Low back pain 01/26/2012    Hypotestosteronism 01/26/2012    Tobacco use 01/26/2012    Decreased testosterone level 01/26/2012        HPI:                The patient is a 73 y.o. male with significant past medical history of chronic back pains, remote history of hep B hepatitis, hypertension, hyperlipidemia   presents with complaints of Fall, SOB and fever. He had a CXR and a CT chest which showed small right pleural effusion. He has Legionella pneumonia. He has no sick exposure, no recent travel. He is on Abx. At this time he is lying in the bed. He is in mild resp distress.      Past Medical History:      Diagnosis Date    Acid reflux     Anxiety     Arthritis     \"Right Knee\"    Asthma     Chronic back pain     Cough     Occ Productive Cough \"Milky\" Sputum    Diverticulitis Dx Early 2000's    Hepatitis Dx 18's    \"In The Hospital A Few Days\"    [place] (hard of hearing)     Bilateral Ears    HTN (hypertension)     Hyperlipidemia     Nocturia  Shortness of breath on exertion     Slow urinary stream     Tingling     \"Tingling Right Hand\"    Wears dentures     Full Upper, Partial Lower    Wears glasses     Wears partial dentures     Lower      Past Surgical History:        Procedure Laterality Date    APPENDECTOMY  1960's    CARPAL TUNNEL RELEASE Right 06/22/2017    COLONOSCOPY  Last Done Early 2000's    Polyps Removed In Past    DENTAL SURGERY      Teeth Extracted In Past    EYE SURGERY Left Late 1980's    Cataract With Lens Implant    FRACTURE SURGERY Left 2000's    Broken Left Wrist    HERNIA REPAIR  [address]    Umbilical Hernia Repair    KNEE ARTHROSCOPY Right 04/13/2017    meniscus repair    TONSILLECTOMY AND ADENOIDECTOMY  1960's     Current Medications:     methylPREDNISolone  40 mg IntraVENous Daily    guaiFENesin  600 mg Oral BID    methylPREDNISolone sodium        ipratropium-albuterol  1 ampule Inhalation Q4H WA    azithromycin  500 mg IntraVENous Once    gabapentin  100 mg Oral TID    naltrexone  50 mg Oral Daily    nicotine  1 patch TransDERmal Daily    pantoprazole  40 mg Oral QAM AC    tamsulosin  0.4 mg Oral Daily    sodium chloride flush  5-40 mL IntraVENous 2 times per day    enoxaparin  40 mg SubCUTAneous Daily    cefepime  2,000 mg IntraVENous Q12H    budesonide-formoterol  2 puff Inhalation BID    vancomycin  1,250 mg IntraVENous Q12H     Allergies:    Social History:    TOBACCO:   reports that he has been smoking cigarettes. He started smoking about 56 years ago. He has a 13.00 pack-year smoking history. He has never used smokeless tobacco.  ETOH:   reports previous alcohol use of about 3.0 - 6.0 standard drinks of alcohol per week.   Patient currently lives independently    Family History:       Problem Relation Age of Onset    Stroke Mother     Heart Disease Mother     Cancer Father         Prostate Cancer    Cancer Son         Leukemia       REVIEW OF SYSTEMS:    CONSTITUTIONAL:  negative for fevers, chills, diaphoresis, activity change, appetite change, fatigue, night sweats and unexpected weight change. EYES:  negative for blurred vision, eye discharge, visual disturbance and icterus  HEENT:  negative for hearing loss, tinnitus, ear drainage, sinus pressure, nasal congestion, epistaxis and snoring  RESPIRATORY:  See HPI  CARDIOVASCULAR:  negative for chest pain, palpitations, exertional chest pressure/discomfort, edema, syncope  GASTROINTESTINAL:  negative for nausea, vomiting, diarrhea, constipation, blood in stool and abdominal pain  GENITOURINARY:  negative for frequency, dysuria, urinary incontinence, decreased urine volume, and hematuria  HEMATOLOGIC/LYMPHATIC:  negative for easy bruising, bleeding and lymphadenopathy  ALLERGIC/IMMUNOLOGIC:  negative for recurrent infections, angioedema, anaphylaxis and drug reactions  ENDOCRINE:  negative for weight changes and diabetic symptoms including polyuria, polydipsia and polyphagia  MUSCULOSKELETAL:  negative for  pain, joint swelling, decreased range of motion and muscle weakness  NEUROLOGICAL:  negative for headaches, slurred speech, unilateral weakness  PSYCHIATRIC/BEHAVIORAL: negative for hallucinations, behavioral problems, confusion and agitation.      Objective:   PHYSICAL EXAM:      VITALS:  /81   Pulse 100   Temp 98.9 °F (37.2 °C) (Oral)   Resp 18   Ht 5' 8\" (1.727 m)   Wt 300 lb (136.1 kg)   SpO2 94%   BMI 45.61 kg/m²   24HR INTAKE/OUTPUT:      Intake/Output Summary (Last 24 hours) at 2021 1329  Last data filed at 2021 0830  Gross per 24 hour   Intake 2000 ml   Output 250 ml   Net 1750 ml     CURRENT PULSE OXIMETRY:  SpO2: 94 %  24HR PULSE OXIMETRY RANGE:  SpO2  Av.1 %  Min: 90 %  Max: 95 %    CONSTITUTIONAL:  awake, alert, cooperative, no apparent distress, and appears stated age  NECK:  Supple, symmetrical, trachea midline, no adenopathy, thyroid symmetric, not enlarged and no tenderness, skin normal  LUNGS: decreased air entry right base  CARDIOVASCULAR:  normal S1 and S2, no edema and no JVD  ABDOMEN:  normal bowel sounds, non-distended and no masses palpated, and no tenderness to palpation. No hepatospleenomegaly  LYMPHADENOPATHY:  no axillary or supraclavicular adenopathy. No cervical adnenopathy  PSYCHIATRIC: Oriented to person place and time. No obvious depression or anxiety. MUSCULOSKELETAL: No obvious misalignment or effusion of the joints. No clubbing, cyanosis of the digits. SKIN:  normal skin color, texture, turgor and no redness, warmth, or swelling.  No palpable nodules    DATA:    Old records have been reviewed  CBC with Differential:    Lab Results   Component Value Date    WBC 31.3 11/04/2021    RBC 4.54 11/04/2021    HGB 13.8 11/04/2021    HCT 43.7 11/04/2021     11/04/2021    MCV 96.3 11/04/2021    MCH 30.4 11/04/2021    MCHC 31.6 11/04/2021    RDW 12.9 11/04/2021    SEGSPCT 74.0 11/03/2021    BANDSPCT 13 11/03/2021    BLASTSPCT 1 11/03/2021    LYMPHOPCT 7.0 11/03/2021    MONOPCT 3.0 11/03/2021    MYELOPCT 1 11/03/2021    BASOPCT 0.6 01/29/2018    MONOSABS 1.0 11/03/2021    LYMPHSABS 2.2 11/03/2021    EOSABS 0.3 01/29/2018    BASOSABS 0.1 01/29/2018    DIFFTYPE MANUAL DIFFERENTIAL 11/03/2021     BMP:    Lab Results   Component Value Date     11/04/2021    K 4.2 11/04/2021    CL 95 11/04/2021    CO2 20 11/04/2021    BUN 22 11/04/2021    CREATININE 0.7 11/04/2021    CALCIUM 8.1 11/04/2021    GFRAA >60 11/04/2021    LABGLOM >60 11/04/2021    LABGLOM 98 01/14/2020    GLUCOSE 128 11/04/2021     Hepatic Function Panel:    Lab Results   Component Value Date    ALKPHOS 77 11/04/2021    ALT 16 11/04/2021    AST 32 11/04/2021    PROT 5.8 11/04/2021    PROT 7.9 06/05/2012    BILITOT 1.5 11/04/2021    BILIDIR 1.3 11/04/2021    IBILI 0.2 11/04/2021     ABG:    Lab Results   Component Value Date    YXU5ZPS 25.4 11/04/2021    YLF5KJP 47.0 11/04/2021    PO2ART 48 11/04/2021       Cultures: Pending    Radiology Review:     1. Right lower lobe consolidation with a small right pleural effusion. Findings have progressed. 2. Cardiomegaly with pulmonary vascular congestion     No evidence of pulmonary embolism.       Large pneumonia involving majority of the right lower lobe.  Follow-up chest   x-ray to resolution is recommended to rule out the possibility of an   underlying neoplasm. Assessment/Plan       Patient Active Problem List    Diagnosis Date Noted    PNA (pneumonia) 11/03/2021    Bilateral carpal tunnel syndrome 06/04/2021    Flexor tenosynovitis of finger 06/12/2017    Complex tear of lateral meniscus of right knee as current injury 04/13/2017    Chondromalacia patellae, right knee 04/13/2017    Primary osteoarthritis of right knee 04/13/2017    Acute medial meniscal tear 04/05/2017    Cataract 08/23/2016    Hyperlipidemia 05/26/2016    Edema 03/17/2015    Depression, neurotic 05/01/2014    Pain in the wrist 08/13/2013    Benign essential HTN 07/11/2013    Tobacco dependence 06/05/2012    Right bundle branch block 06/05/2012    GERD (gastroesophageal reflux disease) 42/57/2441    Uncomplicated alcohol dependence ([facility] [address] 06/05/2012    Low back pain 01/26/2012    Hypotestosteronism 01/26/2012    Tobacco use 01/26/2012    Decreased testosterone level 01/26/2012   Hypoxia  RLL Pneumonia sec to Legionella  Morbid Obesity  ? BHARGAVI  Leukocytosis  Lactic acidosis- improving  sepsis    PLAN  1. Abx  2. F/u C&s  3. Inhalers  4. CXR in am  5. Keep sats > 92%  6. ICS  7. OOB  8. DVT and GI Prophylaxis  9. C/w present management  10.  BIPAP qhs and prn      Electronically signed by Franklin White MD on 11/4/2021 at 1:29 PM

## 2021-11-04 NOTE — PROGRESS NOTES
Donna Roa is a 73 y.o. male started on vancomycin for sepsis secondary to pneumonia. Pharmacy consulted by NP [facility] for monitoring and adjustment. Indication for treatment: Sepsis secondary to pneumonia   Goal trough: 15 mcg/mL / 400-600 AUC/MELISSA  Other antimicrobials: Cefepime    Ht Readings from Last 1 Encounters:   11/04/21 5' 8\" (1.727 m)     Wt Readings from Last 3 Encounters:   11/04/21 300 lb (136.1 kg)   10/27/21 299 lb (135.6 kg)   08/13/21 299 lb (135.6 kg)        Pertinent Laboratory Values:   Temp Readings from Last 3 Encounters:   11/04/21 98.2 °F (36.8 °C) (Oral)   08/13/21 98.1 °F (36.7 °C) (Infrared)   06/04/21 98.1 °F (36.7 °C) (Infrared)     Recent Labs     11/03/21  1045 11/03/21  2052 11/04/21  0238 11/04/21  0703 11/04/21  1351   WBC 31.8*  --   --  31.3*  --    LACTATE 2.7*   < > 2.6* 2.4* 2.9*    < > = values in this interval not displayed. Recent Labs     11/03/21  1045 11/04/21  0703   BUN 21 22   CREATININE 0.8* 0.7*     Estimated Creatinine Clearance: 135 mL/min (A) (based on SCr of 0.7 mg/dL (L)). Intake/Output Summary (Last 24 hours) at 11/4/2021 1710  Last data filed at 11/4/2021 0830  Gross per 24 hour   Intake --   Output 250 ml   Net -250 ml       Previous vancomycin levels:  TROUGH:  No results for input(s): VANCOTROUGH in the last 72 hours. RANDOM:  No results for input(s): VANCORANDOM in the last 72 hours. Plan:  · WBC elevated @31.3, pt afebrile  · Renal trends at baseline, limited UOP data  · Continue vancomycin 1250mg ivpb q12h for a predicted AUC of 501  · Check the vanco level tomorrow am  · Pharmacy will continue to monitor patient and adjust therapy as indicated    VANCOMYCIN LEVEL SCHEDULED FOR 11/5 @06:00    Thank you for the consult and the opportunity to serve your patient. Casey Houston, [facility]  5:14 PM  11/04/21

## 2021-11-05 ENCOUNTER — APPOINTMENT (OUTPATIENT)
Dept: GENERAL RADIOLOGY | Age: 71
DRG: 871 | End: 2021-11-05
Payer: MEDICARE

## 2021-11-05 ENCOUNTER — TELEPHONE (OUTPATIENT)
Dept: FAMILY MEDICINE CLINIC | Age: 71
End: 2021-11-05

## 2021-11-05 LAB
EKG ATRIAL RATE: 125 BPM
EKG DIAGNOSIS: NORMAL
EKG P AXIS: 52 DEGREES
EKG P-R INTERVAL: 148 MS
EKG Q-T INTERVAL: 420 MS
EKG QRS DURATION: 134 MS
EKG QTC CALCULATION (BAZETT): 606 MS
EKG R AXIS: -22 DEGREES
EKG T AXIS: 39 DEGREES
EKG VENTRICULAR RATE: 125 BPM
LACTATE: 2.5 MMOL/L (ref 0.4–2)

## 2021-11-05 PROCEDURE — 2580000003 HC RX 258: Performed by: NURSE PRACTITIONER

## 2021-11-05 PROCEDURE — 2580000003 HC RX 258: Performed by: HOSPITALIST

## 2021-11-05 PROCEDURE — 6360000002 HC RX W HCPCS: Performed by: NURSE PRACTITIONER

## 2021-11-05 PROCEDURE — 83605 ASSAY OF LACTIC ACID: CPT

## 2021-11-05 PROCEDURE — 6370000000 HC RX 637 (ALT 250 FOR IP): Performed by: NURSE PRACTITIONER

## 2021-11-05 PROCEDURE — 2140000000 HC CCU INTERMEDIATE R&B

## 2021-11-05 PROCEDURE — 99232 SBSQ HOSP IP/OBS MODERATE 35: CPT | Performed by: INTERNAL MEDICINE

## 2021-11-05 PROCEDURE — 2700000000 HC OXYGEN THERAPY PER DAY

## 2021-11-05 PROCEDURE — 94660 CPAP INITIATION&MGMT: CPT

## 2021-11-05 PROCEDURE — 36415 COLL VENOUS BLD VENIPUNCTURE: CPT

## 2021-11-05 PROCEDURE — 94640 AIRWAY INHALATION TREATMENT: CPT

## 2021-11-05 PROCEDURE — 93010 ELECTROCARDIOGRAM REPORT: CPT | Performed by: INTERNAL MEDICINE

## 2021-11-05 PROCEDURE — 6360000002 HC RX W HCPCS: Performed by: HOSPITALIST

## 2021-11-05 PROCEDURE — 71045 X-RAY EXAM CHEST 1 VIEW: CPT

## 2021-11-05 PROCEDURE — 94761 N-INVAS EAR/PLS OXIMETRY MLT: CPT

## 2021-11-05 RX ORDER — FUROSEMIDE 10 MG/ML
40 INJECTION INTRAMUSCULAR; INTRAVENOUS 2 TIMES DAILY
Status: DISCONTINUED | OUTPATIENT
Start: 2021-11-05 | End: 2021-11-10 | Stop reason: HOSPADM

## 2021-11-05 RX ADMIN — FUROSEMIDE 40 MG: 10 INJECTION, SOLUTION INTRAMUSCULAR; INTRAVENOUS at 17:27

## 2021-11-05 RX ADMIN — VANCOMYCIN 1250 MG: 1.75 INJECTION, SOLUTION INTRAVENOUS at 00:22

## 2021-11-05 RX ADMIN — CEFEPIME 2000 MG: 2 INJECTION, POWDER, FOR SOLUTION INTRAVENOUS at 22:37

## 2021-11-05 RX ADMIN — IPRATROPIUM BROMIDE AND ALBUTEROL SULFATE 1 AMPULE: .5; 2.5 SOLUTION RESPIRATORY (INHALATION) at 21:58

## 2021-11-05 RX ADMIN — VANCOMYCIN 1250 MG: 1.75 INJECTION, SOLUTION INTRAVENOUS at 23:54

## 2021-11-05 RX ADMIN — TAMSULOSIN HYDROCHLORIDE 0.4 MG: 0.4 CAPSULE ORAL at 08:27

## 2021-11-05 RX ADMIN — GABAPENTIN 100 MG: 100 CAPSULE ORAL at 14:09

## 2021-11-05 RX ADMIN — NALTREXONE HYDROCHLORIDE 50 MG: 50 TABLET, FILM COATED ORAL at 08:31

## 2021-11-05 RX ADMIN — IPRATROPIUM BROMIDE AND ALBUTEROL SULFATE 1 AMPULE: .5; 2.5 SOLUTION RESPIRATORY (INHALATION) at 15:42

## 2021-11-05 RX ADMIN — ENOXAPARIN SODIUM 40 MG: 100 INJECTION SUBCUTANEOUS at 08:26

## 2021-11-05 RX ADMIN — VANCOMYCIN 1250 MG: 1.75 INJECTION, SOLUTION INTRAVENOUS at 12:34

## 2021-11-05 RX ADMIN — BUDESONIDE AND FORMOTEROL FUMARATE DIHYDRATE 2 PUFF: 160; 4.5 AEROSOL RESPIRATORY (INHALATION) at 21:58

## 2021-11-05 RX ADMIN — GUAIFENESIN 600 MG: 600 TABLET, EXTENDED RELEASE ORAL at 20:04

## 2021-11-05 RX ADMIN — IPRATROPIUM BROMIDE AND ALBUTEROL SULFATE 1 AMPULE: .5; 2.5 SOLUTION RESPIRATORY (INHALATION) at 07:49

## 2021-11-05 RX ADMIN — CEFEPIME 2000 MG: 2 INJECTION, POWDER, FOR SOLUTION INTRAVENOUS at 11:51

## 2021-11-05 RX ADMIN — OXYCODONE AND ACETAMINOPHEN 1 TABLET: 5; 325 TABLET ORAL at 22:41

## 2021-11-05 RX ADMIN — SODIUM CHLORIDE, PRESERVATIVE FREE 10 ML: 5 INJECTION INTRAVENOUS at 20:05

## 2021-11-05 RX ADMIN — ACETAMINOPHEN 650 MG: 325 TABLET ORAL at 17:27

## 2021-11-05 RX ADMIN — GUAIFENESIN 600 MG: 600 TABLET, EXTENDED RELEASE ORAL at 08:27

## 2021-11-05 RX ADMIN — OXYCODONE AND ACETAMINOPHEN 1 TABLET: 5; 325 TABLET ORAL at 14:08

## 2021-11-05 RX ADMIN — METHYLPREDNISOLONE SODIUM SUCCINATE 40 MG: 40 INJECTION, POWDER, FOR SOLUTION INTRAMUSCULAR; INTRAVENOUS at 08:26

## 2021-11-05 RX ADMIN — PANTOPRAZOLE SODIUM 40 MG: 40 TABLET, DELAYED RELEASE ORAL at 08:24

## 2021-11-05 RX ADMIN — GABAPENTIN 100 MG: 100 CAPSULE ORAL at 08:28

## 2021-11-05 RX ADMIN — SODIUM CHLORIDE, PRESERVATIVE FREE 10 ML: 5 INJECTION INTRAVENOUS at 08:27

## 2021-11-05 RX ADMIN — GABAPENTIN 100 MG: 100 CAPSULE ORAL at 20:04

## 2021-11-05 ASSESSMENT — PAIN SCALES - GENERAL
PAINLEVEL_OUTOF10: 8
PAINLEVEL_OUTOF10: 10
PAINLEVEL_OUTOF10: 3
PAINLEVEL_OUTOF10: 0
PAINLEVEL_OUTOF10: 5
PAINLEVEL_OUTOF10: 3

## 2021-11-05 NOTE — PROGRESS NOTES
Pulmonary and Critical Care  Progress Note      VITALS:  BP (!) 106/58   Pulse 96   Temp 98.1 °F (36.7 °C) (Oral)   Resp 27   Ht 5' 8\" (1.727 m)   Wt 300 lb (136.1 kg)   SpO2 95%   BMI 45.61 kg/m²     Subjective:   CHIEF COMPLAINT :Fall     HPI:                The patient is lying in the bed. He has some cough and is in mild resp distress    Objective:   PHYSICAL EXAM:    LUNGS:Occasional right basal crackles  Abd-soft, BS+,NT  Ext-no pedal edema  CVS-s1s2, no murmurs      DATA:    CBC:  Recent Labs     11/03/21  1045 11/04/21  0703   WBC 31.8* 31.3*   RBC 4.81 4.54*   HGB 14.8 13.8   HCT 44.2 43.7    234   MCV 91.9 96.3   MCH 30.8 30.4   MCHC 33.5 31.6*   RDW 12.6 12.9   SEGSPCT 74.0*  --    BANDSPCT 13*  --       BMP:  Recent Labs     11/03/21  1045 11/04/21  0703   * 131*   K 3.7 4.2   CL 96* 95*   CO2 19* 20*   BUN 21 22   CREATININE 0.8* 0.7*   CALCIUM 8.4 8.1*   GLUCOSE 150* 128*      ABG:  Recent Labs     11/04/21  0415   PH 7.34   PO2ART 48*   NYQ6OIU 47.0*   O2SAT 80.4*     BNP  No results found for: BNP   D-Dimer:  Lab Results   Component Value Date    DDIMER 260 (H) 06/05/2012      Radiology:   Interval worsening consolidation within the right lung.  Findings could be   related to worsening pneumonia or pleural fluid.      1.       Assessment/Plan     Patient Active Problem List    Diagnosis Date Noted    Septicemia ([facility] [address]     PNA (pneumonia) 11/03/2021    Bilateral carpal tunnel syndrome 06/04/2021    Flexor tenosynovitis of finger 06/12/2017    Complex tear of lateral meniscus of right knee as current injury 04/13/2017    Chondromalacia patellae, right knee 04/13/2017    Primary osteoarthritis of right knee 04/13/2017    Acute medial meniscal tear 04/05/2017    Cataract 08/23/2016    Hyperlipidemia 05/26/2016    Edema 03/17/2015    Depression, neurotic 05/01/2014    Pain in the wrist 08/13/2013    Benign essential HTN 07/11/2013    Tobacco dependence 06/05/2012    Right bundle branch block 06/05/2012    GERD (gastroesophageal reflux disease) 77/88/5711    Uncomplicated alcohol dependence ([facility] [address] 06/05/2012    Low back pain 01/26/2012    Hypotestosteronism 01/26/2012    Tobacco use 01/26/2012    Decreased testosterone level 01/26/2012   Hypoxia  RLL Pneumonia sec to Legionella  Morbid Obesity  ? BHARGAVI  Leukocytosis  sepsis       1. Abx  2. F/u C&S  3. Inhalers  4. ICS  5. OOB  6. Keep sats > 92%  7. CXR in am  8.  C.w present management    Electronically signed by Peter Davis MD on 11/5/2021 at 1:17 PM

## 2021-11-05 NOTE — CARE COORDINATION
CM in to see Pt to initiate discharge planning. Pt from home with his SO. Pt son also lives nearby and assists as needed. Pt has DME to include cane. Pt would like home care at discharge. PS to Dr. Arpan Barney to update and request [facility] [id number] [facility]. order. PS to [facility] for referral.     Pt has insurance, pcp, and can afford medications. Pt denies any other needs at this time.   CM following

## 2021-11-05 NOTE — PROGRESS NOTES
[id number] [facility] Liaison spoke with pt and pt is agreeable to c at discharge.  Please place inpatient consult to home health needs order in [facility] [place].

## 2021-11-05 NOTE — PROGRESS NOTES
Call initiated by: Nursing staff:    Call addressed around: 11/4/2021 9:43 PM      Reason for call: Patient with increased work of breathing. Orders placed: Tachypneic. CXR from AM reviewed. Right lower lobe consolidation with small bilateral effusion, cardiomegaly with pulmonary vascular congestion. Prior ABGs reviewed. Will stop iv fluids. Give lasix, stat bipap. Continue antibiotics.         Supriya Morales, APRN - CNP

## 2021-11-05 NOTE — PROGRESS NOTES
[facility] HOSPITALIST PROGRESS NOTE      PCP: Fausto Baires MD    Date of Admission: 11/3/2021    Subjective: feels short of breath   Has been told to sleep study before but has not so far      [address] summary patient is a 59-year-old male with history of chronic back pain, hepatitis B chronic, hypertension and hyperlipidemia who was admitted with dyspnea and shortness of breath. On admission patient had leukocytosis, CT chest showed pneumonia, lactic elevated, CPK elevated  Cefepime and vancomycin started,  11/4 remain tachypneic and tachycardic, bilateral diffuse wheezing, IV steroids added, pulmonology consulted      Vitals signs:  Afebrile for 24 hours, heart rate 90s to 100 range, blood pressure 121/78, on BiPAP 100% FiO2 40 L/min    Medications:  Symbicort, cefepime, Lovenox, Lasix, gabapentin, ipratropium/albuterol, methylprednisolone, nicotine patch, pantoprazole, Flomax, vancomycin    Antibiotics: Cefepime and vancomycin    Fluid status: Urine output 1400 cc    Labs:   No labs drawn today  Urine Legionella positive    Imaging:   Chest x-ray  Impression:        Interval worsening consolidation within the right lung.  Findings could be   related to worsening pneumonia or pleural fluid.         Assessment/Plan:     Sepsis with acute hypoxic respiratory failure secondary to Legionella pneumonia  Acute COPD exacerbation  Acute rhabdomyolysis status post fall  Lactic acidosis  Essential hypertension  Hyperlipidemia  History of chronic back pain  History of hepatitis B  Morbid obesity       Admitted status post fall, requiring oxygen on admission, chest x-ray shows right-sided pneumonia, repeat chest x-ray shows worsening  Leukocytosis and tachycardia, tachypnea on admission  Urine Legionella antigen positive  Currently on Vanco and cefepime, azithromycin added yesterday  Continue azithromycin,  ms, Qtc 483 msec today   Check mag and replace as needed  IV fluids, continue Solu-Medrol, alcides gibbons  Remains on BiPAP  Afebrile for 24 hours  Continue statin  Nicotine patch as needed history of tobacco dependence  Dc IV lfuids, will start lasix if blood pressure tolerated    BMI 45.6, suspected BHARGAVI, will need sleep study as out patient       DVT prophlaxis:   Lovenox    Last BM:   None since admission     Ambulation:   As tolerated    Disposition:   Home     Diet: general diet    Physical Exam Performed:       /78   Pulse 95   Temp 99.3 °F (37.4 °C) (Axillary)   Resp (!) 33   Ht 5' 8\" (1.727 m)   Wt 300 lb (136.1 kg)   SpO2 95%   BMI 45.61 kg/m²     Physical Exam  Constitutional:       General: He is not in acute distress. Appearance: Normal appearance. HENT:      Head: Normocephalic and atraumatic. Right Ear: External ear normal.      Left Ear: External ear normal.   Eyes:      Extraocular Movements: Extraocular movements intact. Pupils: Pupils are equal, round, and reactive to light. Cardiovascular:      Rate and Rhythm: Normal rate and regular rhythm. Heart sounds: No murmur heard. Pulmonary:      Effort: Pulmonary effort is normal. No respiratory distress. Breath sounds: Normal breath sounds. No wheezing. Abdominal:      General: Bowel sounds are normal. There is no distension. Palpations: Abdomen is soft. Tenderness: There is no abdominal tenderness. Musculoskeletal:         General: Swelling present. Cervical back: Normal range of motion. Comments: Bilateral LE edema    Skin:     General: Skin is warm. Neurological:      General: No focal deficit present. Mental Status: He is alert and oriented to person, place, and time. Cranial Nerves: No cranial nerve deficit.    Psychiatric:         Mood and Affect: Mood normal.         Labs:   Recent Labs     11/03/21  1045 11/04/21  0703   WBC 31.8* 31.3*   HGB 14.8 13.8   HCT 44.2 43.7    234     Recent Labs     11/03/21  1045 11/04/21  0703   * 131*   K 3.7 4.2   CL 96* 95*   CO2 19* 20*   BUN 21 22   CREATININE 0.8* 0.7*   CALCIUM 8.4 8.1*     Recent Labs     11/03/21  1045 11/04/21  0703   AST 27 32   ALT 14 16   BILIDIR  --  1.3*   BILITOT 1.6* 1.5*   ALKPHOS 93 77     Recent Labs     11/03/21  1045   INR 1.31     Recent Labs     11/03/21  1045   CKTOTAL 597*       Urinalysis:      Lab Results   Component Value Date    NITRU NEGATIVE 11/03/2021    WBCUA 2 11/03/2021    BACTERIA FEW 11/03/2021    RBCUA <1 11/03/2021    BLOODU NEGATIVE 11/03/2021    SPECGRAV 1.027 11/03/2021       Radiology:  XR CHEST PORTABLE   Final Result   Interval worsening consolidation within the right lung. Findings could be   related to worsening pneumonia or pleural fluid. XR CHEST PORTABLE   Final Result   1. Right lower lobe consolidation with a small right pleural effusion. Findings have progressed. 2. Cardiomegaly with pulmonary vascular congestion. CTA PULMONARY W CONTRAST   Final Result   No evidence of pulmonary embolism. Large pneumonia involving majority of the right lower lobe. Follow-up chest   x-ray to resolution is recommended to rule out the possibility of an   underlying neoplasm. XR PELVIS (1-2 VIEWS)   Final Result   1. No acute abnormality involving the pelvis. 2. No acute abnormality involving the right or left knees. 3. Bilateral tricompartmental osteoarthritis of the knees. XR FEMUR RIGHT (MIN 2 VIEWS)   Final Result   1. No acute abnormality involving the pelvis. 2. No acute abnormality involving the right or left knees. 3. Bilateral tricompartmental osteoarthritis of the knees. XR FEMUR LEFT (MIN 2 VIEWS)   Final Result   1. No acute abnormality involving the pelvis. 2. No acute abnormality involving the right or left knees. 3. Bilateral tricompartmental osteoarthritis of the knees.          XR CHEST PORTABLE   Final Result   Interval enlargement of the cardiomediastinal silhouette which may be   exaggerated by patient rotation. No focal airspace disease or overt pulmonary edema.                  Johnathan Rodas MD  11/5/2021 11:26 AM

## 2021-11-05 NOTE — TELEPHONE ENCOUNTER
[facility] called stating patient is being discharged home with home health orders. She stated he would be receiving nursing, PT and OT through [facility]. She stated if it is okay for patient to receive these we can call [facility] at [phone] to let them know.

## 2021-11-05 NOTE — PROGRESS NOTES
Pedro Pablo Kirkland is a 73 y.o. male started on vancomycin for sepsis secondary to pneumonia. Pharmacy consulted by NP [facility] for monitoring and adjustment. Indication for treatment: Sepsis secondary to pneumonia   Goal trough: 15 mcg/mL / 400-600 AUC/MELISSA  Other antimicrobials: Cefepime    Ht Readings from Last 1 Encounters:   11/04/21 5' 8\" (1.727 m)     Wt Readings from Last 3 Encounters:   11/04/21 300 lb (136.1 kg)   10/27/21 299 lb (135.6 kg)   08/13/21 299 lb (135.6 kg)        Pertinent Laboratory Values:   Temp Readings from Last 3 Encounters:   11/05/21 98.1 °F (36.7 °C) (Oral)   08/13/21 98.1 °F (36.7 °C) (Infrared)   06/04/21 98.1 °F (36.7 °C) (Infrared)     Recent Labs     11/03/21  1045 11/03/21  2052 11/04/21  0238 11/04/21  0703 11/04/21  1351   WBC 31.8*  --   --  31.3*  --    LACTATE 2.7*   < > 2.6* 2.4* 2.9*    < > = values in this interval not displayed. Recent Labs     11/03/21  1045 11/04/21  0703   BUN 21 22   CREATININE 0.8* 0.7*     Estimated Creatinine Clearance: 135 mL/min (A) (based on SCr of 0.7 mg/dL (L)). Intake/Output Summary (Last 24 hours) at 11/5/2021 1320  Last data filed at 11/5/2021 0442  Gross per 24 hour   Intake --   Output 1150 ml   Net -1150 ml       Previous vancomycin levels:  TROUGH:  No results for input(s): VANCOTROUGH in the last 72 hours. RANDOM:  No results for input(s): VANCORANDOM in the last 72 hours. Plan:  · WBC elevated @31.3 yesterday, pt now afebrile (Tmax 103.1)  · Renal trends at baseline yesterday, UOP good  · No vanco level done this morning  · Continue vancomycin 1250mg ivpb q12h for a predicted AUC of 502  · Check the vanco level tomorrow am  · Pharmacy will continue to monitor patient and adjust therapy as indicated    VANCOMYCIN LEVEL SCHEDULED FOR 11/6 @06:00    Thank you for the consult and the opportunity to serve your patient. Grecia Ramsay RPH  1:20 PM  11/05/21

## 2021-11-06 ENCOUNTER — APPOINTMENT (OUTPATIENT)
Dept: GENERAL RADIOLOGY | Age: 71
DRG: 871 | End: 2021-11-06
Payer: MEDICARE

## 2021-11-06 LAB
ALBUMIN SERPL-MCNC: 2.6 GM/DL (ref 3.4–5)
ALP BLD-CCNC: 96 IU/L (ref 40–129)
ALT SERPL-CCNC: 27 U/L (ref 10–40)
ANION GAP SERPL CALCULATED.3IONS-SCNC: 12 MMOL/L (ref 4–16)
AST SERPL-CCNC: 33 IU/L (ref 15–37)
BANDED NEUTROPHILS ABSOLUTE COUNT: 33 K/CU MM
BANDED NEUTROPHILS RELATIVE PERCENT: 61 % (ref 5–11)
BILIRUB SERPL-MCNC: 0.6 MG/DL (ref 0–1)
BILIRUBIN DIRECT: 0.5 MG/DL (ref 0–0.3)
BILIRUBIN, INDIRECT: 0.1 MG/DL (ref 0–0.7)
BUN BLDV-MCNC: 32 MG/DL (ref 6–23)
CALCIUM SERPL-MCNC: 8.3 MG/DL (ref 8.3–10.6)
CHLORIDE BLD-SCNC: 100 MMOL/L (ref 99–110)
CO2: 25 MMOL/L (ref 21–32)
CREAT SERPL-MCNC: 0.6 MG/DL (ref 0.9–1.3)
DIFFERENTIAL TYPE: ABNORMAL
DOSE AMOUNT: NORMAL
DOSE TIME: NORMAL
GFR AFRICAN AMERICAN: >60 ML/MIN/1.73M2
GFR NON-AFRICAN AMERICAN: >60 ML/MIN/1.73M2
GLUCOSE BLD-MCNC: 145 MG/DL (ref 70–99)
HCT VFR BLD CALC: 41.1 % (ref 42–52)
HEMOGLOBIN: 13.7 GM/DL (ref 13.5–18)
HIGH SENSITIVE C-REACTIVE PROTEIN: >300 MG/L
LACTATE: 1.2 MMOL/L (ref 0.4–2)
LACTATE: 2 MMOL/L (ref 0.4–2)
LACTATE: 2.2 MMOL/L (ref 0.4–2)
LACTATE: 2.8 MMOL/L (ref 0.4–2)
LYMPHOCYTES ABSOLUTE: 4.3 K/CU MM
LYMPHOCYTES RELATIVE PERCENT: 8 % (ref 24–44)
MCH RBC QN AUTO: 30.8 PG (ref 27–31)
MCHC RBC AUTO-ENTMCNC: 33.3 % (ref 32–36)
MCV RBC AUTO: 92.4 FL (ref 78–100)
MONOCYTES ABSOLUTE: 1.6 K/CU MM
MONOCYTES RELATIVE PERCENT: 3 % (ref 0–4)
PDW BLD-RTO: 13.2 % (ref 11.7–14.9)
PLATELET # BLD: 275 K/CU MM (ref 140–440)
PMV BLD AUTO: 10.6 FL (ref 7.5–11.1)
POTASSIUM SERPL-SCNC: 4.2 MMOL/L (ref 3.5–5.1)
PROCALCITONIN: 0.99
RBC # BLD: 4.45 M/CU MM (ref 4.6–6.2)
SEGMENTED NEUTROPHILS ABSOLUTE COUNT: 15.1 K/CU MM
SEGMENTED NEUTROPHILS RELATIVE PERCENT: 28 % (ref 36–66)
SODIUM BLD-SCNC: 137 MMOL/L (ref 135–145)
TOTAL PROTEIN: 5.8 GM/DL (ref 6.4–8.2)
VANCOMYCIN RANDOM: 19.3 UG/ML
WBC # BLD: 54 K/CU MM (ref 4–10.5)

## 2021-11-06 PROCEDURE — 2140000000 HC CCU INTERMEDIATE R&B

## 2021-11-06 PROCEDURE — 83605 ASSAY OF LACTIC ACID: CPT

## 2021-11-06 PROCEDURE — 6370000000 HC RX 637 (ALT 250 FOR IP): Performed by: NURSE PRACTITIONER

## 2021-11-06 PROCEDURE — 99232 SBSQ HOSP IP/OBS MODERATE 35: CPT | Performed by: INTERNAL MEDICINE

## 2021-11-06 PROCEDURE — 84145 PROCALCITONIN (PCT): CPT

## 2021-11-06 PROCEDURE — 6360000002 HC RX W HCPCS: Performed by: NURSE PRACTITIONER

## 2021-11-06 PROCEDURE — 85007 BL SMEAR W/DIFF WBC COUNT: CPT

## 2021-11-06 PROCEDURE — 85027 COMPLETE CBC AUTOMATED: CPT

## 2021-11-06 PROCEDURE — 6360000002 HC RX W HCPCS: Performed by: HOSPITALIST

## 2021-11-06 PROCEDURE — 2700000000 HC OXYGEN THERAPY PER DAY

## 2021-11-06 PROCEDURE — 2580000003 HC RX 258: Performed by: NURSE PRACTITIONER

## 2021-11-06 PROCEDURE — 94640 AIRWAY INHALATION TREATMENT: CPT

## 2021-11-06 PROCEDURE — 80053 COMPREHEN METABOLIC PANEL: CPT

## 2021-11-06 PROCEDURE — 36415 COLL VENOUS BLD VENIPUNCTURE: CPT

## 2021-11-06 PROCEDURE — 80202 ASSAY OF VANCOMYCIN: CPT

## 2021-11-06 PROCEDURE — 94761 N-INVAS EAR/PLS OXIMETRY MLT: CPT

## 2021-11-06 PROCEDURE — 6370000000 HC RX 637 (ALT 250 FOR IP): Performed by: INTERNAL MEDICINE

## 2021-11-06 PROCEDURE — 86141 C-REACTIVE PROTEIN HS: CPT

## 2021-11-06 PROCEDURE — 71045 X-RAY EXAM CHEST 1 VIEW: CPT

## 2021-11-06 PROCEDURE — 82248 BILIRUBIN DIRECT: CPT

## 2021-11-06 RX ORDER — ALBUTEROL SULFATE 90 UG/1
2 AEROSOL, METERED RESPIRATORY (INHALATION) 4 TIMES DAILY
Status: DISCONTINUED | OUTPATIENT
Start: 2021-11-06 | End: 2021-11-10 | Stop reason: HOSPADM

## 2021-11-06 RX ORDER — VANCOMYCIN 1.5 G/300ML
1500 INJECTION, SOLUTION INTRAVENOUS EVERY 12 HOURS
Status: DISCONTINUED | OUTPATIENT
Start: 2021-11-06 | End: 2021-11-07

## 2021-11-06 RX ORDER — IPRATROPIUM BROMIDE AND ALBUTEROL SULFATE 2.5; .5 MG/3ML; MG/3ML
1 SOLUTION RESPIRATORY (INHALATION) EVERY 4 HOURS PRN
Status: DISCONTINUED | OUTPATIENT
Start: 2021-11-06 | End: 2021-11-10 | Stop reason: HOSPADM

## 2021-11-06 RX ADMIN — GUAIFENESIN 600 MG: 600 TABLET, EXTENDED RELEASE ORAL at 20:22

## 2021-11-06 RX ADMIN — GUAIFENESIN 600 MG: 600 TABLET, EXTENDED RELEASE ORAL at 10:21

## 2021-11-06 RX ADMIN — VANCOMYCIN 1500 MG: 1.5 INJECTION, SOLUTION INTRAVENOUS at 15:31

## 2021-11-06 RX ADMIN — TAMSULOSIN HYDROCHLORIDE 0.4 MG: 0.4 CAPSULE ORAL at 10:21

## 2021-11-06 RX ADMIN — PANTOPRAZOLE SODIUM 40 MG: 40 TABLET, DELAYED RELEASE ORAL at 05:03

## 2021-11-06 RX ADMIN — CEFEPIME 2000 MG: 2 INJECTION, POWDER, FOR SOLUTION INTRAVENOUS at 12:15

## 2021-11-06 RX ADMIN — IPRATROPIUM BROMIDE AND ALBUTEROL SULFATE 1 AMPULE: .5; 2.5 SOLUTION RESPIRATORY (INHALATION) at 08:14

## 2021-11-06 RX ADMIN — ACETAMINOPHEN 650 MG: 325 TABLET ORAL at 20:29

## 2021-11-06 RX ADMIN — SODIUM CHLORIDE 25 ML: 9 INJECTION, SOLUTION INTRAVENOUS at 12:14

## 2021-11-06 RX ADMIN — SODIUM CHLORIDE, PRESERVATIVE FREE 10 ML: 5 INJECTION INTRAVENOUS at 10:22

## 2021-11-06 RX ADMIN — IPRATROPIUM BROMIDE AND ALBUTEROL SULFATE 1 AMPULE: .5; 2.5 SOLUTION RESPIRATORY (INHALATION) at 16:30

## 2021-11-06 RX ADMIN — OXYCODONE AND ACETAMINOPHEN 1 TABLET: 5; 325 TABLET ORAL at 10:20

## 2021-11-06 RX ADMIN — GABAPENTIN 100 MG: 100 CAPSULE ORAL at 10:20

## 2021-11-06 RX ADMIN — OXYCODONE AND ACETAMINOPHEN 1 TABLET: 5; 325 TABLET ORAL at 20:29

## 2021-11-06 RX ADMIN — BUDESONIDE AND FORMOTEROL FUMARATE DIHYDRATE 2 PUFF: 160; 4.5 AEROSOL RESPIRATORY (INHALATION) at 20:45

## 2021-11-06 RX ADMIN — SODIUM CHLORIDE 25 ML: 9 INJECTION, SOLUTION INTRAVENOUS at 15:30

## 2021-11-06 RX ADMIN — IPRATROPIUM BROMIDE AND ALBUTEROL SULFATE 1 AMPULE: .5; 2.5 SOLUTION RESPIRATORY (INHALATION) at 11:31

## 2021-11-06 RX ADMIN — FUROSEMIDE 40 MG: 10 INJECTION, SOLUTION INTRAMUSCULAR; INTRAVENOUS at 10:21

## 2021-11-06 RX ADMIN — ENOXAPARIN SODIUM 40 MG: 100 INJECTION SUBCUTANEOUS at 10:21

## 2021-11-06 RX ADMIN — FUROSEMIDE 40 MG: 10 INJECTION, SOLUTION INTRAMUSCULAR; INTRAVENOUS at 18:13

## 2021-11-06 RX ADMIN — GABAPENTIN 100 MG: 100 CAPSULE ORAL at 20:22

## 2021-11-06 RX ADMIN — METHYLPREDNISOLONE SODIUM SUCCINATE 40 MG: 40 INJECTION, POWDER, FOR SOLUTION INTRAMUSCULAR; INTRAVENOUS at 10:20

## 2021-11-06 RX ADMIN — NALTREXONE HYDROCHLORIDE 50 MG: 50 TABLET, FILM COATED ORAL at 10:21

## 2021-11-06 RX ADMIN — OXYCODONE AND ACETAMINOPHEN 1 TABLET: 5; 325 TABLET ORAL at 15:27

## 2021-11-06 RX ADMIN — IPRATROPIUM BROMIDE AND ALBUTEROL SULFATE 1 AMPULE: .5; 2.5 SOLUTION RESPIRATORY (INHALATION) at 20:45

## 2021-11-06 RX ADMIN — BUDESONIDE AND FORMOTEROL FUMARATE DIHYDRATE 2 PUFF: 160; 4.5 AEROSOL RESPIRATORY (INHALATION) at 08:14

## 2021-11-06 RX ADMIN — GABAPENTIN 100 MG: 100 CAPSULE ORAL at 15:27

## 2021-11-06 ASSESSMENT — PAIN SCALES - GENERAL
PAINLEVEL_OUTOF10: 0
PAINLEVEL_OUTOF10: 10
PAINLEVEL_OUTOF10: 9
PAINLEVEL_OUTOF10: 0
PAINLEVEL_OUTOF10: 10
PAINLEVEL_OUTOF10: 0
PAINLEVEL_OUTOF10: 0
PAINLEVEL_OUTOF10: 5

## 2021-11-06 ASSESSMENT — PAIN DESCRIPTION - PAIN TYPE: TYPE: ACUTE PAIN

## 2021-11-06 ASSESSMENT — PAIN DESCRIPTION - LOCATION: LOCATION: BACK

## 2021-11-06 NOTE — PROGRESS NOTES
11/06/21 0816   Oxygen Therapy/Pulse Ox   O2 Therapy Oxygen humidified   $Oxygen $Daily Charge   O2 Device Heated high flow cannula   O2 Flow Rate (L/min) 40 L/min   FiO2  93 %   Resp 20   SpO2 94 %   Pulse Oximeter Device Mode Continuous   $Pulse Oximeter $Spot check (multiple/continuous)

## 2021-11-06 NOTE — PROGRESS NOTES
[facility] HOSPITALIST PROGRESS NOTE      PCP: Kashmir Villagomez MD    Date of Admission: 11/3/2021    Subjective: feels his ears are blocked      [address] summary patient is a 77-year-old male with history of chronic back pain, hepatitis B chronic, hypertension and hyperlipidemia who was admitted with dyspnea and shortness of breath. On admission patient had leukocytosis, CT chest showed pneumonia, lactic elevated, CPK elevated  Cefepime and vancomycin started,  11/4 remain tachypneic and tachycardic, bilateral diffuse wheezing, IV steroids added, pulmonology consulted      Vitals signs: Afebrile, heart rate 80s to 90s range, blood pressure borderline, on 40% high flow oxygen at 93% FiO2    Medications:  Symbicort, cefepime, Lovenox, Lasix, gabapentin, ipratropium/albuterol, methylprednisolone, nicotine patch, pantoprazole, Flomax, vancomycin    Antibiotics: Cefepime and vancomycin day 4    Fluid status: Urine output 2900 cc    Labs:   Sodium 137, potassium 4.2, chloride 100, bicarb 25, creatinine 0.6  Lactate 1.2   WBC 54, hemoglobin 13.7, platelets 710    Imaging:   Chest x-ray    Echocardiogram  Summary   Left ventricular systolic function is normal.   Ejection fraction is visually estimated at 55-60%. Mildly dilated right ventricle. Sclerotic, but non-stenotic aortic valve.    No evidence of any pericardial effusion    Assessment/Plan:     Sepsis with acute hypoxic respiratory failure secondary to Legionella pneumonia  Acute COPD exacerbation  Acute rhabdomyolysis status post fall  Lactic acidosis  Essential hypertension  Hyperlipidemia  History of chronic back pain  History of hepatitis B  Morbid obesity       Admitted status post fall, requiring oxygen on admission, chest x-ray shows right-sided pneumonia, repeat chest x-ray shows worsening  Leukocytosis and tachycardia, tachypnea on admission  Urine Legionella antigen positive  Currently on Vanco and cefepime, azithromycin 31.3* 54.0*   HGB 14.8 13.8 13.7   HCT 44.2 43.7 41.1*    234 275     Recent Labs     11/03/21  1045 11/04/21  0703 11/06/21  0402   * 131* 137   K 3.7 4.2 4.2   CL 96* 95* 100   CO2 19* 20* 25   BUN 21 22 32*   CREATININE 0.8* 0.7* 0.6*   CALCIUM 8.4 8.1* 8.3     Recent Labs     11/03/21  1045 11/04/21  0703 11/06/21  0402   AST 27 32 33   ALT 14 16 27   BILIDIR  --  1.3* 0.5*   BILITOT 1.6* 1.5* 0.6   ALKPHOS 93 77 96     Recent Labs     11/03/21  1045   INR 1.31     Recent Labs     11/03/21  1045   CKTOTAL 597*       Urinalysis:      Lab Results   Component Value Date    NITRU NEGATIVE 11/03/2021    WBCUA 2 11/03/2021    BACTERIA FEW 11/03/2021    RBCUA <1 11/03/2021    BLOODU NEGATIVE 11/03/2021    SPECGRAV 1.027 11/03/2021       Radiology:  XR CHEST PORTABLE   Final Result   Improved right pleural effusion, remains large. XR CHEST PORTABLE   Final Result   Interval worsening consolidation within the right lung. Findings could be   related to worsening pneumonia or pleural fluid. XR CHEST PORTABLE   Final Result   1. Right lower lobe consolidation with a small right pleural effusion. Findings have progressed. 2. Cardiomegaly with pulmonary vascular congestion. CTA PULMONARY W CONTRAST   Final Result   No evidence of pulmonary embolism. Large pneumonia involving majority of the right lower lobe. Follow-up chest   x-ray to resolution is recommended to rule out the possibility of an   underlying neoplasm. XR PELVIS (1-2 VIEWS)   Final Result   1. No acute abnormality involving the pelvis. 2. No acute abnormality involving the right or left knees. 3. Bilateral tricompartmental osteoarthritis of the knees. XR FEMUR RIGHT (MIN 2 VIEWS)   Final Result   1. No acute abnormality involving the pelvis. 2. No acute abnormality involving the right or left knees. 3. Bilateral tricompartmental osteoarthritis of the knees.          XR FEMUR LEFT (MIN 2 VIEWS)   Final Result   1. No acute abnormality involving the pelvis. 2. No acute abnormality involving the right or left knees. 3. Bilateral tricompartmental osteoarthritis of the knees. XR CHEST PORTABLE   Final Result   Interval enlargement of the cardiomediastinal silhouette which may be   exaggerated by patient rotation. No focal airspace disease or overt pulmonary edema.                  Erin Junior MD  11/6/2021 10:11 AM

## 2021-11-06 NOTE — PROGRESS NOTES
Pulmonary and Critical Care  Progress Note      VITALS:  BP (!) 103/58   Pulse 92   Temp 98.2 °F (36.8 °C) (Oral)   Resp 18   Ht 5' 8\" (1.727 m)   Wt 300 lb (136.1 kg)   SpO2 95%   BMI 45.61 kg/m²     Subjective:   CHIEF COMPLAINT :Fall     HPI:                The patient is sitting in the bed. He is not in acute resp distress    Objective:   PHYSICAL EXAM:    LUNGS: decreased air entry right base  Abd-soft, BS+,NT  Ext- no pedal edema  CVS-s1s2, no murmurs      DATA:    CBC:  Recent Labs     11/04/21  0703 11/06/21  0402   WBC 31.3* 54.0*   RBC 4.54* 4.45*   HGB 13.8 13.7   HCT 43.7 41.1*    275   MCV 96.3 92.4   MCH 30.4 30.8   MCHC 31.6* 33.3   RDW 12.9 13.2   SEGSPCT  --  28.0*   BANDSPCT  --  61*      BMP:  Recent Labs     11/04/21  0703 11/06/21  0402   * 137   K 4.2 4.2   CL 95* 100   CO2 20* 25   BUN 22 32*   CREATININE 0.7* 0.6*   CALCIUM 8.1* 8.3   GLUCOSE 128* 145*      ABG:  Recent Labs     11/04/21  0415   PH 7.34   PO2ART 48*   DMM5TJE 47.0*   O2SAT 80.4*     BNP  No results found for: BNP   D-Dimer:  Lab Results   Component Value Date    DDIMER 260 (H) 06/05/2012      1. Radiology: Improved right pleural effusion, remains large.       Assessment/Plan     Patient Active Problem List    Diagnosis Date Noted    Septicemia ([facility] [address]     PNA (pneumonia) 11/03/2021    Bilateral carpal tunnel syndrome 06/04/2021    Flexor tenosynovitis of finger 06/12/2017    Complex tear of lateral meniscus of right knee as current injury 04/13/2017    Chondromalacia patellae, right knee 04/13/2017    Primary osteoarthritis of right knee 04/13/2017    Acute medial meniscal tear 04/05/2017    Cataract 08/23/2016    Hyperlipidemia 05/26/2016    Edema 03/17/2015    Depression, neurotic 05/01/2014    Pain in the wrist 08/13/2013    Benign essential HTN 07/11/2013    Tobacco dependence 06/05/2012    Right bundle branch block 06/05/2012    GERD (gastroesophageal reflux disease) 06/05/2012    Uncomplicated alcohol dependence ([facility] [address] 06/05/2012    Low back pain 01/26/2012    Hypotestosteronism 01/26/2012    Tobacco use 01/26/2012    Decreased testosterone level 01/26/2012   Hypoxia  RLL Pneumonia sec to Legionella  Morbid Obesity  ? BHARGAVI  Leukocytosis  Sepsis  Right pleural effusion       1. Abx  2. F/u C&S  3. US chest in am for the pleural effusion, right  4. ICS  5. OOB  6. Keep sats > 92%  7.  C/w present management    Electronically signed by Peter Davis MD on 11/6/2021 at 12:50 PM

## 2021-11-06 NOTE — PROGRESS NOTES
11/06/21 1631   Oxygen Therapy/Pulse Ox   O2 Device Heated high flow cannula   O2 Flow Rate (L/min) 40 L/min   FiO2  80 %   Resp 18   SpO2 95 %

## 2021-11-06 NOTE — PROGRESS NOTES
Sravani Ram is a 73 y.o. male started on vancomycin for sepsis secondary to pneumonia. Pharmacy consulted by NP 13 [facility] for monitoring and adjustment. Indication for treatment: Sepsis secondary to pneumonia   Goal trough: 15 mcg/mL / 400-600 AUC/MELISSA  Other antimicrobials: Cefepime    Ht Readings from Last 1 Encounters:   11/04/21 5' 8\" (1.727 m)     Wt Readings from Last 3 Encounters:   11/04/21 300 lb (136.1 kg)   10/27/21 299 lb (135.6 kg)   08/13/21 299 lb (135.6 kg)        Pertinent Laboratory Values:   Temp Readings from Last 3 Encounters:   11/06/21 98.2 °F (36.8 °C) (Oral)   08/13/21 98.1 °F (36.7 °C) (Infrared)   06/04/21 98.1 °F (36.7 °C) (Infrared)     Recent Labs     11/04/21  0703 11/04/21  1351 11/05/21  2238 11/06/21  0402 11/06/21  1039   WBC 31.3*  --   --  54.0*  --    LACTATE 2.4*   < > 2.5* 1.2 2.0    < > = values in this interval not displayed. Recent Labs     11/04/21  0703 11/06/21  0402   BUN 22 32*   CREATININE 0.7* 0.6*     Estimated Creatinine Clearance: 157 mL/min (A) (based on SCr of 0.6 mg/dL (L)). Intake/Output Summary (Last 24 hours) at 11/6/2021 1224  Last data filed at 11/6/2021 0400  Gross per 24 hour   Intake --   Output 2900 ml   Net -2900 ml       Previous vancomycin levels:  TROUGH:  No results for input(s): VANCOTROUGH in the last 72 hours.   RANDOM:    Recent Labs     11/06/21  0402   VANCORANDOM 19.3     Assessment:  · WBC and temperature: WBC @ 54; afebrile  · SCr, BUN, and urine output:  · Scr stable, BUN trending up  · Day(s) of therapy: 4  · Vancomycin concentration:  · 11/06: 19.3, 4h post-dose, , sub-therapeutic (1250 mg q12h)    Plan:  · Increase vancomycin to 1500 mg IVPB q12h  · Predicted trough: 11,   · Repeat level in 48h  · Pharmacy will continue to monitor patient and adjust therapy as indicated    VANCOMYCIN LEVEL SCHEDULED FOR 11/8 @ 1200    Thank you for the consult and opportunity to take part in the care of this patient,  Sourav [facility], PharmD  12:24 PM  11/06/21

## 2021-11-07 ENCOUNTER — APPOINTMENT (OUTPATIENT)
Dept: ULTRASOUND IMAGING | Age: 71
DRG: 871 | End: 2021-11-07
Payer: MEDICARE

## 2021-11-07 ENCOUNTER — APPOINTMENT (OUTPATIENT)
Dept: CT IMAGING | Age: 71
DRG: 871 | End: 2021-11-07
Payer: MEDICARE

## 2021-11-07 LAB
ANION GAP SERPL CALCULATED.3IONS-SCNC: 8 MMOL/L (ref 4–16)
BUN BLDV-MCNC: 31 MG/DL (ref 6–23)
CALCIUM SERPL-MCNC: 7.9 MG/DL (ref 8.3–10.6)
CHLORIDE BLD-SCNC: 99 MMOL/L (ref 99–110)
CO2: 27 MMOL/L (ref 21–32)
CREAT SERPL-MCNC: 0.5 MG/DL (ref 0.9–1.3)
GFR AFRICAN AMERICAN: >60 ML/MIN/1.73M2
GFR NON-AFRICAN AMERICAN: >60 ML/MIN/1.73M2
GLUCOSE BLD-MCNC: 124 MG/DL (ref 70–99)
HCT VFR BLD CALC: 41.6 % (ref 42–52)
HEMOGLOBIN: 13.6 GM/DL (ref 13.5–18)
LACTATE: 1.4 MMOL/L (ref 0.4–2)
MCH RBC QN AUTO: 30.1 PG (ref 27–31)
MCHC RBC AUTO-ENTMCNC: 32.7 % (ref 32–36)
MCV RBC AUTO: 92 FL (ref 78–100)
PDW BLD-RTO: 13.2 % (ref 11.7–14.9)
PLATELET # BLD: 281 K/CU MM (ref 140–440)
PMV BLD AUTO: 10.9 FL (ref 7.5–11.1)
POTASSIUM SERPL-SCNC: 4.7 MMOL/L (ref 3.5–5.1)
RBC # BLD: 4.52 M/CU MM (ref 4.6–6.2)
SODIUM BLD-SCNC: 134 MMOL/L (ref 135–145)
WBC # BLD: 48.2 K/CU MM (ref 4–10.5)

## 2021-11-07 PROCEDURE — 2580000003 HC RX 258: Performed by: NURSE PRACTITIONER

## 2021-11-07 PROCEDURE — 6360000004 HC RX CONTRAST MEDICATION

## 2021-11-07 PROCEDURE — 6370000000 HC RX 637 (ALT 250 FOR IP): Performed by: HOSPITALIST

## 2021-11-07 PROCEDURE — 83605 ASSAY OF LACTIC ACID: CPT

## 2021-11-07 PROCEDURE — 6370000000 HC RX 637 (ALT 250 FOR IP): Performed by: NURSE PRACTITIONER

## 2021-11-07 PROCEDURE — 6360000002 HC RX W HCPCS: Performed by: HOSPITALIST

## 2021-11-07 PROCEDURE — 0W993ZX DRAINAGE OF RIGHT PLEURAL CAVITY, PERCUTANEOUS APPROACH, DIAGNOSTIC: ICD-10-PCS | Performed by: INTERNAL MEDICINE

## 2021-11-07 PROCEDURE — 2700000000 HC OXYGEN THERAPY PER DAY

## 2021-11-07 PROCEDURE — 80048 BASIC METABOLIC PNL TOTAL CA: CPT

## 2021-11-07 PROCEDURE — 94640 AIRWAY INHALATION TREATMENT: CPT

## 2021-11-07 PROCEDURE — 6360000002 HC RX W HCPCS: Performed by: NURSE PRACTITIONER

## 2021-11-07 PROCEDURE — 2580000003 HC RX 258: Performed by: HOSPITALIST

## 2021-11-07 PROCEDURE — 76604 US EXAM CHEST: CPT

## 2021-11-07 PROCEDURE — 2140000000 HC CCU INTERMEDIATE R&B

## 2021-11-07 PROCEDURE — 71260 CT THORAX DX C+: CPT

## 2021-11-07 PROCEDURE — 99232 SBSQ HOSP IP/OBS MODERATE 35: CPT | Performed by: INTERNAL MEDICINE

## 2021-11-07 PROCEDURE — 85027 COMPLETE CBC AUTOMATED: CPT

## 2021-11-07 PROCEDURE — 36415 COLL VENOUS BLD VENIPUNCTURE: CPT

## 2021-11-07 RX ORDER — LEVOFLOXACIN 5 MG/ML
750 INJECTION, SOLUTION INTRAVENOUS EVERY 24 HOURS
Status: DISCONTINUED | OUTPATIENT
Start: 2021-11-07 | End: 2021-11-10 | Stop reason: HOSPADM

## 2021-11-07 RX ORDER — VANCOMYCIN 1.5 G/300ML
1500 INJECTION, SOLUTION INTRAVENOUS EVERY 12 HOURS
Status: DISCONTINUED | OUTPATIENT
Start: 2021-11-07 | End: 2021-11-10 | Stop reason: HOSPADM

## 2021-11-07 RX ORDER — PREDNISONE 20 MG/1
40 TABLET ORAL DAILY
Status: DISCONTINUED | OUTPATIENT
Start: 2021-11-07 | End: 2021-11-10 | Stop reason: HOSPADM

## 2021-11-07 RX ADMIN — SODIUM CHLORIDE 25 ML: 9 INJECTION, SOLUTION INTRAVENOUS at 21:04

## 2021-11-07 RX ADMIN — ALBUTEROL SULFATE 2 PUFF: 90 AEROSOL, METERED RESPIRATORY (INHALATION) at 15:25

## 2021-11-07 RX ADMIN — CEFEPIME HYDROCHLORIDE 2000 MG: 2 INJECTION, POWDER, FOR SOLUTION INTRAVENOUS at 16:43

## 2021-11-07 RX ADMIN — OXYCODONE AND ACETAMINOPHEN 1 TABLET: 5; 325 TABLET ORAL at 21:01

## 2021-11-07 RX ADMIN — ALBUTEROL SULFATE 2 PUFF: 90 AEROSOL, METERED RESPIRATORY (INHALATION) at 08:39

## 2021-11-07 RX ADMIN — LEVOFLOXACIN 750 MG: 5 INJECTION, SOLUTION INTRAVENOUS at 10:17

## 2021-11-07 RX ADMIN — CEFEPIME 2000 MG: 2 INJECTION, POWDER, FOR SOLUTION INTRAVENOUS at 01:17

## 2021-11-07 RX ADMIN — ENOXAPARIN SODIUM 40 MG: 100 INJECTION SUBCUTANEOUS at 10:18

## 2021-11-07 RX ADMIN — PREDNISONE 40 MG: 20 TABLET ORAL at 10:17

## 2021-11-07 RX ADMIN — TAMSULOSIN HYDROCHLORIDE 0.4 MG: 0.4 CAPSULE ORAL at 10:17

## 2021-11-07 RX ADMIN — GABAPENTIN 100 MG: 100 CAPSULE ORAL at 21:01

## 2021-11-07 RX ADMIN — ALBUTEROL SULFATE 2 PUFF: 90 AEROSOL, METERED RESPIRATORY (INHALATION) at 12:02

## 2021-11-07 RX ADMIN — Medication 2 PUFF: at 08:31

## 2021-11-07 RX ADMIN — VANCOMYCIN 1500 MG: 1.5 INJECTION, SOLUTION INTRAVENOUS at 01:16

## 2021-11-07 RX ADMIN — GUAIFENESIN 600 MG: 600 TABLET, EXTENDED RELEASE ORAL at 21:01

## 2021-11-07 RX ADMIN — IOPAMIDOL 75 ML: 755 INJECTION, SOLUTION INTRAVENOUS at 11:27

## 2021-11-07 RX ADMIN — SODIUM CHLORIDE 25 ML: 9 INJECTION, SOLUTION INTRAVENOUS at 10:15

## 2021-11-07 RX ADMIN — FUROSEMIDE 40 MG: 10 INJECTION, SOLUTION INTRAMUSCULAR; INTRAVENOUS at 10:14

## 2021-11-07 RX ADMIN — SODIUM CHLORIDE 25 ML: 9 INJECTION, SOLUTION INTRAVENOUS at 17:15

## 2021-11-07 RX ADMIN — SODIUM CHLORIDE 25 ML: 9 INJECTION, SOLUTION INTRAVENOUS at 16:43

## 2021-11-07 RX ADMIN — SODIUM CHLORIDE, PRESERVATIVE FREE 10 ML: 5 INJECTION INTRAVENOUS at 10:18

## 2021-11-07 RX ADMIN — CEFEPIME HYDROCHLORIDE 2000 MG: 2 INJECTION, POWDER, FOR SOLUTION INTRAVENOUS at 21:06

## 2021-11-07 RX ADMIN — VANCOMYCIN 1500 MG: 1.5 INJECTION, SOLUTION INTRAVENOUS at 17:17

## 2021-11-07 RX ADMIN — GABAPENTIN 100 MG: 100 CAPSULE ORAL at 10:17

## 2021-11-07 RX ADMIN — Medication 2 PUFF: at 12:05

## 2021-11-07 RX ADMIN — OXYCODONE AND ACETAMINOPHEN 1 TABLET: 5; 325 TABLET ORAL at 10:23

## 2021-11-07 RX ADMIN — NALTREXONE HYDROCHLORIDE 50 MG: 50 TABLET, FILM COATED ORAL at 10:23

## 2021-11-07 RX ADMIN — FUROSEMIDE 40 MG: 10 INJECTION, SOLUTION INTRAMUSCULAR; INTRAVENOUS at 17:23

## 2021-11-07 RX ADMIN — GUAIFENESIN 600 MG: 600 TABLET, EXTENDED RELEASE ORAL at 10:17

## 2021-11-07 RX ADMIN — PANTOPRAZOLE SODIUM 40 MG: 40 TABLET, DELAYED RELEASE ORAL at 06:20

## 2021-11-07 RX ADMIN — SODIUM CHLORIDE, PRESERVATIVE FREE 10 ML: 5 INJECTION INTRAVENOUS at 21:02

## 2021-11-07 RX ADMIN — BUDESONIDE AND FORMOTEROL FUMARATE DIHYDRATE 2 PUFF: 160; 4.5 AEROSOL RESPIRATORY (INHALATION) at 08:40

## 2021-11-07 ASSESSMENT — PAIN DESCRIPTION - FREQUENCY: FREQUENCY: CONTINUOUS

## 2021-11-07 ASSESSMENT — PAIN DESCRIPTION - PROGRESSION: CLINICAL_PROGRESSION: NOT CHANGED

## 2021-11-07 ASSESSMENT — PAIN DESCRIPTION - DESCRIPTORS: DESCRIPTORS: ACHING

## 2021-11-07 ASSESSMENT — PAIN SCALES - GENERAL
PAINLEVEL_OUTOF10: 6
PAINLEVEL_OUTOF10: 0
PAINLEVEL_OUTOF10: 10

## 2021-11-07 ASSESSMENT — PAIN DESCRIPTION - ONSET: ONSET: ON-GOING

## 2021-11-07 ASSESSMENT — PAIN - FUNCTIONAL ASSESSMENT: PAIN_FUNCTIONAL_ASSESSMENT: PREVENTS OR INTERFERES SOME ACTIVE ACTIVITIES AND ADLS

## 2021-11-07 ASSESSMENT — PAIN DESCRIPTION - PAIN TYPE: TYPE: CHRONIC PAIN

## 2021-11-07 ASSESSMENT — PAIN DESCRIPTION - LOCATION: LOCATION: HIP;KNEE

## 2021-11-07 ASSESSMENT — PAIN DESCRIPTION - ORIENTATION: ORIENTATION: RIGHT;LEFT;LOWER

## 2021-11-07 NOTE — PROGRESS NOTES
Sravani Ram is a 73 y.o. male started on vancomycin for sepsis secondary to pneumonia. Pharmacy consulted by Dr. Daniel Reyes for monitoring and adjustment. Indication for treatment: CAP, possible loculated effusion  Goal trough: 15 mcg/mL / 400-600 AUC/MELISSA  Other antimicrobials: Cefepime    Ht Readings from Last 1 Encounters:   11/04/21 5' 8\" (1.727 m)     Wt Readings from Last 3 Encounters:   11/04/21 300 lb (136.1 kg)   10/27/21 299 lb (135.6 kg)   08/13/21 299 lb (135.6 kg)        Pertinent Laboratory Values:   Temp Readings from Last 3 Encounters:   11/07/21 98.3 °F (36.8 °C) (Axillary)   08/13/21 98.1 °F (36.7 °C) (Infrared)   06/04/21 98.1 °F (36.7 °C) (Infrared)     Recent Labs     11/06/21  0402 11/06/21  1039 11/06/21  1517 11/06/21  2054 11/07/21  0339   WBC 54.0*  --   --   --  48.2*   LACTATE 1.2   < > 2.2* 2.8* 1.4    < > = values in this interval not displayed. Recent Labs     11/06/21  0402 11/07/21  0339   BUN 32* 31*   CREATININE 0.6* 0.5*     Estimated Creatinine Clearance: 188 mL/min (A) (based on SCr of 0.5 mg/dL (L)). Intake/Output Summary (Last 24 hours) at 11/7/2021 1017  Last data filed at 11/7/2021 0540  Gross per 24 hour   Intake --   Output 2450 ml   Net -2450 ml       Previous vancomycin levels:  TROUGH:  No results for input(s): VANCOTROUGH in the last 72 hours.   RANDOM:    Recent Labs     11/06/21  0402   VANCORANDOM 19.3     Assessment:  · WBC and temperature: WBC @ 48.2; afebrile  · SCr, BUN, and urine output:  · Scr and BUN stable  · Day(s) of therapy: 5  · Vancomycin concentration:  · 11/06: 19.3, 4h post-dose, , sub-therapeutic (1250 mg q12h)    Plan:  · Vancomycin stopped and now ordered to resume  · Continue vancomycin 1500 mg IVPB q12h  · Predicted trough: 11,   · Repeat level in 24h  · Pharmacy will continue to monitor patient and adjust therapy as indicated    VANCOMYCIN LEVEL SCHEDULED FOR 11/8 @ 1200    Thank you for the consult,  Stephan LEE PEGGY [facility], PharmD  10:17 AM  11/07/21

## 2021-11-07 NOTE — PROGRESS NOTES
[facility] HOSPITALIST PROGRESS NOTE      PCP: Dao Warner MD    Date of Admission: 11/3/2021    Subjective: feels better     Brief Hospital summary patient is a 44-year-old male with history of chronic back pain, hepatitis B chronic, hypertension and hyperlipidemia who was admitted with dyspnea and shortness of breath. On admission patient had leukocytosis, CT chest showed pneumonia, lactic elevated, CPK elevated  Cefepime and vancomycin started,  11/4 remain tachypneic and tachycardic, bilateral diffuse wheezing, IV steroids added, pulmonology consulted  Diuresed well with lasix,       Vitals signs: Afebrile, heart rate 80s range, blood pressure 110/60, on room air    Medications: Albuterol, Symbicort, cefepime, Lovenox, Lasix, gabapentin, ipratropium, methylprednisolone, nicotine patch, pantoprazole, Flomax, vancomycin    Antibiotics: Cefepime and vancomycin day 5    Fluid status: Urine output 2450   Labs:   Na 134, K 4.7, Cl 99, HCO 2, Cr 0.5     Imaging:   Chest x-ray    Echocardiogram  Summary   Left ventricular systolic function is normal.   Ejection fraction is visually estimated at 55-60%. Mildly dilated right ventricle. Sclerotic, but non-stenotic aortic valve.    No evidence of any pericardial effusion    Assessment/Plan:     Sepsis with acute hypoxic respiratory failure secondary to Legionella pneumonia  Acute COPD exacerbation:     Admitted with leucocytosis, urine legionella positive   Azithromycin for three days, vanc and cefepime started  Persistent leucocytosis, diuresed well with lasix,   ECHO did not show any acute abnormalities,   BHARGAVI suspected,   USG chest shows complex effusion,   Pulmonology following  Add levofloxacin, monitor QTc, initially elevated on EKG, has been less than 500 msec on telemetry   Continue lasix   Solumedrol switched to lasix     CT chest shows loculated effusion   Will call IR in am     Check daily CBC     Acute rhabdomyolysis status post fall  Lactic acidosis: CPK elevated  Resolved  PT.OT consult   Orthostatics     Essential hypertension: PRN hydralazine for now   Hyperlipidemia: Statin   BPH: flomax     Tobacco dependence: nicotine patch       Chronic condition:   History of chronic back pain  History of hepatitis B  Morbid obesity     DVT prophlaxis:   Lovenox    Last BM:   None since admission     Ambulation:   As tolerated    Disposition:   Home     Diet: general diet    Physical Exam Performed:       /60   Pulse 82   Temp 98.6 °F (37 °C)   Resp 22   Ht 5' 8\" (1.727 m)   Wt 300 lb (136.1 kg)   SpO2 94%   BMI 45.61 kg/m²     Physical Exam  Constitutional:       General: He is not in acute distress. Appearance: Normal appearance. HENT:      Head: Normocephalic and atraumatic. Right Ear: External ear normal.      Left Ear: External ear normal.   Eyes:      Extraocular Movements: Extraocular movements intact. Pupils: Pupils are equal, round, and reactive to light. Cardiovascular:      Rate and Rhythm: Normal rate and regular rhythm. Heart sounds: No murmur heard. Pulmonary:      Effort: Pulmonary effort is normal. No respiratory distress. Breath sounds: Normal breath sounds. No wheezing. Abdominal:      General: Bowel sounds are normal. There is no distension. Palpations: Abdomen is soft. Tenderness: There is no abdominal tenderness. Musculoskeletal:         General: Swelling present. Cervical back: Normal range of motion. Comments: Bilateral LE edema    Skin:     General: Skin is warm. Neurological:      General: No focal deficit present. Mental Status: He is alert and oriented to person, place, and time. Cranial Nerves: No cranial nerve deficit.    Psychiatric:         Mood and Affect: Mood normal.         Labs:   Recent Labs     11/06/21  0402 11/07/21 0339   WBC 54.0* 48.2*   HGB 13.7 13.6   HCT 41.1* 41.6*    281     Recent Labs     11/06/21  0402 11/07/21  0339   NA 137 134*   K 4.2 4.7    99   CO2 25 27   BUN 32* 31*   CREATININE 0.6* 0.5*   CALCIUM 8.3 7.9*     Recent Labs     11/06/21  0402   AST 33   ALT 27   BILIDIR 0.5*   BILITOT 0.6   ALKPHOS 96     No results for input(s): INR in the last 72 hours. No results for input(s): [place] in the last 72 hours. Urinalysis:      Lab Results   Component Value Date    NITRU NEGATIVE 11/03/2021    WBCUA 2 11/03/2021    BACTERIA FEW 11/03/2021    RBCUA <1 11/03/2021    BLOODU NEGATIVE 11/03/2021    SPECGRAV 1.027 11/03/2021       Radiology:  US CHEST INCLUDING MEDIASTINUM   Final Result   1. Moderate complex right pleural effusion. XR CHEST PORTABLE   Final Result   Improved right pleural effusion, remains large. XR CHEST PORTABLE   Final Result   Interval worsening consolidation within the right lung. Findings could be   related to worsening pneumonia or pleural fluid. XR CHEST PORTABLE   Final Result   1. Right lower lobe consolidation with a small right pleural effusion. Findings have progressed. 2. Cardiomegaly with pulmonary vascular congestion. CTA PULMONARY W CONTRAST   Final Result   No evidence of pulmonary embolism. Large pneumonia involving majority of the right lower lobe. Follow-up chest   x-ray to resolution is recommended to rule out the possibility of an   underlying neoplasm. XR PELVIS (1-2 VIEWS)   Final Result   1. No acute abnormality involving the pelvis. 2. No acute abnormality involving the right or left knees. 3. Bilateral tricompartmental osteoarthritis of the knees. XR FEMUR RIGHT (MIN 2 VIEWS)   Final Result   1. No acute abnormality involving the pelvis. 2. No acute abnormality involving the right or left knees. 3. Bilateral tricompartmental osteoarthritis of the knees. XR FEMUR LEFT (MIN 2 VIEWS)   Final Result   1. No acute abnormality involving the pelvis.    2. No acute abnormality involving the right or left knees.   3. Bilateral tricompartmental osteoarthritis of the knees. XR CHEST PORTABLE   Final Result   Interval enlargement of the cardiomediastinal silhouette which may be   exaggerated by patient rotation. No focal airspace disease or overt pulmonary edema.                  Deshaun Lobo MD  11/7/2021 9:34 AM

## 2021-11-07 NOTE — PROGRESS NOTES
Pulmonary and Critical Care  Progress Note      VITALS:  BP (!) 117/55   Pulse 98   Temp 98.3 °F (36.8 °C) (Axillary)   Resp 22   Ht 5' 8\" (1.727 m)   Wt 300 lb (136.1 kg)   SpO2 (!) 85%   BMI 45.61 kg/m²     Subjective:   CHIEF COMPLAINT :Fall     HPI:                The patient is lying in the bed. Savita Major He is not in acute resp distress    Objective:   PHYSICAL EXAM:    LUNGS:decreased air entry bilateral bases  Abd-soft, BS+,NT  Ext- no pedal edema  CVS-s1s2, no murmurs      DATA:    CBC:  Recent Labs     11/06/21  0402 11/07/21  0339   WBC 54.0* 48.2*   RBC 4.45* 4.52*   HGB 13.7 13.6   HCT 41.1* 41.6*    281   MCV 92.4 92.0   MCH 30.8 30.1   MCHC 33.3 32.7   RDW 13.2 13.2   SEGSPCT 28.0*  --    BANDSPCT 61*  --       BMP:  Recent Labs     11/06/21  0402 11/07/21 0339    134*   K 4.2 4.7    99   CO2 25 27   BUN 32* 31*   CREATININE 0.6* 0.5*   CALCIUM 8.3 7.9*   GLUCOSE 145* 124*      ABG:  No results for input(s): PH, PO2ART, OQT0WPO, HCO3, BEART, O2SAT in the last 72 hours. BNP  No results found for: BNP   D-Dimer:  Lab Results   Component Value Date    DDIMER 260 (H) 06/05/2012      1.  Radiology: None      Assessment/Plan     Patient Active Problem List    Diagnosis Date Noted    Septicemia ([facility] [address]     PNA (pneumonia) 11/03/2021    Bilateral carpal tunnel syndrome 06/04/2021    Flexor tenosynovitis of finger 06/12/2017    Complex tear of lateral meniscus of right knee as current injury 04/13/2017    Chondromalacia patellae, right knee 04/13/2017    Primary osteoarthritis of right knee 04/13/2017    Acute medial meniscal tear 04/05/2017    Cataract 08/23/2016    Hyperlipidemia 05/26/2016    Edema 03/17/2015    Depression, neurotic 05/01/2014    Pain in the wrist 08/13/2013    Benign essential HTN 07/11/2013    Tobacco dependence 06/05/2012    Right bundle branch block 06/05/2012    GERD (gastroesophageal reflux disease) 39/48/3981    Uncomplicated alcohol dependence ([facility] [address] 06/05/2012    Low back pain 01/26/2012    Hypotestosteronism 01/26/2012    Tobacco use 01/26/2012    Decreased testosterone level 01/26/2012   Hypoxia- improving  RLL Pneumonia sec to Legionella  Morbid Obesity  ? BHARGAVI  Leukocytosis  Sepsis  Right pleural effusion likely parapneumonic       1. Thoracentesis in am /Possible right chest tube  2. Abc  3. F/u C&S  4. ICS  5. OOB  6. Keep sats > 92%  7.  C/w present  management    Electronically signed by Kylee Loza MD on 11/7/2021 at 3:13 PM

## 2021-11-08 ENCOUNTER — APPOINTMENT (OUTPATIENT)
Dept: INTERVENTIONAL RADIOLOGY/VASCULAR | Age: 71
DRG: 871 | End: 2021-11-08
Payer: MEDICARE

## 2021-11-08 ENCOUNTER — APPOINTMENT (OUTPATIENT)
Dept: ULTRASOUND IMAGING | Age: 71
DRG: 871 | End: 2021-11-08
Payer: MEDICARE

## 2021-11-08 LAB
ANION GAP SERPL CALCULATED.3IONS-SCNC: 9 MMOL/L (ref 4–16)
BUN BLDV-MCNC: 28 MG/DL (ref 6–23)
CALCIUM SERPL-MCNC: 8.2 MG/DL (ref 8.3–10.6)
CHLORIDE BLD-SCNC: 99 MMOL/L (ref 99–110)
CO2: 31 MMOL/L (ref 21–32)
CREAT SERPL-MCNC: 0.5 MG/DL (ref 0.9–1.3)
CULTURE: NORMAL
CULTURE: NORMAL
DOSE AMOUNT: NORMAL
DOSE TIME: NORMAL
GFR AFRICAN AMERICAN: >60 ML/MIN/1.73M2
GFR NON-AFRICAN AMERICAN: >60 ML/MIN/1.73M2
GLUCOSE BLD-MCNC: 102 MG/DL (ref 70–99)
HCT VFR BLD CALC: 43.5 % (ref 42–52)
HEMOGLOBIN: 14.4 GM/DL (ref 13.5–18)
Lab: NORMAL
Lab: NORMAL
MCH RBC QN AUTO: 30.8 PG (ref 27–31)
MCHC RBC AUTO-ENTMCNC: 33.1 % (ref 32–36)
MCV RBC AUTO: 93.1 FL (ref 78–100)
PDW BLD-RTO: 13.5 % (ref 11.7–14.9)
PLATELET # BLD: 346 K/CU MM (ref 140–440)
PMV BLD AUTO: 10.6 FL (ref 7.5–11.1)
POTASSIUM SERPL-SCNC: 4.3 MMOL/L (ref 3.5–5.1)
PROCALCITONIN: 0.35
RBC # BLD: 4.67 M/CU MM (ref 4.6–6.2)
SODIUM BLD-SCNC: 139 MMOL/L (ref 135–145)
SPECIMEN: NORMAL
SPECIMEN: NORMAL
VANCOMYCIN TROUGH: 15.5 UG/ML (ref 10–20)
WBC # BLD: 46.5 K/CU MM (ref 4–10.5)

## 2021-11-08 PROCEDURE — 6360000002 HC RX W HCPCS: Performed by: HOSPITALIST

## 2021-11-08 PROCEDURE — 80048 BASIC METABOLIC PNL TOTAL CA: CPT

## 2021-11-08 PROCEDURE — 94664 DEMO&/EVAL PT USE INHALER: CPT

## 2021-11-08 PROCEDURE — 6370000000 HC RX 637 (ALT 250 FOR IP): Performed by: NURSE PRACTITIONER

## 2021-11-08 PROCEDURE — 36415 COLL VENOUS BLD VENIPUNCTURE: CPT

## 2021-11-08 PROCEDURE — 84145 PROCALCITONIN (PCT): CPT

## 2021-11-08 PROCEDURE — 76937 US GUIDE VASCULAR ACCESS: CPT

## 2021-11-08 PROCEDURE — 76604 US EXAM CHEST: CPT

## 2021-11-08 PROCEDURE — 99223 1ST HOSP IP/OBS HIGH 75: CPT | Performed by: INTERNAL MEDICINE

## 2021-11-08 PROCEDURE — 6370000000 HC RX 637 (ALT 250 FOR IP): Performed by: HOSPITALIST

## 2021-11-08 PROCEDURE — 94640 AIRWAY INHALATION TREATMENT: CPT

## 2021-11-08 PROCEDURE — 99232 SBSQ HOSP IP/OBS MODERATE 35: CPT | Performed by: INTERNAL MEDICINE

## 2021-11-08 PROCEDURE — 93970 EXTREMITY STUDY: CPT

## 2021-11-08 PROCEDURE — 2580000003 HC RX 258: Performed by: NURSE PRACTITIONER

## 2021-11-08 PROCEDURE — 2580000003 HC RX 258: Performed by: HOSPITALIST

## 2021-11-08 PROCEDURE — 80202 ASSAY OF VANCOMYCIN: CPT

## 2021-11-08 PROCEDURE — C1729 CATH, DRAINAGE: HCPCS

## 2021-11-08 PROCEDURE — 87081 CULTURE SCREEN ONLY: CPT

## 2021-11-08 PROCEDURE — 87070 CULTURE OTHR SPECIMN AEROBIC: CPT

## 2021-11-08 PROCEDURE — 94761 N-INVAS EAR/PLS OXIMETRY MLT: CPT

## 2021-11-08 PROCEDURE — 87205 SMEAR GRAM STAIN: CPT

## 2021-11-08 PROCEDURE — 2140000000 HC CCU INTERMEDIATE R&B

## 2021-11-08 PROCEDURE — 2709999900 HC NON-CHARGEABLE SUPPLY

## 2021-11-08 PROCEDURE — 85027 COMPLETE CBC AUTOMATED: CPT

## 2021-11-08 PROCEDURE — 2700000000 HC OXYGEN THERAPY PER DAY

## 2021-11-08 RX ORDER — AMLODIPINE BESYLATE 5 MG/1
5 TABLET ORAL DAILY
Status: DISCONTINUED | OUTPATIENT
Start: 2021-11-08 | End: 2021-11-10 | Stop reason: HOSPADM

## 2021-11-08 RX ORDER — ZOLPIDEM TARTRATE 5 MG/1
10 TABLET ORAL ONCE
Status: COMPLETED | OUTPATIENT
Start: 2021-11-08 | End: 2021-11-08

## 2021-11-08 RX ORDER — LISINOPRIL 5 MG/1
5 TABLET ORAL DAILY
Status: DISCONTINUED | OUTPATIENT
Start: 2021-11-08 | End: 2021-11-10 | Stop reason: HOSPADM

## 2021-11-08 RX ADMIN — Medication 2 PUFF: at 22:39

## 2021-11-08 RX ADMIN — Medication 2 PUFF: at 16:13

## 2021-11-08 RX ADMIN — ACETAMINOPHEN 650 MG: 325 TABLET ORAL at 09:11

## 2021-11-08 RX ADMIN — GABAPENTIN 100 MG: 100 CAPSULE ORAL at 14:34

## 2021-11-08 RX ADMIN — GUAIFENESIN 600 MG: 600 TABLET, EXTENDED RELEASE ORAL at 19:52

## 2021-11-08 RX ADMIN — ALBUTEROL SULFATE 2 PUFF: 90 AEROSOL, METERED RESPIRATORY (INHALATION) at 22:39

## 2021-11-08 RX ADMIN — LISINOPRIL 5 MG: 5 TABLET ORAL at 11:11

## 2021-11-08 RX ADMIN — GUAIFENESIN 600 MG: 600 TABLET, EXTENDED RELEASE ORAL at 09:11

## 2021-11-08 RX ADMIN — TAMSULOSIN HYDROCHLORIDE 0.4 MG: 0.4 CAPSULE ORAL at 09:11

## 2021-11-08 RX ADMIN — Medication 2 PUFF: at 08:17

## 2021-11-08 RX ADMIN — BUDESONIDE AND FORMOTEROL FUMARATE DIHYDRATE 2 PUFF: 160; 4.5 AEROSOL RESPIRATORY (INHALATION) at 08:18

## 2021-11-08 RX ADMIN — CEFEPIME HYDROCHLORIDE 2000 MG: 2 INJECTION, POWDER, FOR SOLUTION INTRAVENOUS at 09:09

## 2021-11-08 RX ADMIN — VANCOMYCIN 1500 MG: 1.5 INJECTION, SOLUTION INTRAVENOUS at 01:24

## 2021-11-08 RX ADMIN — ALBUTEROL SULFATE 2 PUFF: 90 AEROSOL, METERED RESPIRATORY (INHALATION) at 16:12

## 2021-11-08 RX ADMIN — SODIUM CHLORIDE, PRESERVATIVE FREE 10 ML: 5 INJECTION INTRAVENOUS at 09:10

## 2021-11-08 RX ADMIN — OXYCODONE AND ACETAMINOPHEN 1 TABLET: 5; 325 TABLET ORAL at 14:36

## 2021-11-08 RX ADMIN — BUDESONIDE AND FORMOTEROL FUMARATE DIHYDRATE 2 PUFF: 160; 4.5 AEROSOL RESPIRATORY (INHALATION) at 22:40

## 2021-11-08 RX ADMIN — VANCOMYCIN 1500 MG: 1.5 INJECTION, SOLUTION INTRAVENOUS at 18:05

## 2021-11-08 RX ADMIN — PREDNISONE 40 MG: 20 TABLET ORAL at 09:11

## 2021-11-08 RX ADMIN — PANTOPRAZOLE SODIUM 40 MG: 40 TABLET, DELAYED RELEASE ORAL at 09:11

## 2021-11-08 RX ADMIN — NALTREXONE HYDROCHLORIDE 50 MG: 50 TABLET, FILM COATED ORAL at 09:11

## 2021-11-08 RX ADMIN — SODIUM CHLORIDE 25 ML: 9 INJECTION, SOLUTION INTRAVENOUS at 09:05

## 2021-11-08 RX ADMIN — CEFEPIME HYDROCHLORIDE 2000 MG: 2 INJECTION, POWDER, FOR SOLUTION INTRAVENOUS at 23:13

## 2021-11-08 RX ADMIN — SODIUM CHLORIDE, PRESERVATIVE FREE 10 ML: 5 INJECTION INTRAVENOUS at 19:53

## 2021-11-08 RX ADMIN — FUROSEMIDE 40 MG: 10 INJECTION, SOLUTION INTRAMUSCULAR; INTRAVENOUS at 18:05

## 2021-11-08 RX ADMIN — GABAPENTIN 100 MG: 100 CAPSULE ORAL at 19:52

## 2021-11-08 RX ADMIN — ALBUTEROL SULFATE 2 PUFF: 90 AEROSOL, METERED RESPIRATORY (INHALATION) at 11:53

## 2021-11-08 RX ADMIN — GABAPENTIN 100 MG: 100 CAPSULE ORAL at 09:15

## 2021-11-08 RX ADMIN — Medication 2 PUFF: at 11:54

## 2021-11-08 RX ADMIN — ZOLPIDEM TARTRATE 10 MG: 5 TABLET ORAL at 21:04

## 2021-11-08 RX ADMIN — FUROSEMIDE 40 MG: 10 INJECTION, SOLUTION INTRAMUSCULAR; INTRAVENOUS at 09:09

## 2021-11-08 RX ADMIN — AMLODIPINE BESYLATE 5 MG: 5 TABLET ORAL at 11:11

## 2021-11-08 RX ADMIN — ALBUTEROL SULFATE 2 PUFF: 90 AEROSOL, METERED RESPIRATORY (INHALATION) at 08:16

## 2021-11-08 ASSESSMENT — PAIN - FUNCTIONAL ASSESSMENT
PAIN_FUNCTIONAL_ASSESSMENT: PREVENTS OR INTERFERES SOME ACTIVE ACTIVITIES AND ADLS
PAIN_FUNCTIONAL_ASSESSMENT: PREVENTS OR INTERFERES SOME ACTIVE ACTIVITIES AND ADLS

## 2021-11-08 ASSESSMENT — PAIN DESCRIPTION - ORIENTATION
ORIENTATION: LOWER
ORIENTATION: LOWER
ORIENTATION: OTHER (COMMENT)
ORIENTATION: LOWER

## 2021-11-08 ASSESSMENT — PAIN DESCRIPTION - LOCATION
LOCATION: BACK
LOCATION: BACK
LOCATION: KNEE
LOCATION: BACK;KNEE
LOCATION: BACK

## 2021-11-08 ASSESSMENT — PAIN DESCRIPTION - DESCRIPTORS
DESCRIPTORS: ACHING;SHARP
DESCRIPTORS: ACHING;DISCOMFORT
DESCRIPTORS: CONSTANT;DISCOMFORT;SHARP
DESCRIPTORS: SHARP
DESCRIPTORS: ACHING

## 2021-11-08 ASSESSMENT — PAIN SCALES - GENERAL
PAINLEVEL_OUTOF10: 5
PAINLEVEL_OUTOF10: 4
PAINLEVEL_OUTOF10: 10
PAINLEVEL_OUTOF10: 10
PAINLEVEL_OUTOF10: 0
PAINLEVEL_OUTOF10: 5
PAINLEVEL_OUTOF10: 10
PAINLEVEL_OUTOF10: 10

## 2021-11-08 ASSESSMENT — PAIN DESCRIPTION - ONSET
ONSET: GRADUAL
ONSET: GRADUAL
ONSET: ON-GOING
ONSET: ON-GOING

## 2021-11-08 ASSESSMENT — PAIN DESCRIPTION - FREQUENCY
FREQUENCY: CONTINUOUS
FREQUENCY: INTERMITTENT
FREQUENCY: CONTINUOUS
FREQUENCY: INTERMITTENT
FREQUENCY: INTERMITTENT

## 2021-11-08 ASSESSMENT — PAIN DESCRIPTION - PAIN TYPE
TYPE: CHRONIC PAIN

## 2021-11-08 ASSESSMENT — PAIN DESCRIPTION - PROGRESSION
CLINICAL_PROGRESSION: NOT CHANGED

## 2021-11-08 ASSESSMENT — PAIN DESCRIPTION - DIRECTION
RADIATING_TOWARDS: HIP AND BACK

## 2021-11-08 NOTE — CONSULTS
Infectious Disease Consult Note  2021   Patient Name: Maeve Peck : 1950   Impression   Legionnaires disease, right parapneumonic effusion complicated by acute hypoxic respiratory failure: Overall appears to be improving as oxygen needs have been titrated downwards. Leukocytosis likely multifactorial, from infection as well as corticosteroids. Overall the patient is improving. Possibility of bacterial coinfection exists.  COPD   Multi-morbidity: per PMHx  Plan:   Therapeutic: Vancomycin, cefepime and levofloxacin   Diagnostic: Bilateral lower extremity venous Doppler   F/u test: MRSA nares. Thank you for allowing me to consult in the care of this patient.  ------------------------  REASON FOR CONSULT: Infective syndrome   Requested by: Dr. Eduardo Arceo is a 73 y.o.  male with a medical history of chronic hepatitis B, hyperlipidemia, essential hypertension, tobacco dependence, who was admitted 11/3/2021 for further evaluation and management of fall, shortness of breath and fever. Apparently had the mechanical fall that preceded his symptoms of cough and shortness of breath that worsened over the course of 3 days. Chest x-ray on admission showed no focal airspace disease. X-ray of the pelvis, and both knees did not show any fracture. On 11/3/2021 CTA of the chest showed no evidence of pulmonary embolism, however, it showed large pneumonia involving majority of the right lower lobe. Procalcitonin was elevated at 0.834, WBC was 30 1.8K. Mm3. He received empiric vancomycin, cefepime and azithromycin. The day after his procalcitonin had risen to 1.85. Legionella urine antigen was positive, levofloxacin was added on 2021. The patient has been receiving methylprednisolone and now prednisone. White blood cell count had risen to 54,000 per mm3, and now down to 46,510 mm3. Procalcitonin is down to 0.349.   Infectious disease consult was placed for persistently elevated white blood cell count. ?  Infectious diseases service was consulted to evaluate the pt, and recommend further investigative and therapeutic measures. Review and summary of old records:  ROS: Other systems reviewed Including eyes, ENT, respiratory, cardiovascular, GI, , dermatologic, neurologic, psych, hem/lymphatic, musculoskeletal and endocrine were negative other than what is mentioned above.      Patient Active Problem List    Diagnosis Date Noted    Septicemia ([facility] [address]     PNA (pneumonia) 11/03/2021    Bilateral carpal tunnel syndrome 06/04/2021    Flexor tenosynovitis of finger 06/12/2017    Complex tear of lateral meniscus of right knee as current injury 04/13/2017    Chondromalacia patellae, right knee 04/13/2017    Primary osteoarthritis of right knee 04/13/2017    Acute medial meniscal tear 04/05/2017    Cataract 08/23/2016    Hyperlipidemia 05/26/2016    Edema 03/17/2015    Depression, neurotic 05/01/2014    Pain in the wrist 08/13/2013    Benign essential HTN 07/11/2013    Tobacco dependence 06/05/2012    Right bundle branch block 06/05/2012    GERD (gastroesophageal reflux disease) 33/22/4012    Uncomplicated alcohol dependence ([facility] [address] 06/05/2012    Low back pain 01/26/2012    Hypotestosteronism 01/26/2012    Tobacco use 01/26/2012    Decreased testosterone level 01/26/2012     Past Medical History:   Diagnosis Date    Acid reflux     Anxiety     Arthritis     \"Right Knee\"    Asthma     Chronic back pain     Cough     Occ Productive Cough \"Milky\" Sputum    Diverticulitis Dx Early 2000's    Hepatitis Dx 18's    \"In The Hospital A Few Days\"    [place] (hard of hearing)     Bilateral Ears    HTN (hypertension)     Hyperlipidemia     Nocturia     Shortness of breath on exertion     Slow urinary stream     Tingling     \"Tingling Right Hand\"    Wears dentures     Full Upper, Partial Lower    Wears glasses     Wears partial dentures     Lower      Past Surgical History:   Procedure Laterality Date    APPENDECTOMY  1960's    CARPAL TUNNEL RELEASE Right 06/22/2017    COLONOSCOPY  Last Done Early 2000's    Polyps Removed In Past    DENTAL SURGERY      Teeth Extracted In Past    EYE SURGERY Left Late 1980's    Cataract With Lens Implant    FRACTURE SURGERY Left 2000's    Broken Left Wrist    HERNIA REPAIR  5676'T    Umbilical Hernia Repair    KNEE ARTHROSCOPY Right 04/13/2017    meniscus repair    TONSILLECTOMY AND ADENOIDECTOMY  26's      Family History   Problem Relation Age of Onset    Stroke Mother     Heart Disease Mother     Cancer Father         Prostate Cancer    Cancer Son         Leukemia      Infectious disease related family history - not contibutory. SOCIAL HISTORY  Social History     Tobacco Use    Smoking status: Current Every Day Smoker     Packs/day: 0.25     Years: 52.00     Pack years: 13.00     Types: Cigarettes     Start date: 1965    Smokeless tobacco: Never Used   Substance Use Topics    Alcohol use: Not Currently     Alcohol/week: 3.0 - 6.0 standard drinks     Types: 3 - 6 Cans of beer per week       Born:  [address] Occupation:   No recent travel of significance.  No recent unusual exposures.  NO pets  ? ALLERGIES  Allergies   Allergen Reactions    Sulfa Antibiotics Rash      MEDICATIONS  Reviewed and are per the chart/EMR.    IMMUNIZATION HISTORY  Immunization History   Administered Date(s) Administered    COVID-19, Pfizer, PF, 30mcg/0.3mL 02/13/2021, 03/09/2021    Influenza, High Dose (Fluzone 65 yrs and older) 09/17/2018    Influenza, Triv, inactivated, subunit, adjuvanted, IM (Fluad 65 yrs and older) 10/07/2019    Pneumococcal Conjugate 13-valent ([id number]) 07/25/2017    Pneumococcal Polysaccharide ([id number]) 09/04/2018    Tdap (Boostrix, Adacel) 05/06/2015 ?  Antibiotics:  Vancomycin  Cefepime  Levofloxacin  ?  -------------------------------------------------------------------------------------------------------------------    Vital Signs:  Vitals:    11/08/21 0800   BP: 133/82   Pulse: 88   Resp: 16   Temp: 97.8 °F (36.6 °C)   SpO2: 91%         Exam:    VS: noted; wt 136.1 kg  Gen: alert and oriented X3, no distress  Skin: no stigmata of endocarditis  Wounds: C/D/I  HEMT: AT/NC Oropharynx pink, moist, and without lesions or exudates; dentition in good state of repair  Eyes: PERRLA, EOMI, conjunctiva pink, sclera anicteric. Neck: Supple. Trachea midline. No LAD. Chest: Bibasilar crackles  Heart: RRR and no MRG. Abd: soft, non-distended, no tenderness, no hepatomegaly. Normoactive bowel sounds. Ext: Bilateral lower extremity pitting edema  Catheter Site: Bilateral lower extremity  LDA: Urethral catheter  Neuro: Mental status intact. CN 2-12 intact and no focal sensory or motor deficits    ? Diagnostic Studies: reviewed  ? ? I have examined this patient and available medical records on this date and have made the above observations, conclusions and recommendations.   Electronically signed by: Electronically signed by Pepe Garcia MD on 11/8/2021 at 9:41 AM

## 2021-11-08 NOTE — PROGRESS NOTES
Pulmonary and Critical Care  Progress Note      VITALS:  /60   Pulse (P) 91   Temp 99.1 °F (37.3 °C) (Oral)   Resp 25   Ht 5' 8\" (1.727 m)   Wt 300 lb (136.1 kg)   SpO2 93%   BMI 45.61 kg/m²     Subjective:   CHIEF COMPLAINT :Fall     HPI:                The patient is lying in the bed. He is not wolfgang cute resp distress    Objective:   PHYSICAL EXAM:    LUNGS:decreased air entry bilateral bases  Abd-soft, BS+,NT  Ext- no pedal edema  CVS-s1s2, no murmurs      DATA:    CBC:  Recent Labs     11/06/21  0402 11/07/21  0339 11/08/21 0440   WBC 54.0* 48.2* 46.5*   RBC 4.45* 4.52* 4.67   HGB 13.7 13.6 14.4   HCT 41.1* 41.6* 43.5    281 346   MCV 92.4 92.0 93.1   MCH 30.8 30.1 30.8   MCHC 33.3 32.7 33.1   RDW 13.2 13.2 13.5   SEGSPCT 28.0*  --   --    BANDSPCT 61*  --   --       BMP:  Recent Labs     11/06/21 0402 11/07/21 0339 11/08/21 0440    134* 139   K 4.2 4.7 4.3    99 99   CO2 25 27 31   BUN 32* 31* 28*   CREATININE 0.6* 0.5* 0.5*   CALCIUM 8.3 7.9* 8.2*   GLUCOSE 145* 124* 102*      ABG:  No results for input(s): PH, PO2ART, UKE2SGD, HCO3, BEART, O2SAT in the last 72 hours. BNP  No results found for: BNP   D-Dimer:  Lab Results   Component Value Date    DDIMER 260 (H) 06/05/2012      1.  Radiology: None      Assessment/Plan     Patient Active Problem List    Diagnosis Date Noted    Septicemia ([facility] [address]     PNA (pneumonia) 11/03/2021    Bilateral carpal tunnel syndrome 06/04/2021    Flexor tenosynovitis of finger 06/12/2017    Complex tear of lateral meniscus of right knee as current injury 04/13/2017    Chondromalacia patellae, right knee 04/13/2017    Primary osteoarthritis of right knee 04/13/2017    Acute medial meniscal tear 04/05/2017    Cataract 08/23/2016    Hyperlipidemia 05/26/2016    Edema 03/17/2015    Depression, neurotic 05/01/2014    Pain in the wrist 08/13/2013    Benign essential HTN 07/11/2013    Tobacco dependence 06/05/2012    Right bundle branch block 06/05/2012    GERD (gastroesophageal reflux disease) 78/13/7185    Uncomplicated alcohol dependence ([facility] [address] 06/05/2012    Low back pain 01/26/2012    Hypotestosteronism 01/26/2012    Tobacco use 01/26/2012    Decreased testosterone level 01/26/2012   Hypoxia- improving  RLL Pneumonia sec to Legionella  Morbid Obesity  ? BHARGAVI  Leukocytosis  Sepsis  Right pleural effusion likely parapneumonic       1. Thoracentesis today  2. Pleural fluid analysis  3. Abx  4. F/u C&S  5. ICS  6. OOB  7. Keep sats > 92%  8. CXR post thoracentesis  9.  C.w present management    Electronically signed by Claudia Poon MD on 11/8/2021 at 11:59 AM

## 2021-11-08 NOTE — PROGRESS NOTES
Dr. Sandra Gamble in to talk with patient. After reviewing US images it was determined that there was not enough fluid to perform procedure. 3 US images were taken   Patient's plan of care explained by Dr. Sandra Gamble. Report called to [facility] ([facility] Patient transported back room via bed with transporter. No acute distress noted upon departure.

## 2021-11-08 NOTE — PROGRESS NOTES
Noy Gabriel is a 73 y.o. male started on vancomycin for sepsis secondary to pneumonia. Pharmacy consulted by Dr. Chase Crigler for monitoring and adjustment. Indication for treatment: CAP, possible loculated effusion  Goal trough: 15 mcg/mL / 400-600 AUC/MELISSA  Other antimicrobials: Cefepime    Ht Readings from Last 1 Encounters:   11/04/21 5' 8\" (1.727 m)     Wt Readings from Last 3 Encounters:   11/04/21 300 lb (136.1 kg)   10/27/21 299 lb (135.6 kg)   08/13/21 299 lb (135.6 kg)        Pertinent Laboratory Values:   Temp Readings from Last 3 Encounters:   11/08/21 99.1 °F (37.3 °C) (Oral)   08/13/21 98.1 °F (36.7 °C) (Infrared)   06/04/21 98.1 °F (36.7 °C) (Infrared)     Recent Labs     11/06/21  0402 11/06/21  1039 11/06/21  1517 11/06/21  2054 11/07/21  0339 11/08/21  0440   WBC 54.0*  --   --   --  48.2* 46.5*   LACTATE 1.2   < > 2.2* 2.8* 1.4  --     < > = values in this interval not displayed. Recent Labs     11/06/21  0402 11/07/21  0339 11/08/21  0440   BUN 32* 31* 28*   CREATININE 0.6* 0.5* 0.5*     Estimated Creatinine Clearance: 188 mL/min (A) (based on SCr of 0.5 mg/dL (L)). Intake/Output Summary (Last 24 hours) at 11/8/2021 1506  Last data filed at 11/8/2021 1202  Gross per 24 hour   Intake 10 ml   Output 3500 ml   Net -3490 ml       Previous vancomycin levels:  TROUGH:    Recent Labs     11/08/21  1203   VANCOTROUGH 15.5     RANDOM:    Recent Labs     11/06/21  0402   VANCORANDOM 19.3     Assessment:  · WBC and temperature: WBC remains very elevated;  Pt with a slight fever of 99.1  · SCr, BUN, and urine output:  SCR remains at baseline, UOP good  · Day(s) of therapy: 6  · Vancomycin concentration:  · 11/06: 19.3, 4h post-dose, , sub-therapeutic (1250 mg q12h)  · 11//08: 15.5, 10½ hour level on 1500mg q12h, predicted     Plan:  · Renal trends remain at baseline  · Continue vancomycin 1500mg ivpb q12h with a predicted therapeutic AUC.  · Recheck the vanco level in 3 days to monitor for accumulation  2/2 BMI  · Pharmacy will continue to monitor patient and adjust therapy as indicated    VANCOMYCIN LEVEL SCHEDULED FOR 11/11 @00:30    Thank you for the consult,  Attila Lora.  Clement Christie, [address] [place] [address]  3:06 PM  11/08/21

## 2021-11-08 NOTE — PROGRESS NOTES
Physician Progress Note      Carlos Phelps  CSN #:                  [id number]  :                       1950  ADMIT DATE:       11/3/2021 8:13 AM  DISCH DATE:  RESPONDING  PROVIDER #:        GENIA Jensen MD          QUERY TEXT:    Patient admitted with BMI 45.6. If possible, please document in progress notes   and discharge summary if you are evaluating and /or treating any of the   following: The medical record reflects the following:  Risk Factors: lifestyle choices  Clinical Indicators: Pt is 5'8\", weighs 300lb. Pt BMI is 45.6. Treatment: Will require lifestyle modifications and education    Thank you,  Luis Willis, RN  [phone]  Options provided:  -- Morbid obesity  -- Other - I will add my own diagnosis  -- Disagree - Not applicable / Not valid  -- Disagree - Clinically unable to determine / Unknown  -- Refer to Clinical Documentation Reviewer    PROVIDER RESPONSE TEXT:    This patient has morbid obesity.     Query created by: Hilda Sharif on 2021 2:37 PM      Electronically signed by:  Cj Lopez MD 2021 3:22 PM

## 2021-11-09 PROBLEM — J96.01 ACUTE RESPIRATORY FAILURE WITH HYPOXIA (HCC): Status: ACTIVE | Noted: 2021-11-09

## 2021-11-09 LAB
ANION GAP SERPL CALCULATED.3IONS-SCNC: 12 MMOL/L (ref 4–16)
BUN BLDV-MCNC: 24 MG/DL (ref 6–23)
CALCIUM SERPL-MCNC: 7.7 MG/DL (ref 8.3–10.6)
CHLORIDE BLD-SCNC: 99 MMOL/L (ref 99–110)
CO2: 29 MMOL/L (ref 21–32)
CREAT SERPL-MCNC: 0.5 MG/DL (ref 0.9–1.3)
GFR AFRICAN AMERICAN: >60 ML/MIN/1.73M2
GFR NON-AFRICAN AMERICAN: >60 ML/MIN/1.73M2
GLUCOSE BLD-MCNC: 100 MG/DL (ref 70–99)
GLUCOSE BLD-MCNC: 113 MG/DL (ref 70–99)
HCT VFR BLD CALC: 44.6 % (ref 42–52)
HEMOGLOBIN: 14.4 GM/DL (ref 13.5–18)
MCH RBC QN AUTO: 30.6 PG (ref 27–31)
MCHC RBC AUTO-ENTMCNC: 32.3 % (ref 32–36)
MCV RBC AUTO: 94.9 FL (ref 78–100)
PDW BLD-RTO: 13.3 % (ref 11.7–14.9)
PLATELET # BLD: 358 K/CU MM (ref 140–440)
PMV BLD AUTO: 10.4 FL (ref 7.5–11.1)
POTASSIUM SERPL-SCNC: 4.1 MMOL/L (ref 3.5–5.1)
RBC # BLD: 4.7 M/CU MM (ref 4.6–6.2)
SODIUM BLD-SCNC: 140 MMOL/L (ref 135–145)
WBC # BLD: 36.9 K/CU MM (ref 4–10.5)

## 2021-11-09 PROCEDURE — 6360000002 HC RX W HCPCS: Performed by: NURSE PRACTITIONER

## 2021-11-09 PROCEDURE — 94761 N-INVAS EAR/PLS OXIMETRY MLT: CPT

## 2021-11-09 PROCEDURE — 2580000003 HC RX 258: Performed by: NURSE PRACTITIONER

## 2021-11-09 PROCEDURE — 99232 SBSQ HOSP IP/OBS MODERATE 35: CPT | Performed by: INTERNAL MEDICINE

## 2021-11-09 PROCEDURE — 2580000003 HC RX 258: Performed by: HOSPITALIST

## 2021-11-09 PROCEDURE — 2700000000 HC OXYGEN THERAPY PER DAY

## 2021-11-09 PROCEDURE — 2140000000 HC CCU INTERMEDIATE R&B

## 2021-11-09 PROCEDURE — 6370000000 HC RX 637 (ALT 250 FOR IP): Performed by: NURSE PRACTITIONER

## 2021-11-09 PROCEDURE — 6360000002 HC RX W HCPCS: Performed by: HOSPITALIST

## 2021-11-09 PROCEDURE — 94640 AIRWAY INHALATION TREATMENT: CPT

## 2021-11-09 PROCEDURE — 36415 COLL VENOUS BLD VENIPUNCTURE: CPT

## 2021-11-09 PROCEDURE — 82962 GLUCOSE BLOOD TEST: CPT

## 2021-11-09 PROCEDURE — 94664 DEMO&/EVAL PT USE INHALER: CPT

## 2021-11-09 PROCEDURE — 80048 BASIC METABOLIC PNL TOTAL CA: CPT

## 2021-11-09 PROCEDURE — 99233 SBSQ HOSP IP/OBS HIGH 50: CPT | Performed by: INTERNAL MEDICINE

## 2021-11-09 PROCEDURE — 6370000000 HC RX 637 (ALT 250 FOR IP): Performed by: HOSPITALIST

## 2021-11-09 PROCEDURE — 85027 COMPLETE CBC AUTOMATED: CPT

## 2021-11-09 RX ORDER — ZOLPIDEM TARTRATE 5 MG/1
5 TABLET ORAL NIGHTLY PRN
Status: DISCONTINUED | OUTPATIENT
Start: 2021-11-09 | End: 2021-11-10 | Stop reason: HOSPADM

## 2021-11-09 RX ADMIN — GUAIFENESIN 600 MG: 600 TABLET, EXTENDED RELEASE ORAL at 10:14

## 2021-11-09 RX ADMIN — CEFEPIME HYDROCHLORIDE 2000 MG: 2 INJECTION, POWDER, FOR SOLUTION INTRAVENOUS at 10:26

## 2021-11-09 RX ADMIN — Medication 2 PUFF: at 11:24

## 2021-11-09 RX ADMIN — PANTOPRAZOLE SODIUM 40 MG: 40 TABLET, DELAYED RELEASE ORAL at 05:20

## 2021-11-09 RX ADMIN — OXYCODONE AND ACETAMINOPHEN 1 TABLET: 5; 325 TABLET ORAL at 10:13

## 2021-11-09 RX ADMIN — BUDESONIDE AND FORMOTEROL FUMARATE DIHYDRATE 2 PUFF: 160; 4.5 AEROSOL RESPIRATORY (INHALATION) at 20:04

## 2021-11-09 RX ADMIN — FUROSEMIDE 40 MG: 10 INJECTION, SOLUTION INTRAMUSCULAR; INTRAVENOUS at 18:47

## 2021-11-09 RX ADMIN — LISINOPRIL 5 MG: 5 TABLET ORAL at 10:13

## 2021-11-09 RX ADMIN — VANCOMYCIN 1500 MG: 1.5 INJECTION, SOLUTION INTRAVENOUS at 18:50

## 2021-11-09 RX ADMIN — CEFEPIME HYDROCHLORIDE 2000 MG: 2 INJECTION, POWDER, FOR SOLUTION INTRAVENOUS at 21:10

## 2021-11-09 RX ADMIN — GABAPENTIN 100 MG: 100 CAPSULE ORAL at 21:06

## 2021-11-09 RX ADMIN — BUDESONIDE AND FORMOTEROL FUMARATE DIHYDRATE 2 PUFF: 160; 4.5 AEROSOL RESPIRATORY (INHALATION) at 07:54

## 2021-11-09 RX ADMIN — TAMSULOSIN HYDROCHLORIDE 0.4 MG: 0.4 CAPSULE ORAL at 10:14

## 2021-11-09 RX ADMIN — FUROSEMIDE 40 MG: 10 INJECTION, SOLUTION INTRAMUSCULAR; INTRAVENOUS at 10:14

## 2021-11-09 RX ADMIN — Medication 2 PUFF: at 20:03

## 2021-11-09 RX ADMIN — AMLODIPINE BESYLATE 5 MG: 5 TABLET ORAL at 10:14

## 2021-11-09 RX ADMIN — GABAPENTIN 100 MG: 100 CAPSULE ORAL at 14:54

## 2021-11-09 RX ADMIN — ALBUTEROL SULFATE 2 PUFF: 90 AEROSOL, METERED RESPIRATORY (INHALATION) at 20:02

## 2021-11-09 RX ADMIN — VANCOMYCIN 1500 MG: 1.5 INJECTION, SOLUTION INTRAVENOUS at 05:21

## 2021-11-09 RX ADMIN — GABAPENTIN 100 MG: 100 CAPSULE ORAL at 10:14

## 2021-11-09 RX ADMIN — GUAIFENESIN 600 MG: 600 TABLET, EXTENDED RELEASE ORAL at 21:06

## 2021-11-09 RX ADMIN — ALBUTEROL SULFATE 2 PUFF: 90 AEROSOL, METERED RESPIRATORY (INHALATION) at 11:22

## 2021-11-09 RX ADMIN — Medication 2 PUFF: at 07:52

## 2021-11-09 RX ADMIN — ZOLPIDEM TARTRATE 5 MG: 5 TABLET ORAL at 21:20

## 2021-11-09 RX ADMIN — ALBUTEROL SULFATE 2 PUFF: 90 AEROSOL, METERED RESPIRATORY (INHALATION) at 07:50

## 2021-11-09 RX ADMIN — SODIUM CHLORIDE, PRESERVATIVE FREE 10 ML: 5 INJECTION INTRAVENOUS at 21:07

## 2021-11-09 RX ADMIN — ENOXAPARIN SODIUM 40 MG: 100 INJECTION SUBCUTANEOUS at 10:13

## 2021-11-09 RX ADMIN — NALTREXONE HYDROCHLORIDE 50 MG: 50 TABLET, FILM COATED ORAL at 10:19

## 2021-11-09 RX ADMIN — PREDNISONE 40 MG: 20 TABLET ORAL at 10:14

## 2021-11-09 RX ADMIN — LEVOFLOXACIN 750 MG: 5 INJECTION, SOLUTION INTRAVENOUS at 14:54

## 2021-11-09 ASSESSMENT — PAIN DESCRIPTION - ORIENTATION
ORIENTATION: RIGHT;LEFT
ORIENTATION: LOWER

## 2021-11-09 ASSESSMENT — PAIN - FUNCTIONAL ASSESSMENT: PAIN_FUNCTIONAL_ASSESSMENT: PREVENTS OR INTERFERES SOME ACTIVE ACTIVITIES AND ADLS

## 2021-11-09 ASSESSMENT — PAIN DESCRIPTION - LOCATION
LOCATION: BACK
LOCATION: LEG

## 2021-11-09 ASSESSMENT — PAIN SCALES - GENERAL
PAINLEVEL_OUTOF10: 8
PAINLEVEL_OUTOF10: 5
PAINLEVEL_OUTOF10: 5

## 2021-11-09 ASSESSMENT — PAIN DESCRIPTION - DESCRIPTORS: DESCRIPTORS: ACHING

## 2021-11-09 ASSESSMENT — PAIN DESCRIPTION - ONSET: ONSET: GRADUAL

## 2021-11-09 ASSESSMENT — PAIN DESCRIPTION - PROGRESSION: CLINICAL_PROGRESSION: NOT CHANGED

## 2021-11-09 ASSESSMENT — PAIN DESCRIPTION - FREQUENCY: FREQUENCY: INTERMITTENT

## 2021-11-09 ASSESSMENT — PAIN DESCRIPTION - PAIN TYPE
TYPE: CHRONIC PAIN
TYPE: CHRONIC PAIN

## 2021-11-09 ASSESSMENT — PAIN DESCRIPTION - DIRECTION: RADIATING_TOWARDS: HIP AND BACK

## 2021-11-09 NOTE — PROGRESS NOTES
Pulmonary and Critical Care  Progress Note      VITALS:  /68   Pulse 83   Temp 97.8 °F (36.6 °C) (Oral)   Resp 15   Ht 5' 8\" (1.727 m)   Wt 300 lb (136.1 kg)   SpO2 92%   BMI 45.61 kg/m²     Subjective:   CHIEF COMPLAINT :Fall     HPI:                The patient is lying in the bed. He feels better. He is not in acute resp distress    Objective:   PHYSICAL EXAM:    LUNGS:Decreased air entry right base  Abd-distended, BS+,NT  Ext- no pedal edema  CVS-s1s2, no murmurs      DATA:    CBC:  Recent Labs     11/07/21  0339 11/08/21  0440 11/09/21  0416   WBC 48.2* 46.5* 36.9*   RBC 4.52* 4.67 4.70   HGB 13.6 14.4 14.4   HCT 41.6* 43.5 44.6    346 358   MCV 92.0 93.1 94.9   MCH 30.1 30.8 30.6   MCHC 32.7 33.1 32.3   RDW 13.2 13.5 13.3      BMP:  Recent Labs     11/07/21  0339 11/08/21  0440 11/09/21  0416   * 139 140   K 4.7 4.3 4.1   CL 99 99 99   CO2 27 31 29   BUN 31* 28* 24*   CREATININE 0.5* 0.5* 0.5*   CALCIUM 7.9* 8.2* 7.7*   GLUCOSE 124* 102* 113*      ABG:  No results for input(s): PH, PO2ART, NPQ9ORF, HCO3, BEART, O2SAT in the last 72 hours. BNP  No results found for: BNP   D-Dimer:  Lab Results   Component Value Date    DDIMER 260 (H) 06/05/2012      1.  Radiology: None      Assessment/Plan     Patient Active Problem List    Diagnosis Date Noted    Septicemia ([facility] [address]     PNA (pneumonia) 11/03/2021    Bilateral carpal tunnel syndrome 06/04/2021    Flexor tenosynovitis of finger 06/12/2017    Complex tear of lateral meniscus of right knee as current injury 04/13/2017    Chondromalacia patellae, right knee 04/13/2017    Primary osteoarthritis of right knee 04/13/2017    Acute medial meniscal tear 04/05/2017    Cataract 08/23/2016    Hyperlipidemia 05/26/2016    Edema 03/17/2015    Depression, neurotic 05/01/2014    Pain in the wrist 08/13/2013    Benign essential HTN 07/11/2013    Tobacco dependence 06/05/2012    Right bundle branch block 06/05/2012    GERD (gastroesophageal reflux disease) 90/53/1151    Uncomplicated alcohol dependence ([facility] [address] 06/05/2012    Low back pain 01/26/2012    Hypotestosteronism 01/26/2012    Tobacco use 01/26/2012    Decreased testosterone level 01/26/2012   Hypoxia- improving  RLL Pneumonia sec to Legionella  Morbid Obesity  ? BHARGAVI  Leukocytosis  Sepsis  Right pleural effusion likely parapneumonic       1. Abx  2. F/u C&S  3. ICS  4. OOB  5. BIPAP qhs and prn  6. Keep sats > 92%  7. Await placement  8. CXR in am  9.  C.w present management    Electronically signed by Deb Issa MD on 11/9/2021 at 1:50 PM

## 2021-11-09 NOTE — PROGRESS NOTES
Cherelle Torres is a 73 y.o. male started on vancomycin for sepsis secondary to pneumonia. Pharmacy consulted by Dr. Grecia Felder for monitoring and adjustment. Indication for treatment: CAP, possible loculated effusion  Goal trough: 15 mcg/mL / 400-600 AUC/MELISSA  Other antimicrobials: Cefepime    Ht Readings from Last 1 Encounters:   11/04/21 5' 8\" (1.727 m)     Wt Readings from Last 3 Encounters:   11/04/21 300 lb (136.1 kg)   10/27/21 299 lb (135.6 kg)   08/13/21 299 lb (135.6 kg)        Pertinent Laboratory Values:   Temp Readings from Last 3 Encounters:   11/09/21 97.8 °F (36.6 °C) (Oral)   08/13/21 98.1 °F (36.7 °C) (Infrared)   06/04/21 98.1 °F (36.7 °C) (Infrared)     Recent Labs     11/06/21 2054 11/07/21 0339 11/08/21  0440 11/09/21  0416   WBC  --  48.2* 46.5* 36.9*   LACTATE 2.8* 1.4  --   --      Recent Labs     11/07/21 0339 11/08/21  0440 11/09/21  0416   BUN 31* 28* 24*   CREATININE 0.5* 0.5* 0.5*     Estimated Creatinine Clearance: 188 mL/min (A) (based on SCr of 0.5 mg/dL (L)). Intake/Output Summary (Last 24 hours) at 11/9/2021 1539  Last data filed at 11/9/2021 6631  Gross per 24 hour   Intake 2137.34 ml   Output 2150 ml   Net -12.66 ml       Previous vancomycin levels:  TROUGH:    Recent Labs     11/08/21  1203   VANCOTROUGH 15.5     RANDOM:    No results for input(s): VANCORANDOM in the last 72 hours. Assessment:  · WBC and temperature: WBC remains very elevated; Improved down to 36.9; Pt now afebrile. · SCr, BUN, and urine output:  SCR remains at baseline, UOP good  · Day(s) of therapy: 7  · Vancomycin concentration:  · 11/06: 19.3, 4h post-dose, , sub-therapeutic (1250 mg q12h)  · 11//08: 15.5, 10½ hour level on 1500mg q12h, predicted   · 11/11: scheduled for 00:30    Plan:  · Renal trends remain at baseline  · Continue vancomycin 1500mg ivpb q12h with a predicted therapeutic AUC.   · Recheck the vanco level in 2 days to monitor for accumulation  2/2 BMI  · Pharmacy will continue to monitor patient and adjust therapy as indicated    VANCOMYCIN LEVEL SCHEDULED FOR 11/11 @05:30    Thank you for the consult,  Yokasta Sommer.  Kannan Fernandez [facility]  3:39 PM  11/09/21

## 2021-11-09 NOTE — PROGRESS NOTES
following  Add levofloxacin, monitor QTc, initially elevated on EKG, has been less than 500 msec on telemetry   Continue lasix   Solumedrol switched to lasix     CT chest read loculated effusion, discussed with IR, not loculated, since legionella identified would not perform thoracentesis, effusion not big enough to improve clinical symtpoms, sample for culture and cytology can be obtained     Consult ID, recommends continue current antibiotic    Continue lasix BID, diuresing well,     Will need repeat CT in 2-3 weeks for follow up     Leukocytosis improving    Check daily CBC     Patient was seen in hospital for COPD .  I am prescribing oxygen because the diagnosis and testing requires the patient to have oxygen in the home.  Conditions will improve or be benefited by oxygen use.  The patient is able to perform good mobility and therefore requires the use of a portable oxygen system for ambulation.        Acute rhabdomyolysis status post fall  Lactic acidosis: CPK elevated  Resolved  PT.OT consult   Orthostatics     Essential hypertension: resume lisinopril and amlodipine   PRN hydralazine for now     Hyperlipidemia: Statin     BPH: flomax     Tobacco dependence: nicotine patch       Chronic condition:   History of chronic back pain  History of hepatitis B  Morbid obesity     DVT prophlaxis:   Lovenox    Last BM:   None since admission     Ambulation:   As tolerated    Disposition:   Home     Diet: general diet    Physical Exam Performed:       /69   Pulse 89   Temp 97.8 °F (36.6 °C) (Oral)   Resp 19   Ht 5' 8\" (1.727 m)   Wt 300 lb (136.1 kg)   SpO2 90%   BMI 45.61 kg/m²     Physical Exam  Constitutional:       General: He is not in acute distress. Appearance: Normal appearance. HENT:      Head: Normocephalic and atraumatic. Right Ear: External ear normal.      Left Ear: External ear normal.   Eyes:      Extraocular Movements: Extraocular movements intact.       Pupils: Pupils are equal, round, and reactive to light. Cardiovascular:      Rate and Rhythm: Normal rate and regular rhythm. Heart sounds: No murmur heard. Pulmonary:      Effort: Pulmonary effort is normal. No respiratory distress. Breath sounds: Normal breath sounds. No wheezing. Abdominal:      General: Bowel sounds are normal. There is no distension. Palpations: Abdomen is soft. Tenderness: There is no abdominal tenderness. Musculoskeletal:         General: Swelling present. Cervical back: Normal range of motion. Comments: Bilateral LE edema    Skin:     General: Skin is warm. Neurological:      General: No focal deficit present. Mental Status: He is alert and oriented to person, place, and time. Cranial Nerves: No cranial nerve deficit. Psychiatric:         Mood and Affect: Mood normal.         Labs:   Recent Labs     11/07/21 0339 11/08/21 0440 11/09/21 0416   WBC 48.2* 46.5* 36.9*   HGB 13.6 14.4 14.4   HCT 41.6* 43.5 44.6    346 358     Recent Labs     11/07/21 0339 11/08/21 0440 11/09/21 0416   * 139 140   K 4.7 4.3 4.1   CL 99 99 99   CO2 27 31 29   BUN 31* 28* 24*   CREATININE 0.5* 0.5* 0.5*   CALCIUM 7.9* 8.2* 7.7*     No results for input(s): AST, ALT, BILIDIR, BILITOT, ALKPHOS in the last 72 hours. No results for input(s): INR in the last 72 hours. No results for input(s): Vicky Gazella in the last 72 hours. Urinalysis:      Lab Results   Component Value Date    NITRU NEGATIVE 11/03/2021    WBCUA 2 11/03/2021    BACTERIA FEW 11/03/2021    RBCUA <1 11/03/2021    BLOODU NEGATIVE 11/03/2021    SPECGRAV 1.027 11/03/2021       Radiology:  VL DUP LOWER EXTREMITY VENOUS BILATERAL   Final Result   No evidence of DVT in either lower extremity. IR GUIDED THORACENTESIS PLEURAL   Final Result      CT CHEST W CONTRAST   Final Result   Worsening multifocal pneumonia with increase in volume of small loculated   right pleural effusion.          US CHEST INCLUDING MEDIASTINUM   Final Result   1. Moderate complex right pleural effusion. XR CHEST PORTABLE   Final Result   Improved right pleural effusion, remains large. XR CHEST PORTABLE   Final Result   Interval worsening consolidation within the right lung. Findings could be   related to worsening pneumonia or pleural fluid. XR CHEST PORTABLE   Final Result   1. Right lower lobe consolidation with a small right pleural effusion. Findings have progressed. 2. Cardiomegaly with pulmonary vascular congestion. CTA PULMONARY W CONTRAST   Final Result   No evidence of pulmonary embolism. Large pneumonia involving majority of the right lower lobe. Follow-up chest   x-ray to resolution is recommended to rule out the possibility of an   underlying neoplasm. XR PELVIS (1-2 VIEWS)   Final Result   1. No acute abnormality involving the pelvis. 2. No acute abnormality involving the right or left knees. 3. Bilateral tricompartmental osteoarthritis of the knees. XR FEMUR RIGHT (MIN 2 VIEWS)   Final Result   1. No acute abnormality involving the pelvis. 2. No acute abnormality involving the right or left knees. 3. Bilateral tricompartmental osteoarthritis of the knees. XR FEMUR LEFT (MIN 2 VIEWS)   Final Result   1. No acute abnormality involving the pelvis. 2. No acute abnormality involving the right or left knees. 3. Bilateral tricompartmental osteoarthritis of the knees. XR CHEST PORTABLE   Final Result   Interval enlargement of the cardiomediastinal silhouette which may be   exaggerated by patient rotation. No focal airspace disease or overt pulmonary edema.                  Hoda Cook MD  11/9/2021 10:05 AM

## 2021-11-09 NOTE — PROGRESS NOTES
Infectious Disease Progress Note  2021   Patient Name: Virgil Jordan : 1950       Reason for visit: F/u legionnaires disease, acute hypoxic respiratory failure  History:? Interval history noted  Denies n/v/d/f or untoward effects of antimicrobials  Physical Exam:  Vital Signs: /68   Pulse 83   Temp 97.8 °F (36.6 °C) (Oral)   Resp 15   Ht 5' 8\" (1.727 m)   Wt 300 lb (136.1 kg)   SpO2 92%   BMI 45.61 kg/m²     Gen: alert and oriented X3, no distress  Skin: no stigmata of endocarditis  Wounds: C/D/I  HEMT: AT/NC Oropharynx pink, moist, and without lesions or exudates; dentition in good state of repair  Eyes: PERRLA, EOMI, conjunctiva pink, sclera anicteric. Neck: Supple. Trachea midline. No LAD. Chest: Equal air entry bilaterally (crackles have resolved)  Heart: RRR and no MRG. Abd: soft, non-distended, no tenderness, no hepatomegaly. Normoactive bowel sounds. Ext: no clubbing, cyanosis, or edema  Catheter Site: without erythema or tenderness  LDA:  Urethral catheter:  Neuro: Mental status intact. CN 2-12 intact and no focal sensory or motor deficits     Radiologic / Imaging / TESTING  No results found.      Labs:    Recent Results (from the past 24 hour(s))   Basic metabolic panel    Collection Time: 21  4:16 AM   Result Value Ref Range    Sodium 140 135 - 145 MMOL/L    Potassium 4.1 3.5 - 5.1 MMOL/L    Chloride 99 99 - 110 mMol/L    CO2 29 21 - 32 MMOL/L    Anion Gap 12 4 - 16    BUN 24 (H) 6 - 23 MG/DL    CREATININE 0.5 (L) 0.9 - 1.3 MG/DL    Glucose 113 (H) 70 - 99 MG/DL    Calcium 7.7 (L) 8.3 - 10.6 MG/DL    GFR Non-African American >60 >60 mL/min/1.73m2    GFR African American >60 >60 mL/min/1.73m2   CBC    Collection Time: 21  4:16 AM   Result Value Ref Range    WBC 36.9 (HH) 4.0 - 10.5 K/CU MM    RBC 4.70 4.6 - 6.2 M/CU MM    Hemoglobin 14.4 13.5 - 18.0 GM/DL    Hematocrit 44.6 42 - 52 %    MCV 94.9 78 - 100 FL    MCH 30.6 27 - 31 PG    MCHC 32.3 32.0 - 36.0 %    RDW 13.3 11.7 - 14.9 %    Platelets 773 512 - 274 K/CU MM    MPV 10.4 7.5 - 11.1 FL   POCT Glucose    Collection Time: 11/09/21  7:36 AM   Result Value Ref Range    POC Glucose 100 (H) 70 - 99 MG/DL     CULTURE results: Invalid input(s): BLOOD CULTURE,  URINE CULTURE, SURGICAL CULTURE    Diagnosis:  Patient Active Problem List   Diagnosis    Tobacco dependence    Right bundle branch block    GERD (gastroesophageal reflux disease)    Uncomplicated alcohol dependence (HCC)    Hyperlipidemia    Low back pain    Cataract    Acute medial meniscal tear    Complex tear of lateral meniscus of right knee as current injury    Chondromalacia patellae, right knee    Primary osteoarthritis of right knee    Depression, neurotic    Edema    Benign essential HTN    Hypotestosteronism    Tobacco use    Pain in the wrist    Flexor tenosynovitis of finger    Bilateral carpal tunnel syndrome    Decreased testosterone level    PNA (pneumonia)    Septicemia (HCC)    Acute respiratory failure with hypoxia (HCC)       Active Problems  Active Problems:    PNA (pneumonia)    Septicemia (HCC)    Acute respiratory failure with hypoxia (HCC)  Resolved Problems:    * No resolved hospital problems. *      Impression and plan   Summary and rationale: Patient is a 73 y.o.  male with a medical history of tobacco dependence, essential hypertension, hyperlipidemia who presented with a 3-day history of cough and shortness of breath diagnosed with legionnaires disease and acute hypoxic respiratory failure. Possibility of bacterial coinfection exists. On account of leukocytosis patient is being treated with vancomycin, levofloxacin and cefepime.  Clinical status: Improving-oxygen needs have been titrated downwards, leukocytosis still high is on a downward trend.  Therapeutic: Vancomycin, levofloxacin (11/7-) and cefepime.   Anticipate at least a 7-day course of antibiotics   Diagnostic:   F/u: MRSA nares, respiratory culture   Other: Probable diastolic CHF, will need outpatient evaluation.         Electronically signed by: Electronically signed by Precious Orozco MD on 11/9/2021 at 7:38 PM

## 2021-11-10 VITALS
DIASTOLIC BLOOD PRESSURE: 66 MMHG | RESPIRATION RATE: 18 BRPM | BODY MASS INDEX: 43.65 KG/M2 | SYSTOLIC BLOOD PRESSURE: 119 MMHG | TEMPERATURE: 99.4 F | HEIGHT: 68 IN | OXYGEN SATURATION: 96 % | WEIGHT: 288 LBS | HEART RATE: 77 BPM

## 2021-11-10 LAB
ANION GAP SERPL CALCULATED.3IONS-SCNC: 10 MMOL/L (ref 4–16)
BUN BLDV-MCNC: 23 MG/DL (ref 6–23)
CALCIUM SERPL-MCNC: 8.1 MG/DL (ref 8.3–10.6)
CHLORIDE BLD-SCNC: 99 MMOL/L (ref 99–110)
CO2: 28 MMOL/L (ref 21–32)
CREAT SERPL-MCNC: 0.5 MG/DL (ref 0.9–1.3)
CULTURE: NORMAL
DOSE AMOUNT: ABNORMAL
DOSE TIME: ABNORMAL
GFR AFRICAN AMERICAN: >60 ML/MIN/1.73M2
GFR NON-AFRICAN AMERICAN: >60 ML/MIN/1.73M2
GLUCOSE BLD-MCNC: 112 MG/DL (ref 70–99)
HCT VFR BLD CALC: 46.6 % (ref 42–52)
HEMOGLOBIN: 14.9 GM/DL (ref 13.5–18)
Lab: NORMAL
MCH RBC QN AUTO: 29.7 PG (ref 27–31)
MCHC RBC AUTO-ENTMCNC: 32 % (ref 32–36)
MCV RBC AUTO: 92.8 FL (ref 78–100)
PDW BLD-RTO: 13.3 % (ref 11.7–14.9)
PLATELET # BLD: 368 K/CU MM (ref 140–440)
PMV BLD AUTO: 10 FL (ref 7.5–11.1)
POTASSIUM SERPL-SCNC: 4.3 MMOL/L (ref 3.5–5.1)
RBC # BLD: 5.02 M/CU MM (ref 4.6–6.2)
SODIUM BLD-SCNC: 137 MMOL/L (ref 135–145)
SPECIMEN: NORMAL
VANCOMYCIN TROUGH: 42.1 UG/ML (ref 10–20)
WBC # BLD: 29.9 K/CU MM (ref 4–10.5)

## 2021-11-10 PROCEDURE — 94618 PULMONARY STRESS TESTING: CPT

## 2021-11-10 PROCEDURE — 6360000002 HC RX W HCPCS: Performed by: HOSPITALIST

## 2021-11-10 PROCEDURE — 6360000002 HC RX W HCPCS: Performed by: NURSE PRACTITIONER

## 2021-11-10 PROCEDURE — 2580000003 HC RX 258: Performed by: NURSE PRACTITIONER

## 2021-11-10 PROCEDURE — 99232 SBSQ HOSP IP/OBS MODERATE 35: CPT | Performed by: INTERNAL MEDICINE

## 2021-11-10 PROCEDURE — 80048 BASIC METABOLIC PNL TOTAL CA: CPT

## 2021-11-10 PROCEDURE — 2580000003 HC RX 258: Performed by: HOSPITALIST

## 2021-11-10 PROCEDURE — 80202 ASSAY OF VANCOMYCIN: CPT

## 2021-11-10 PROCEDURE — 6370000000 HC RX 637 (ALT 250 FOR IP): Performed by: NURSE PRACTITIONER

## 2021-11-10 PROCEDURE — 6370000000 HC RX 637 (ALT 250 FOR IP): Performed by: HOSPITALIST

## 2021-11-10 PROCEDURE — 94761 N-INVAS EAR/PLS OXIMETRY MLT: CPT

## 2021-11-10 PROCEDURE — 85027 COMPLETE CBC AUTOMATED: CPT

## 2021-11-10 PROCEDURE — 36415 COLL VENOUS BLD VENIPUNCTURE: CPT

## 2021-11-10 PROCEDURE — 2700000000 HC OXYGEN THERAPY PER DAY

## 2021-11-10 PROCEDURE — 94640 AIRWAY INHALATION TREATMENT: CPT

## 2021-11-10 RX ORDER — POTASSIUM CHLORIDE 750 MG/1
10 TABLET, EXTENDED RELEASE ORAL 2 TIMES DAILY
Qty: 180 TABLET | Refills: 1 | Status: SHIPPED | OUTPATIENT
Start: 2021-11-10 | End: 2022-01-13 | Stop reason: SDUPTHER

## 2021-11-10 RX ORDER — FUROSEMIDE 40 MG/1
40 TABLET ORAL 2 TIMES DAILY
Qty: 6 TABLET | Refills: 0 | Status: SHIPPED | OUTPATIENT
Start: 2021-11-10 | End: 2021-11-12 | Stop reason: SDUPTHER

## 2021-11-10 RX ORDER — LEVOFLOXACIN 750 MG/1
750 TABLET ORAL DAILY
Qty: 2 TABLET | Refills: 0 | Status: SHIPPED | OUTPATIENT
Start: 2021-11-11 | End: 2021-11-13

## 2021-11-10 RX ADMIN — Medication 2 PUFF: at 08:53

## 2021-11-10 RX ADMIN — NALTREXONE HYDROCHLORIDE 50 MG: 50 TABLET, FILM COATED ORAL at 10:05

## 2021-11-10 RX ADMIN — Medication 2 PUFF: at 14:02

## 2021-11-10 RX ADMIN — VANCOMYCIN 1500 MG: 1.5 INJECTION, SOLUTION INTRAVENOUS at 06:20

## 2021-11-10 RX ADMIN — CEFEPIME HYDROCHLORIDE 2000 MG: 2 INJECTION, POWDER, FOR SOLUTION INTRAVENOUS at 10:09

## 2021-11-10 RX ADMIN — OXYCODONE AND ACETAMINOPHEN 1 TABLET: 5; 325 TABLET ORAL at 08:05

## 2021-11-10 RX ADMIN — ALBUTEROL SULFATE 2 PUFF: 90 AEROSOL, METERED RESPIRATORY (INHALATION) at 16:24

## 2021-11-10 RX ADMIN — LEVOFLOXACIN 750 MG: 5 INJECTION, SOLUTION INTRAVENOUS at 10:46

## 2021-11-10 RX ADMIN — TAMSULOSIN HYDROCHLORIDE 0.4 MG: 0.4 CAPSULE ORAL at 08:26

## 2021-11-10 RX ADMIN — VANCOMYCIN 1500 MG: 1.5 INJECTION, SOLUTION INTRAVENOUS at 18:26

## 2021-11-10 RX ADMIN — BUDESONIDE AND FORMOTEROL FUMARATE DIHYDRATE 2 PUFF: 160; 4.5 AEROSOL RESPIRATORY (INHALATION) at 08:53

## 2021-11-10 RX ADMIN — PANTOPRAZOLE SODIUM 40 MG: 40 TABLET, DELAYED RELEASE ORAL at 06:21

## 2021-11-10 RX ADMIN — AMLODIPINE BESYLATE 5 MG: 5 TABLET ORAL at 08:26

## 2021-11-10 RX ADMIN — SODIUM CHLORIDE, PRESERVATIVE FREE 10 ML: 5 INJECTION INTRAVENOUS at 08:25

## 2021-11-10 RX ADMIN — GUAIFENESIN 600 MG: 600 TABLET, EXTENDED RELEASE ORAL at 08:26

## 2021-11-10 RX ADMIN — FUROSEMIDE 40 MG: 10 INJECTION, SOLUTION INTRAMUSCULAR; INTRAVENOUS at 08:25

## 2021-11-10 RX ADMIN — LISINOPRIL 5 MG: 5 TABLET ORAL at 08:25

## 2021-11-10 RX ADMIN — ENOXAPARIN SODIUM 40 MG: 100 INJECTION SUBCUTANEOUS at 08:30

## 2021-11-10 RX ADMIN — GABAPENTIN 100 MG: 100 CAPSULE ORAL at 14:17

## 2021-11-10 RX ADMIN — ALBUTEROL SULFATE 2 PUFF: 90 AEROSOL, METERED RESPIRATORY (INHALATION) at 14:02

## 2021-11-10 RX ADMIN — PREDNISONE 40 MG: 20 TABLET ORAL at 08:26

## 2021-11-10 RX ADMIN — GABAPENTIN 100 MG: 100 CAPSULE ORAL at 08:30

## 2021-11-10 RX ADMIN — Medication 2 PUFF: at 16:24

## 2021-11-10 RX ADMIN — ALBUTEROL SULFATE 2 PUFF: 90 AEROSOL, METERED RESPIRATORY (INHALATION) at 08:53

## 2021-11-10 ASSESSMENT — PAIN SCALES - GENERAL
PAINLEVEL_OUTOF10: 10
PAINLEVEL_OUTOF10: 0
PAINLEVEL_OUTOF10: 10
PAINLEVEL_OUTOF10: 10

## 2021-11-10 NOTE — PROGRESS NOTES
Doctor Please copy and paste below in your progress note per DME requirements.     Patient was seen in hospital for COPD . I am prescribing oxygen because the diagnosis and testing requires the patient to have oxygen in the home. Conditions will improve or be benefited by oxygen use. The patient is able to perform good mobility and therefore requires the use of a portable oxygen system for ambulation.

## 2021-11-10 NOTE — PROGRESS NOTES
11/10/2021 12:20 PM  Patient Room #: 3005/1143-K  Patient Name: Rani Meier    (Step 1 Done by RN if possible otherwise call Pulmonary Diagnostics)  1. Place patient on room air at rest for at least 30 minutes. If patient falls below 88% before 30 minutes then you can record the level and stop. Record room air saturation level __ %. If patient is at 88% or below, they will qualify for home oxygen and you can stop. If level does not fall below 88%, fill in level above. If indicated continue to Step 2. Signature:_____ Date: ___  (Step 2&3 Done by [facility])  2. Ambulate patient on room air until saturation falls below 89%. Record level of room air saturation with ambulation___ %. Next, place patient back on ___lpm oxygen and ambulate, record level __%. (Note:  this level must show improvement from room air level done with ambulation.)  If patients saturation on room air with ambulation is 88% or below AND patient shows improvement with oxygen during ambulation, they will qualify for home oxygen and you can stop. If patient does not drop below 89%, then patient should have an overnight oximetry trending on room air to see if level falls below 88%. Complete level in Step 3 below. 3. Room air overnight oximetry level 88 % for___  cumulative minutes. If patients room air oxygen level is < 89% for at least 5 cumulative minutes, patient will qualify for home oxygen and you can stop.         (Attach Night Trending Report)    Complete order below: Diagnosis:_COPD__  Home oxygen at:  Length of Need: X Lifetime  3 Months     _3__lpm or __%   via  [x] nasal cannula  []mask  [] other: Conserving Device         [x]continuous [x]  with activity  [x]  Nocturnal   [x] Portable Tanks [x]  Concentrator        Therapist Signature:_Mari Kirk Paget, RRT __     Date:  ___  Physician Signature:  __Electronically Signed in EMR_    Date:___  Physician Printed Name: Violette Gonzalez MD  NPI: _ [id number]___    [x] Patient Qualifies      [] Patient Does NOT qualify

## 2021-11-10 NOTE — PROGRESS NOTES
Cherelle Torres is a 73 y.o. male started on vancomycin for sepsis secondary to pneumonia. Pharmacy consulted by Dr. Grecia Felder for monitoring and adjustment. Indication for treatment: CAP, possible loculated effusion  Goal trough: 15 mcg/mL / 400-600 AUC/MELISSA  Other antimicrobials: Cefepime    Ht Readings from Last 1 Encounters:   11/04/21 5' 8\" (1.727 m)     Wt Readings from Last 3 Encounters:   11/04/21 300 lb (136.1 kg)   10/27/21 299 lb (135.6 kg)   08/13/21 299 lb (135.6 kg)        Pertinent Laboratory Values:   Temp Readings from Last 3 Encounters:   11/10/21 97.8 °F (36.6 °C) (Oral)   08/13/21 98.1 °F (36.7 °C) (Infrared)   06/04/21 98.1 °F (36.7 °C) (Infrared)     Recent Labs     11/08/21  0440 11/09/21  0416 11/10/21  0730   WBC 46.5* 36.9* 29.9*     Recent Labs     11/08/21  0440 11/09/21  0416 11/10/21  0730   BUN 28* 24* 23   CREATININE 0.5* 0.5* 0.5*     Estimated Creatinine Clearance: 188 mL/min (A) (based on SCr of 0.5 mg/dL (L)). Intake/Output Summary (Last 24 hours) at 11/10/2021 1114  Last data filed at 11/10/2021 1248  Gross per 24 hour   Intake --   Output 4600 ml   Net -4600 ml       Previous vancomycin levels:  TROUGH:    Recent Labs     11/08/21  1203 11/10/21  0730   VANCOTROUGH 15.5 42.1*     RANDOM:    No results for input(s): VANCORANDOM in the last 72 hours.   Assessment:  · WBC and temperature: WBC trending down/afebrile  · SCr, BUN, and urine output:  Stable  · Day(s) of therapy: 8  · Vancomycin concentration:  · 11/06: 19.3, 4h post-dose, , sub-therapeutic (1250 mg q12h)  · 11//08: 15.5, 10½ hour level on 1500mg q12h, predicted   · 11/10: Drawn while vancomycin was infusing    Plan:  · Continue vancomycin 1500mg ivpb q12h with a predicted therapeutic AUC  · Pharmacy will continue to monitor patient and adjust therapy as indicated    VANCOMYCIN LEVEL SCHEDULED FOR 11/12 @0600    Thank you for the consult,  Chivo Hanna, [facility]  11:14 AM  11/10/21

## 2021-11-10 NOTE — PROGRESS NOTES
Outpatient Pharmacy Progress Note for Meds-to-Beds    Total number of Prescriptions Filled: 3  The following medications were delivered to the patient or nursing unit during the discharge process:   Potassium chloride   Furosemide 40mg   Levofloxacin 750mg        Thank you for letting us serve your patients.   [address]    [address], [address] [facility]    Phone: [phone]    Fax: [phone]

## 2021-11-10 NOTE — PROGRESS NOTES
Pulmonary and Critical Care  Progress Note      VITALS:  /71   Pulse 79   Temp 97.8 °F (36.6 °C) (Oral)   Resp 20   Ht 5' 8\" (1.727 m)   Wt 300 lb (136.1 kg)   SpO2 93%   BMI 45.61 kg/m²     Subjective:   CHIEF COMPLAINT :Fall     HPI:                The patient is sitting in the bed. He is not in acute resp distress    Objective:   PHYSICAL EXAM:    LUNGS:Decreased air entry right base  Abd-distended, BS+,NT  Ext- no pedal edema  CVS-s1s2, no murmurs      DATA:    CBC:  Recent Labs     11/08/21 0440 11/09/21 0416 11/10/21  0730   WBC 46.5* 36.9* 29.9*   RBC 4.67 4.70 5.02   HGB 14.4 14.4 14.9   HCT 43.5 44.6 46.6    358 368   MCV 93.1 94.9 92.8   MCH 30.8 30.6 29.7   MCHC 33.1 32.3 32.0   RDW 13.5 13.3 13.3      BMP:  Recent Labs     11/08/21  0440 11/09/21  0416 11/10/21  0730    140 137   K 4.3 4.1 4.3   CL 99 99 99   CO2 31 29 28   BUN 28* 24* 23   CREATININE 0.5* 0.5* 0.5*   CALCIUM 8.2* 7.7* 8.1*   GLUCOSE 102* 113* 112*      ABG:  No results for input(s): PH, PO2ART, ABM0SWL, HCO3, BEART, O2SAT in the last 72 hours. BNP  No results found for: BNP   D-Dimer:  Lab Results   Component Value Date    DDIMER 260 (H) 06/05/2012      1.  Radiology: None      Assessment/Plan     Patient Active Problem List    Diagnosis Date Noted    Acute respiratory failure with hypoxia ([address] 11/09/2021    Septicemia (HCC)     PNA (pneumonia) 11/03/2021    Bilateral carpal tunnel syndrome 06/04/2021    Flexor tenosynovitis of finger 06/12/2017    Complex tear of lateral meniscus of right knee as current injury 04/13/2017    Chondromalacia patellae, right knee 04/13/2017    Primary osteoarthritis of right knee 04/13/2017    Acute medial meniscal tear 04/05/2017    Cataract 08/23/2016    Hyperlipidemia 05/26/2016    Edema 03/17/2015    Depression, neurotic 05/01/2014    Pain in the wrist 08/13/2013    Benign essential HTN 07/11/2013    Tobacco dependence 06/05/2012    Right bundle branch block 06/05/2012    GERD (gastroesophageal reflux disease) 88/77/0485    Uncomplicated alcohol dependence ([facility] [address] 06/05/2012    Low back pain 01/26/2012    Hypotestosteronism 01/26/2012    Tobacco use 01/26/2012    Decreased testosterone level 01/26/2012   Hypoxia- improving  RLL Pneumonia sec to Legionella  Morbid Obesity  ? BHARGAVI  Leukocytosis  Sepsis  Right pleural effusion likely parapneumonic       1. Abx per ID  2. F/u C&S  3. ICS  4. OOB  5. BIPAP qhs and prn  6. OP PSG  7. Keep sats > 92%  8. Await placement  9.  C.w present management    Electronically signed by Arthuro Boeck, MD on 11/10/2021 at 12:28 PM

## 2021-11-10 NOTE — PROGRESS NOTES
DEIRDREO delivered to patient's room. [facility]

## 2021-11-10 NOTE — DISCHARGE SUMMARY
[facility] HOSPITALISTS DISCHARGE SUMMARY    Patient Demographics    Patient. Virgil Jordan  Date of Birth. 1950  MRN. [id number]     Primary care provider. Alicia Pryor MD  (Tel: [phone])    Admit date: 11/3/2021    Discharge date (blank if same as Note Date): Note Date: 11/10/2021     Reason for Hospitalization. Chief Complaint   Patient presents with    Fall     Bilateral Knee pain, bilateral hip pain    Shortness of Breath     Fever         Diagnosis. Active Problems:    PNA (pneumonia)    Septicemia ([address]    Acute respiratory failure with hypoxia (HCC)  Resolved Problems:    * No resolved hospital problems. [place] [facility] Course:  patient is a 65-year-old male with history of chronic back pain, hepatitis B chronic, hypertension and hyperlipidemia who was admitted with dyspnea and shortness of breath. On admission patient had leukocytosis, CT chest showed pneumonia, lactic elevated, CPK elevated  Cefepime and vancomycin started,  11/4 remain tachypneic and tachycardic, bilateral diffuse wheezing, IV steroids added, pulmonology consulted  Diuresed well with lasix,   ID consult, CT chest showed effusion, no indication for thoracentesis per IR  Subsequently perform per pulmnology request  ID consulted, antibiotics continued, WBC count improved,   Was down to 2 liters of oxygen,   Levofloxacin for 2 more days  Was advised to continue lasix, Added [facility]  Pulmonology follow up   Out patient Sleep study     Invasive procedures and treatments. 1. None     Consults. IP CONSULT TO HOSPITALIST  IP CONSULT TO PULMONOLOGY  PHARMACY TO DOSE VANCOMYCIN  IP CONSULT TO INFECTIOUS DISEASES  IP CONSULT TO IV TEAM    Physical examination on discharge day. /71   Pulse 79   Temp 97.8 °F (36.6 °C) (Oral)   Resp 20   Ht 5' 8\" (1.727 m)   Wt 300 lb (136.1 kg)   SpO2 93%   BMI 45.61 kg/m²   General appearance. Alert. Looks comfortable. HEENT. Sclera clear. Moist mucus membranes. Cardiovascular. Regular rate and rhythm, normal S1, S2. No murmur. Respiratory. Not using accessory muscles. Clear to auscultation bilaterally, no wheeze. Gastrointestinal. Abdomen soft, non-tender, not distended, normal bowel sounds  Neurology. Facial symmetry. No speech deficits. Moving all extremities equally. Extremities. Bilateral LE edema  Skin. Warm, dry, normal turgor    Condition at time of discharge \stable     Medication instructions provided to patient at discharge. Medication List      START taking these medications    levoFLOXacin 750 MG tablet  Commonly known as: LEVAQUIN  Take 1 tablet by mouth daily for 2 days  Start taking on: November 11, 2021     potassium chloride 10 MEQ extended release tablet  Commonly known as: KLOR-CON M  Take 1 tablet by mouth 2 times daily        CHANGE how you take these medications    furosemide 40 MG tablet  Commonly known as: Lasix  Take 1 tablet by mouth 2 times daily  What changed:   · medication strength  · how much to take  · when to take this        CONTINUE taking these medications    albuterol sulfate  (90 Base) MCG/ACT inhaler  Commonly known as: ProAir HFA  Inhale 2 puffs into the lungs every 6 hours as needed for Wheezing     amLODIPine 5 MG tablet  Commonly known as: NORVASC  TAKE 1 TABLET BY MOUTH EVERY DAY IN THE MORNING     Compression Stockings Misc  by Does not apply route 20-30 mM HG    Wear daily     fluticasone-salmeterol 250-50 MCG/DOSE Aepb  Commonly known as: Advair Diskus  INHALE 1 PUFF INTO THE LUNGS EVERY 12 HOURS     gabapentin 100 MG capsule  Commonly known as: Neurontin  Take 1 capsule by mouth 3 times daily for 7 days.      lisinopril 5 MG tablet  Commonly known as: PRINIVIL;ZESTRIL  TAKE 1 TABLET BY MOUTH EVERY DAY IN THE MORNING     naltrexone 50 MG tablet  Commonly known as: DEPADE  Take 1 tablet by mouth daily     nicotine 21 MG/24HR  Commonly known as: NICODERM CQ  Place 1 patch onto the skin daily     pantoprazole 40 MG tablet  Commonly known as: PROTONIX  Take 1 tablet by mouth every morning (before breakfast)     sildenafil 100 MG tablet  Commonly known as: Viagra  Take 1 tablet by mouth as needed for Erectile Dysfunction     tamsulosin 0.4 MG capsule  Commonly known as: FLOMAX  Take 1 capsule by mouth daily        STOP taking these medications    Handicap Placard Misc     ibuprofen 200 MG tablet  Commonly known as: ADVIL;MOTRIN     meloxicam 15 MG tablet  Commonly known as: MOBIC           Where to Get Your Medications      These medications were sent to [address] [place] [address], [address] [id number]    Phone: [phone]   · furosemide 40 MG tablet  · levoFLOXacin 750 MG tablet  · potassium chloride 10 MEQ extended release tablet         Discharge recommendations given to patient. Follow Up. PCP and pulmnology in 1 week   Disposition. home  Activity. As tolerated  Diet: ADULT DIET; Regular; Low Fat/Low Chol/High Fiber/2 gm Na; Low Sodium (2 gm)      Spent 35 minutes in discharge process.     Signed:  Chrystal Valdes MD     11/10/2021 11:56 AM   c

## 2021-11-10 NOTE — CARE COORDINATION
Received a call from [facility] in the outpt pharmacy regarding pt's d/c medications. Pt has listed [employer] plus. Rx's requested was for levofloxacin, potassium chloride and lasix. Voucher created and faxed to [facility]. If pt would like any further help, he must complete an application and provide required documentation.  Pt will be put on the flag list./MB

## 2021-11-10 NOTE — PROGRESS NOTES
[facility] Liaison spoke w/pt & is aware of discharge & will initiate Jarod Rapp.

## 2021-11-11 ENCOUNTER — CARE COORDINATION (OUTPATIENT)
Dept: CASE MANAGEMENT | Age: 71
End: 2021-11-11

## 2021-11-12 ENCOUNTER — TELEPHONE (OUTPATIENT)
Dept: ORTHOPEDIC SURGERY | Age: 71
End: 2021-11-12

## 2021-11-12 ENCOUNTER — CARE COORDINATION (OUTPATIENT)
Dept: CASE MANAGEMENT | Age: 71
End: 2021-11-12

## 2021-11-12 DIAGNOSIS — R60.0 BILATERAL LOWER EXTREMITY EDEMA: ICD-10-CM

## 2021-11-12 RX ORDER — FUROSEMIDE 40 MG/1
40 TABLET ORAL 2 TIMES DAILY
Qty: 60 TABLET | Refills: 0 | Status: SHIPPED | OUTPATIENT
Start: 2021-11-12 | End: 2022-01-04 | Stop reason: SDUPTHER

## 2021-11-12 NOTE — TELEPHONE ENCOUNTER
Curlene Mcardle with [facility] called stating patient Alex Orellana is requesting a refill of Gabapentin 100mg to be sent to [facility]. Please advise.

## 2021-11-12 NOTE — CARE COORDINATION
Care Transitions Outreach Attempt    Call within 2 business days of discharge: Yes   Attempted to reach patient for transitions of care follow up. Unable to reach patient. Patient: Lexy Flores Patient : 1950 MRN: [id number]    Last Discharge [facility]       Complaint Diagnosis Description Type Department Provider    11/3/21 Fall; Shortness of Breath Acute respiratory failure with hypoxia ([address] . .. ED to Hosp-Admission (Discharged) (ADMITTED) [facility] 3N Brennon Calixto MD; Neris Burgos, . .. 24 Hour Transition of Care Call:    2nd Attempt to contact patient for 24 Hour Transition Call - (Discharged from Hospital to Home)  Patient was not reached. No answer - Unable to leave message. FINAL CALL    Zina Morejon, JOEY    [phone]  Tammie Das / Valery Ruggiero Coordinator           Was this an external facility discharge?  No Discharge Facility: [facility]    Noted following upcoming appointments from discharge chart review:   [facility] follow up appointment(s):   Future Appointments   Date Time Provider María Rendon   2021 11:00 AM MD Pepper Maguire6 Jose Alberto BYRD   2021  2:50 PM DO Mariia Mendoza [facility] follow up appointment(s):

## 2021-11-12 NOTE — TELEPHONE ENCOUNTER
Ozzy Printers with [facility] called stating patient was sent home from the hospital with Lasix but they only sent enough for 3 days. Ozzy Printers would like to know if patient can get a refill of the Lasix sent to [place]. He is scheduled to see Dr. Sofia Nava on 11/17/2021.

## 2021-11-16 LAB
CULTURE: NORMAL
GRAM SMEAR: NORMAL
Lab: NORMAL
SPECIMEN: NORMAL

## 2021-11-17 ENCOUNTER — OFFICE VISIT (OUTPATIENT)
Dept: FAMILY MEDICINE CLINIC | Age: 71
End: 2021-11-17
Payer: MEDICARE

## 2021-11-17 VITALS
HEART RATE: 93 BPM | TEMPERATURE: 97.4 F | DIASTOLIC BLOOD PRESSURE: 70 MMHG | OXYGEN SATURATION: 93 % | SYSTOLIC BLOOD PRESSURE: 122 MMHG | WEIGHT: 293.8 LBS | BODY MASS INDEX: 44.67 KG/M2

## 2021-11-17 DIAGNOSIS — R09.02 HYPOXIA: ICD-10-CM

## 2021-11-17 DIAGNOSIS — M17.11 PRIMARY OSTEOARTHRITIS OF RIGHT KNEE: ICD-10-CM

## 2021-11-17 DIAGNOSIS — J96.01 SEPSIS WITH ACUTE HYPOXIC RESPIRATORY FAILURE WITHOUT SEPTIC SHOCK, DUE TO UNSPECIFIED ORGANISM (HCC): ICD-10-CM

## 2021-11-17 DIAGNOSIS — J96.01 ACUTE RESPIRATORY FAILURE WITH HYPOXIA (HCC): ICD-10-CM

## 2021-11-17 DIAGNOSIS — Z09 HOSPITAL DISCHARGE FOLLOW-UP: ICD-10-CM

## 2021-11-17 DIAGNOSIS — J18.9 PNEUMONIA OF BOTH LOWER LOBES DUE TO INFECTIOUS ORGANISM: Primary | ICD-10-CM

## 2021-11-17 DIAGNOSIS — R65.20 SEPSIS WITH ACUTE HYPOXIC RESPIRATORY FAILURE WITHOUT SEPTIC SHOCK, DUE TO UNSPECIFIED ORGANISM (HCC): ICD-10-CM

## 2021-11-17 DIAGNOSIS — A41.9 SEPSIS WITH ACUTE HYPOXIC RESPIRATORY FAILURE WITHOUT SEPTIC SHOCK, DUE TO UNSPECIFIED ORGANISM (HCC): ICD-10-CM

## 2021-11-17 DIAGNOSIS — M17.12 PRIMARY OSTEOARTHRITIS OF LEFT KNEE: ICD-10-CM

## 2021-11-17 PROCEDURE — 1111F DSCHRG MED/CURRENT MED MERGE: CPT | Performed by: FAMILY MEDICINE

## 2021-11-17 PROCEDURE — 99496 TRANSJ CARE MGMT HIGH F2F 7D: CPT | Performed by: FAMILY MEDICINE

## 2021-11-17 RX ORDER — TRAMADOL HYDROCHLORIDE 50 MG/1
50-100 TABLET ORAL EVERY 6 HOURS PRN
Qty: 56 TABLET | Refills: 0 | Status: SHIPPED | OUTPATIENT
Start: 2021-11-17 | End: 2021-11-24

## 2021-11-17 SDOH — ECONOMIC STABILITY: FOOD INSECURITY: WITHIN THE PAST 12 MONTHS, YOU WORRIED THAT YOUR FOOD WOULD RUN OUT BEFORE YOU GOT MONEY TO BUY MORE.: NEVER TRUE

## 2021-11-17 SDOH — ECONOMIC STABILITY: FOOD INSECURITY: WITHIN THE PAST 12 MONTHS, THE FOOD YOU BOUGHT JUST DIDN'T LAST AND YOU DIDN'T HAVE MONEY TO GET MORE.: NEVER TRUE

## 2021-11-17 ASSESSMENT — SOCIAL DETERMINANTS OF HEALTH (SDOH): HOW HARD IS IT FOR YOU TO PAY FOR THE VERY BASICS LIKE FOOD, HOUSING, MEDICAL CARE, AND HEATING?: NOT HARD AT ALL

## 2021-11-17 NOTE — PROGRESS NOTES
ordered at time of hospital discharge: Yes    Chief Complaint   Patient presents with   [address] from Hospital       History of Present illness - Follow up of Hospital diagnosis(es): Sepsis, pneumonia due to Legionella, mechanical fall, lactic acidosis, rhabdomyolysis, hypertension, hyperlipidemia, hepatitis B, COPD    Inpatient course: Discharge summary reviewed- see chart. Interval history/Current status: Improved. Patient is currently on oxygen 2 L per nasal cannula continuously. He is try to go without his oxygen but has significant \"dizziness\" without it. He also has chronic knee pain. He has received a Synvisc injection his right knee which is helped some. His knee pain is keeping him awake at night. He is wanting to have a knee replacement. A comprehensive review of systems was negative except for what was noted in the HPI. Vitals:    11/17/21 1055   BP: 122/70   Site: Left Upper Arm   Position: Sitting   Cuff Size: Large Adult   Pulse: 93   Temp: 97.4 °F (36.3 °C)   TempSrc: Infrared   SpO2: 93%   Weight: 293 lb 12.8 oz (133.3 kg)     Body mass index is 44.67 kg/m².    Wt Readings from Last 3 Encounters:   11/17/21 293 lb 12.8 oz (133.3 kg)   11/10/21 288 lb (130.6 kg)   10/27/21 299 lb (135.6 kg)     BP Readings from Last 3 Encounters:   11/17/21 122/70   11/10/21 119/66   08/13/21 120/76        Physical Exam:  General Appearance: alert and oriented to person, place and time, well developed and well- nourished, in no acute distress  Skin: warm and dry, no rash or erythema  Head: normocephalic and atraumatic  Eyes: pupils equal, round, and reactive to light, extraocular eye movements intact, conjunctivae normal  ENT: tympanic membrane, external ear and ear canal normal bilaterally, nose without deformity, nasal mucosa and turbinates normal without polyps  Neck: supple and non-tender without mass, no thyromegaly or thyroid nodules, no cervical lymphadenopathy  Pulmonary/Chest: clear to auscultation bilaterally- no wheezes, rales or rhonchi, normal air movement, no respiratory distress  Cardiovascular: normal rate, regular rhythm, normal S1 and S2, no murmurs, rubs, clicks, or gallops, distal pulses intact, no carotid bruits  Abdomen: soft, non-tender, non-distended, normal bowel sounds, no masses or organomegaly  Extremities: no cyanosis, clubbing or edema  Musculoskeletal: normal range of motion, no joint swelling, deformity or tenderness  Neurologic: reflexes normal and symmetric, no cranial nerve deficit, gait, coordination and speech normal    Assessment/Plan:     Diagnosis Orders   1. Pneumonia of both lower lobes due to infectious organism   Proved AR DISCHARGE MEDS RECONCILED W/ CURRENT OUTPATIENT MED LIST   2. Acute respiratory failure with hypoxia (HCC)   Improved AR DISCHARGE MEDS RECONCILED W/ CURRENT OUTPATIENT MED LIST   3. Hypoxia   Improving AR DISCHARGE MEDS RECONCILED W/ CURRENT OUTPATIENT MED LIST   4. Hospital discharge follow-up  AR DISCHARGE MEDS RECONCILED W/ CURRENT OUTPATIENT MED LIST   5. Sepsis with acute hypoxic respiratory failure without septic shock, due to unspecified organism ([facility] [address]   Resolved    6. Primary osteoarthritis of left knee  traMADol (ULTRAM) 50 MG tablet   7. Primary osteoarthritis of right knee  traMADol (ULTRAM) 50 MG tablet   Needs improvement    We will prescribe tramadol for now. Patient follow-up with orthopedics. Patient needs to schedule appointment with pulmonology for follow-up.           Medical Decision Making: high complexity

## 2021-11-17 NOTE — PATIENT INSTRUCTIONS
Patient Education        Knee Arthritis: Care Instructions  Your Care Instructions     Knee arthritis is a breakdown of the cartilage that cushions your knee joint. When the cartilage wears down, your bones rub against each other. This causes pain and stiffness. Knee arthritis tends to get worse with time. Treatment for knee arthritis involves reducing pain, making the leg muscles stronger, and staying at a healthy body weight. The treatment usually does not improve the health of the cartilage, but it can reduce pain and improve how well your knee works. You can take simple measures to protect your knee joints, ease your pain, and help you stay active. Follow-up care is a key part of your treatment and safety. Be sure to make and go to all appointments, and call your doctor if you are having problems. It's also a good idea to know your test results and keep a list of the medicines you take. How can you care for yourself at home? · Know that knee arthritis will cause more pain on some days than on others. · Stay at a healthy weight. Lose weight if you are overweight. When you stand up, the pressure on your knees from every pound of body weight is multiplied four times. So if you lose 10 pounds, you will reduce the pressure on your knees by 40 pounds. · Talk to your doctor or physical therapist about exercises that will help ease joint pain. ? Stretch to help prevent stiffness and to prevent injury before you exercise. You may enjoy gentle forms of yoga to help keep your knee joints and muscles flexible. ? Walk instead of jog.  ? Ride a bike. This makes your thigh muscles stronger and takes pressure off your knee. ? Wear well-fitting and comfortable shoes. ? Exercise in chest-deep water. This can help you exercise longer with less pain. ? Avoid exercises that include squatting or kneeling. They can put a lot of strain on your knees.   ? Talk to your doctor to make sure that the exercise you do is not making the arthritis worse. · Do not sit for long periods of time. Try to walk once in a while to keep your knee from getting stiff. · Ask your doctor or physical therapist whether shoe inserts may reduce your arthritis pain. · If you can afford it, get new athletic shoes at least every year. This can help reduce the strain on your knees. · Use a device to help you do everyday activities. ? A cane or walking stick can help you keep your balance when you walk. Hold the cane or walking stick in the hand opposite the painful knee. ? If you feel like you may fall when you walk, try using crutches or a front-wheeled walker. These can prevent falls that could cause more damage to your knee. ? A knee brace may help keep your knee stable and prevent pain. ? You also can use other things to make life easier, such as a higher toilet seat and handrails in the bathtub or shower. · Take pain medicines exactly as directed. ? Do not wait until you are in severe pain. You will get better results if you take it sooner. ? If you are not taking a prescription pain medicine, take an over-the-counter medicine such as acetaminophen (Tylenol), ibuprofen (Advil, Motrin), or naproxen (Aleve). Read and follow all instructions on the label. ? Do not take two or more pain medicines at the same time unless the doctor told you to. Many pain medicines have acetaminophen, which is Tylenol. Too much acetaminophen (Tylenol) can be harmful. ? Tell your doctor if you take a blood thinner, have diabetes, or have allergies to shellfish. · Ask your doctor if you might benefit from a shot of steroid medicine into your knee. This may provide pain relief for several months. · Many people take the supplements glucosamine and chondroitin for osteoarthritis. Some people feel they help, but the medical research does not show that they work. Talk to your doctor before you take these supplements. When should you call for help?    Call your doctor now or seek immediate medical care if:    · You have sudden swelling, warmth, or pain in your knee.     · You have knee pain and a fever or rash.     · You have such bad pain that you cannot use your knee. Watch closely for changes in your health, and be sure to contact your doctor if you have any problems. Where can you learn more? Go to [email or web address] and sign in to your [employer] account. Enter V922 in the [employer] box to learn more about \"Knee Arthritis: Care Instructions. \"     If you do not have an account, please click on the \"Sign Up Now\" link. Current as of: April 30, 2021               Content Version: 13.0  © 2006-2021 Healthwise, Inc[employer]. Care instructions adapted under license by [facility] ([facility]). If you have questions about a medical condition or this instruction, always ask your healthcare professional. [address] any warranty or liability for your use of this information.

## 2021-11-19 ENCOUNTER — TELEPHONE (OUTPATIENT)
Dept: FAMILY MEDICINE CLINIC | Age: 71
End: 2021-11-19

## 2021-11-19 RX ORDER — INHALER, ASSIST DEVICES
SPACER (EA) MISCELLANEOUS
Qty: 1 EACH | Refills: 0 | Status: SHIPPED | OUTPATIENT
Start: 2021-11-19

## 2021-11-19 NOTE — TELEPHONE ENCOUNTER
[place] with [facility] called stating patient has albuterol inhaler but does not have the spacer. She stated he also was not sent home with the incentive spirometer. She would like to know if you can call those into [facility] for patient. Olivier Adams can be reached at [phone].

## 2021-11-29 ENCOUNTER — TELEPHONE (OUTPATIENT)
Dept: FAMILY MEDICINE CLINIC | Age: 71
End: 2021-11-29

## 2021-11-29 DIAGNOSIS — M17.12 PRIMARY OSTEOARTHRITIS OF LEFT KNEE: Primary | ICD-10-CM

## 2021-11-29 DIAGNOSIS — M17.11 PRIMARY OSTEOARTHRITIS OF RIGHT KNEE: ICD-10-CM

## 2021-11-29 RX ORDER — HYDROCODONE BITARTRATE AND ACETAMINOPHEN 5; 325 MG/1; MG/1
1-2 TABLET ORAL EVERY 6 HOURS PRN
Qty: 56 TABLET | Refills: 0 | Status: SHIPPED | OUTPATIENT
Start: 2021-11-29 | End: 2021-12-06

## 2021-11-29 NOTE — TELEPHONE ENCOUNTER
----- Message from Librado Staley sent at 11/26/2021 11:59 AM EST -----  Subject: Message to Provider    QUESTIONS  Information for Provider? pt called in about the pain medication Dr Rayne Mccormick   put him on and was told by Dr Rayne Mccormick that if the meds didnt work to call   in and he would prescribe him something stronger so pt would like to know   if  would send in that prescription for something stronger and possibly   a sleeping pill so he can sleep please call to advise  ---------------------------------------------------------------------------  --------------  CALL BACK INFO  What is the best way for the office to contact you? Do not leave any   message, patient will call back for answer  Preferred Call Back Phone Number? [phone]  ---------------------------------------------------------------------------  --------------  SCRIPT ANSWERS  Relationship to Patient?  Self

## 2021-11-29 NOTE — TELEPHONE ENCOUNTER
New prescription sent to his pharmacy. If this medication works, he will need an appointment in 1 week to sign a medication contract.

## 2021-12-03 ENCOUNTER — OFFICE VISIT (OUTPATIENT)
Dept: FAMILY MEDICINE CLINIC | Age: 71
End: 2021-12-03
Payer: MEDICARE

## 2021-12-03 VITALS
HEART RATE: 87 BPM | BODY MASS INDEX: 44.55 KG/M2 | DIASTOLIC BLOOD PRESSURE: 76 MMHG | OXYGEN SATURATION: 95 % | TEMPERATURE: 97.1 F | SYSTOLIC BLOOD PRESSURE: 120 MMHG | WEIGHT: 293 LBS

## 2021-12-03 DIAGNOSIS — E66.01 CLASS 3 SEVERE OBESITY DUE TO EXCESS CALORIES WITH SERIOUS COMORBIDITY AND BODY MASS INDEX (BMI) OF 40.0 TO 44.9 IN ADULT (HCC): ICD-10-CM

## 2021-12-03 DIAGNOSIS — G89.29 CHRONIC BILATERAL LOW BACK PAIN WITHOUT SCIATICA: ICD-10-CM

## 2021-12-03 DIAGNOSIS — M17.12 PRIMARY OSTEOARTHRITIS OF LEFT KNEE: Primary | ICD-10-CM

## 2021-12-03 DIAGNOSIS — Z72.0 TOBACCO USE: ICD-10-CM

## 2021-12-03 DIAGNOSIS — M17.11 PRIMARY OSTEOARTHRITIS OF RIGHT KNEE: ICD-10-CM

## 2021-12-03 DIAGNOSIS — M54.50 CHRONIC BILATERAL LOW BACK PAIN WITHOUT SCIATICA: ICD-10-CM

## 2021-12-03 PROCEDURE — 90694 VACC AIIV4 NO PRSRV 0.5ML IM: CPT | Performed by: FAMILY MEDICINE

## 2021-12-03 PROCEDURE — G8427 DOCREV CUR MEDS BY ELIG CLIN: HCPCS | Performed by: FAMILY MEDICINE

## 2021-12-03 PROCEDURE — G8417 CALC BMI ABV UP PARAM F/U: HCPCS | Performed by: FAMILY MEDICINE

## 2021-12-03 PROCEDURE — 1036F TOBACCO NON-USER: CPT | Performed by: FAMILY MEDICINE

## 2021-12-03 PROCEDURE — 1123F ACP DISCUSS/DSCN MKR DOCD: CPT | Performed by: FAMILY MEDICINE

## 2021-12-03 PROCEDURE — 1111F DSCHRG MED/CURRENT MED MERGE: CPT | Performed by: FAMILY MEDICINE

## 2021-12-03 PROCEDURE — G0008 ADMIN INFLUENZA VIRUS VAC: HCPCS | Performed by: FAMILY MEDICINE

## 2021-12-03 PROCEDURE — G8484 FLU IMMUNIZE NO ADMIN: HCPCS | Performed by: FAMILY MEDICINE

## 2021-12-03 PROCEDURE — 3017F COLORECTAL CA SCREEN DOC REV: CPT | Performed by: FAMILY MEDICINE

## 2021-12-03 PROCEDURE — 4040F PNEUMOC VAC/ADMIN/RCVD: CPT | Performed by: FAMILY MEDICINE

## 2021-12-03 PROCEDURE — 99214 OFFICE O/P EST MOD 30 MIN: CPT | Performed by: FAMILY MEDICINE

## 2021-12-03 RX ORDER — HYDROCODONE BITARTRATE AND ACETAMINOPHEN 10; 325 MG/1; MG/1
1 TABLET ORAL EVERY 6 HOURS PRN
Qty: 120 TABLET | Refills: 0 | Status: SHIPPED | OUTPATIENT
Start: 2021-12-03 | End: 2022-01-04 | Stop reason: SDUPTHER

## 2021-12-03 ASSESSMENT — PATIENT HEALTH QUESTIONNAIRE - PHQ9
SUM OF ALL RESPONSES TO PHQ QUESTIONS 1-9: 0
SUM OF ALL RESPONSES TO PHQ9 QUESTIONS 1 & 2: 0
1. LITTLE INTEREST OR PLEASURE IN DOING THINGS: 0
2. FEELING DOWN, DEPRESSED OR HOPELESS: 0

## 2021-12-03 NOTE — LETTER
disease. Overdose or dangerous interactions with alcohol and other medications may occur, leading to death. Hyperalgesia may develop, which means patients receiving opioids for the treatment of pain may become more sensitive to certain painful stimuli, and in some cases, experience pain from ordinarily non-painful stimuli. Women between the ages of 14-53 who could become pregnant should carefully weigh the risks and benefits of opioids with their physicians, as these medications increase the risk of pregnancy complications, including miscarriage,  delivery and stillbirth. It is also possible for babies to be born addicted to opioids. Opioid dependence withdrawal symptoms may include; feelings of uneasiness, increased pain, irritability, belly pain, diarrhea, sweats and goose-flesh. Benzodiazepines and non-benzodiazepine sleep medications: These medications can lead to problems such as addiction/dependence, sedation, fatigue, lightheadedness, dizziness, incoordination, falls, depression, hallucinations, and impaired judgment, memory and concentration. The ability to drive and operate machinery may also be affected. Abnormal sleep-related behaviors have been reported, including sleepwalking, driving, making telephone calls, eating, or having sex while not fully awake. These medications can suppress breathing and worsen sleep apnea, particularly when combined with alcohol or other sedating medications, potentially leading to death. Dependence withdrawal symptoms may include tremors, anxiety, hallucinations and seizures. Stimulants:  Common adverse effects include addiction/dependence, increased blood  pressure and heart rate, decreased appetite, nausea, involuntary weight loss, insomnia,                                                                                                                     Initials:_______   irritability, and headaches.   These risks may increase when these medications are combined with other stimulants, such as caffeine pills or energy drinks, certain weight loss supplements and oral decongestants. Dependence withdrawal symptoms may include depressed mood, loss of interest, suicidal thoughts, anxiety, fatigue, appetite changes and agitation. Testosterone replacement therapy:  Potential side effects include increased risk of stroke and heart attack, blood clots, increased blood pressure, increased cholesterol, enlarged prostate, sleep apnea, irritability/aggression and other mood disorders, and decreased fertility. I agree and understand that I and my prescriber have the following rights and responsibilities regarding my treatment plan:     1. MY RIGHTS:  To be informed of my treatment and medication plan. To be an active participant in my health and wellbeing. 2. MY RESPONSIBILITY AND UNDERSTANDING FOR USE OF MEDICATIONS   I will take medications at the dose and frequency as directed. For my safety, I will not increase or change how I take my medications without the recommendation of my healthcare provider.  I will actively participate in any program recommended by my provider which may improve function, including social, physical, psychological programs.  I will not take my medications with alcohol or other drugs not prescribed to me. I understand that drinking alcohol with my medications increases the chances of side effects, including reduced breathing rate and could lead to personal injury when operating machinery.  I understand that if I have a history of substance use disorders, including alcohol or other illicit drugs, that I may be at increased risk of addiction to my medications.  I agree to notify my provider immediately if I should become pregnant so that my treatment plan can be adjusted.    I agree and understand that I shall only receive controlled substance medications from the prescriber that signed this agreement unless there is written agreement among other prescribers of controlled substances outlining the responsibility of the medications being prescribed.  I understand that the if the controlled medication is not helping to achieve goals, the dosage may be tapered and no longer prescribed. 3. MY RESPONSIBILITY FOR COMMUNICATION / PRESCRIPTION RENEWALS   I agree that all controlled substance medications that I take will be prescribed only by my provider. If another healthcare provider prescribes me medication in an emergency, I will notify my provider within seventy-two (72) hours.  I will arrange for refills at the prescribed interval ONLY during regular office hours. I will not ask for refills earlier than agreed, after-hours, on holidays or weekends. Refills may take up to 72 hours for processing and prescriptions to reach the pharmacy.  I will inform my other health care providers that I am taking these medications and of the existence of this [id number]. In the event of an emergency, I will provide the same information to the emergency department prescribers.  I will keep my provider updated on the pharmacy I am using for controlled medication prescription filling. Initials:_______  4. MY RESPONSIBILITY FOR PROTECTING MEDICATIONS   I will protect my prescriptions and medications. I understand that lost or misplaced prescriptions will not be replaced.  I will keep medications only for my own use and will not share them with others. I will keep all medications away from children.  I agree that if my medications are adjusted or discontinued, I will properly dispose of any remaining medications. I understand that I will be required to dispose of any remaining controlled medications as, directed by my prescriber, prior to being provided with any prescriptions for other controlled medications.   Medication drop box locations can be found at: controlled substance medications, in their original bottles, to all of my scheduled appointments. In addition, my provider may ask me to come to the practice at any time for a random pill count. 8. TERMINATION OF THIS AGREEMENT  For my safety, my prescriber has the right to stop prescribing controlled substance medications and may end this agreement. Initials:_______   Conditions that may result in termination of this agreement:  a. I do not show any improvement in pain, or my activity has not improved. b. I develop rapid tolerance or loss of improvement, as described in my treatment plan.  c. I develop significant side effects from the medication. d. My behavior is not consistent with the responsibilities outlined above, thereby causing safety concerns to continue prescribing controlled substance medications. e. I fail to follow the terms of this agreement. f. Other:____________________________       UNDERSTANDING THIS MEDICATION AGREEMENT:    I have read the above and have had all my questions answered. For chronic disease management, I know that my symptoms can be managed with many types of treatments. A chronic medication trial may be part of my treatment, but I must be an active participant in my care. Medication therapy is only one part of my symptom management plan. In some cases, there may be limited scientific evidence to support the chronic use of certain medications to improve symptoms and daily function. Furthermore, in certain circumstances, there may be scientific information that suggests that the use of chronic controlled substances may worsen my symptoms and increase my risk of unintentional death directly related to this medication therapy. I know that if my provider feels my risk from controlled medications is greater than my benefit, I will have my controlled substance medication(s) compassionately lowered or removed altogether.      I further agree to allow this office to contact my HIPAA contact if there are concerns about my safety and use of the controlled medications. I have agreed to use the prescribed controlled substance medications to me as instructed by my provider and as stated in this Medication Agreement. My initial on each page and my signature below shows that I have read each page and I have had the opportunity to ask questions with answers provided by my provider.     Patient Name (Printed): _____________________________________  Patient Signature:  ______________________   Date: _____________    Prescriber Name (Printed): ___________________________________  Prescriber Signature: _____________________  Date: _____________

## 2021-12-09 ENCOUNTER — TELEPHONE (OUTPATIENT)
Dept: FAMILY MEDICINE CLINIC | Age: 71
End: 2021-12-09

## 2021-12-09 DIAGNOSIS — K21.9 GASTROESOPHAGEAL REFLUX DISEASE WITHOUT ESOPHAGITIS: ICD-10-CM

## 2021-12-09 RX ORDER — PANTOPRAZOLE SODIUM 40 MG/1
40 TABLET, DELAYED RELEASE ORAL
Qty: 90 TABLET | Refills: 0 | Status: SHIPPED | OUTPATIENT
Start: 2021-12-09 | End: 2022-01-14 | Stop reason: SDUPTHER

## 2021-12-13 ENCOUNTER — OFFICE VISIT (OUTPATIENT)
Dept: ORTHOPEDIC SURGERY | Age: 71
End: 2021-12-13
Payer: MEDICARE

## 2021-12-13 VITALS
OXYGEN SATURATION: 96 % | RESPIRATION RATE: 15 BRPM | WEIGHT: 288 LBS | HEART RATE: 103 BPM | BODY MASS INDEX: 43.65 KG/M2 | HEIGHT: 68 IN

## 2021-12-13 DIAGNOSIS — M25.561 CHRONIC PAIN OF RIGHT KNEE: ICD-10-CM

## 2021-12-13 DIAGNOSIS — M17.11 PRIMARY OSTEOARTHRITIS OF RIGHT KNEE: Primary | ICD-10-CM

## 2021-12-13 DIAGNOSIS — G89.29 CHRONIC PAIN OF LEFT KNEE: ICD-10-CM

## 2021-12-13 DIAGNOSIS — M25.562 CHRONIC PAIN OF LEFT KNEE: ICD-10-CM

## 2021-12-13 DIAGNOSIS — G89.29 CHRONIC PAIN OF RIGHT KNEE: ICD-10-CM

## 2021-12-13 DIAGNOSIS — M17.12 PRIMARY OSTEOARTHRITIS OF LEFT KNEE: ICD-10-CM

## 2021-12-13 PROCEDURE — 4040F PNEUMOC VAC/ADMIN/RCVD: CPT | Performed by: ORTHOPAEDIC SURGERY

## 2021-12-13 PROCEDURE — G8427 DOCREV CUR MEDS BY ELIG CLIN: HCPCS | Performed by: ORTHOPAEDIC SURGERY

## 2021-12-13 PROCEDURE — 99203 OFFICE O/P NEW LOW 30 MIN: CPT | Performed by: ORTHOPAEDIC SURGERY

## 2021-12-13 PROCEDURE — G8484 FLU IMMUNIZE NO ADMIN: HCPCS | Performed by: ORTHOPAEDIC SURGERY

## 2021-12-13 PROCEDURE — 3017F COLORECTAL CA SCREEN DOC REV: CPT | Performed by: ORTHOPAEDIC SURGERY

## 2021-12-13 PROCEDURE — G8417 CALC BMI ABV UP PARAM F/U: HCPCS | Performed by: ORTHOPAEDIC SURGERY

## 2021-12-13 PROCEDURE — 1123F ACP DISCUSS/DSCN MKR DOCD: CPT | Performed by: ORTHOPAEDIC SURGERY

## 2021-12-13 PROCEDURE — 1036F TOBACCO NON-USER: CPT | Performed by: ORTHOPAEDIC SURGERY

## 2021-12-13 NOTE — PATIENT INSTRUCTIONS
Continue weight-bearing as tolerated. Continue range of motion exercises as instructed. Ice and elevate as needed. Tylenol or Motrin for pain. We will schedule surgery at soonest convenience.  If you have any questions regarding your surgery please call our office and ask to speak with Lawrence Reed [phone]

## 2021-12-13 NOTE — PROGRESS NOTES
Patient present to the office today for evaluation of the left knee. Last steroid injection given in the bilateral knees was on 10/27/21. Pt states pain today is a 6/10 in the left knee. Pt states nothing is helping with his pain. Pt states he is falling a lot due to the pain in the left knee. Pt states pain is globally in the left knee.  Pt states he has been taking noro which is not helping with the pain

## 2021-12-14 ASSESSMENT — ENCOUNTER SYMPTOMS
CHEST TIGHTNESS: 0
BACK PAIN: 0
COLOR CHANGE: 0

## 2021-12-14 NOTE — PROGRESS NOTES
Subjective:      Patient ID: Rani Meier is a 73 y.o. male. Patient present to the office today for evaluation of the left knee. Last steroid injection given in the bilateral knees was on 10/27/21. Pt states pain today is a 6/10 in the left knee. Pt states nothing is helping with his pain. Pt states he is falling a lot due to the pain in the left knee. Pt states pain is globally in the left knee. Pt states he has been taking noro which is not helping with the pain    He comes in today for his first visit with me in regards to his bilateral knee pain, left worse than right. He states that he has been dealing with worsening deep, aching and grinding pain in both of his knees for many years. He states that now the pain is getting to the point that he is having difficulty walking and performing his daily activities. Patient denies any new injury to the involved extremity/ joint, denies numbness or tingling in the involved extremity and denies fever or chills. Review of Systems   Constitutional: Negative for activity change, chills and fever. Respiratory: Negative for chest tightness. Cardiovascular: Negative for chest pain. Musculoskeletal: Positive for arthralgias, gait problem, joint swelling and myalgias. Negative for back pain. Skin: Negative for color change, pallor, rash and wound. Neurological: Negative for weakness and numbness.        Past Medical History:   Diagnosis Date    Acid reflux     Anxiety     Arthritis     \"Right Knee\"    Asthma     Chronic back pain     Cough     Occ Productive Cough \"Milky\" Sputum    Diverticulitis Dx Early 2000's    Hepatitis Dx 18's    \"In The Hospital A Few Days\"    [place] (hard of hearing)     Bilateral Ears    HTN (hypertension)     Hyperlipidemia     Nocturia     Shortness of breath on exertion     Slow urinary stream     Tingling     \"Tingling Right Hand\"    Wears dentures     Full Upper, Partial Lower    Wears glasses     Wears partial dentures     Lower       Objective:   Physical Exam  Constitutional:       Appearance: He is well-developed. HENT:      Head: Normocephalic. Eyes:      Pupils: Pupils are equal, round, and reactive to light. Pulmonary:      Effort: Pulmonary effort is normal.   Musculoskeletal:         General: Swelling and tenderness present. No deformity. Normal range of motion. Cervical back: Normal range of motion. Right hip: Normal.      Left hip: Normal.      Right knee: No swelling, deformity, effusion, erythema, ecchymosis, lacerations or bony tenderness. Normal range of motion. No tenderness. No medial joint line, lateral joint line, MCL, LCL or patellar tendon tenderness. No LCL laxity or MCL laxity. Normal alignment and normal patellar mobility. Left knee: Normal. No swelling, deformity, effusion, erythema, ecchymosis, lacerations or bony tenderness. Normal range of motion. No tenderness. No medial joint line, lateral joint line, MCL, LCL or patellar tendon tenderness. No LCL laxity or MCL laxity. Normal alignment and normal patellar mobility. Skin:     General: Skin is warm and dry. Capillary Refill: Capillary refill takes less than 2 seconds. Coloration: Skin is not pale. Findings: No erythema or rash. Neurological:      Mental Status: He is alert and oriented to person, place, and time. Left knee-Skin intact with no erythema, ecchymosis or lacerations present. 0-130    Right knee-Skin intact with no erythema, ecchymosis or lacerations present. 0 130    XRAY  X-ray 4 views of the left knee from October 27, 2021 reviewed by me today in the office demonstrates age appropriate bone density throughout with severe degenerative changes with 75% medial patellofemoral joint space narrowing, medial joint space collapse, moderate osteophyte formation in the patellofemoral compartment with normal tracking of the patella, no acute osseous abnormalities.     X-ray 4 views of the

## 2021-12-16 ENCOUNTER — TELEPHONE (OUTPATIENT)
Dept: FAMILY MEDICINE CLINIC | Age: 71
End: 2021-12-16

## 2021-12-16 NOTE — TELEPHONE ENCOUNTER
----- Message from [address] sent at 12/16/2021  2:44 PM EST -----  Subject: Message to Provider    QUESTIONS  Information for Provider? pt states that he has oxygen tanks at his home   and he is not using them and he is getting billed for them and he said   that he was told he needs to get in touch with PCP about not using the   tanks and getting them picked up so he is no longer billed please call pt   asap.   ---------------------------------------------------------------------------  --------------  CALL BACK INFO  What is the best way for the office to contact you? OK to leave message on   voicemail  Preferred Call Back Phone Number? [phone]  ---------------------------------------------------------------------------  --------------  SCRIPT ANSWERS  Relationship to Patient?  Self

## 2021-12-17 NOTE — TELEPHONE ENCOUNTER
I'd like to confirm that oxygen sat is acceptable when not using home oxygen. Can he come in for a quick visit when we can check O2 sat?

## 2021-12-20 ENCOUNTER — OFFICE VISIT (OUTPATIENT)
Dept: FAMILY MEDICINE CLINIC | Age: 71
End: 2021-12-20
Payer: MEDICARE

## 2021-12-20 ENCOUNTER — TELEPHONE (OUTPATIENT)
Dept: FAMILY MEDICINE CLINIC | Age: 71
End: 2021-12-20

## 2021-12-20 VITALS
WEIGHT: 296.8 LBS | DIASTOLIC BLOOD PRESSURE: 84 MMHG | HEART RATE: 98 BPM | TEMPERATURE: 96.8 F | OXYGEN SATURATION: 95 % | SYSTOLIC BLOOD PRESSURE: 136 MMHG | BODY MASS INDEX: 45.13 KG/M2

## 2021-12-20 DIAGNOSIS — M54.42 CHRONIC MIDLINE LOW BACK PAIN WITH LEFT-SIDED SCIATICA: ICD-10-CM

## 2021-12-20 DIAGNOSIS — G89.29 CHRONIC MIDLINE LOW BACK PAIN WITH LEFT-SIDED SCIATICA: ICD-10-CM

## 2021-12-20 DIAGNOSIS — R60.0 LOWER LEG EDEMA: ICD-10-CM

## 2021-12-20 DIAGNOSIS — A48.1 LEGIONNAIRE'S DISEASE (HCC): ICD-10-CM

## 2021-12-20 DIAGNOSIS — F17.200 SMOKER: ICD-10-CM

## 2021-12-20 DIAGNOSIS — M17.11 PRIMARY OSTEOARTHRITIS OF RIGHT KNEE: ICD-10-CM

## 2021-12-20 DIAGNOSIS — N13.8 PROSTATE NODULE WITH URINARY OBSTRUCTION: ICD-10-CM

## 2021-12-20 DIAGNOSIS — N40.3 PROSTATE NODULE WITH URINARY OBSTRUCTION: ICD-10-CM

## 2021-12-20 DIAGNOSIS — E66.01 MORBID OBESITY (HCC): ICD-10-CM

## 2021-12-20 DIAGNOSIS — J44.9 CHRONIC OBSTRUCTIVE PULMONARY DISEASE, UNSPECIFIED COPD TYPE (HCC): Primary | ICD-10-CM

## 2021-12-20 PROBLEM — J44.0 CHRONIC OBSTRUCTIVE PULMON DISEASE W ACUTE LOWER RESP INFCT (HCC): Status: ACTIVE | Noted: 2021-11-12

## 2021-12-20 PROBLEM — Z99.81 DEPENDENCE ON SUPPLEMENTAL OXYGEN: Status: ACTIVE | Noted: 2021-11-12

## 2021-12-20 PROCEDURE — G8484 FLU IMMUNIZE NO ADMIN: HCPCS | Performed by: FAMILY MEDICINE

## 2021-12-20 PROCEDURE — 36415 COLL VENOUS BLD VENIPUNCTURE: CPT | Performed by: FAMILY MEDICINE

## 2021-12-20 PROCEDURE — G8417 CALC BMI ABV UP PARAM F/U: HCPCS | Performed by: FAMILY MEDICINE

## 2021-12-20 PROCEDURE — 3017F COLORECTAL CA SCREEN DOC REV: CPT | Performed by: FAMILY MEDICINE

## 2021-12-20 PROCEDURE — G8926 SPIRO NO PERF OR DOC: HCPCS | Performed by: FAMILY MEDICINE

## 2021-12-20 PROCEDURE — 3023F SPIROM DOC REV: CPT | Performed by: FAMILY MEDICINE

## 2021-12-20 PROCEDURE — 1123F ACP DISCUSS/DSCN MKR DOCD: CPT | Performed by: FAMILY MEDICINE

## 2021-12-20 PROCEDURE — 99215 OFFICE O/P EST HI 40 MIN: CPT | Performed by: FAMILY MEDICINE

## 2021-12-20 PROCEDURE — 4040F PNEUMOC VAC/ADMIN/RCVD: CPT | Performed by: FAMILY MEDICINE

## 2021-12-20 PROCEDURE — G8428 CUR MEDS NOT DOCUMENT: HCPCS | Performed by: FAMILY MEDICINE

## 2021-12-20 PROCEDURE — 1036F TOBACCO NON-USER: CPT | Performed by: FAMILY MEDICINE

## 2021-12-20 RX ORDER — NICOTINE 21 MG/24HR
1 PATCH, TRANSDERMAL 24 HOURS TRANSDERMAL DAILY
Qty: 84 PATCH | Refills: 0 | Status: SHIPPED | OUTPATIENT
Start: 2021-12-20 | End: 2022-02-01

## 2021-12-20 ASSESSMENT — ENCOUNTER SYMPTOMS
COUGH: 1
APNEA: 1
CHOKING: 0
SHORTNESS OF BREATH: 1
CHEST TIGHTNESS: 0
WHEEZING: 0

## 2021-12-20 NOTE — ASSESSMENT & PLAN NOTE
He takes Lasix for bilateral foot edema. He had been confused that the Lasix was to help the problem of incomplete bladder emptying. I clarified that Lasix system move water from the blood to the urine.

## 2021-12-20 NOTE — TELEPHONE ENCOUNTER
[facility] needs a letter stating that you are discontinuing the home oxygen. Letter can be sent to [phone].

## 2021-12-20 NOTE — TELEPHONE ENCOUNTER
----- Message from [facility] sent at 12/20/2021  8:46 AM EST -----  Subject: Message to Provider    QUESTIONS  Information for Provider? Pt calling in with the name of oxygen co. It's   [employer] .  ---------------------------------------------------------------------------  --------------  CALL BACK INFO  What is the best way for the office to contact you? OK to leave message on   voicemail  Preferred Call Back Phone Number? [phone]  ---------------------------------------------------------------------------  --------------  SCRIPT ANSWERS  Relationship to Patient?  Self

## 2021-12-20 NOTE — PATIENT INSTRUCTIONS
Patient Education        Getting Back to Normal After Low Back Pain: Care Instructions  Your Care Instructions  Almost everyone has low back pain at some time. The good news is that most low back pain will go away in a few days or weeks with some basic self-care. Some people are afraid that doing too much may make their pain worse. In the past, people stayed in bed, thinking this would help their backs. Now doctors think that, in most cases, getting back to your normal activities is good for your back, as long as you avoid doing things that make your pain worse. Follow-up care is a key part of your treatment and safety. Be sure to make and go to all appointments, and call your doctor if you are having problems. It's also a good idea to know your test results and keep a list of the medicines you take. How can you care for yourself at home? Ease back into daily activities  · For the first day or two of pain, take it easy. But as soon as you can, get back to your normal daily life and activities. · Get gentle exercise, such as walking. Movement keeps your spine flexible and helps your muscles stay strong. · If you are an athlete, return to your activity carefully. Choose a low-impact option until your pain is under control. Avoid or change activities that cause pain  · Try to avoid too much bending, heavy lifting, or reaching. These movements put extra stress on your back. · In bed, try lying on your side with a pillow between your knees. Or lie on your back on the floor with a pillow under your knees. · When you sit, place a small pillow, a rolled-up towel, or a lumbar roll in the curve of your back for extra support. · Try putting one foot up on a stool or changing positions every few minutes if you have to stand still for a period of time. Pay attention to body mechanics and posture  Body mechanics are the way you use your body. Posture is the way you sit or stand. · Take extra care when you lift.  When you must lift, bend your knees and keep your back straight. Avoid twisting, and keep the load close to your body. · Stand or sit tall, with your shoulders back and your stomach pulled in to support your back. Get support when you need it  · Let people know when you need a helping hand. Get family members or friends to help out with tasks you can't do right now. · Be honest with your doctor about how the pain affects you. · If you've had to take time off work, talk to your doctor and boss about a gradual [id number] plan. Find out if there are other ways you could do your job to avoid hurting your back again. Reduce stress  Worrying about the pain can cause you to tense the muscles in your lower back. This in turn causes more pain. Here are a few things you can do to relax your mind and your muscles:  · Take 10 to 15 minutes to sit quietly and breathe deeply. Try to focus only on your breathing. If you can't keep thoughts away, think about things that make you feel good. · Get involved in your favorite hobby, or try something new. · Talk to a friend, read a book, or listen to your favorite music. · Find a counselor you like and trust. Talk openly and honestly about your problems. Be willing to make some changes. When should you call for help? Call 911 anytime you think you may need emergency care. For example, call if:    · You are unable to move a leg at all. Call your doctor now or seek immediate medical care if:    · You have new or worse symptoms in your legs, belly, or buttocks. Symptoms may include:  ? Numbness or tingling. ? Weakness. ? Pain.     · You lose bladder or bowel control. Watch closely for changes in your health, and be sure to contact your doctor if:    · You have a fever, lose weight, or don't feel well.     · You are not getting better as expected. Where can you learn more? Go to https://vicente.Blackbay. org and sign in to your [employer] account.  Enter B266 in the

## 2021-12-20 NOTE — PROGRESS NOTES
12/20/21    [facility]  1950    SUBJECTIVE    HPI - Emani Torrez is a 73 y.o. 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 Was hospitalized about 3 weeks ago. He has not used home oxygen x 2 weeks. Smoker: former 1 ppd smoker. He quit when in hospital when Legionaire's. Knee OA : pending knee surgery 1/25/21. Sciatica : has bilat sciatic mainly on Rt. He had an MRI and has not yet heard the results form the back Dr. He got an MRI. Nocturia : 10x nocturia. He feesl he does not empty the urine. Review of Systems   Constitutional: Negative for fatigue and fever. Respiratory: Positive for apnea (States lung Dr thinks he might have sleep apnea but has not yet contacted him. ), cough (now and then ) and shortness of breath. Negative for choking, chest tightness and wheezing. Fasting: Yes    Allergies   Allergen Reactions    Sulfa Antibiotics Rash      Fam Hx : Father had prostate CA. OBJECTIVE    /84 (Site: Left Upper Arm, Position: Sitting, Cuff Size: Large Adult)   Pulse 98   Temp 96.8 °F (36 °C) (Infrared)   Wt 296 lb 12.8 oz (134.6 kg)   SpO2 95%   BMI 45.13 kg/m²     Physical Exam   Constitutional:       General: Not in acute distress. Appearance: Normal appearance. Not ill-appearing. Eyes:      General: No scleral icterus. Cardiovascular:      Rate and Rhythm: Normal rate and regular rhythm. Heart sounds: No murmur heard. No friction rub. No gallop. Edgardo PT and DP pulses are hard to palpate due to swelling. Pulmonary:      Effort: Pulmonary effort is normal. No respiratory distress. Breath sounds: No wheezing, rhonchi or rales. Abdominal:      Palpations: Abdomen is soft. There is no mass. Tenderness: There is no abdominal tenderness. Musculoskeletal:     Moves all extremities normally. Has some swelling edgardo feet. Skin:     General: Skin is warm. Coloration: Skin is not jaundiced. Neurological:      Mental Status: She is alert. Psychiatric:         Behavior: Behavior normal.         Thought Content: Thought content normal.         Judgment: Judgment normal.  Rectal exam:  No lesions around the anus. Normal sphincter tone. Prostate normal size with RT side nodule and NO tenderness. No rectal masses. Reviewed Imaging: CT dated 11/7/2021. BMP 11/10/21 has eGFR > 60 (creatinine 0.5)    ASSESSMENT/PLAN:    1. Chronic obstructive pulmonary disease, unspecified COPD type ([facility] [address]  Comments:  OK to stop home oxygen. the company starts with an A and is in town - thinks office is on [address]. Assessment & Plan:   He has underlying COPD. I encouraged smoking cessation. I also put in a follow-up referral to pulmonology. He states that the pulmonologist who he saw in the hospital said that he might have sleep apnea. Orders:  -     Rudolph Nagy [place]  2. Prostate nodule with urinary obstruction  Assessment & Plan:   I am feeling a nodule on the right side. He has obstructive urinary symptoms which have been present for 2 years. I will refer to a urologist as well as ordering free and total PSA levels. Orders:  -     Heri Thompson MD, Urology, [facility]  -     PSA, Total and Free  3. Legionnaire's disease [facility])  Assessment & Plan:   He is fully recovered from legionnaires disease which hospitalized him about 3 weeks ago. 4. Morbid obesity ([facility] [address]  Assessment & Plan:   Encouraged weight loss pointing out that his knee pain would be less severe with less weight and his recovery from knee surgery will be easier with less weight. 5. Smoker  Assessment & Plan:   Quit smoking when in the hospital but has resumed smoking. Nicotine patches have been helpful. I have provided a refill of additional nicotine patches to help with quitting completely.   Orders:  -     nicotine (Candelario Pinto) 21 MG/24HR; Place 1 patch onto the skin daily, Disp-84 patch, R-0Normal  6. Primary osteoarthritis of right knee  Assessment & Plan:   He is scheduled for knee surgery January 25, 2022.  7. Lower leg edema  Assessment & Plan:   He takes Lasix for bilateral foot edema. He had been confused that the Lasix was to help the problem of incomplete bladder emptying. I clarified that Lasix system move water from the blood to the urine. 8. Chronic midline low back pain with left-sided sciatica  Assessment & Plan:   He will need to follow-up with Dr. Caleb Perez to go over the back MRI results. Orders:  -     External Referral - Dior Dial MD, Neurosurgery, [facility]    Counseling provided for:  >> Healthy eating - 1> avoid sugar and other refined carbohydrates; 2> eat more healthy unsaturated fats (runny at room temperature like oils and nut oils and fish oils and avocados); 3> eat more foods with fiber. Try to exercise more than in past.     Recommended smoking cessation. Pick a couple unhealthy foods to not bring in house. Try to eat healthy foods instead. Return in about 4 weeks (around 1/17/2022) for Dr Tashia Gutierrez for pain pills and other issues. Mihai Peña 43 minutes were spent this calendar day in pre-charting and chart review; with the patient doing history, exam, medical decision making, and counseling; and completing orders and documentation.     Rachelle Castillo MD

## 2021-12-20 NOTE — ASSESSMENT & PLAN NOTE
Encouraged weight loss pointing out that his knee pain would be less severe with less weight and his recovery from knee surgery will be easier with less weight.

## 2021-12-20 NOTE — ASSESSMENT & PLAN NOTE
I am feeling a nodule on the right side. He has obstructive urinary symptoms which have been present for 2 years. I will refer to a urologist as well as ordering free and total PSA levels.

## 2021-12-20 NOTE — ASSESSMENT & PLAN NOTE
He has underlying COPD. I encouraged smoking cessation. I also put in a follow-up referral to pulmonology. He states that the pulmonologist who he saw in the hospital said that he might have sleep apnea.

## 2021-12-20 NOTE — ASSESSMENT & PLAN NOTE
Quit smoking when in the hospital but has resumed smoking. Nicotine patches have been helpful. I have provided a refill of additional nicotine patches to help with quitting completely.

## 2021-12-21 ENCOUNTER — TELEPHONE (OUTPATIENT)
Dept: FAMILY MEDICINE CLINIC | Age: 71
End: 2021-12-21

## 2021-12-21 NOTE — TELEPHONE ENCOUNTER
No answer. Appointment on 01/10/2022 is with pulmonologist. He needs to call their office to see if he still needs appointment. Their phone number is [phone].

## 2021-12-21 NOTE — TELEPHONE ENCOUNTER
----- Message from [facility] sent at 12/21/2021  3:06 PM EST -----  Subject: Message to Provider    QUESTIONS  Information for Provider? patient is wanting to know why he needs to come   in on 1/10 for a COPD he had a follow up yesterday with Gurwinder Thurman and did get   oxygen he is concerned because he is spending out of pocket money on all   these appointments and only wants what is necessary please call patient   back and assist.  ---------------------------------------------------------------------------  --------------  CALL BACK INFO  What is the best way for the office to contact you? OK to leave message on   voicemail  Preferred Call Back Phone Number? [phone]  ---------------------------------------------------------------------------  --------------  SCRIPT ANSWERS  Relationship to Patient?  Self

## 2021-12-23 LAB
PROSTATE SPECIFIC ANTIGEN FREE: 0.5 UG/L
PROSTATE SPECIFIC ANTIGEN PERCENT FREE: 41.7 %
PROSTATE SPECIFIC ANTIGEN: 1.2 UG/L (ref 0–4)

## 2022-01-03 ENCOUNTER — TELEPHONE (OUTPATIENT)
Dept: FAMILY MEDICINE CLINIC | Age: 72
End: 2022-01-03

## 2022-01-03 DIAGNOSIS — Z12.11 COLON CANCER SCREENING: Primary | ICD-10-CM

## 2022-01-03 NOTE — TELEPHONE ENCOUNTER
----- Message from [facility] sent at 1/3/2022 12:48 PM EST -----  Subject: Referral Request    QUESTIONS   Reason for referral request? Patient is requesting a referral from Dr. Kulwant España to get a coloscopy in [place]. Patient do not have their fax   number or any information on where the referral needs to go too. Please   call patient back when the referral is ready at [phone]. Has the physician seen you for this condition before? Yes  Select a date? 2021-12-03  Select the Provider the patient wants to be referred to, if known (PCP or   Specialist)? Blake Alonso   Preferred Specialist (if applicable)? Blake Alonso  Do you already have an appointment scheduled? No  Additional Information for Provider?   ---------------------------------------------------------------------------  --------------  CALL BACK INFO  What is the best way for the office to contact you? OK to leave message on   voicemail, OK to respond with electronic message via [facility] portal (only   for patients who have registered [employer] account)  Preferred Call Back Phone Number?  [phone]

## 2022-01-04 DIAGNOSIS — R60.0 BILATERAL LOWER EXTREMITY EDEMA: ICD-10-CM

## 2022-01-04 DIAGNOSIS — G89.29 CHRONIC BILATERAL LOW BACK PAIN WITHOUT SCIATICA: ICD-10-CM

## 2022-01-04 DIAGNOSIS — M17.11 PRIMARY OSTEOARTHRITIS OF RIGHT KNEE: ICD-10-CM

## 2022-01-04 DIAGNOSIS — M17.12 PRIMARY OSTEOARTHRITIS OF LEFT KNEE: ICD-10-CM

## 2022-01-04 DIAGNOSIS — M54.50 CHRONIC BILATERAL LOW BACK PAIN WITHOUT SCIATICA: ICD-10-CM

## 2022-01-04 RX ORDER — FUROSEMIDE 40 MG/1
40 TABLET ORAL 2 TIMES DAILY
Qty: 60 TABLET | Refills: 0 | Status: SHIPPED | OUTPATIENT
Start: 2022-01-04 | End: 2022-02-01

## 2022-01-04 RX ORDER — HYDROCODONE BITARTRATE AND ACETAMINOPHEN 10; 325 MG/1; MG/1
1 TABLET ORAL EVERY 6 HOURS PRN
Qty: 120 TABLET | Refills: 0 | Status: SHIPPED | OUTPATIENT
Start: 2022-01-04 | End: 2022-01-31 | Stop reason: SDUPTHER

## 2022-01-04 NOTE — TELEPHONE ENCOUNTER
I do not know any gastroenterologist in [facility]. I can send a consult anywhere he would like, but I need to know where he would like to go.

## 2022-01-04 NOTE — TELEPHONE ENCOUNTER
Patient is scheduled for 01/14/2022. He is requesting refill of Lasix and Hydrocodone. He stated the swelling in his ankles has went down.

## 2022-01-10 ENCOUNTER — TELEPHONE (OUTPATIENT)
Dept: FAMILY MEDICINE CLINIC | Age: 72
End: 2022-01-10

## 2022-01-10 ENCOUNTER — TELEPHONE (OUTPATIENT)
Dept: ORTHOPEDIC SURGERY | Age: 72
End: 2022-01-10

## 2022-01-10 NOTE — TELEPHONE ENCOUNTER
----- Message from Deepti Topete sent at 1/10/2022 10:25 AM EST -----  Subject: Message to Provider    QUESTIONS  Information for Provider? pt. has had his surgery moved to Feb. 15th. He   assumes he will have to reschedule his pre -op appt. His pre-op appt is   scheduled for Jan. 18th right now. Please call pt. back and advise.  ---------------------------------------------------------------------------  --------------  CALL BACK INFO  What is the best way for the office to contact you? OK to leave message on   voicemail  Preferred Call Back Phone Number? [phone]  ---------------------------------------------------------------------------  --------------  SCRIPT ANSWERS  Relationship to Patient?  Self

## 2022-01-10 NOTE — TELEPHONE ENCOUNTER
----- Message from Varinder Roslyn sent at 1/10/2022 12:03 PM EST -----  Subject: Message to Provider    QUESTIONS  Information for Provider? Patient is scheduled to have a pre-op   appointment @ 11:15 am on 1/18/22. But, his surgery has moved from 1/25/22   to 2/15/22. The pre-op is within 30 days still, but the ECC wanted to   double check to make sure it was okay to leave it.  ---------------------------------------------------------------------------  --------------  CALL BACK INFO  What is the best way for the office to contact you? OK to leave message on   voicemail  Preferred Call Back Phone Number? [phone]  ---------------------------------------------------------------------------  --------------  SCRIPT ANSWERS  Relationship to Patient?  Self

## 2022-01-10 NOTE — TELEPHONE ENCOUNTER
Left message to call office. Spoke to Orthopedics office and they stated it is okay to keep pre-op physical appointment for 01/18/2022 for surgery on 02/15/2022.

## 2022-01-10 NOTE — TELEPHONE ENCOUNTER
----- Message from Mohsen Matthew sent at 1/10/2022  3:31 PM EST -----  Subject: Refill Request    QUESTIONS  Name of Medication? potassium chloride (KLOR-CON M) 10 MEQ extended   release tablet  Patient-reported dosage and instructions? 10 MEQ  How many days do you have left? 0  Preferred Pharmacy? [facility]  Pharmacy phone number (if available)? [phone]  ---------------------------------------------------------------------------  --------------  CALL BACK INFO  What is the best way for the office to contact you? OK to leave message on   voicemail  Preferred Call Back Phone Number?  [phone]

## 2022-01-10 NOTE — TELEPHONE ENCOUNTER
Please advise Abi from Dr. Brendon York office is calling in asking if the patient is allowed to do a pre-op appointment with his yearly physical.    Looking into Dr. Julissa Epperson office note patient states that he wanted to stay at the hospital.

## 2022-01-12 ENCOUNTER — TELEPHONE (OUTPATIENT)
Dept: FAMILY MEDICINE CLINIC | Age: 72
End: 2022-01-12

## 2022-01-12 NOTE — TELEPHONE ENCOUNTER
Patient left message stating [facility] does not have the rx for the potassium and wanted to know if you can send a new script to [facility].    Spoke with [facility] they do not have rx on file

## 2022-01-12 NOTE — TELEPHONE ENCOUNTER
Lety from [facility] called stating it is time to renew home care. Lety requesting a verbal okay to continue home care for patient.      Pastor Goff can be reached at [phone]

## 2022-01-13 RX ORDER — POTASSIUM CHLORIDE 750 MG/1
10 TABLET, EXTENDED RELEASE ORAL 2 TIMES DAILY
Qty: 60 TABLET | Refills: 0 | Status: SHIPPED | OUTPATIENT
Start: 2022-01-13 | End: 2022-02-01

## 2022-01-13 NOTE — TELEPHONE ENCOUNTER
His potassium was actually prescribed by different doctor, Dr. Debra Vargas. It was sent to the [facility] outpatient pharmacy. That is why [facility] did not have the prescription. I did send a bridge prescription to the pharmacy this morning.

## 2022-01-14 ENCOUNTER — OFFICE VISIT (OUTPATIENT)
Dept: FAMILY MEDICINE CLINIC | Age: 72
End: 2022-01-14
Payer: MEDICARE

## 2022-01-14 VITALS
BODY MASS INDEX: 45.55 KG/M2 | TEMPERATURE: 97 F | HEART RATE: 105 BPM | DIASTOLIC BLOOD PRESSURE: 78 MMHG | WEIGHT: 299.6 LBS | OXYGEN SATURATION: 95 % | SYSTOLIC BLOOD PRESSURE: 132 MMHG

## 2022-01-14 DIAGNOSIS — R35.1 BPH ASSOCIATED WITH NOCTURIA: ICD-10-CM

## 2022-01-14 DIAGNOSIS — M17.12 PRIMARY OSTEOARTHRITIS OF LEFT KNEE: ICD-10-CM

## 2022-01-14 DIAGNOSIS — K21.9 GASTROESOPHAGEAL REFLUX DISEASE WITHOUT ESOPHAGITIS: ICD-10-CM

## 2022-01-14 DIAGNOSIS — F10.20 UNCOMPLICATED ALCOHOL DEPENDENCE (HCC): ICD-10-CM

## 2022-01-14 DIAGNOSIS — I10 ESSENTIAL HYPERTENSION: Primary | ICD-10-CM

## 2022-01-14 DIAGNOSIS — N40.1 BPH ASSOCIATED WITH NOCTURIA: ICD-10-CM

## 2022-01-14 DIAGNOSIS — F17.200 SMOKER: ICD-10-CM

## 2022-01-14 DIAGNOSIS — J44.9 CHRONIC OBSTRUCTIVE PULMONARY DISEASE, UNSPECIFIED COPD TYPE (HCC): ICD-10-CM

## 2022-01-14 DIAGNOSIS — M17.11 PRIMARY OSTEOARTHRITIS OF RIGHT KNEE: ICD-10-CM

## 2022-01-14 PROBLEM — M17.0 BILATERAL PRIMARY OSTEOARTHRITIS OF KNEE: Status: ACTIVE | Noted: 2021-11-12

## 2022-01-14 PROCEDURE — 4004F PT TOBACCO SCREEN RCVD TLK: CPT | Performed by: FAMILY MEDICINE

## 2022-01-14 PROCEDURE — G8417 CALC BMI ABV UP PARAM F/U: HCPCS | Performed by: FAMILY MEDICINE

## 2022-01-14 PROCEDURE — 1123F ACP DISCUSS/DSCN MKR DOCD: CPT | Performed by: FAMILY MEDICINE

## 2022-01-14 PROCEDURE — 3023F SPIROM DOC REV: CPT | Performed by: FAMILY MEDICINE

## 2022-01-14 PROCEDURE — 99214 OFFICE O/P EST MOD 30 MIN: CPT | Performed by: FAMILY MEDICINE

## 2022-01-14 PROCEDURE — 3017F COLORECTAL CA SCREEN DOC REV: CPT | Performed by: FAMILY MEDICINE

## 2022-01-14 PROCEDURE — G8428 CUR MEDS NOT DOCUMENT: HCPCS | Performed by: FAMILY MEDICINE

## 2022-01-14 PROCEDURE — 4040F PNEUMOC VAC/ADMIN/RCVD: CPT | Performed by: FAMILY MEDICINE

## 2022-01-14 PROCEDURE — G8484 FLU IMMUNIZE NO ADMIN: HCPCS | Performed by: FAMILY MEDICINE

## 2022-01-14 RX ORDER — PANTOPRAZOLE SODIUM 40 MG/1
40 TABLET, DELAYED RELEASE ORAL
Qty: 90 TABLET | Refills: 0 | Status: SHIPPED | OUTPATIENT
Start: 2022-01-14 | End: 2022-04-18

## 2022-01-14 RX ORDER — AMLODIPINE BESYLATE 5 MG/1
TABLET ORAL
Qty: 90 TABLET | Refills: 1 | Status: SHIPPED | OUTPATIENT
Start: 2022-01-14 | End: 2022-06-21 | Stop reason: SDUPTHER

## 2022-01-14 RX ORDER — LISINOPRIL 5 MG/1
TABLET ORAL
Qty: 90 TABLET | Refills: 1 | Status: SHIPPED | OUTPATIENT
Start: 2022-01-14 | End: 2022-06-21 | Stop reason: SDUPTHER

## 2022-01-14 RX ORDER — TAMSULOSIN HYDROCHLORIDE 0.4 MG/1
0.4 CAPSULE ORAL DAILY
Qty: 90 CAPSULE | Refills: 1 | Status: SHIPPED | OUTPATIENT
Start: 2022-01-14 | End: 2022-06-21 | Stop reason: SDUPTHER

## 2022-01-14 ASSESSMENT — PATIENT HEALTH QUESTIONNAIRE - PHQ9
SUM OF ALL RESPONSES TO PHQ QUESTIONS 1-9: 0
SUM OF ALL RESPONSES TO PHQ9 QUESTIONS 1 & 2: 0
1. LITTLE INTEREST OR PLEASURE IN DOING THINGS: 0
2. FEELING DOWN, DEPRESSED OR HOPELESS: 0
SUM OF ALL RESPONSES TO PHQ QUESTIONS 1-9: 0

## 2022-01-14 NOTE — PROGRESS NOTES
Still smoking about 10 cigarettes/day. No longer drinking. Was scheduled for right knee replacement 1/25/22, but due to hospital crisis, it has been rescheduled for 2/15/22. Currently on Norco  qid with good pain control. Patient with bilateral lower extremity edema. Patient has been on Lasix 40 mg twice daily along with potassium. Patient states that his swelling has improved and he is having no shortness of breath. He wants to know if he needs to take his Lasix anymore. O:   Vitals:    01/14/22 0847   BP: 132/78   Pulse: 105   Temp: 97 °F (36.1 °C)   SpO2: 95%     No acute distress. Alert and Oriented x 3  HEENT: Atraumatic. Normocephalic. PERRLA, EOMI, Conjunctiva clear  NECK: without thyromegaly, lymphadenopathy, JVD  LUNGS:Clear to ascultation bilaterally. Breathing comfortably  CARDIOVASCULAR:  Regular rate and rhythm, no murmurs, rubs, or gallops  EXTREMITY: Full range of motion. No clubbing/cyanosis/trace bilateral lower extremity edema  NEURO: Cranial nerves II-XII grossly intact. Strength 5/5, DTR 2/4. SKIN: Warm, Dry, No rash. PSYCH: Mood and Affect normal.    A:    Diagnosis Orders   1. Essential hypertension  amLODIPine (NORVASC) 5 MG tablet   Controlled lisinopril (PRINIVIL;ZESTRIL) 5 MG tablet   2. BPH  Controlled tamsulosin (FLOMAX) 0.4 MG capsule   3. Gastroesophageal reflux disease without esophagitis   Controlled pantoprazole (PROTONIX) 40 MG tablet   4. Chronic obstructive pulmonary disease, unspecified COPD type ([address]   Fairly well controlled, no changes    5. Uncomplicated alcohol dependence ([address]   Resolved    6. Body mass index (BMI) 45.0-49.9, adult (HCC)   Improving    7. Primary osteoarthritis of left knee     8. Primary osteoarthritis of right knee     9. Smoker       P: Continue amlodipine, lisinopril, Flomax, and pantoprazole at current doses. Hold Lasix and potassium unless lower extremity edema returns and then restart both once daily.   Follow-up with orthopedics for her knee surgery. Follow-up with me 6 months. Amparo  received counseling on the following healthy behaviors: nutrition, exercise, medication adherence and tobacco cessation    Patient given educational materials on Smoking Cessation and Hypertension    I have instructed Amparo  to complete a self tracking handout on Blood Sugars  and instructed them to bring it with them to his next appointment. Discussed use, benefit, and side effects of prescribed medications. Barriers to medication compliance addressed. All patient questions answered. Pt voiced understanding.

## 2022-01-14 NOTE — PATIENT INSTRUCTIONS
Patient Education        DASH Diet: Care Instructions  Your Care Instructions     The DASH diet is an eating plan that can help lower your blood pressure. DASH stands for Dietary Approaches to Stop Hypertension. Hypertension is high blood pressure. The DASH diet focuses on eating foods that are high in calcium, potassium, and magnesium. These nutrients can lower blood pressure. The foods that are highest in these nutrients are fruits, vegetables, low-fat dairy products, nuts, seeds, and legumes. But taking calcium, potassium, and magnesium supplements instead of eating foods that are high in those nutrients does not have the same effect. The DASH diet also includes whole grains, fish, and poultry. The DASH diet is one of several lifestyle changes your doctor may recommend to lower your high blood pressure. Your doctor may also want you to decrease the amount of sodium in your diet. Lowering sodium while following the DASH diet can lower blood pressure even further than just the DASH diet alone. Follow-up care is a key part of your treatment and safety. Be sure to make and go to all appointments, and call your doctor if you are having problems. It's also a good idea to know your test results and keep a list of the medicines you take. How can you care for yourself at home? Following the DASH diet  · Eat 4 to 5 servings of fruit each day. A serving is 1 medium-sized piece of fruit, ½ cup chopped or canned fruit, 1/4 cup dried fruit, or 4 ounces (½ cup) of fruit juice. Choose fruit more often than fruit juice. · Eat 4 to 5 servings of vegetables each day. A serving is 1 cup of lettuce or raw leafy vegetables, ½ cup of chopped or cooked vegetables, or 4 ounces (½ cup) of vegetable juice. Choose vegetables more often than vegetable juice. · Get 2 to 3 servings of low-fat and fat-free dairy each day. A serving is 8 ounces of milk, 1 cup of yogurt, or 1 ½ ounces of cheese. · Eat 6 to 8 servings of grains each day. A serving is 1 slice of bread, 1 ounce of dry cereal, or ½ cup of cooked rice, pasta, or cooked cereal. Try to choose whole-grain products as much as possible. · Limit lean meat, poultry, and fish to 2 servings each day. A serving is 3 ounces, about the size of a deck of cards. · Eat 4 to 5 servings of nuts, seeds, and legumes (cooked dried beans, lentils, and split peas) each week. A serving is 1/3 cup of nuts, 2 tablespoons of seeds, or ½ cup of cooked beans or peas. · Limit fats and oils to 2 to 3 servings each day. A serving is 1 teaspoon of vegetable oil or 2 tablespoons of salad dressing. · Limit sweets and added sugars to 5 servings or less a week. A serving is 1 tablespoon jelly or jam, ½ cup sorbet, or 1 cup of lemonade. · Eat less than 2,300 milligrams (mg) of sodium a day. If you limit your sodium to 1,500 mg a day, you can lower your blood pressure even more. · Be aware that all of these are the suggested number of servings for people who eat 1,800 to 2,000 calories a day. Your recommended number of servings may be different if you need more or fewer calories. Tips for success  · Start small. Do not try to make dramatic changes to your diet all at once. You might feel that you are missing out on your favorite foods and then be more likely to not follow the plan. Make small changes, and stick with them. Once those changes become habit, add a few more changes. · Try some of the following:  ? Make it a goal to eat a fruit or vegetable at every meal and at snacks. This will make it easy to get the recommended amount of fruits and vegetables each day. ? Try yogurt topped with fruit and nuts for a snack or healthy dessert. ? Add lettuce, tomato, cucumber, and onion to sandwiches. ? Combine a ready-made pizza crust with low-fat mozzarella cheese and lots of vegetable toppings. Try using tomatoes, squash, spinach, broccoli, carrots, cauliflower, and onions. ?  Have a variety of cut-up vegetables with a low-fat dip as an appetizer instead of chips and dip. ? Sprinkle sunflower seeds or chopped almonds over salads. Or try adding chopped walnuts or almonds to cooked vegetables. ? Try some vegetarian meals using beans and peas. Add garbanzo or kidney beans to salads. Make burritos and tacos with mashed moser beans or black beans. Where can you learn more? Go to https://Carmine.OpenROV. org and sign in to your [employer] account. Enter Y401 in the [employer] box to learn more about \"DASH Diet: Care Instructions. \"     If you do not have an account, please click on the \"Sign Up Now\" link. Current as of: April 29, 2021               Content Version: 13.1  © 1445-9102 [employer], [employer]. Care instructions adapted under license by [facility] ([facility]). If you have questions about a medical condition or this instruction, always ask your healthcare professional. [place] [address] any warranty or liability for your use of this information.

## 2022-01-18 ENCOUNTER — OFFICE VISIT (OUTPATIENT)
Dept: FAMILY MEDICINE CLINIC | Age: 72
End: 2022-01-18
Payer: MEDICARE

## 2022-01-18 VITALS
HEIGHT: 68 IN | OXYGEN SATURATION: 96 % | BODY MASS INDEX: 45.53 KG/M2 | DIASTOLIC BLOOD PRESSURE: 70 MMHG | HEART RATE: 90 BPM | TEMPERATURE: 97.2 F | WEIGHT: 300.4 LBS | SYSTOLIC BLOOD PRESSURE: 124 MMHG

## 2022-01-18 DIAGNOSIS — M17.11 PRIMARY OSTEOARTHRITIS OF RIGHT KNEE: Primary | ICD-10-CM

## 2022-01-18 DIAGNOSIS — Z12.11 COLON CANCER SCREENING: ICD-10-CM

## 2022-01-18 DIAGNOSIS — Z01.818 VISIT FOR PRE-OPERATIVE EXAMINATION: ICD-10-CM

## 2022-01-18 DIAGNOSIS — M17.12 PRIMARY OSTEOARTHRITIS OF LEFT KNEE: ICD-10-CM

## 2022-01-18 DIAGNOSIS — R60.0 BILATERAL LOWER EXTREMITY EDEMA: ICD-10-CM

## 2022-01-18 DIAGNOSIS — J44.9 CHRONIC OBSTRUCTIVE PULMONARY DISEASE, UNSPECIFIED COPD TYPE (HCC): ICD-10-CM

## 2022-01-18 PROCEDURE — G8417 CALC BMI ABV UP PARAM F/U: HCPCS | Performed by: FAMILY MEDICINE

## 2022-01-18 PROCEDURE — 3023F SPIROM DOC REV: CPT | Performed by: FAMILY MEDICINE

## 2022-01-18 PROCEDURE — G8484 FLU IMMUNIZE NO ADMIN: HCPCS | Performed by: FAMILY MEDICINE

## 2022-01-18 PROCEDURE — 99213 OFFICE O/P EST LOW 20 MIN: CPT | Performed by: FAMILY MEDICINE

## 2022-01-18 PROCEDURE — G8427 DOCREV CUR MEDS BY ELIG CLIN: HCPCS | Performed by: FAMILY MEDICINE

## 2022-01-18 NOTE — PATIENT INSTRUCTIONS
Patient Education        Total Knee Replacement: Before Your Surgery  What is a total knee replacement? A total knee replacement replaces the worn ends of the bones where they meet at the knee. Those bones are the thighbone (femur) and the lower leg bone (tibia). Your doctor will remove the damaged bone. Then your doctor will replace it with plastic and metal parts. These new parts may be attached to your bones with cement. Your doctor will make a cut down the center of your knee. This cut is called an incision. It will be several inches long. Sometimes the surgery can be done with a smaller incision. Both kinds of incisions leave scars that usually fade with time. Your doctor will let you know if you will stay in the hospital or if you can go home the day of surgery. If you have both knees done at the same time, you may need to be in the hospital for a few days. Most people go back to normal activities or work in 4 to 16 weeks. This depends on your health. It also depends on how well your knee does in your rehab program. This may take longer if you have both knees done at the same time. How do you prepare for surgery? Surgery can be stressful. This information will help you understand what you can expect. And it will help you safely prepare for surgery. Preparing for surgery    · You may need to shower or bathe with a special soap the night before and the morning of your surgery. The soap contains chlorhexidine. It reduces the amount of bacteria on your skin that could cause an infection after surgery.     · Be sure you have someone to take you home. Anesthesia and pain medicine will make it unsafe for you to drive or get home on your own.     · Understand exactly what surgery is planned, along with the risks, benefits, and other options.     · If you take aspirin or some other blood thinner, ask your doctor if you should stop taking it before your surgery.  Make sure that you understand exactly what your doctor wants you to do. These medicines increase the risk of bleeding.     · Tell your doctor ALL the medicines, vitamins, supplements, and herbal remedies you take. Some may increase the risk of problems during your surgery. Your doctor will tell you if you should stop taking any of them before the surgery and how soon to do it.     · Make sure your doctor and the hospital have a copy of your advance directive. If you don't have one, you may want to prepare one. It lets others know your health care wishes. It's a good thing to have before any type of surgery or procedure. What happens on the day of surgery? · Follow the instructions exactly about when to stop eating and drinking. If you don't, your surgery may be canceled. If your doctor told you to take your medicines on the day of surgery, take them with only a sip of water.     · Take a bath or shower before you come in for your surgery. Do not apply lotions, perfumes, deodorants, or nail polish.     · Do not shave the surgical site yourself.     · Take off all jewelry and piercings. And take out contact lenses, if you wear them. At the hospital or surgery center   · Bring a picture ID.     · The area for surgery is often marked to make sure there are no errors.     · You will be kept comfortable and safe by your anesthesia provider. The anesthesia may make you sleep. Or it may just numb the area being worked on.     · You also will get antibiotics through the IV tube before surgery. This lowers the risk of an infection of the incision.     · The surgery will take about 2 to 3 hours. When should you call your doctor? · You have questions or concerns.     · You don't understand how to prepare for your surgery.     · You become ill before the surgery (such as fever, flu, or a cold).     · You need to reschedule or have changed your mind about having the surgery. Where can you learn more? Go to [email or web address]://[email or web address].[employer]. org and sign in to your [employer] account. Enter K733 in the [facility] box to learn more about \"Total Knee Replacement: Before Your Surgery. \"     If you do not have an account, please click on the \"Sign Up Now\" link. Current as of: July 1, 2021               Content Version: 13.1  © 2075-7778 Healthwise, Incorporated. Care instructions adapted under license by [facility] ([facility]). If you have questions about a medical condition or this instruction, always ask your healthcare professional. [address] any warranty or liability for your use of this information.

## 2022-01-18 NOTE — PROGRESS NOTES
Subjective:   Chief Complaint:     Felix Mc is a 73 y.o. male who presents for a  physical examination. History of Present Illness:      Patient with OA bilateral knees and is schedules to have a right total knee replacement on 2/15/21 and I was consulted by Dr. Anton Bobo for medical optimization due to HTN, COPD, Obesity. Patient's last colonoscopy was almost 5 years ago. Patient has a family history of colon cancer and his last colonoscopy revealed hyperplastic polyps. Patient would like to schedule a colonoscopy.     Past Medical History:   Diagnosis Date    Acid reflux     Anxiety     Arthritis     \"Right Knee\"    Asthma     Chronic back pain     Cough     Occ Productive Cough \"Milky\" Sputum    Diverticulitis Dx Early 2000's    Hepatitis Dx 18's    \"In The Hospital A Few Days\"    [place] (hard of hearing)     Bilateral Ears    HTN (hypertension)     Hyperlipidemia     Nocturia     Shortness of breath on exertion     Slow urinary stream     Tingling     \"Tingling Right Hand\"    Wears dentures     Full Upper, Partial Lower    Wears glasses     Wears partial dentures     Lower        Review of patient's past surgical history indicates:     Past Surgical History:   Procedure Laterality Date    APPENDECTOMY  1960's    CARPAL TUNNEL RELEASE Right 06/22/2017    COLONOSCOPY  Last Done Early 2000's    Polyps Removed In Past    DENTAL SURGERY      Teeth Extracted In Past    EYE SURGERY Left Late 1980's    Cataract With Lens Implant    FRACTURE SURGERY Left 2000's    Broken Left Wrist    HERNIA REPAIR  3976'Z    Umbilical Hernia Repair    KNEE ARTHROSCOPY Right 04/13/2017    meniscus repair    TONSILLECTOMY AND ADENOIDECTOMY  1960's                                                   Current Outpatient Medications   Medication Sig Dispense Refill    amLODIPine (NORVASC) 5 MG tablet TAKE 1 TABLET BY MOUTH EVERY DAY IN THE MORNING 90 tablet 1    tamsulosin (FLOMAX) 0.4 MG capsule Take 1 capsule by mouth daily 90 capsule 1    lisinopril (PRINIVIL;ZESTRIL) 5 MG tablet TAKE 1 TABLET BY MOUTH EVERY DAY IN THE MORNING 90 tablet 1    pantoprazole (PROTONIX) 40 MG tablet Take 1 tablet by mouth every morning (before breakfast) 90 tablet 0    potassium chloride (KLOR-CON M) 10 MEQ extended release tablet Take 1 tablet by mouth 2 times daily 60 tablet 0    furosemide (LASIX) 40 MG tablet Take 1 tablet by mouth 2 times daily 60 tablet 0    HYDROcodone-acetaminophen (NORCO)  MG per tablet Take 1 tablet by mouth every 6 hours as needed for Pain for up to 30 days. Intended supply: 30 days 120 tablet 0    nicotine (NICODERM CQ) 21 MG/24HR Place 1 patch onto the skin daily 84 patch 0    Spacer/Aero-Holding Chambers (AEROCHAMBER MV) MISC Use with albuterol. 1 each 0    Compression Stockings MISC by Does not apply route 20-30 mM HG    Wear daily 2 each 0    sildenafil (VIAGRA) 100 MG tablet Take 1 tablet by mouth as needed for Erectile Dysfunction 30 tablet 2    albuterol sulfate HFA (PROAIR HFA) 108 (90 Base) MCG/ACT inhaler Inhale 2 puffs into the lungs every 6 hours as needed for Wheezing 3 Inhaler 1     No current facility-administered medications for this visit.        Allergies   Allergen Reactions    Sulfa Antibiotics Rash       Social History     Tobacco Use    Smoking status: Current Some Day Smoker     Packs/day: 1.00     Years: 52.00     Pack years: 52.00     Types: Cigarettes     Start date: 1965    Smokeless tobacco: Former User     Quit date: 11/3/2021   Vaping Use    Vaping Use: Never used   Substance Use Topics    Alcohol use: Not Currently     Alcohol/week: 3.0 - 6.0 standard drinks     Types: 3 - 6 Cans of beer per week    Drug use: No        Family History   Problem Relation Age of Onset    Stroke Mother     Heart Disease Mother     Cancer Father         Prostate Cancer    Cancer Son         Leukemia        Review Of Systems    Skin: no abnormal pigmentation, rash, scaling, itching, masses, hair or nail changes  Eyes: negative  Ears/Nose/Throat: negative  Respiratory: See HPI  Cardiovascular: See HPI  Gastrointestinal: negative  Genitourinary: negative  Musculoskeletal: See HPI  Neurologic: negative  Psychiatric: negative  Hematologic/Lymphatic/Immunologic: negative  Endocrine: negative       Objective:      BP (!) 140/76 (Site: Left Upper Arm, Position: Sitting, Cuff Size: Large Adult)   Pulse 90   Temp 97.2 °F (36.2 °C) (Infrared)   Ht 5' 8\" (1.727 m)   Wt (!) 300 lb 6.4 oz (136.3 kg)   SpO2 96%   BMI 45.68 kg/m²   General appearance - healthy, alert, no distress, obese  Skin - Skin color, texture, turgor normal. No rashes or lesions. Head - Normocephalic. No masses, lesions, tenderness or abnormalities  Eyes - conjunctivae/corneas clear. PERRL, EOM's intact. Ears - External ears normal. Canals clear. TM's normal.  Nose/Sinuses - Nares normal. Septum midline. Mucosa normal. No drainage or sinus tenderness. Oropharynx - Lips, mucosa, and tongue normal. Teeth and gums normal.   Neck - Neck supple. No adenopathy. Thyroid symmetric, normal size,  Back - Back symmetric, no curvature. ROM normal. No CVA tenderness. Lungs -clear to auscultation bilaterally, breathing comfortably  Heart - Regular rate and rhythm, with no rub, murmur or gallop noted. Abdomen - Abdomen soft, non-tender. BS normal. No masses, organomegaly  Extremities - Extremities normal. No deformities or skin discoloration. 2+ pitting edema to mid calf. Musculoskeletal - Spine ROM normal. Muscular strength intact. Peripheral pulses - radial=4/4  Neuro - Gait normal. Reflexes normal and symmetric. Sensation grossly normal.  No focal weakness           Assessment:         Diagnosis Orders   1. Primary osteoarthritis of right knee     2. Primary osteoarthritis of left knee     3. Bilateral lower extremity edema     4. Chronic obstructive pulmonary disease, unspecified COPD type ([facility] [address]     5.  Visit for pre-operative examination     6. Colon cancer screening  External Referral To Gastroenterology                 Plan:   Restart Lasix for edema. Medically optimized for surgery.   Letter stating such will be sent to Dr. Natasha Irby, his orthopedist.

## 2022-01-19 DIAGNOSIS — Z20.822 ENCOUNTER FOR PREOPERATIVE SCREENING LABORATORY TESTING FOR COVID-19 VIRUS: Primary | ICD-10-CM

## 2022-01-19 DIAGNOSIS — Z01.812 ENCOUNTER FOR PREOPERATIVE SCREENING LABORATORY TESTING FOR COVID-19 VIRUS: Primary | ICD-10-CM

## 2022-01-19 DIAGNOSIS — M17.12 PRIMARY OSTEOARTHRITIS OF LEFT KNEE: ICD-10-CM

## 2022-01-19 DIAGNOSIS — M25.562 PAIN IN JOINT OF LEFT KNEE: ICD-10-CM

## 2022-01-31 ENCOUNTER — TELEPHONE (OUTPATIENT)
Dept: FAMILY MEDICINE CLINIC | Age: 72
End: 2022-01-31

## 2022-01-31 DIAGNOSIS — M17.11 PRIMARY OSTEOARTHRITIS OF RIGHT KNEE: ICD-10-CM

## 2022-01-31 DIAGNOSIS — G89.29 CHRONIC BILATERAL LOW BACK PAIN WITHOUT SCIATICA: ICD-10-CM

## 2022-01-31 DIAGNOSIS — M17.12 PRIMARY OSTEOARTHRITIS OF LEFT KNEE: ICD-10-CM

## 2022-01-31 DIAGNOSIS — M54.50 CHRONIC BILATERAL LOW BACK PAIN WITHOUT SCIATICA: ICD-10-CM

## 2022-01-31 RX ORDER — HYDROCODONE BITARTRATE AND ACETAMINOPHEN 10; 325 MG/1; MG/1
1 TABLET ORAL EVERY 6 HOURS PRN
Qty: 120 TABLET | Refills: 0 | Status: SHIPPED | OUTPATIENT
Start: 2022-01-31 | End: 2022-02-28 | Stop reason: SDUPTHER

## 2022-02-01 ENCOUNTER — HOSPITAL ENCOUNTER (OUTPATIENT)
Age: 72
Discharge: HOME | End: 2022-02-01
Payer: MEDICARE

## 2022-02-01 ENCOUNTER — HOSPITAL ENCOUNTER (OUTPATIENT)
Dept: GENERAL RADIOLOGY | Age: 72
Discharge: HOME | End: 2022-02-01
Payer: MEDICARE

## 2022-02-01 ENCOUNTER — HOSPITAL ENCOUNTER (OUTPATIENT)
Age: 72
Discharge: HOME | End: 2022-02-01

## 2022-02-01 DIAGNOSIS — Z01.818 PRE-OP TESTING: ICD-10-CM

## 2022-02-01 LAB
ALBUMIN SERPL-MCNC: 3.9 GM/DL (ref 3.4–5)
ALP BLD-CCNC: 78 IU/L (ref 40–128)
ALT SERPL-CCNC: 20 U/L (ref 10–40)
ANION GAP SERPL CALCULATED.3IONS-SCNC: 11 MMOL/L (ref 4–16)
AST SERPL-CCNC: 19 IU/L (ref 15–37)
BACTERIA: NEGATIVE /HPF
BASOPHILS ABSOLUTE: 0.2 K/CU MM
BASOPHILS RELATIVE PERCENT: 1.2 % (ref 0–1)
BILIRUB SERPL-MCNC: 0.3 MG/DL (ref 0–1)
BILIRUBIN URINE: NEGATIVE MG/DL
BLOOD, URINE: NEGATIVE
BUN BLDV-MCNC: 15 MG/DL (ref 6–23)
CALCIUM SERPL-MCNC: 9.1 MG/DL (ref 8.3–10.6)
CHLORIDE BLD-SCNC: 102 MMOL/L (ref 99–110)
CLARITY: CLEAR
CO2: 26 MMOL/L (ref 21–32)
COLOR: YELLOW
CREAT SERPL-MCNC: 0.7 MG/DL (ref 0.9–1.3)
DIFFERENTIAL TYPE: ABNORMAL
EKG ATRIAL RATE: 83 BPM
EKG DIAGNOSIS: NORMAL
EKG P AXIS: 53 DEGREES
EKG P-R INTERVAL: 160 MS
EKG Q-T INTERVAL: 384 MS
EKG QRS DURATION: 136 MS
EKG QTC CALCULATION (BAZETT): 451 MS
EKG R AXIS: -33 DEGREES
EKG T AXIS: 31 DEGREES
EKG VENTRICULAR RATE: 83 BPM
EOSINOPHILS ABSOLUTE: 0.4 K/CU MM
EOSINOPHILS RELATIVE PERCENT: 3.6 % (ref 0–3)
ERYTHROCYTE SEDIMENTATION RATE: 27 MM/HR (ref 0–20)
GFR AFRICAN AMERICAN: >60 ML/MIN/1.73M2
GFR NON-AFRICAN AMERICAN: >60 ML/MIN/1.73M2
GLUCOSE BLD-MCNC: 166 MG/DL (ref 70–99)
GLUCOSE, URINE: NEGATIVE MG/DL
HCT VFR BLD CALC: 50.1 % (ref 42–52)
HEMOGLOBIN: 16.1 GM/DL (ref 13.5–18)
IMMATURE NEUTROPHIL %: 1.1 % (ref 0–0.43)
KETONES, URINE: NEGATIVE MG/DL
LEUKOCYTE ESTERASE, URINE: NEGATIVE
LYMPHOCYTES ABSOLUTE: 4.7 K/CU MM
LYMPHOCYTES RELATIVE PERCENT: 38.2 % (ref 24–44)
MCH RBC QN AUTO: 29.8 PG (ref 27–31)
MCHC RBC AUTO-ENTMCNC: 32.1 % (ref 32–36)
MCV RBC AUTO: 92.8 FL (ref 78–100)
MONOCYTES ABSOLUTE: 1.4 K/CU MM
MONOCYTES RELATIVE PERCENT: 11.4 % (ref 0–4)
MUCUS: ABNORMAL HPF
NITRITE URINE, QUANTITATIVE: NEGATIVE
NUCLEATED RBC %: 0 %
PDW BLD-RTO: 12.9 % (ref 11.7–14.9)
PH, URINE: 6 (ref 5–8)
PLATELET # BLD: 283 K/CU MM (ref 140–440)
PMV BLD AUTO: 10.4 FL (ref 7.5–11.1)
POTASSIUM SERPL-SCNC: 4.1 MMOL/L (ref 3.5–5.1)
PROTEIN UA: NEGATIVE MG/DL
RBC # BLD: 5.4 M/CU MM (ref 4.6–6.2)
RBC URINE: ABNORMAL /HPF (ref 0–3)
SEGMENTED NEUTROPHILS ABSOLUTE COUNT: 5.4 K/CU MM
SEGMENTED NEUTROPHILS RELATIVE PERCENT: 44.5 % (ref 36–66)
SODIUM BLD-SCNC: 139 MMOL/L (ref 135–145)
SPECIFIC GRAVITY UA: 1.02 (ref 1–1.03)
SQUAMOUS EPITHELIAL: <1 /HPF
TOTAL IMMATURE NEUTOROPHIL: 0.13 K/CU MM
TOTAL NUCLEATED RBC: 0 K/CU MM
TOTAL PROTEIN: 7.4 GM/DL (ref 6.4–8.2)
UROBILINOGEN, URINE: 1 MG/DL (ref 0.2–1)
WBC # BLD: 12.2 K/CU MM (ref 4–10.5)
WBC UA: ABNORMAL /HPF (ref 0–2)

## 2022-02-01 PROCEDURE — 81001 URINALYSIS AUTO W/SCOPE: CPT

## 2022-02-01 PROCEDURE — 80053 COMPREHEN METABOLIC PANEL: CPT

## 2022-02-01 PROCEDURE — 93010 ELECTROCARDIOGRAM REPORT: CPT | Performed by: INTERNAL MEDICINE

## 2022-02-01 PROCEDURE — 36415 COLL VENOUS BLD VENIPUNCTURE: CPT

## 2022-02-01 PROCEDURE — 93005 ELECTROCARDIOGRAM TRACING: CPT | Performed by: ORTHOPAEDIC SURGERY

## 2022-02-01 PROCEDURE — 85025 COMPLETE CBC W/AUTO DIFF WBC: CPT

## 2022-02-01 PROCEDURE — 87086 URINE CULTURE/COLONY COUNT: CPT

## 2022-02-01 PROCEDURE — 85652 RBC SED RATE AUTOMATED: CPT

## 2022-02-01 PROCEDURE — 71046 X-RAY EXAM CHEST 2 VIEWS: CPT

## 2022-02-02 LAB
CULTURE: NORMAL
Lab: NORMAL
SPECIMEN: NORMAL

## 2022-02-04 NOTE — PROGRESS NOTES
[phone]  I reported to Dr. Maria Esther Brito office via voicemail that I reviewed patients pre-op labs from 2/1/22 and patient has a WBC of 12.2, sed rate 27, and CXR stated narrowing of trachea, pleural fluid or thickening of right lung, recommend CT.

## 2022-02-07 ENCOUNTER — TELEPHONE (OUTPATIENT)
Dept: ORTHOPEDIC SURGERY | Age: 72
End: 2022-02-07

## 2022-02-07 NOTE — PROGRESS NOTES
Patient was seen previously by [facility] [place], operated by [facility] and PAT assessment done. I am following up to remind patient to get his covid test done for surgery and to review pre-op instructions if needed. 02/07/2022 01:18 PM Phone (Outgoing) Maryjane Delcid (Self) [phone] (H) Remove   No Answer/Busy - No voicemail option, unable to leave a message. By Red Bautista RN        02/07/2022 01:22 PM Phone (Outgoing) Lv Reyes (EC)  Remove   I left a voicemail with my call back number stating I have been trying to reach Grecia Breen without success. Can you please give him a message to call me. By Red Bautista RN        02/07/2022 01:29 PM Phone (Incoming) Lv Reyes (EC) [phone] (H) Remove   Jerzy Long called me and said he had not seen his father in a few days and his phone is not set up to leave messages. Jerzy Long said he will call his father and tell him to call me. By Red Bautista RN        02/07/2022 01:36 PM Phone (Incoming) Maryjane Prim (Self) [phone] (H) Remove   Patient called me and he confirmed he is having a covid test done at the surgeons office tomorrow 2/8/22 and I reveiwed his pre-op instructions with him.       By Red Bautista RN

## 2022-02-07 NOTE — TELEPHONE ENCOUNTER
Emmett Scheuermann from [address] [place] [address] called and LM stating she wants Dr. Shabnam Centeno to review Pt's labs and chest xrays. He is scheduled for surgery on 02/15/22. Any questions Emmett Scheuermann can be reached at [phone].

## 2022-02-08 ENCOUNTER — ANESTHESIA EVENT (OUTPATIENT)
Dept: OPERATING ROOM | Age: 72
DRG: 470 | End: 2022-02-08
Payer: MEDICARE

## 2022-02-08 ENCOUNTER — HOSPITAL ENCOUNTER (OUTPATIENT)
Age: 72
Setting detail: SPECIMEN
Discharge: HOME | End: 2022-02-08
Payer: MEDICARE

## 2022-02-08 PROCEDURE — U0005 INFEC AGEN DETEC AMPLI PROBE: HCPCS

## 2022-02-08 PROCEDURE — U0003 INFECTIOUS AGENT DETECTION BY NUCLEIC ACID (DNA OR RNA); SEVERE ACUTE RESPIRATORY SYNDROME CORONAVIRUS 2 (SARS-COV-2) (CORONAVIRUS DISEASE [COVID-19]), AMPLIFIED PROBE TECHNIQUE, MAKING USE OF HIGH THROUGHPUT TECHNOLOGIES AS DESCRIBED BY CMS-2020-01-R: HCPCS

## 2022-02-08 ASSESSMENT — ENCOUNTER SYMPTOMS: SHORTNESS OF BREATH: 1

## 2022-02-08 ASSESSMENT — LIFESTYLE VARIABLES: SMOKING_STATUS: 1

## 2022-02-08 NOTE — ANESTHESIA PRE PROCEDURE
Department of Anesthesiology  Preprocedure Note       Name:  Yobany Hall   Age:  73 y.o.  :  1950                                          MRN:  [id number]         Date:  2022      Surgeon: Steve Winslow):  Myrtha Paget, DO    Procedure: Procedure(s):  RIGHT KNEE TOTAL ARTHROPLASTY    Medications prior to admission:   Prior to Admission medications    Medication Sig Start Date End Date Taking? Authorizing Provider   HYDROcodone-acetaminophen (NORCO)  MG per tablet Take 1 tablet by mouth every 6 hours as needed for Pain for up to 30 days. Intended supply: 30 days 1/31/22 3/2/22  David Wooten MD   amLODIPine (NORVASC) 5 MG tablet TAKE 1 TABLET BY MOUTH EVERY DAY IN THE MORNING 22   David Wooten MD   tamsulosin [facility]) 0.4 MG capsule Take 1 capsule by mouth daily 22   David Wooten MD   lisinopril (PRINIVIL;ZESTRIL) 5 MG tablet TAKE 1 TABLET BY MOUTH EVERY DAY IN THE MORNING 22   David Wooten MD   pantoprazole (PROTONIX) 40 MG tablet Take 1 tablet by mouth every morning (before breakfast) 22   David Wooten MD   Spacer/Aero-Holding Chambers (AEROCHAMBER MV) MISC Use with albuterol. 21   David Wooten MD   Compression Stockings MISC by Does not apply route 20-30 mM HG    Wear daily 21   David Wooten MD   sildenafil (VIAGRA) 100 MG tablet Take 1 tablet by mouth as needed for Erectile Dysfunction 20   David Wooten MD   albuterol sulfate HFA (PROAIR HFA) 108 (90 Base) MCG/ACT inhaler Inhale 2 puffs into the lungs every 6 hours as needed for Wheezing 20   David Wooten MD       Current medications:    No current facility-administered medications for this encounter. Current Outpatient Medications   Medication Sig Dispense Refill    HYDROcodone-acetaminophen (NORCO)  MG per tablet Take 1 tablet by mouth every 6 hours as needed for Pain for up to 30 days.  Intended supply: 30 days 120 tablet 0    amLODIPine (NORVASC) 5 MG tablet TAKE 1 TABLET BY MOUTH EVERY DAY IN THE MORNING 90 tablet 1    tamsulosin (FLOMAX) 0.4 MG capsule Take 1 capsule by mouth daily 90 capsule 1    lisinopril (PRINIVIL;ZESTRIL) 5 MG tablet TAKE 1 TABLET BY MOUTH EVERY DAY IN THE MORNING 90 tablet 1    pantoprazole (PROTONIX) 40 MG tablet Take 1 tablet by mouth every morning (before breakfast) 90 tablet 0    Spacer/Aero-Holding Chambers (AEROCHAMBER MV) MISC Use with albuterol. 1 each 0    Compression Stockings MISC by Does not apply route 20-30 mM HG    Wear daily 2 each 0    sildenafil (VIAGRA) 100 MG tablet Take 1 tablet by mouth as needed for Erectile Dysfunction 30 tablet 2    albuterol sulfate HFA (PROAIR HFA) 108 (90 Base) MCG/ACT inhaler Inhale 2 puffs into the lungs every 6 hours as needed for Wheezing 3 Inhaler 1       Allergies:     Allergies   Allergen Reactions    Sulfa Antibiotics Rash       Problem List:    Patient Active Problem List   Diagnosis Code    Smoker F17.200    Right bundle branch block I45.10    GERD (gastroesophageal reflux disease) F40.0    Uncomplicated alcohol dependence ([facility] [address] F10.20    Hyperlipidemia E78.5    Low back pain M54.50    Cataract H26.9    Morbid obesity ([facility] [address] E66.01    Acute medial meniscal tear S83.249A    Complex tear of lateral meniscus of right knee as current injury S83.271A    Chondromalacia patellae, right knee M22.41    Primary osteoarthritis of right knee M17.11    Depression, neurotic F34.1    Edema R60.9    Benign essential HTN I10    Hypotestosteronism E34.9    Former smoker Z87.891    Pain in the wrist M25.539    Flexor tenosynovitis of finger M65.9    Bilateral carpal tunnel syndrome G56.03    Decreased testosterone level R79.89    PNA (pneumonia) J18.9    Septicemia (HCC) A41.9    Acute respiratory failure with hypoxia (HCC) J96.01    Dependence on supplemental oxygen Z99.81    Chronic obstructive pulmon disease w acute lower resp infct (HCC) J44.0    Legionnaire's disease ([address] A48.1    Lower leg edema R60.0    Prostate nodule with urinary obstruction N40.3, N13.8    Bilateral primary osteoarthritis of knee M17.0       Past Medical History:        Diagnosis Date    Acid reflux     Anxiety     Arthritis     \"Right Knee\"    Asthma     Chronic back pain     Cough     Occ Productive Cough \"Milky\" Sputum    Diverticulitis Dx Early 2000's    Hepatitis Dx 18's    \"In The Hospital A Few Days\"    [place] (hard of hearing)     Bilateral Ears    HTN (hypertension)     Hyperlipidemia     Nocturia     Shortness of breath on exertion     Slow urinary stream     Tingling     \"Tingling Right Hand\"    Wears dentures     Full Upper, Partial Lower    Wears glasses     Wears partial dentures     Lower       Past Surgical History:        Procedure Laterality Date    APPENDECTOMY  1960's    CARPAL TUNNEL RELEASE Right 06/22/2017    COLONOSCOPY  Last Done Early 2000's    Polyps Removed In Past    DENTAL SURGERY      Teeth Extracted In Past    EYE SURGERY Left Late 1980's    Cataract With Lens Implant    FRACTURE SURGERY Left 2000's    Broken Left Wrist    HERNIA REPAIR  5580'M    Umbilical Hernia Repair    KNEE ARTHROSCOPY Right 04/13/2017    meniscus repair    TONSILLECTOMY AND ADENOIDECTOMY  1960's       Social History:    Social History     Tobacco Use    Smoking status: Current Some Day Smoker     Packs/day: 1.00     Years: 52.00     Pack years: 52.00     Types: Cigarettes     Start date: 1965    Smokeless tobacco: Former User     Quit date: 11/3/2021   Substance Use Topics    Alcohol use: Not Currently     Alcohol/week: 3.0 - 6.0 standard drinks     Types: 3 - 6 Cans of beer per week                                Ready to quit: Not Answered  Counseling given: Not Answered      Vital Signs (Current): There were no vitals filed for this visit.                                            BP Readings from Last 3 Encounters:   01/18/22 124/70   01/14/22 132/78   12/20/21 136/84 NPO Status:                                                                                 BMI:   Wt Readings from Last 3 Encounters:   01/18/22 (!) 300 lb 6.4 oz (136.3 kg)   01/14/22 299 lb 9.6 oz (135.9 kg)   01/10/22 296 lb (134.3 kg)     There is no height or weight on file to calculate BMI.    CBC:   Lab Results   Component Value Date    WBC 12.2 02/01/2022    RBC 5.40 02/01/2022    HGB 16.1 02/01/2022    HCT 50.1 02/01/2022    MCV 92.8 02/01/2022    RDW 12.9 02/01/2022     02/01/2022       CMP:   Lab Results   Component Value Date     02/01/2022    K 4.1 02/01/2022     02/01/2022    CO2 26 02/01/2022    BUN 15 02/01/2022    CREATININE 0.7 02/01/2022    GFRAA >60 02/01/2022    AGRATIO 1.0 01/14/2020    LABGLOM >60 02/01/2022    LABGLOM 98 01/14/2020    GLUCOSE 166 02/01/2022    PROT 7.4 02/01/2022    PROT 7.9 06/05/2012    CALCIUM 9.1 02/01/2022    BILITOT 0.3 02/01/2022    ALKPHOS 78 02/01/2022    AST 19 02/01/2022    ALT 20 02/01/2022       POC Tests: No results for input(s): POCGLU, POCNA, POCK, POCCL, POCBUN, POCHEMO, POCHCT in the last 72 hours.     Coags:   Lab Results   Component Value Date    PROTIME 16.9 11/03/2021    INR 1.31 11/03/2021       HCG (If Applicable): No results found for: PREGTESTUR, PREGSERUM, HCG, HCGQUANT     ABGs:   Lab Results   Component Value Date    PO2ART 48 11/04/2021    DSQ0HLC 47.0 11/04/2021    SWS6BBE 25.4 11/04/2021        Type & Screen (If Applicable):  No results found for: LABABO, LABRH    Drug/Infectious Status (If Applicable):  Lab Results   Component Value Date    HEPCAB POSITIVE 07/25/2017       COVID-19 Screening (If Applicable):   Lab Results   Component Value Date    COVID19 NOT DETECTED 11/03/2021    COVID19 NOT DETECTED 11/19/2020           Anesthesia Evaluation  Patient summary reviewed  Airway: Mallampati: II  TM distance: >3 FB   Neck ROM: full  Mouth opening: > = 3 FB Dental:          Pulmonary:normal exam    (+) pneumonia:  COPD: shortness of breath:  asthma: current smoker                          ROS comment: Recent pnu, [employer] 11/21 requiring   Home o2 x 1month now dc    Cardiovascular:    (+) hypertension:, dysrhythmias:, hyperlipidemia      ECG reviewed      Echocardiogram reviewed               ROS comment:  Summary   Left ventricular systolic function is normal.   Ejection fraction is visually estimated at 55-60%. Mildly dilated right ventricle. Sclerotic, but non-stenotic aortic valve. No evidence of any pericardial effusion. Signature      ------------------------------------------------------------------   Electronically signed by Won Persaud MD (Interpreting   physician) on 11/04/2021 at 02:26 PM   ------------------------------------------------------------------    Normal sinus rhythm   Left axis deviation   Right bundle branch block   Minimal voltage criteria for LVH, may be normal variant   Abnormal ECG   When compared with ECG of 03-NOV-2021 11:15,   Vent. rate has decreased BY  42 BPM   Confirmed by CHRISTOPHER Quinn ([id number]) on 2/1/2022 10:01:40 PM      Neuro/Psych:   (+) depression/anxiety              ROS comment: etoh   clbp GI/Hepatic/Renal:   (+) GERD:, liver disease:,           Endo/Other:    (+) : arthritis: OA., .                 Abdominal:             Vascular:   + PVD, aortic or cerebral, . Other Findings:           Anesthesia Plan      general, MAC, regional and spinal     ASA 4     (Chart review only 2/8/22   covid - 2/8/22  Medically cleared by SAUNDRA Rivas ma  )  Induction: intravenous and inhalational.    MIPS: Postoperative opioids intended. Anesthetic plan and risks discussed with patient. Plan discussed with CRNA.     Attending anesthesiologist reviewed and agrees with Pre Eval content          BERNARDINO Ma - CRNA   2/8/2022

## 2022-02-09 ENCOUNTER — TELEPHONE (OUTPATIENT)
Dept: ORTHOPEDIC SURGERY | Age: 72
End: 2022-02-09

## 2022-02-09 LAB
SARS-COV-2: NOT DETECTED
SOURCE: NORMAL

## 2022-02-09 NOTE — TELEPHONE ENCOUNTER
Called patient and verified that surgery on Tuesday 02/15/2022 was for the Right total knee. When the patient was in the office Dr. Dayna Doe and the patient discussed doing the Left side and it was documented as such, however when the patient went home and thought about it, he feels that the Right needs to be one first. Flaca HOLT Talked about this with Dr Dayna Doe and surgery was changed to the Right side. Requested Dr. Dayna Doe to update his office notes to reflect.  Gilma Driscoll

## 2022-02-14 NOTE — PROGRESS NOTES
2/14/22 - Talked to [facility] from Dr. Ramesh Han office, states patient was upset because no one has called him with instructions. Per notes on 2/7/22, Ann Jacobs RN reviewed his pre-op instructions with him that were previously given 1/11/22. I called Pascual Hooker and reviewed all instructions again, and gave him surgery times of 1215 on 2/15/22 @ [facility], arrival 0615. Pascual Hooker verbalized understanding.

## 2022-02-15 ENCOUNTER — APPOINTMENT (OUTPATIENT)
Dept: GENERAL RADIOLOGY | Age: 72
DRG: 470 | End: 2022-02-15
Attending: ORTHOPAEDIC SURGERY
Payer: MEDICARE

## 2022-02-15 ENCOUNTER — HOSPITAL ENCOUNTER (INPATIENT)
Age: 72
LOS: 1 days | Discharge: HOME | DRG: 470 | End: 2022-02-16
Attending: ORTHOPAEDIC SURGERY | Admitting: ORTHOPAEDIC SURGERY
Payer: MEDICARE

## 2022-02-15 ENCOUNTER — ANESTHESIA (OUTPATIENT)
Dept: OPERATING ROOM | Age: 72
DRG: 470 | End: 2022-02-15
Payer: MEDICARE

## 2022-02-15 VITALS — DIASTOLIC BLOOD PRESSURE: 61 MMHG | SYSTOLIC BLOOD PRESSURE: 95 MMHG | TEMPERATURE: 97.7 F | OXYGEN SATURATION: 95 %

## 2022-02-15 DIAGNOSIS — Z01.818 PRE-OP TESTING: ICD-10-CM

## 2022-02-15 DIAGNOSIS — Z96.651 HISTORY OF TOTAL RIGHT KNEE REPLACEMENT: Primary | ICD-10-CM

## 2022-02-15 PROCEDURE — 2580000003 HC RX 258: Performed by: ORTHOPAEDIC SURGERY

## 2022-02-15 PROCEDURE — 6370000000 HC RX 637 (ALT 250 FOR IP): Performed by: ORTHOPAEDIC SURGERY

## 2022-02-15 PROCEDURE — 73560 X-RAY EXAM OF KNEE 1 OR 2: CPT

## 2022-02-15 PROCEDURE — 64447 NJX AA&/STRD FEMORAL NRV IMG: CPT | Performed by: ANESTHESIOLOGY

## 2022-02-15 PROCEDURE — C1713 ANCHOR/SCREW BN/BN,TIS/BN: HCPCS | Performed by: ORTHOPAEDIC SURGERY

## 2022-02-15 PROCEDURE — 94150 VITAL CAPACITY TEST: CPT

## 2022-02-15 PROCEDURE — 27447 TOTAL KNEE ARTHROPLASTY: CPT | Performed by: ORTHOPAEDIC SURGERY

## 2022-02-15 PROCEDURE — 2709999900 HC NON-CHARGEABLE SUPPLY: Performed by: ORTHOPAEDIC SURGERY

## 2022-02-15 PROCEDURE — 7100000001 HC PACU RECOVERY - ADDTL 15 MIN: Performed by: ORTHOPAEDIC SURGERY

## 2022-02-15 PROCEDURE — 2700000000 HC OXYGEN THERAPY PER DAY

## 2022-02-15 PROCEDURE — 2720000010 HC SURG SUPPLY STERILE: Performed by: ORTHOPAEDIC SURGERY

## 2022-02-15 PROCEDURE — 2500000003 HC RX 250 WO HCPCS: Performed by: NURSE ANESTHETIST, CERTIFIED REGISTERED

## 2022-02-15 PROCEDURE — 27447 TOTAL KNEE ARTHROPLASTY: CPT | Performed by: PHYSICIAN ASSISTANT

## 2022-02-15 PROCEDURE — 6360000002 HC RX W HCPCS: Performed by: ORTHOPAEDIC SURGERY

## 2022-02-15 PROCEDURE — 6370000000 HC RX 637 (ALT 250 FOR IP): Performed by: NURSE PRACTITIONER

## 2022-02-15 PROCEDURE — 0SRC0J9 REPLACEMENT OF RIGHT KNEE JOINT WITH SYNTHETIC SUBSTITUTE, CEMENTED, OPEN APPROACH: ICD-10-PCS | Performed by: ORTHOPAEDIC SURGERY

## 2022-02-15 PROCEDURE — 3700000001 HC ADD 15 MINUTES (ANESTHESIA): Performed by: ORTHOPAEDIC SURGERY

## 2022-02-15 PROCEDURE — 2060000000 HC ICU INTERMEDIATE R&B

## 2022-02-15 PROCEDURE — 2500000003 HC RX 250 WO HCPCS: Performed by: ORTHOPAEDIC SURGERY

## 2022-02-15 PROCEDURE — 3700000000 HC ANESTHESIA ATTENDED CARE: Performed by: ORTHOPAEDIC SURGERY

## 2022-02-15 PROCEDURE — C1776 JOINT DEVICE (IMPLANTABLE): HCPCS | Performed by: ORTHOPAEDIC SURGERY

## 2022-02-15 PROCEDURE — A4217 STERILE WATER/SALINE, 500 ML: HCPCS | Performed by: ORTHOPAEDIC SURGERY

## 2022-02-15 PROCEDURE — 6370000000 HC RX 637 (ALT 250 FOR IP)

## 2022-02-15 PROCEDURE — 7100000000 HC PACU RECOVERY - FIRST 15 MIN: Performed by: ORTHOPAEDIC SURGERY

## 2022-02-15 PROCEDURE — 3600000005 HC SURGERY LEVEL 5 BASE: Performed by: ORTHOPAEDIC SURGERY

## 2022-02-15 PROCEDURE — 6360000002 HC RX W HCPCS: Performed by: NURSE ANESTHETIST, CERTIFIED REGISTERED

## 2022-02-15 PROCEDURE — 94761 N-INVAS EAR/PLS OXIMETRY MLT: CPT

## 2022-02-15 PROCEDURE — 3600000015 HC SURGERY LEVEL 5 ADDTL 15MIN: Performed by: ORTHOPAEDIC SURGERY

## 2022-02-15 DEVICE — CEMENT BNE 40GM W/ GENT HI VISC RADPQ FOR REV SURG: Type: IMPLANTABLE DEVICE | Site: KNEE | Status: FUNCTIONAL

## 2022-02-15 RX ORDER — LISINOPRIL 5 MG/1
5 TABLET ORAL DAILY
Status: DISCONTINUED | OUTPATIENT
Start: 2022-02-16 | End: 2022-02-16 | Stop reason: HOSPADM

## 2022-02-15 RX ORDER — SODIUM CHLORIDE 9 MG/ML
25 INJECTION, SOLUTION INTRAVENOUS PRN
Status: DISCONTINUED | OUTPATIENT
Start: 2022-02-15 | End: 2022-02-16 | Stop reason: HOSPADM

## 2022-02-15 RX ORDER — LABETALOL HYDROCHLORIDE 5 MG/ML
5 INJECTION, SOLUTION INTRAVENOUS EVERY 10 MIN PRN
Status: DISCONTINUED | OUTPATIENT
Start: 2022-02-15 | End: 2022-02-15 | Stop reason: HOSPADM

## 2022-02-15 RX ORDER — MIDAZOLAM HYDROCHLORIDE 1 MG/ML
INJECTION INTRAMUSCULAR; INTRAVENOUS PRN
Status: DISCONTINUED | OUTPATIENT
Start: 2022-02-15 | End: 2022-02-15 | Stop reason: SDUPTHER

## 2022-02-15 RX ORDER — SODIUM CHLORIDE 9 MG/ML
25 INJECTION, SOLUTION INTRAVENOUS PRN
Status: DISCONTINUED | OUTPATIENT
Start: 2022-02-15 | End: 2022-02-15 | Stop reason: HOSPADM

## 2022-02-15 RX ORDER — SODIUM CHLORIDE 0.9 % (FLUSH) 0.9 %
10 SYRINGE (ML) INJECTION EVERY 12 HOURS SCHEDULED
Status: DISCONTINUED | OUTPATIENT
Start: 2022-02-15 | End: 2022-02-15 | Stop reason: HOSPADM

## 2022-02-15 RX ORDER — DIPHENHYDRAMINE HCL 25 MG
25 TABLET ORAL EVERY 6 HOURS PRN
Status: DISCONTINUED | OUTPATIENT
Start: 2022-02-15 | End: 2022-02-16 | Stop reason: HOSPADM

## 2022-02-15 RX ORDER — OXYCODONE HYDROCHLORIDE 5 MG/1
10 TABLET ORAL EVERY 4 HOURS PRN
Status: DISCONTINUED | OUTPATIENT
Start: 2022-02-15 | End: 2022-02-16 | Stop reason: HOSPADM

## 2022-02-15 RX ORDER — KETOROLAC TROMETHAMINE 30 MG/ML
15 INJECTION, SOLUTION INTRAMUSCULAR; INTRAVENOUS EVERY 6 HOURS PRN
Status: DISCONTINUED | OUTPATIENT
Start: 2022-02-15 | End: 2022-02-16 | Stop reason: HOSPADM

## 2022-02-15 RX ORDER — TAMSULOSIN HYDROCHLORIDE 0.4 MG/1
0.4 CAPSULE ORAL DAILY
Status: DISCONTINUED | OUTPATIENT
Start: 2022-02-15 | End: 2022-02-16 | Stop reason: HOSPADM

## 2022-02-15 RX ORDER — ACETAMINOPHEN 500 MG
1000 TABLET ORAL ONCE
Status: COMPLETED | OUTPATIENT
Start: 2022-02-15 | End: 2022-02-15

## 2022-02-15 RX ORDER — HYDRALAZINE HYDROCHLORIDE 20 MG/ML
5 INJECTION INTRAMUSCULAR; INTRAVENOUS EVERY 10 MIN PRN
Status: DISCONTINUED | OUTPATIENT
Start: 2022-02-15 | End: 2022-02-15 | Stop reason: HOSPADM

## 2022-02-15 RX ORDER — SODIUM CHLORIDE 0.9 % (FLUSH) 0.9 %
10 SYRINGE (ML) INJECTION PRN
Status: DISCONTINUED | OUTPATIENT
Start: 2022-02-15 | End: 2022-02-15 | Stop reason: HOSPADM

## 2022-02-15 RX ORDER — HYDROMORPHONE HCL 110MG/55ML
0.5 PATIENT CONTROLLED ANALGESIA SYRINGE INTRAVENOUS ONCE
Status: COMPLETED | OUTPATIENT
Start: 2022-02-15 | End: 2022-02-15

## 2022-02-15 RX ORDER — MEPERIDINE HYDROCHLORIDE 25 MG/ML
12.5 INJECTION INTRAMUSCULAR; INTRAVENOUS; SUBCUTANEOUS EVERY 5 MIN PRN
Status: DISCONTINUED | OUTPATIENT
Start: 2022-02-15 | End: 2022-02-15 | Stop reason: HOSPADM

## 2022-02-15 RX ORDER — SODIUM CHLORIDE 0.9 % (FLUSH) 0.9 %
10 SYRINGE (ML) INJECTION EVERY 12 HOURS SCHEDULED
Status: DISCONTINUED | OUTPATIENT
Start: 2022-02-15 | End: 2022-02-16 | Stop reason: HOSPADM

## 2022-02-15 RX ORDER — LIDOCAINE HYDROCHLORIDE 20 MG/ML
INJECTION, SOLUTION EPIDURAL; INFILTRATION; INTRACAUDAL; PERINEURAL PRN
Status: DISCONTINUED | OUTPATIENT
Start: 2022-02-15 | End: 2022-02-15 | Stop reason: SDUPTHER

## 2022-02-15 RX ORDER — PROPOFOL 10 MG/ML
INJECTION, EMULSION INTRAVENOUS CONTINUOUS PRN
Status: DISCONTINUED | OUTPATIENT
Start: 2022-02-15 | End: 2022-02-15 | Stop reason: SDUPTHER

## 2022-02-15 RX ORDER — IPRATROPIUM BROMIDE AND ALBUTEROL SULFATE 2.5; .5 MG/3ML; MG/3ML
1 SOLUTION RESPIRATORY (INHALATION) ONCE
Status: COMPLETED | OUTPATIENT
Start: 2022-02-15 | End: 2022-02-15

## 2022-02-15 RX ORDER — DEXAMETHASONE SODIUM PHOSPHATE 4 MG/ML
INJECTION, SOLUTION INTRA-ARTICULAR; INTRALESIONAL; INTRAMUSCULAR; INTRAVENOUS; SOFT TISSUE PRN
Status: DISCONTINUED | OUTPATIENT
Start: 2022-02-15 | End: 2022-02-15 | Stop reason: SDUPTHER

## 2022-02-15 RX ORDER — PROMETHAZINE HYDROCHLORIDE 25 MG/ML
6.25 INJECTION, SOLUTION INTRAMUSCULAR; INTRAVENOUS
Status: DISCONTINUED | OUTPATIENT
Start: 2022-02-15 | End: 2022-02-15 | Stop reason: HOSPADM

## 2022-02-15 RX ORDER — AMLODIPINE BESYLATE 5 MG/1
5 TABLET ORAL DAILY
Status: DISCONTINUED | OUTPATIENT
Start: 2022-02-16 | End: 2022-02-16 | Stop reason: HOSPADM

## 2022-02-15 RX ORDER — 0.9 % SODIUM CHLORIDE 0.9 %
500 INTRAVENOUS SOLUTION INTRAVENOUS
Status: DISCONTINUED | OUTPATIENT
Start: 2022-02-15 | End: 2022-02-15 | Stop reason: HOSPADM

## 2022-02-15 RX ORDER — ONDANSETRON 2 MG/ML
INJECTION INTRAMUSCULAR; INTRAVENOUS PRN
Status: DISCONTINUED | OUTPATIENT
Start: 2022-02-15 | End: 2022-02-15 | Stop reason: SDUPTHER

## 2022-02-15 RX ORDER — DIPHENHYDRAMINE HYDROCHLORIDE 50 MG/ML
12.5 INJECTION INTRAMUSCULAR; INTRAVENOUS
Status: DISCONTINUED | OUTPATIENT
Start: 2022-02-15 | End: 2022-02-15 | Stop reason: HOSPADM

## 2022-02-15 RX ORDER — PHENYLEPHRINE HCL IN 0.9% NACL 1 MG/10 ML
SYRINGE (ML) INTRAVENOUS PRN
Status: DISCONTINUED | OUTPATIENT
Start: 2022-02-15 | End: 2022-02-15 | Stop reason: SDUPTHER

## 2022-02-15 RX ORDER — CEFAZOLIN SODIUM 2 G/100ML
2000 INJECTION, SOLUTION INTRAVENOUS ONCE
Status: DISCONTINUED | OUTPATIENT
Start: 2022-02-15 | End: 2022-02-15

## 2022-02-15 RX ORDER — PANTOPRAZOLE SODIUM 40 MG/1
40 TABLET, DELAYED RELEASE ORAL
Status: DISCONTINUED | OUTPATIENT
Start: 2022-02-16 | End: 2022-02-16 | Stop reason: HOSPADM

## 2022-02-15 RX ORDER — ACETAMINOPHEN 325 MG/1
650 TABLET ORAL EVERY 6 HOURS
Status: DISCONTINUED | OUTPATIENT
Start: 2022-02-15 | End: 2022-02-16 | Stop reason: HOSPADM

## 2022-02-15 RX ORDER — HYDROCODONE BITARTRATE AND ACETAMINOPHEN 5; 325 MG/1; MG/1
2 TABLET ORAL EVERY 6 HOURS PRN
Status: DISCONTINUED | OUTPATIENT
Start: 2022-02-15 | End: 2022-02-15 | Stop reason: HOSPADM

## 2022-02-15 RX ORDER — FENTANYL CITRATE 50 UG/ML
50 INJECTION, SOLUTION INTRAMUSCULAR; INTRAVENOUS EVERY 5 MIN PRN
Status: DISCONTINUED | OUTPATIENT
Start: 2022-02-15 | End: 2022-02-15 | Stop reason: HOSPADM

## 2022-02-15 RX ORDER — SODIUM CHLORIDE, SODIUM LACTATE, POTASSIUM CHLORIDE, CALCIUM CHLORIDE 600; 310; 30; 20 MG/100ML; MG/100ML; MG/100ML; MG/100ML
INJECTION, SOLUTION INTRAVENOUS CONTINUOUS
Status: DISCONTINUED | OUTPATIENT
Start: 2022-02-15 | End: 2022-02-15

## 2022-02-15 RX ORDER — HYDROCODONE BITARTRATE AND ACETAMINOPHEN 5; 325 MG/1; MG/1
1 TABLET ORAL PRN
Status: DISCONTINUED | OUTPATIENT
Start: 2022-02-15 | End: 2022-02-15 | Stop reason: HOSPADM

## 2022-02-15 RX ORDER — ONDANSETRON 2 MG/ML
4 INJECTION INTRAMUSCULAR; INTRAVENOUS
Status: DISCONTINUED | OUTPATIENT
Start: 2022-02-15 | End: 2022-02-15 | Stop reason: HOSPADM

## 2022-02-15 RX ORDER — TRANEXAMIC ACID 10 MG/ML
1000 INJECTION, SOLUTION INTRAVENOUS
Status: COMPLETED | OUTPATIENT
Start: 2022-02-15 | End: 2022-02-15

## 2022-02-15 RX ORDER — ROPIVACAINE HYDROCHLORIDE 5 MG/ML
INJECTION, SOLUTION EPIDURAL; INFILTRATION; PERINEURAL
Status: COMPLETED | OUTPATIENT
Start: 2022-02-15 | End: 2022-02-15

## 2022-02-15 RX ORDER — SODIUM CHLORIDE 0.9 % (FLUSH) 0.9 %
10 SYRINGE (ML) INJECTION PRN
Status: DISCONTINUED | OUTPATIENT
Start: 2022-02-15 | End: 2022-02-16 | Stop reason: HOSPADM

## 2022-02-15 RX ORDER — ALBUTEROL SULFATE 90 UG/1
2 AEROSOL, METERED RESPIRATORY (INHALATION) EVERY 6 HOURS PRN
Status: DISCONTINUED | OUTPATIENT
Start: 2022-02-15 | End: 2022-02-16 | Stop reason: HOSPADM

## 2022-02-15 RX ORDER — SODIUM CHLORIDE, SODIUM LACTATE, POTASSIUM CHLORIDE, CALCIUM CHLORIDE 600; 310; 30; 20 MG/100ML; MG/100ML; MG/100ML; MG/100ML
INJECTION, SOLUTION INTRAVENOUS CONTINUOUS
Status: DISCONTINUED | OUTPATIENT
Start: 2022-02-15 | End: 2022-02-16 | Stop reason: HOSPADM

## 2022-02-15 RX ORDER — KETAMINE HCL 50MG/ML(1)
SYRINGE (ML) INTRAVENOUS PRN
Status: DISCONTINUED | OUTPATIENT
Start: 2022-02-15 | End: 2022-02-15 | Stop reason: SDUPTHER

## 2022-02-15 RX ORDER — OXYCODONE HYDROCHLORIDE 5 MG/1
5 TABLET ORAL EVERY 4 HOURS PRN
Status: DISCONTINUED | OUTPATIENT
Start: 2022-02-15 | End: 2022-02-16 | Stop reason: HOSPADM

## 2022-02-15 RX ORDER — IPRATROPIUM BROMIDE AND ALBUTEROL SULFATE 2.5; .5 MG/3ML; MG/3ML
SOLUTION RESPIRATORY (INHALATION)
Status: COMPLETED
Start: 2022-02-15 | End: 2022-02-15

## 2022-02-15 RX ORDER — HYDROMORPHONE HCL 110MG/55ML
0.5 PATIENT CONTROLLED ANALGESIA SYRINGE INTRAVENOUS EVERY 5 MIN PRN
Status: DISCONTINUED | OUTPATIENT
Start: 2022-02-15 | End: 2022-02-15 | Stop reason: HOSPADM

## 2022-02-15 RX ADMIN — LIDOCAINE HYDROCHLORIDE 50 MG: 20 INJECTION, SOLUTION EPIDURAL; INFILTRATION; INTRACAUDAL at 12:49

## 2022-02-15 RX ADMIN — SODIUM CHLORIDE, POTASSIUM CHLORIDE, SODIUM LACTATE AND CALCIUM CHLORIDE: 600; 310; 30; 20 INJECTION, SOLUTION INTRAVENOUS at 09:44

## 2022-02-15 RX ADMIN — Medication 25 MG: at 12:51

## 2022-02-15 RX ADMIN — Medication 100 MCG: at 13:34

## 2022-02-15 RX ADMIN — Medication 100 MCG: at 13:40

## 2022-02-15 RX ADMIN — SODIUM CHLORIDE, POTASSIUM CHLORIDE, SODIUM LACTATE AND CALCIUM CHLORIDE: 600; 310; 30; 20 INJECTION, SOLUTION INTRAVENOUS at 15:42

## 2022-02-15 RX ADMIN — PROPOFOL 75 MCG/KG/MIN: 10 INJECTION, EMULSION INTRAVENOUS at 13:09

## 2022-02-15 RX ADMIN — DEXAMETHASONE SODIUM PHOSPHATE 8 MG: 4 INJECTION, SOLUTION INTRAMUSCULAR; INTRAVENOUS at 12:49

## 2022-02-15 RX ADMIN — PROPOFOL 150 MCG/KG/MIN: 10 INJECTION, EMULSION INTRAVENOUS at 12:53

## 2022-02-15 RX ADMIN — IPRATROPIUM BROMIDE AND ALBUTEROL SULFATE 1 AMPULE: 2.5; .5 SOLUTION RESPIRATORY (INHALATION) at 14:53

## 2022-02-15 RX ADMIN — TAMSULOSIN HYDROCHLORIDE 0.4 MG: 0.4 CAPSULE ORAL at 21:38

## 2022-02-15 RX ADMIN — ASPIRIN 325 MG: 325 TABLET, COATED ORAL at 21:33

## 2022-02-15 RX ADMIN — MIDAZOLAM 2 MG: 1 INJECTION INTRAMUSCULAR; INTRAVENOUS at 11:00

## 2022-02-15 RX ADMIN — OXYCODONE HYDROCHLORIDE 10 MG: 5 TABLET ORAL at 21:33

## 2022-02-15 RX ADMIN — IPRATROPIUM BROMIDE AND ALBUTEROL SULFATE 1 AMPULE: .5; 2.5 SOLUTION RESPIRATORY (INHALATION) at 14:53

## 2022-02-15 RX ADMIN — ROPIVACAINE HYDROCHLORIDE 20 ML: 5 INJECTION, SOLUTION EPIDURAL; INFILTRATION; PERINEURAL at 11:07

## 2022-02-15 RX ADMIN — Medication 25 MG: at 13:18

## 2022-02-15 RX ADMIN — PROPOFOL 100 MCG/KG/MIN: 10 INJECTION, EMULSION INTRAVENOUS at 13:00

## 2022-02-15 RX ADMIN — TRANEXAMIC ACID 1000 MG: 10 INJECTION, SOLUTION INTRAVENOUS at 12:59

## 2022-02-15 RX ADMIN — HYDROMORPHONE HYDROCHLORIDE 0.5 MG: 2 INJECTION INTRAMUSCULAR; INTRAVENOUS; SUBCUTANEOUS at 17:16

## 2022-02-15 RX ADMIN — ACETAMINOPHEN 650 MG: 325 TABLET ORAL at 18:11

## 2022-02-15 RX ADMIN — CEFAZOLIN SODIUM 3000 MG: 1 INJECTION, POWDER, FOR SOLUTION INTRAMUSCULAR; INTRAVENOUS at 12:51

## 2022-02-15 RX ADMIN — OXYCODONE HYDROCHLORIDE 10 MG: 5 TABLET ORAL at 16:20

## 2022-02-15 RX ADMIN — CEFAZOLIN SODIUM 3000 MG: 1 INJECTION, POWDER, FOR SOLUTION INTRAMUSCULAR; INTRAVENOUS at 21:37

## 2022-02-15 RX ADMIN — PROPOFOL 150 MCG/KG/MIN: 10 INJECTION, EMULSION INTRAVENOUS at 12:49

## 2022-02-15 RX ADMIN — ACETAMINOPHEN 1000 MG: 500 TABLET ORAL at 09:44

## 2022-02-15 RX ADMIN — SODIUM CHLORIDE, POTASSIUM CHLORIDE, SODIUM LACTATE AND CALCIUM CHLORIDE: 600; 310; 30; 20 INJECTION, SOLUTION INTRAVENOUS at 13:40

## 2022-02-15 RX ADMIN — ONDANSETRON 4 MG: 2 INJECTION INTRAMUSCULAR; INTRAVENOUS at 12:49

## 2022-02-15 RX ADMIN — Medication 100 MCG: at 13:30

## 2022-02-15 ASSESSMENT — PULMONARY FUNCTION TESTS
PIF_VALUE: 1
PIF_VALUE: 2
PIF_VALUE: 1
PIF_VALUE: 0
PIF_VALUE: 1
PIF_VALUE: 0
PIF_VALUE: 1
PIF_VALUE: 0
PIF_VALUE: 1
PIF_VALUE: 0
PIF_VALUE: 1
PIF_VALUE: 0
PIF_VALUE: 1

## 2022-02-15 ASSESSMENT — PAIN SCALES - GENERAL
PAINLEVEL_OUTOF10: 7
PAINLEVEL_OUTOF10: 9
PAINLEVEL_OUTOF10: 5
PAINLEVEL_OUTOF10: 10
PAINLEVEL_OUTOF10: 0

## 2022-02-15 ASSESSMENT — PAIN DESCRIPTION - DESCRIPTORS
DESCRIPTORS: STABBING;OTHER (COMMENT)
DESCRIPTORS: ACHING

## 2022-02-15 ASSESSMENT — ENCOUNTER SYMPTOMS
ALLERGIC/IMMUNOLOGIC NEGATIVE: 1
EYES NEGATIVE: 1
CHEST TIGHTNESS: 0
VOMITING: 0
SHORTNESS OF BREATH: 0
NAUSEA: 0

## 2022-02-15 ASSESSMENT — PAIN DESCRIPTION - ONSET: ONSET: ON-GOING

## 2022-02-15 ASSESSMENT — PAIN DESCRIPTION - LOCATION
LOCATION: KNEE

## 2022-02-15 ASSESSMENT — PAIN DESCRIPTION - PROGRESSION
CLINICAL_PROGRESSION: GRADUALLY WORSENING
CLINICAL_PROGRESSION: RAPIDLY WORSENING

## 2022-02-15 ASSESSMENT — PAIN DESCRIPTION - ORIENTATION
ORIENTATION: RIGHT

## 2022-02-15 ASSESSMENT — PAIN - FUNCTIONAL ASSESSMENT
PAIN_FUNCTIONAL_ASSESSMENT: PREVENTS OR INTERFERES WITH MANY ACTIVE NOT PASSIVE ACTIVITIES
PAIN_FUNCTIONAL_ASSESSMENT: PREVENTS OR INTERFERES WITH ALL ACTIVE AND SOME PASSIVE ACTIVITIES
PAIN_FUNCTIONAL_ASSESSMENT: 0-10

## 2022-02-15 ASSESSMENT — PAIN DESCRIPTION - FREQUENCY: FREQUENCY: CONTINUOUS

## 2022-02-15 ASSESSMENT — PAIN DESCRIPTION - PAIN TYPE
TYPE: SURGICAL PAIN

## 2022-02-15 NOTE — PROGRESS NOTES
1441- pt arrived from OR, monitors applied. Report received from Ruba Kim and Divya Holm. Respirations even and unlabored with audible expiratory wheezes, new orders for breathing treatment received. Right knee dressing dry and intact with ice. PT able to feel touch down to bilat thighs. Pt denies pain. VSS.   1559- recovery complete, pt remains in PACU awaiting post op bed. Pt has full sensation down to bilat feet. 1620- Pt complaining of right knee pain, pain medication given. 1700- attempted to get pt OOB into chair. Pt able to sit on side of bed and stand with the assistance of 1 RN and a walker. PT states he feels dizzy. Pt returned to semi fowlers position in bed with 2 RNs. VSS. 1715- Pt continues to rate pain 10/10 with no relief from oral pain medication. Carri Vidal notified and 1x order received for breakthrough pain medication. 1730- pt to [facility], report given to [facility] at bedside.

## 2022-02-15 NOTE — OP NOTE
DATE OF PROCEDURE:  2/15/2022    PREOPERATIVE DIAGNOSIS:  Right knee DJD. POSTOPERATIVE DIAGNOSIS:  Right knee DJD. PROCEDURE:  Right total knee arthroplasty using [employer]  MC knee with size 10 femoral component, size F tibial  component, size 35 patellar component and size 12 tibial poly component  with antibiotic-impregnated cement. SURGEON:  Liane Lindsey DO    FIRST ASSISTANT: CARLY Florentino    ANESTHESIA:  Spinal with regional block. ESTIMATED BLOOD LOSS:  50 mL. TOTAL TOURNIQUET TIME:  44 minutes. FLUIDS:  1200 mL of crystalloids. INDICATIONS FOR PROCEDURE:  The patient is a 81-year-old male with  long-standing history of right knee pain. For this, he underwent  conservative treatment with no relief of his symptoms. X-rays revealed  severe arthritis in the right knee. Given his persistent symptoms  despite conservative treatment and with his x-ray findings, I  recommended surgical treatment. I explained the risks, benefits and  possible complications of the procedure to the patient and after  answering all of his questions, he consented to undergo the above  procedure. REPORT OF PROCEDURE:  The patient was seen and evaluated in the  preoperative holding area where the right lower extremity was signed in  his presence. At this point, care of the patient was turned over to  anesthesia team who performed a regional block to the right lower  extremity. He was then transported back to the operative suite. Spinal  anesthesia was performed and once adequate anesthesia was obtained, the  right lower extremity was prepped and draped in usual sterile fashion. Preoperative antibiotics were administered, 1 gm of TXA was administered  IV. At this point, a time-out was performed and all in attendance were  in agreement. I exsanguinated the right lower extremity with the use of an Esmarch and  tourniquet was inflated to 250 mmHg.   I then made a standard anterior  midline incision overlying the right knee and carried sharp dissection  down into the knee joint through a medial parapatellar incision. I then  debrided a significant amount of intra-articular fat pad and  inflammatory synovium. I then elevated the soft tissue off the proximal  and medial tibia with the use of Bovie. I then everted the patella and  flexed the knee at 90 degrees. I then debrided soft tissue around the  patella. I then used a saw to perform a freehand cut of the patella and  the bone fragment was then elevated and discarded. I then aligned a  sizing guide and found this to be a size 35 patella. I then drilled the  three drill holes. I then aligned a size 35 patella trial component,  which fit nicely on the patella. Additional osteophytes were then  debrided around the patellar component. The trial component was then  removed. I then turned my attention for preparation of the distal femur. I  placed retractors along the medial and lateral aspect of the distal  femur to protect the soft tissue. I then inserted the intramedullary  drill into the femoral canal.  I then inserted the intramedullary distal  femoral cutting guide, which was malleted in place and pinned in place. I then used a saw to perform the distal femoral cut resecting 10 mm of  distal femoral bone. The bone fragments were then discarded. The  distal femoral cutting guide was then removed. I then aligned the  sizing guide over the distal femur and found this to be a size 10 distal  femoral component. I then drilled guide holes through the sizing guide,  which was then removed. I then inserted the size 10 distal femoral  cutting block, which was malleted in place and pinned in place. I then  used a saw to perform the anterior, posterior and chamfer cuts and the  bone fragments were then elevated and discarded. The distal femoral  cutting guide was then removed.   I then debrided the ACL and PCL out of  the femoral notch.    I then turned my attention for preparation of the proximal tibia. I  placed a retractor along the posterior tibia, translating it anteriorly  as well as retractors along the medial and lateral aspect of the  proximal tibia to protect the soft tissue. I then removed the remnant  of the medial and lateral meniscus ensuring to protect the popliteal  tendon and medial collateral ligament. Once I had adequate exposure of  the proximal tibia, I aligned the proximal tibial cutting guide and once  the appropriate position was obtained, it was pinned in place. I then  used a saw to perform the proximal tibial cut and the bone fragment was  then elevated and discarded. I then placed a lamina  in the  lateral compartment of the knee and used a curved osteotome and curved  curette to remove small-sized osteophytes off the posterior aspect of  the medial femoral condyle. I then placed a lamina  in the  medial compartment of the knee and used a curved osteotome and curved  curette to remove small osteophytes off the posterior aspect of the  lateral femoral condyle. I then translated the proximal tibia  anteriorly again. I then aligned a size F tibial base plate and once  the appropriate position was obtained, it was pinned in place. I then  inserted a size 10 distal femoral trial component, which was malleted in  place until it was firmly seated. With the trial in place, I then used  a saw to perform the box cut and the bone fragment was then elevated and  discarded. I then inserted a trial size 12 tibia poly component and a  size 35 patellar component. With all trial components in place, I ran  the knee through full range of motion and found there to be excellent  tracking of the patella with no laxity to varus or valgus stressing. These were then selected for the final implant components. The trial components were then removed leaving the tibial base plate in  place.   I then drilled and punched the keel for the proximal tibia and  the tibial base plate was then removed. The  bone ends were then irrigated with pulse lavage using sterile normal  saline with gentamicin, the bone ends were then dried. I then placed  the antibiotic-impregnated cement on the tibia as well as on the tibial  component. The final size F tibial component was then malleted in place  and excess cement was debrided. I then placed the  antibiotic-impregnated cement on the femoral component as well as on the  distal femur. The final size 10 distal femoral component was then  malleted in place and excess cement was debrided. I then inserted the  trial size 12 tibial poly component and the knee was then held in  extension and excess cement was debrided. I then everted the patella,  which was irrigated with pulse lavage and then dried and then placed  antibiotic-impregnated cement on the patella and the final size 35  patellar component was then clamped in place and excess cement was  debrided. The tourniquet was then deflated for a total of 44 minutes  and adequate hemostasis was maintained. Once the cement was allowed to  harden at the completion, the tibial poly trial component was then  removed. The joint was then irrigated further with pulse lavage. I  then inserted the final size 12 MC tibial poly component, which was locked  in place. With all final components in place, I ran the knee through a  full range of motion and found there to be excellent tracking of the  patella with no laxity to varus or valgus stressing. The joint was then irrigated further with pulse lavage. I then re-approximated the medial  parapatellar incision using #5 Ethibond suture and #1 Vicryl suture in  figure-of-eight fashion. I then closed subcutaneous tissue using 2-0  Vicryl suture followed by skin closure with stratafix and prineo. Adequate  hemostasis was maintained at all times.   I then applied a sterile soft  dressing to the right lower extremity. The patient was then awakened  from anesthesia and transported to PACU in stable condition. He  appeared to have tolerated the procedure well. PROGNOSIS:  At this point, he will be admitted to the hospital for  postoperative pain control, rehabilitation and medical monitoring. Hospitalist will be consulted for medical management and physical  therapy will be consulted for gait training and he could be  weightbearing as tolerated on the right leg. He will receive antibiotic  prophylaxis and DVT prophylaxis during his hospitalization. Following  his discharge, I will see him back in the office in 3 weeks and will continue to monitor his progress in the outpatient setting for resolution of his symptoms.         Jean 97, DO

## 2022-02-15 NOTE — BRIEF OP NOTE
Brief Postoperative Note      Patient: Angelina Beckford  YOB: 1950  MRN: [id number]    Date of Procedure: 2/15/2022    Pre-Op Diagnosis: Primary osteoarthritis of right knee [M17.11] Right knee pain, unspecified chronicity [M25.561]    Post-Op Diagnosis: Same       Procedure(s):  RIGHT KNEE TOTAL ARTHROPLASTY    Surgeon(s):  Lorelei Gamble DO    Assistant:  Physician Assistant: Bouchra Chapa PA-C    Anesthesia: Spinal    Estimated Blood Loss (mL): less than 50     Complications: None    Specimens:   * No specimens in log *    Implants:  Implant Name Type Inv.  Item Serial No.  Lot No. LRB No. Used Action   CEMENT BNE 40GM W/ GENT HI VISC RADPQ FOR REV SURG - [id number]  CEMENT BNE 40GM W/ GENT HI VISC RADPQ FOR REV SURG  [employer]WD [id number] Right 1 Implanted   PSN TIB STM 5 DEG SZ F R - [id number]  PSN TIB STM 5 DEG SZ F R  [employer]- [id number] Right 1 Implanted   PSN FEM CR CMT CCR STD SZ10 R - [id number]  PSN FEM CR CMT CCR STD SZ10 R  [employer]- [id number] Right 1 Implanted   COMPONENT PAT [id number] THK9MM KNEE POLY KATIE CONVENTIONAL - [id number]  COMPONENT PAT [id number] THK9MM KNEE POLY KATIE [employer]  [employer]- [id number] Right 1 Implanted   PSN MC VE ASF R 12MM 8-11 EF - [id number]  PSN MC VE ASF R 12MM 8-11 EF  [employer]- [id number] Right 1 Implanted         Drains: * No LDAs found *    Findings: R knee OA    Electronically signed by Jean Cowan DO on 2/15/2022 at 2:16 PM

## 2022-02-15 NOTE — PROGRESS NOTES
1055 patient to pacu for peripheral block monitor placed and alarms on.   1110 call light in reach and patient resting quietly with his eyes   1229 transferred to the OR via cart

## 2022-02-15 NOTE — H&P
V2.0  [facility] Consult Note      Name:  Bam Pace /Age/Sex: 1950  (73 y.o. male)   MRN & CSN:  [id number] & [id number] Encounter Date/Time: 2/15/2022 6:55 PM EST   Location:  [facility] PCP: Elen Dennis MD     Attending:Brennan Centeno DO  Consulting Provider: Angela Moss, APRN - [id number] [facility] Day: 1    Assessment and Recommendations   Bam Pace is a 73 y.o. male with pmh of HTN, COPD  who presents s/p R TKA    Hospital Problems           Last Modified POA    History of total right knee replacement 2/15/2022 Yes          Recommendations:  Status post total right knee replacement   Status post right TKA per Dr. Ngoc Queen today   Postoperative pain management per orthopedic surgery on oxycodone and Toradol   Pain control PRN   Antiemetics PRN   Case management consult     COPD    Former smoker, 52-pack-year history   Not on any home oxygen chronically. Albuterol as needed as needed   Continue I-S, cough and deep breathing  Hypertension   Resume home lisinopril, Norvasc   As needed labetalol for hypertension  BPH   Resume home Flomax  Diet ADULT DIET; Regular   DVT Prophylaxis [] Lovenox, []  Heparin, [x] SCDs, [] Ambulation,  [] Eliquis, [] Xarelto   Code Status Full Code   Surrogate Decision Maker/ POA    Hi  History Obtained From:    patient    History of Present Illness:     Chief Complaint:   Bam Pace is a 73 y.o. male who is seen today by the hospitalist team at the request of Dr. Ngoc Queen, with a Chief Complaint of status post right total knee replacement. Patient is examined in same-day surgery postop. He complains of his pain in his right knee but denies any other complaints. Review of Systems:    Review of Systems   Constitutional: Negative for fatigue. HENT: Negative. Eyes: Negative. Respiratory: Negative for chest tightness and shortness of breath. Cardiovascular: Negative for chest pain and leg swelling.    Gastrointestinal: Negative for nausea and vomiting. Endocrine: Negative for cold intolerance and heat intolerance. Genitourinary: Negative for dysuria and hematuria. Musculoskeletal: Negative for myalgias. R knee pain   Skin: Negative. Allergic/Immunologic: Negative. Neurological: Negative for dizziness and light-headedness. Hematological: Does not bruise/bleed easily. Psychiatric/Behavioral: Negative for agitation. The patient is not nervous/anxious. Objective: Intake/Output Summary (Last 24 hours) at 2/15/2022 1855  Last data filed at 2/15/2022 1547  Gross per 24 hour   Intake 2760 ml   Output 150 ml   Net 2610 ml      Vitals:   Vitals:    02/15/22 1630 02/15/22 1645 02/15/22 1700 02/15/22 1730   BP: (!) 144/73 (!) 158/80  134/85   Pulse: 83 88  85   Resp: 16 16  18   Temp:    97.6 °F (36.4 °C)   TempSrc:    Temporal   SpO2: 95%  93% 95%   Weight:       Height:           Medications Prior to Admission     Prior to Admission medications    Medication Sig Start Date End Date Taking? Authorizing Provider   HYDROcodone-acetaminophen (NORCO)  MG per tablet Take 1 tablet by mouth every 6 hours as needed for Pain for up to 30 days. Intended supply: 30 days 1/31/22 3/2/22 Yes Aamir Lopez MD   amLODIPine (NORVASC) 5 MG tablet TAKE 1 TABLET BY MOUTH EVERY DAY IN THE MORNING 1/14/22  Yes Aamir Lopez MD   tamsulosin [facility]) 0.4 MG capsule Take 1 capsule by mouth daily 1/14/22  Yes Aamir Lopez MD   lisinopril (PRINIVIL;ZESTRIL) 5 MG tablet TAKE 1 TABLET BY MOUTH EVERY DAY IN THE MORNING 1/14/22  Yes Aamir Lopez MD   pantoprazole (PROTONIX) 40 MG tablet Take 1 tablet by mouth every morning (before breakfast) 1/14/22  Yes Aamir Lopez MD   albuterol sulfate HFA (PROAIR HFA) 108 (90 Base) MCG/ACT inhaler Inhale 2 puffs into the lungs every 6 hours as needed for Wheezing 1/14/20  Yes Aamir Lopez MD   Spacer/Aero-Holding Chambers (AEROCHAMBER MV) MISC Use with albuterol.  11/19/21   Jennifer Sullivan Teena Murillo MD   Compression Stockings MISC by Does not apply route 20-30 mM HG    Wear daily 8/13/21   Janie Fuller MD   sildenafil (VIAGRA) 100 MG tablet Take 1 tablet by mouth as needed for Erectile Dysfunction 7/14/20   Janie Fuller MD       Physical Exam: Need 8 Elements   General: NAD  Eyes: EOMI  ENT: neck supple  Cardiovascular: Regular rate. Respiratory: Clear to auscultation  Gastrointestinal: Soft, non tender  Genitourinary: no suprapubic tenderness  Musculoskeletal: No edema  Skin: warm, dry  Neuro: Alert. Psych: Mood appropriate. Past Medical History:   PMHx   Past Medical History:   Diagnosis Date    Acid reflux     Anxiety     Arthritis     \"Right Knee\"    Asthma     Chronic back pain     Cough     Occ Productive Cough \"Milky\" Sputum    Diverticulitis Dx Early 2000's    Hepatitis Dx 18's    \"In The Hospital A Few Days\"    [place] (hard of hearing)     Bilateral Ears    HTN (hypertension)     Hyperlipidemia     Nocturia     Shortness of breath on exertion     Slow urinary stream     Tingling     \"Tingling Right Hand\"    Wears dentures     Full Upper, Partial Lower    Wears glasses     Wears partial dentures     Lower     PSHX:  has a past surgical history that includes eye surgery (Left, Late 1980's); Dental surgery; Colonoscopy (Last Done Early 2000's); Tonsillectomy and adenoidectomy (1960's); hernia repair (1980's); Appendectomy (1960's); fracture surgery (Left, 2000's); Knee arthroscopy (Right, 04/13/2017); and Carpal tunnel release (Right, 06/22/2017). Allergies: Allergies   Allergen Reactions    Sulfa Antibiotics Rash     Fam HX:family history includes Cancer in his father and son; Heart Disease in his mother; Stroke in his mother.    Soc HX:   Social History     Socioeconomic History    Marital status:      Spouse name: None    Number of children: 3    Years of education: None    Highest education level: None   Occupational History    None   Tobacco Use  Smoking status: Current Some Day Smoker     Packs/day: 1.00     Years: 52.00     Pack years: 52.00     Types: Cigarettes     Start date: 1965    Smokeless tobacco: Former User     Quit date: 11/3/2021   Vaping Use    Vaping Use: Never used   Substance and Sexual Activity    Alcohol use: Not Currently     Alcohol/week: 3.0 - 6.0 standard drinks     Types: 3 - 6 Cans of beer per week    Drug use: No    Sexual activity: Not Currently   Other Topics Concern    None   Social History Narrative    Single, 3 kids, works for [employer] as a      Social Determinants of Health     Financial Resource Strain: Low Risk     Difficulty of Paying Living Expenses: Not hard at all   Food Insecurity: No Food Insecurity    Worried About [address] in the Last Year: Never true    [facility] in the Last Year: Never true   Transportation Needs:     Lack of Transportation (Medical): Not on file    Lack of Transportation (Non-Medical):  Not on file   Physical Activity:     Days of Exercise per Week: Not on file    Minutes of Exercise per Session: Not on file   Stress:     Feeling of Stress : Not on file   Social Connections:     Frequency of Communication with Friends and Family: Not on file    Frequency of Social Gatherings with Friends and Family: Not on file    Attends Yazidi Services: Not on file    Active Member of [address] [place] [address] or Organizations: Not on file    Attends Club or Organization Meetings: Not on file    Marital Status: Not on file   Intimate Partner Violence:     Fear of Current or Ex-Partner: Not on file    Emotionally Abused: Not on file    Physically Abused: Not on file    Sexually Abused: Not on file   Housing Stability:     Unable to Pay for Housing in the Last Year: Not on file    Number of [place] in the Last Year: Not on file    Unstable Housing in the Last Year: Not on file       Medications:   Medications:    sodium chloride

## 2022-02-15 NOTE — H&P
Subjective:      Patient ID: Eric Carroll is a 73 y.o. male.     Patient present to the office today for evaluation of the left knee. Last steroid injection given in the bilateral knees was on 10/27/21. Pt states pain today is a 6/10 in the left knee. Pt states nothing is helping with his pain. Pt states he is falling a lot due to the pain in the left knee. Pt states pain is globally in the left knee. Pt states he has been taking noro which is not helping with the pain     He comes in today for his first visit with me in regards to his bilateral knee pain, left worse than right. He states that he has been dealing with worsening deep, aching and grinding pain in both of his knees for many years. He states that now the pain is getting to the point that he is having difficulty walking and performing his daily activities. Patient denies any new injury to the involved extremity/ joint, denies numbness or tingling in the involved extremity and denies fever or chills.           Review of Systems   Constitutional: Negative for activity change, chills and fever. Respiratory: Negative for chest tightness. Cardiovascular: Negative for chest pain. Musculoskeletal: Positive for arthralgias, gait problem, joint swelling and myalgias. Negative for back pain. Skin: Negative for color change, pallor, rash and wound.    Neurological: Negative for weakness and numbness.         Past Medical History        Past Medical History:   Diagnosis Date    Acid reflux      Anxiety      Arthritis       \"Right Knee\"    Asthma      Chronic back pain      Cough       Occ Productive Cough \"Milky\" Sputum    Diverticulitis Dx Early 2000's    Hepatitis Dx 1970's     \"In The Hospital A Few Days\"    [place] (hard of hearing)       Bilateral Ears    HTN (hypertension)      Hyperlipidemia      Nocturia      Shortness of breath on exertion      Slow urinary stream      Tingling       \"Tingling Right Hand\"    Wears dentures       Full Upper, Partial Lower    Wears glasses      Wears partial dentures       Lower            No current facility-administered medications on file prior to encounter. Current Outpatient Medications on File Prior to Encounter   Medication Sig Dispense Refill    albuterol sulfate HFA (PROAIR HFA) 108 (90 Base) MCG/ACT inhaler Inhale 2 puffs into the lungs every 6 hours as needed for Wheezing 3 Inhaler 1    Spacer/Aero-Holding Chambers (AEROCHAMBER MV) MISC Use with albuterol.  1 each 0    Compression Stockings MISC by Does not apply route 20-30 mM HG    Wear daily 2 each 0    sildenafil (VIAGRA) 100 MG tablet Take 1 tablet by mouth as needed for Erectile Dysfunction 30 tablet 2     Allergies   Allergen Reactions    Sulfa Antibiotics Rash     Past Surgical History:   Procedure Laterality Date    APPENDECTOMY  1960's    CARPAL TUNNEL RELEASE Right 06/22/2017    COLONOSCOPY  Last Done Early 2000's    Polyps Removed In Past    DENTAL SURGERY      Teeth Extracted In Past    EYE SURGERY Left Late 1980's    Cataract With Lens Implant    FRACTURE SURGERY Left 2000's    Broken Left Wrist    HERNIA REPAIR  9581'Q    Umbilical Hernia Repair    KNEE ARTHROSCOPY Right 04/13/2017    meniscus repair    TONSILLECTOMY AND ADENOIDECTOMY  1960's     Social History     Tobacco Use    Smoking status: Current Some Day Smoker     Packs/day: 1.00     Years: 52.00     Pack years: 52.00     Types: Cigarettes     Start date: 1965    Smokeless tobacco: Former User     Quit date: 11/3/2021   Vaping Use    Vaping Use: Never used   Substance Use Topics    Alcohol use: Not Currently     Alcohol/week: 3.0 - 6.0 standard drinks     Types: 3 - 6 Cans of beer per week    Drug use: No     Family History   Problem Relation Age of Onset    Stroke Mother     Heart Disease Mother     Cancer Father         Prostate Cancer    Cancer Son         Leukemia       Objective:   Physical Exam  Constitutional:       Appearance: He is well-developed. HENT:      Head: Normocephalic. Eyes:      Pupils: Pupils are equal, round, and reactive to light. Pulmonary:      Effort: Pulmonary effort is normal.   Musculoskeletal:         General: Swelling and tenderness present. No deformity. Normal range of motion. Cervical back: Normal range of motion. Right hip: Normal.      Left hip: Normal.      Right knee: No swelling, deformity, effusion, erythema, ecchymosis, lacerations or bony tenderness. Normal range of motion. No tenderness. No medial joint line, lateral joint line, MCL, LCL or patellar tendon tenderness. No LCL laxity or MCL laxity. Normal alignment and normal patellar mobility. Left knee: Normal. No swelling, deformity, effusion, erythema, ecchymosis, lacerations or bony tenderness. Normal range of motion. No tenderness. No medial joint line, lateral joint line, MCL, LCL or patellar tendon tenderness. No LCL laxity or MCL laxity. Normal alignment and normal patellar mobility. Skin:     General: Skin is warm and dry. Capillary Refill: Capillary refill takes less than 2 seconds. Coloration: Skin is not pale. Findings: No erythema or rash. Neurological:      Mental Status: He is alert and oriented to person, place, and time. Left knee-Skin intact with no erythema, ecchymosis or lacerations present. 0-130     Right knee-Skin intact with no erythema, ecchymosis or lacerations present.   0 130     XRAY  X-ray 4 views of the left knee from October 27, 2021 reviewed by me today in the office demonstrates age appropriate bone density throughout with severe degenerative changes with 75% medial patellofemoral joint space narrowing, medial joint space collapse, moderate osteophyte formation in the patellofemoral compartment with normal tracking of the patella, no acute osseous abnormalities.     X-ray 4 views of the right knee from October 27, 2021 reviewed by me today in the office demonstrates age appropriate bone density throughout with severe degenerative changes with medial, lateral patellofemoral joint space collapse, moderate osteophyte formation of the patellofemoral compartment with normal tracking patella, no acute osseous abnormalities.      /79   Pulse 93   Temp 97.6 °F (36.4 °C) (Temporal)   Resp 24   Ht 5' 8\" (1.727 m)   Wt 296 lb (134.3 kg)   SpO2 92%   BMI 45.01 kg/m²     Assessment:   Left knee OA, severe  Right knee OA, severe                Plan:   I discussed with him today his x-ray findings. I explained to him that he does have severe arthritis in both of his knees. At this point given his persistent worsening symptoms despite conservative treatment with his x-ray findings I recommend surgical treatment beginning with his more symptomatic left knee first.  I had a lengthy discussion with him today in regards to left total knee replacement surgery. I explained risks, benefits, possible complications of the procedure and answered all questions for the patient. I explained postoperative rehabilitation protocol and expectations with the patient today. The patient understands and consents to the procedure. Patient will follow up with their primary care physician prior to surgical treatment for preoperative clearance. We will schedule surgery at soonest convenience. Continue weight-bearing as tolerated. Continue range of motion exercises as instructed. Ice and elevate as needed. Tylenol or Motrin for pain. Follow up in 3 weeks postop and once he is doing better we'll discuss proceeding with surgical treatment for his right knee. He would like to have the surgery at [facility] and he would like to spend the night in the hospital after surgery.                 The patient was counseled at length about the risks of joe Covid-19 during their perioperative period and any recovery window from their procedure.   The patient was made aware that joe Covid-19  may worsen their prognosis for recovering from their procedure  and lend to a higher morbidity and/or mortality risk. All material risks, benefits, and reasonable alternatives including postponing the procedure were discussed. The patient does wish to proceed with the procedure at this time. Pt seen and examined, No change in H+P. He states that since he saw me in the office, his right knee has been feeling worse so he would like to proceed with his right knee replacement first today. Once he has progressed well we will discuss proceeding with surgical treatment for his left knee.       Jean 97, DO

## 2022-02-15 NOTE — ANESTHESIA PROCEDURE NOTES
Peripheral Block    Patient location during procedure: procedure area  Start time: 2/15/2022 11:00 AM  End time: 2/15/2022 11:07 AM  Staffing  Performed: resident/CRNA   Resident/CRNA: BERNARDINO Quiros CRNA  Preanesthetic Checklist  Completed: patient identified, IV checked, site marked, risks and benefits discussed, surgical consent, monitors and equipment checked, pre-op evaluation, timeout performed, anesthesia consent given, oxygen available and patient being monitored  Peripheral Block  Patient position: supine  Prep: ChloraPrep  Patient monitoring: cardiac monitor, continuous pulse ox, continuous capnometry, frequent blood pressure checks and IV access  Block type: Saphenous  Laterality: right  Injection technique: single-shot  Guidance: ultrasound guided  Local infiltration: lidocaine  Infiltration strength: 1 %  Dose: 3 mL  Provider prep: mask and sterile gloves  Local infiltration: lidocaine  Needle  Needle type: long-bevel   Needle gauge: 22 G  Needle length: 10 cm  Needle localization: ultrasound guidance  Assessment  Injection assessment: negative aspiration for heme  Slow fractionated injection: yes  Hemodynamics: stable  Medications Administered  Ropivacaine (NAROPIN) injection 0.5%, 20 mL  Reason for block: procedure for pain

## 2022-02-15 NOTE — ANESTHESIA PROCEDURE NOTES
Spinal Block    Patient location during procedure: OR  Start time: 2/15/2022 12:32 PM  End time: 2/15/2022 12:49 PM  Reason for block: primary anesthetic and at surgeon's request  Staffing  Performed: resident/CRNA   Anesthesiologist: Evgeny Weaver MD  Resident/CRNA: BERNARDINO Cohen CRNA  Preanesthetic Checklist  Completed: patient identified, IV checked, site marked, risks and benefits discussed, surgical consent, monitors and equipment checked, pre-op evaluation, timeout performed, anesthesia consent given, oxygen available and patient being monitored  Spinal Block  Patient position: sitting  Prep: Betadine  Patient monitoring: cardiac monitor, continuous pulse ox, continuous capnometry and frequent blood pressure checks  Approach: midline  Location: L4/L5  Guidance: paresthesia technique  Provider prep: mask and sterile gloves  Local infiltration: lidocaine  Agent: bupivacaine  Dose: 3  Dose: 3  Needle  Needle type: Quincke   Needle gauge: 22 G  Needle length: 6 in  Assessment  Sensory level: T8  Swirl obtained: Yes  CSF: clear  Attempts: 2  Hemodynamics: stable

## 2022-02-16 VITALS
BODY MASS INDEX: 44.86 KG/M2 | RESPIRATION RATE: 18 BRPM | HEART RATE: 103 BPM | TEMPERATURE: 97.8 F | WEIGHT: 296 LBS | SYSTOLIC BLOOD PRESSURE: 140 MMHG | HEIGHT: 68 IN | DIASTOLIC BLOOD PRESSURE: 60 MMHG | OXYGEN SATURATION: 98 %

## 2022-02-16 LAB
HCT VFR BLD CALC: 37.3 % (ref 42–52)
HEMOGLOBIN: 12.2 GM/DL (ref 13.5–18)

## 2022-02-16 PROCEDURE — 6360000002 HC RX W HCPCS: Performed by: ORTHOPAEDIC SURGERY

## 2022-02-16 PROCEDURE — 2580000003 HC RX 258: Performed by: ORTHOPAEDIC SURGERY

## 2022-02-16 PROCEDURE — 85014 HEMATOCRIT: CPT

## 2022-02-16 PROCEDURE — 97116 GAIT TRAINING THERAPY: CPT

## 2022-02-16 PROCEDURE — 6370000000 HC RX 637 (ALT 250 FOR IP): Performed by: ORTHOPAEDIC SURGERY

## 2022-02-16 PROCEDURE — 94761 N-INVAS EAR/PLS OXIMETRY MLT: CPT

## 2022-02-16 PROCEDURE — 97162 PT EVAL MOD COMPLEX 30 MIN: CPT

## 2022-02-16 PROCEDURE — 94664 DEMO&/EVAL PT USE INHALER: CPT

## 2022-02-16 PROCEDURE — 85018 HEMOGLOBIN: CPT

## 2022-02-16 PROCEDURE — 94150 VITAL CAPACITY TEST: CPT

## 2022-02-16 PROCEDURE — 6370000000 HC RX 637 (ALT 250 FOR IP): Performed by: NURSE PRACTITIONER

## 2022-02-16 RX ORDER — OXYCODONE HYDROCHLORIDE 5 MG/1
5 TABLET ORAL EVERY 6 HOURS PRN
Qty: 25 TABLET | Refills: 0 | Status: SHIPPED | OUTPATIENT
Start: 2022-02-16 | End: 2022-02-21 | Stop reason: SDUPTHER

## 2022-02-16 RX ADMIN — PANTOPRAZOLE SODIUM 40 MG: 40 TABLET, DELAYED RELEASE ORAL at 09:35

## 2022-02-16 RX ADMIN — AMLODIPINE BESYLATE 5 MG: 5 TABLET ORAL at 09:35

## 2022-02-16 RX ADMIN — ASPIRIN 325 MG: 325 TABLET, COATED ORAL at 09:35

## 2022-02-16 RX ADMIN — ACETAMINOPHEN 650 MG: 325 TABLET ORAL at 05:03

## 2022-02-16 RX ADMIN — ACETAMINOPHEN 650 MG: 325 TABLET ORAL at 01:43

## 2022-02-16 RX ADMIN — OXYCODONE HYDROCHLORIDE 10 MG: 5 TABLET ORAL at 09:43

## 2022-02-16 RX ADMIN — OXYCODONE HYDROCHLORIDE 10 MG: 5 TABLET ORAL at 01:43

## 2022-02-16 RX ADMIN — CEFAZOLIN SODIUM 3000 MG: 1 INJECTION, POWDER, FOR SOLUTION INTRAMUSCULAR; INTRAVENOUS at 05:03

## 2022-02-16 ASSESSMENT — PAIN DESCRIPTION - ORIENTATION
ORIENTATION: RIGHT
ORIENTATION: RIGHT

## 2022-02-16 ASSESSMENT — PAIN SCALES - GENERAL
PAINLEVEL_OUTOF10: 8
PAINLEVEL_OUTOF10: 8
PAINLEVEL_OUTOF10: 6
PAINLEVEL_OUTOF10: 7

## 2022-02-16 ASSESSMENT — PAIN DESCRIPTION - PROGRESSION: CLINICAL_PROGRESSION: GRADUALLY WORSENING

## 2022-02-16 ASSESSMENT — PAIN DESCRIPTION - ONSET
ONSET: ON-GOING
ONSET: ON-GOING

## 2022-02-16 ASSESSMENT — PAIN DESCRIPTION - FREQUENCY
FREQUENCY: CONTINUOUS
FREQUENCY: CONTINUOUS

## 2022-02-16 ASSESSMENT — PAIN DESCRIPTION - LOCATION
LOCATION: KNEE
LOCATION: KNEE

## 2022-02-16 ASSESSMENT — PAIN DESCRIPTION - DESCRIPTORS
DESCRIPTORS: ACHING
DESCRIPTORS: ACHING

## 2022-02-16 ASSESSMENT — PAIN - FUNCTIONAL ASSESSMENT: PAIN_FUNCTIONAL_ASSESSMENT: PREVENTS OR INTERFERES SOME ACTIVE ACTIVITIES AND ADLS

## 2022-02-16 ASSESSMENT — PAIN DESCRIPTION - PAIN TYPE
TYPE: SURGICAL PAIN
TYPE: SURGICAL PAIN

## 2022-02-16 NOTE — PROGRESS NOTES
Outpatient Pharmacy Progress Note for Meds-to-Beds    Total number of Prescriptions Filled: 1  The following medications were dispensed to the patient during the discharge process:  Oxycodone 5 mg    Additional Documentation:  Patient picked-up the medication(s) in the OP Pharmacy   The prescription for aspirin was not covered under the patient's insurance; this medication can be purchased as an over-the-counter medication. The patient has hydrocodone/acetaminophen 10/325 mg at home. Prescriber comfortable with patient receiving oxycodone. Thank you for letting us serve your patients.   [facility]    [address], [address] [facility]    Phone: [phone]    Fax: [phone]

## 2022-02-16 NOTE — CONSULTS
[address] [facility], 1950, SD06/SD06-01, 2/16/2022    History  [place]:  The primary encounter diagnosis was History of total right knee replacement. A diagnosis of Pre-op testing was also pertinent to this visit. Patient  has a past medical history of Acid reflux, Anxiety, Arthritis, Asthma, Chronic back pain, Cough, Diverticulitis, Hepatitis, [place] (hard of hearing), HTN (hypertension), Hyperlipidemia, Nocturia, Shortness of breath on exertion, Slow urinary stream, Tingling, Wears dentures, Wears glasses, and Wears partial dentures. Patient  has a past surgical history that includes eye surgery (Left, Late 1980's); Dental surgery; Colonoscopy (Last Done Early 2000's); Tonsillectomy and adenoidectomy (1960's); hernia repair (1980's); Appendectomy (1960's); fracture surgery (Left, 2000's); Knee arthroscopy (Right, 04/13/2017); and Carpal tunnel release (Right, 06/22/2017). Subjective:  Patient states:  \"I didn't know it'd hurt this bad. \"    Pain:  6/10 pain in R LE at sx site.     Communication with other providers:  Handoff to RN  Restrictions: WBAT R LE, general precautions, fall risk    Home Setup/Prior level of function  Social/Functional History  Lives With: Significant other  Type of Home: House  Home Layout: One level  Home Access: Level entry  Bathroom Shower/Tub: Walk-in shower  Bathroom Toilet: Handicap height  Bathroom Equipment: Grab bars around toilet,Built-in shower seat  Home Equipment: Cane (Does not use AD at baseline)  ADL Assistance: Independent  Homemaking Assistance: Independent  Homemaking Responsibilities: Yes  Ambulation Assistance: Independent  Transfer Assistance: Independent  Active : Yes    Examination of body systems (includes body structures/functions, activity/participation limitations):  · Observation:  Pt sitting EOB upon arrival and agreeable to therapy  · Vision:  [facility]  · Hearing:  [place]  · Cardiopulmonary:  No O2 needs  · Cognition: WFL, see OT/SLP note for further evaluation. Musculoskeletal  · ROM R: -10-85 L:  WFL. · Strength R LE: 3/5 L LE: 5/5, minimal impairment in function and endurance. · Neuro:  [place]      Mobility:  · Rolling L/R:  NT, pt sitting at beginning and end of session  · Supine to sit:  NT, pt sitting at beginning and end of session  · Transfers: Pt completed STS x2 trials to/from EOB . First trial required min A and second trial required CGA. Cues provided for sequencing and hand placement throughout  · Sitting balance:  good. · Standing balance:  fair. · Gait: Pt ambulated x2 bouts of 25' each with RW CGA. Pt ambulated with decreased olga, forward flexed posture, and overreliance of UEs on walker. Cues provided for walker management, standing posture, and gait pattern. · Education: Pt educated on [address] precautions, use of leg , R leading step to gait pattern with RW, further therapy after discharge. [facility] 6 Clicks Inpatient Mobility:  AM-PAC Inpatient Mobility Raw Score : 18    Safety: patient left EOB, call light within reach, RN notified, gait belt used. Assessment:  Pt is a 73 y.o. male admitted to the hospital after a R TKA on 2/15/2022. Pt is typically independent with all ambulation and transfers without a device. Pt is currently performing transfers CGA and ambulating 25' with RW CGA. Pt is presenting with decreased endurance, impaired balance, impaired gait, impaired transfers, impaired ROM, decreased strength, and increased pain. Pt would benefit from continued acute care PT as well as [facility] PT upon discharge to continue to address impairments. Complexity: moderate    Prognosis: Good, no significant barriers to participation at this time.      Plan Times per week: BID/week     Equipment: Pt will need RW at discharge     Goals:  Short term goals  Time Frame for Short term goals: 1 week  Short term goal 1: Pt to complete all bed mobility mod I  Short term goal 2:

## 2022-02-16 NOTE — PROGRESS NOTES
Suzette Ayala is a 73 y.o. male patient. 1. Pre-op testing      Past Medical History:   Diagnosis Date    Acid reflux     Anxiety     Arthritis     \"Right Knee\"    Asthma     Chronic back pain     Cough     Occ Productive Cough \"Milky\" Sputum    Diverticulitis Dx Early 2000's    Hepatitis Dx 18's    \"In The Hospital A Few Days\"    [place] (hard of hearing)     Bilateral Ears    HTN (hypertension)     Hyperlipidemia     Nocturia     Shortness of breath on exertion     Slow urinary stream     Tingling     \"Tingling Right Hand\"    Wears dentures     Full Upper, Partial Lower    Wears glasses     Wears partial dentures     Lower     No past surgical history pertinent negatives on file. Scheduled Meds:   sodium chloride flush  10 mL IntraVENous 2 times per day    acetaminophen  650 mg Oral Q6H    aspirin  325 mg Oral BID    pantoprazole  40 mg Oral QAM AC    tamsulosin  0.4 mg Oral Daily    lisinopril  5 mg Oral Daily    amLODIPine  5 mg Oral Daily     Continuous Infusions:   lactated ringers 100 mL/hr at 02/15/22 1542    sodium chloride       PRN Meds:sodium chloride flush, sodium chloride, oxyCODONE **OR** oxyCODONE, ketorolac, diphenhydrAMINE, albuterol sulfate HFA    Allergies   Allergen Reactions    Sulfa Antibiotics Rash     Active Problems:    History of total right knee replacement  Resolved Problems:    * No resolved hospital problems. *    Blood pressure 134/70, pulse 90, temperature 97.8 °F (36.6 °C), temperature source Temporal, resp. rate 18, height 5' 8\" (1.727 m), weight 296 lb (134.3 kg), SpO2 92 %. Subjective   Patient seen and examined, resting in bed comfortably, pain controlled, no new complaints. Painful overnight but doing better this morning. Ready to get up with PT.    Objective   RLE - Dressing removed, incision clean, dry, intact, no calf TTP, compartments soft, NVID  Moderate pain with range of motion of the right knee.     Assessment & Plan  POD #1 R TKA,

## 2022-02-16 NOTE — PROGRESS NOTES
Discharge instructions reviewed with pt. All questions answered. Pt instructed to  walker at [facility] DME on [facility]. Pt demonstrated understanding. IV removed and dressing applied. Pt assisted with dressing. Belongings returned to pt. Pt awaiting on son to arrive to pick pt up for discharge.

## 2022-02-16 NOTE — PROGRESS NOTES
[address] report from [facility]. 2130 Patient declined to walk to restroom, wanted to stand at bedside and use urinal.   2340 Attempted to get patient to walk with assistance in hallway. Patient refused and stated he did not feel like it. 0100 Patient stood at bedside and used urinal after refusing to use restroom. Patient back in bed, will continue to monitor. 0200 Patient will not leave telemetry leads on.

## 2022-02-16 NOTE — DISCHARGE SUMMARY
ADMIT DATE: 2/15/2022    DISCHARGE DATE: 2/16/2022    PROVIDER:     Chano Adan DO                      ADMISSION DIAGNOSIS:  Right knee DJD. DISCHARGE DIAGNOSIS:  Right knee DJD. PROCEDURE:  Right total knee arthroplasty on 2/15/2022. HOSPITAL COURSE:  The patient is a 73year-old male with a  longstanding history of right knee pain. For this, he was brought to  [facility] on 2/15/2022 where he underwent right   total knee arthroplasty. He was admitted postoperatively  for pain control, rehabilitation, and medical monitoring. Hospitalist  was consulted for medical management. Physical Therapy was consulted  for gait training. He did receive antibiotic prophylaxis and DVT  prophylaxis during his hospitalization. Throughout his hospital  course, clinically, he remained stable with no apparent medical  complications. He was progressing well with physical therapy and his  pain was well controlled with oral medications.  was  consulted for discharge planning. Given that clinically he was  stable, he was discharged to home on 2/16/2022. DISCHARGE MEDICATIONS:    1. Oxycodone 5 mg one p.o. q.6 h. p.r.n. Pain. 2.   mg p.o. twice daily for 14 days. 3.  Other medications per the [facility] DISCHARGE INSTRUCTIONS:    He is to have physical therapy for gait training and range of motion, and can be weightbearing as tolerated on the right leg. His incision is to be kept clean, dry, and intact at  all times. He is to ice and elevate the right lower extremity for pain and swelling. He is to contact my office if he develops increased pain, swelling, numbness, tingling, redness, fevers, or chills. He is to follow up in my office in 3 weeks at his prescheduled appointment.            Jean Cowan DO

## 2022-02-16 NOTE — CARE COORDINATION
Chart reviewed and met w/ pt to initiate discharge planning. CM introduced self and explained role. Pt is from home w/ his friend Thong Taylor. Pt lives in single level home, with 2 small steps to enter. Pt reports only DME in the home is a cane. Pt has PCP and insurance with affordable RX coverage. Pt reports that his son, Katie Mireles will be picking him up at discharge. Pt will need a rolling walker at discharge. Pt also stated that he will need HHC at discharge. Pt states he has [facility] previously and would like to use the same company if possible. Referral sent Sebastian Osgood, [facility] liaison. Message sent to Elizabet Alfaro for pt walker. Ulysses Hilda was evaluated today and a DME order was entered for a wheeled walker because he requires this to successfully complete daily living tasks of eating, bathing, toileting, personal cares, ambulating, grooming and hygiene. A wheeled walker is necessary due to the patient's unsteady gait, upper body weakness, and inability to  an ambulation device; and he can ambulate only by pushing a walker instead of a lesser assistive device such as a cane, crutch, or standard walker. The need for this equipment was discussed with the patient and he understands and is in agreement.     Electronically signed by Katia Marcial RN on 2/16/2022 at 11:05 AM

## 2022-02-16 NOTE — PROGRESS NOTES
Patient instructed and educated on [employer]. Patient able to do 1500 ml. Vital capacity. Patient's goal is 3000ml.  Electronically signed by Mihir Castro RCP on 2/16/2022 at 9:11 AM

## 2022-02-16 NOTE — PROGRESS NOTES
[address] [facility] Liaison spoke with pt & is aware of discharge & will initiate [facility].

## 2022-02-16 NOTE — PROGRESS NOTES
Pt stated that he would need a walker at home. He stated that he did have a seat in his shower, a cane, and did not have any steps to get into his home. He stated that he did have someone to stay with him. Pt stated that he would like [facility] at home for PT/OT d/t not having transportation to and from OP therapy. Spoke with CM to arrange [facility] and walker for pt.

## 2022-02-18 NOTE — ANESTHESIA POSTPROCEDURE EVALUATION
Department of Anesthesiology  Postprocedure Note    Patient: Yobany Hall  MRN: [id number]  [place]: 1950  Date of evaluation: 2/18/2022  Time:  6:45 AM     Procedure Summary     Date: 02/15/22 Room / Location: [address] [place] [address]    Anesthesia Start: 6945 Anesthesia Stop: 4025    Procedure: RIGHT KNEE TOTAL ARTHROPLASTY (Right Knee) Diagnosis:       Primary osteoarthritis of right knee      Right knee pain, unspecified chronicity      (Primary osteoarthritis of right knee [M17.11] Right knee pain, unspecified chronicity [M25.561])    Surgeons: Myrtha Paget, DO Responsible Provider: Billy Valente MD    Anesthesia Type: general, MAC, regional, spinal ASA Status: 4          Anesthesia Type: general, MAC, regional, spinal    Wolf Phase I: Wolf Score: 10    Wolf Phase II:      Last vitals: Reviewed and per EMR flowsheets.        Anesthesia Post Evaluation    Patient location during evaluation: PACU  Patient participation: complete - patient participated  Level of consciousness: awake and alert  Pain score: 0  Airway patency: patent  Nausea & Vomiting: no nausea and no vomiting  Complications: no  Cardiovascular status: blood pressure returned to baseline  Respiratory status: acceptable  Hydration status: euvolemic

## 2022-02-21 DIAGNOSIS — Z96.651 HISTORY OF TOTAL RIGHT KNEE REPLACEMENT: ICD-10-CM

## 2022-02-21 RX ORDER — OXYCODONE HYDROCHLORIDE 5 MG/1
5 TABLET ORAL EVERY 6 HOURS PRN
Qty: 25 TABLET | Refills: 0 | Status: SHIPPED | OUTPATIENT
Start: 2022-02-21 | End: 2022-02-28

## 2022-02-21 NOTE — TELEPHONE ENCOUNTER
Patient called requesting a refill of pain medication and requesting something stronger he is having a difficult time sleeping.

## 2022-02-28 ENCOUNTER — TELEPHONE (OUTPATIENT)
Dept: FAMILY MEDICINE CLINIC | Age: 72
End: 2022-02-28

## 2022-02-28 DIAGNOSIS — M17.12 PRIMARY OSTEOARTHRITIS OF LEFT KNEE: ICD-10-CM

## 2022-02-28 DIAGNOSIS — M17.11 PRIMARY OSTEOARTHRITIS OF RIGHT KNEE: ICD-10-CM

## 2022-02-28 DIAGNOSIS — M54.50 CHRONIC BILATERAL LOW BACK PAIN WITHOUT SCIATICA: ICD-10-CM

## 2022-02-28 DIAGNOSIS — G89.29 CHRONIC BILATERAL LOW BACK PAIN WITHOUT SCIATICA: ICD-10-CM

## 2022-02-28 RX ORDER — HYDROCODONE BITARTRATE AND ACETAMINOPHEN 10; 325 MG/1; MG/1
1 TABLET ORAL EVERY 6 HOURS PRN
Qty: 120 TABLET | Refills: 0 | Status: SHIPPED | OUTPATIENT
Start: 2022-02-28 | End: 2022-03-24 | Stop reason: SDUPTHER

## 2022-03-09 ENCOUNTER — OFFICE VISIT (OUTPATIENT)
Dept: ORTHOPEDIC SURGERY | Age: 72
End: 2022-03-09

## 2022-03-09 VITALS
BODY MASS INDEX: 44.86 KG/M2 | HEIGHT: 68 IN | OXYGEN SATURATION: 98 % | WEIGHT: 296 LBS | RESPIRATION RATE: 16 BRPM | TEMPERATURE: 97.3 F

## 2022-03-09 DIAGNOSIS — Z96.651 S/P TKR (TOTAL KNEE REPLACEMENT), RIGHT: Primary | ICD-10-CM

## 2022-03-09 PROCEDURE — 99024 POSTOP FOLLOW-UP VISIT: CPT | Performed by: PHYSICIAN ASSISTANT

## 2022-03-09 RX ORDER — OXYCODONE HYDROCHLORIDE AND ACETAMINOPHEN 5; 325 MG/1; MG/1
1 TABLET ORAL 2 TIMES DAILY PRN
Qty: 14 TABLET | Refills: 0 | Status: SHIPPED | OUTPATIENT
Start: 2022-03-09 | End: 2022-03-16

## 2022-03-09 NOTE — PROGRESS NOTES
Date of surgery:   2/15/2022  Surgeon: Dr. Erika Muir    History:  Mr. Areli Franco is here in follow up regarding his right knee arthroplasty  He is doing well although he does have some moderate swelling in the right lower extremity. He states that he had swelling in his right lower extremity in the past as well prior to surgery. He denies any history of blood clots. States that he is still having pain in the right knee which he rates about an 8/10 today. He is doing physical therapy and doing well in therapy. He has been taking pain medication. Physical:  Vitals:    03/09/22 0943   Resp: 16   Temp: 97.3 °F (36.3 °C)   TempSrc: Temporal   SpO2: 98%   Weight: 296 lb (134.3 kg)   Height: 5' 8\" (1.727 m)     Right knee: Inspection: Incision well-healed, no erythema, no edema around the incision. Right lower extremity does show moderate to severe generalized edema but no tenderness within the calf or no other evidence of DVT. Range of motion:  Extension: 0 degrees, flexion 105 degrees.     No varus or valgus instability noted on exam    Impression: Status post above, doing well       Plan:   Patient Instructions   Weightbearing and activities as tolerated  May take Ibuprofen or Motrin as needed  Rest, ice, and elevate as needed  Work on ROM and strengthening exercises as discussed  Follow up in 4 weeks

## 2022-03-09 NOTE — PATIENT INSTRUCTIONS
Weightbearing and activities as tolerated  May take Ibuprofen or Motrin as needed  Rest, ice, and elevate as needed  Work on ROM and strengthening exercises as discussed  Follow up in 4 weeks

## 2022-03-11 ENCOUNTER — TELEPHONE (OUTPATIENT)
Dept: ORTHOPEDIC SURGERY | Age: 72
End: 2022-03-11

## 2022-03-11 NOTE — TELEPHONE ENCOUNTER
Patient called in with concerns of swelling into the right lower leg. Pt states he has not been using ice. He has been resting in bed but has not been elevating. I advised patient to use ice and elevation over the weekend and if the swelling was not any better to call our office to be seen sooner.

## 2022-03-15 DIAGNOSIS — Z96.651 HISTORY OF TOTAL RIGHT KNEE REPLACEMENT: ICD-10-CM

## 2022-03-15 DIAGNOSIS — Z96.651 S/P TKR (TOTAL KNEE REPLACEMENT), RIGHT: Primary | ICD-10-CM

## 2022-03-17 ENCOUNTER — OFFICE VISIT (OUTPATIENT)
Dept: ORTHOPEDIC SURGERY | Age: 72
End: 2022-03-17
Payer: MEDICARE

## 2022-03-17 VITALS
HEIGHT: 68 IN | BODY MASS INDEX: 43.19 KG/M2 | RESPIRATION RATE: 16 BRPM | OXYGEN SATURATION: 98 % | HEART RATE: 63 BPM | WEIGHT: 285 LBS

## 2022-03-17 DIAGNOSIS — M17.12 ARTHRITIS OF KNEE, LEFT: Primary | ICD-10-CM

## 2022-03-17 PROCEDURE — 20610 DRAIN/INJ JOINT/BURSA W/O US: CPT | Performed by: PHYSICIAN ASSISTANT

## 2022-03-17 ASSESSMENT — ENCOUNTER SYMPTOMS
SHORTNESS OF BREATH: 0
EYES NEGATIVE: 1
RESPIRATORY NEGATIVE: 1
GASTROINTESTINAL NEGATIVE: 1

## 2022-03-17 NOTE — PROGRESS NOTES
Review of Systems   Constitutional: Negative. Negative for chills and fever. HENT: Negative. Eyes: Negative. Respiratory: Negative. Negative for shortness of breath. Cardiovascular: Negative. Gastrointestinal: Negative. Genitourinary: Negative. Musculoskeletal: Positive for arthralgias and myalgias. Skin: Negative. Negative for rash and wound. Neurological: Negative. Psychiatric/Behavioral: Negative. HPI:  Nathen Orta is a 73 y.o. male that complains of left knee pain today. Patient states that the left knee has been bothering him more as he has been recovering from the right total knee replacement. He does have significant arthritis in the left knee which he is looking at getting replaced sometime around October of this year. He would like to do a steroid injection now in the left knee today to try to get him through until the fall.     Past Medical History:   Diagnosis Date    Acid reflux     Anxiety     Arthritis     \"Right Knee\"    Asthma     Chronic back pain     Cough     Occ Productive Cough \"Milky\" Sputum    Diverticulitis Dx Early 2000's    Hepatitis Dx 18's    \"In The Hospital A Few Days\"    [place] (hard of hearing)     Bilateral Ears    HTN (hypertension)     Hyperlipidemia     Nocturia     Shortness of breath on exertion     Slow urinary stream     Tingling     \"Tingling Right Hand\"    Wears dentures     Full Upper, Partial Lower    Wears glasses     Wears partial dentures     Lower       Past Surgical History:   Procedure Laterality Date    APPENDECTOMY  1960's    CARPAL TUNNEL RELEASE Right 06/22/2017    COLONOSCOPY  Last Done Early 2000's    Polyps Removed In Past    DENTAL SURGERY      Teeth Extracted In Past    EYE SURGERY Left Late 1980's    Cataract With Lens Implant    FRACTURE SURGERY Left 2000's    Broken Left Wrist    HERNIA REPAIR  6330'Z    Umbilical Hernia Repair    KNEE ARTHROSCOPY Right 04/13/2017    meniscus repair  TONSILLECTOMY AND ADENOIDECTOMY  1960's    TOTAL KNEE ARTHROPLASTY Right 2/15/2022    RIGHT KNEE TOTAL ARTHROPLASTY performed by Jody Kelley DO at [facility] OR       Family History   Problem Relation Age of Onset    Stroke Mother     Heart Disease Mother     Cancer Father         Prostate Cancer    Cancer Son         Leukemia       Social History     Socioeconomic History    Marital status:      Spouse name: Not on file    Number of children: 3    Years of education: Not on file    Highest education level: Not on file   Occupational History    Not on file   Tobacco Use    Smoking status: Current Some Day Smoker     Packs/day: 1.00     Years: 52.00     Pack years: 52.00     Types: Cigarettes     Start date: 1965    Smokeless tobacco: Former User     Quit date: 11/3/2021   Vaping Use    Vaping Use: Never used   Substance and Sexual Activity    Alcohol use: Not Currently     Alcohol/week: 3.0 - 6.0 standard drinks     Types: 3 - 6 Cans of beer per week    Drug use: No    Sexual activity: Not Currently   Other Topics Concern    Not on file   Social History Narrative    Single, 3 kids, works for [employer] as a      Social Determinants of Health     Financial Resource Strain: Low Risk     Difficulty of Paying Living Expenses: Not hard at all   Food Insecurity: No Food Insecurity    Worried About [facility] in the Last Year: Never true    [facility] in the Last Year: Never true   Transportation Needs:     Lack of Transportation (Medical): Not on file    Lack of Transportation (Non-Medical):  Not on file   Physical Activity:     Days of Exercise per Week: Not on file    Minutes of Exercise per Session: Not on file   Stress:     Feeling of Stress : Not on file   Social Connections:     Frequency of Communication with Friends and Family: Not on file    Frequency of Social Gatherings with Friends and Family: Not on file    Attends Hindu Services: Not on file    Active Member of Clubs or Organizations: Not on file    Attends Club or Organization Meetings: Not on file    Marital Status: Not on file   Intimate Partner Violence:     Fear of Current or Ex-Partner: Not on file    Emotionally Abused: Not on file    Physically Abused: Not on file    Sexually Abused: Not on file   Housing Stability:     Unable to Pay for Housing in the Last Year: Not on file    Number of [place] in the Last Year: Not on file    Unstable Housing in the Last Year: Not on file       Current Outpatient Medications   Medication Sig Dispense Refill    HYDROcodone-acetaminophen (NORCO)  MG per tablet Take 1 tablet by mouth every 6 hours as needed for Pain for up to 30 days. Intended supply: 30 days 120 tablet 0    amLODIPine (NORVASC) 5 MG tablet TAKE 1 TABLET BY MOUTH EVERY DAY IN THE MORNING 90 tablet 1    tamsulosin (FLOMAX) 0.4 MG capsule Take 1 capsule by mouth daily 90 capsule 1    lisinopril (PRINIVIL;ZESTRIL) 5 MG tablet TAKE 1 TABLET BY MOUTH EVERY DAY IN THE MORNING 90 tablet 1    pantoprazole (PROTONIX) 40 MG tablet Take 1 tablet by mouth every morning (before breakfast) 90 tablet 0    Spacer/Aero-Holding Chambers (AEROCHAMBER MV) MISC Use with albuterol. 1 each 0    Compression Stockings MISC by Does not apply route 20-30 mM HG    Wear daily 2 each 0    sildenafil (VIAGRA) 100 MG tablet Take 1 tablet by mouth as needed for Erectile Dysfunction 30 tablet 2    albuterol sulfate HFA (PROAIR HFA) 108 (90 Base) MCG/ACT inhaler Inhale 2 puffs into the lungs every 6 hours as needed for Wheezing 3 Inhaler 1    aspirin 325 MG EC tablet Take 1 tablet by mouth 2 times daily for 14 days 28 tablet 0     No current facility-administered medications for this visit.        Allergies   Allergen Reactions    Sulfa Antibiotics Rash       Review of Systems:  See above      Physical Exam:   Pulse 63   Resp 16   Ht 5' 8\" (1.727 m)   Wt 285 lb (129.3 kg)   SpO2 98%   BMI 43.33 kg/m²        Gait is Antalgic. The patient can bear weight on the injured extremity. Gen/Psych:Examination reveals a pleasant individual in no acute distress. The patient is oriented to time, place and person. The patient's mood and affect are appropriate. Patient appears well nourished. Body habitus is obese     Lymph:  mild lymphedema in bilateral lower extremities     Skin intact in bilateral lower extremities with no ulcerations, lesions, rash, erythema. Vascular: There are mild varicosities in bilateral lower extremities, sensation intact to light touch over bilateral lower extremities. left leg/knee exam:  Leg alignment:     neutral  Quadriceps/hamstring atrophy:   no  Knee effusion:    no   Knee erythema:   no  ROM:     Full, 0-120 degrees  Varus laxity at 0 and 30 deg's: no  Valguslaxity at 0 and 30 deg's: no  Recurvatum:    no  Tenderness at:   Diffuse tenderness    Bilateral Knee strength is 5/5 flexion and extension  There is + L2-S1 motor and sensory function in bilateral lower extremities    Outside record review: office notes and prior x-rays reviewed    Imaging studies:  X-rays of the left knee reviewed from prior visits show moderate to severe degenerative change within medial compartment left knee. Impression:    Diagnosis Orders   1. Arthritis of knee, left  TN ARTHROCENTESIS ASPIR&/INJ MAJOR JT/BURSA W/O US           Plan:    Patient Instructions   Continue to weight bear as tolerated  Continue range of motion  Ice and elevate as needed  Tylenol or Motrin for pain  Injection given today in the office   Rest for 24-48 hours  Follow up as needed      Left knee injection procedure note    Pre-procedure diagnosis:  Left knee DJD    Post-procedure diagnosis:  same    Procedure: The planned procedure/risks/benefits/alternatives were discussed with the patient.   Risks include, but are not limited to, increased pain, drug reaction, infection, bleeding, lack of improvement, neurovascular injury, and increased blood sugar levels. The patient understood and all of their questions were answered. The Left knee was prepped with alcohol then a 22 gauge needle was advanced into the inferior-lateral joint without difficulty. The joint was then injected with 1 ml 1% lidocaine, 1ml of Kenalog (40 mg/ml), 1ml 0.5% bupivacaine the injection site was cleansed with isopropyl alcohol and a band-aid was placed. Complications:  None, the patient tolerated the procedure well. Instructions: The patient was advised to rest the knee and decrease activity for the next 24 to 48 hours. May use prescription or OTC pain relievers as needed for any post-injection pain as well as ice.

## 2022-03-18 ENCOUNTER — NURSE TRIAGE (OUTPATIENT)
Dept: OTHER | Facility: CLINIC | Age: 72
End: 2022-03-18

## 2022-03-18 ENCOUNTER — HOSPITAL ENCOUNTER (OUTPATIENT)
Dept: PHYSICAL THERAPY | Age: 72
Setting detail: THERAPIES SERIES
Discharge: HOME | End: 2022-03-18
Payer: MEDICARE

## 2022-03-18 DIAGNOSIS — R60.0 EDEMA OF LOWER EXTREMITY: ICD-10-CM

## 2022-03-18 PROCEDURE — 97162 PT EVAL MOD COMPLEX 30 MIN: CPT

## 2022-03-18 PROCEDURE — 97110 THERAPEUTIC EXERCISES: CPT

## 2022-03-18 RX ORDER — FUROSEMIDE 20 MG/1
20 TABLET ORAL DAILY
Qty: 10 TABLET | Refills: 0 | Status: SHIPPED | OUTPATIENT
Start: 2022-03-18 | End: 2022-03-24 | Stop reason: SDUPTHER

## 2022-03-18 ASSESSMENT — PAIN DESCRIPTION - LOCATION: LOCATION: KNEE

## 2022-03-18 ASSESSMENT — PAIN DESCRIPTION - PAIN TYPE: TYPE: SURGICAL PAIN;CHRONIC PAIN

## 2022-03-18 ASSESSMENT — PAIN DESCRIPTION - PROGRESSION: CLINICAL_PROGRESSION: GRADUALLY IMPROVING

## 2022-03-18 ASSESSMENT — PAIN DESCRIPTION - DESCRIPTORS: DESCRIPTORS: ACHING;DULL

## 2022-03-18 ASSESSMENT — PAIN DESCRIPTION - ONSET: ONSET: ON-GOING

## 2022-03-18 ASSESSMENT — PAIN - FUNCTIONAL ASSESSMENT: PAIN_FUNCTIONAL_ASSESSMENT: PREVENTS OR INTERFERES WITH ALL ACTIVE AND SOME PASSIVE ACTIVITIES

## 2022-03-18 ASSESSMENT — PAIN DESCRIPTION - ORIENTATION: ORIENTATION: LEFT;RIGHT

## 2022-03-18 ASSESSMENT — PAIN DESCRIPTION - FREQUENCY: FREQUENCY: CONTINUOUS

## 2022-03-18 ASSESSMENT — PAIN SCALES - GENERAL: PAINLEVEL_OUTOF10: 6

## 2022-03-18 NOTE — PROGRESS NOTES
Physical Therapy  Initial Assessment  Date: 3/18/2022  Patient Name: Susana Koch  MRN: [id number]  : 1950     Treatment Diagnosis: B knee pain, stiffness, weakness LE's and core    Restrictions  Position Activity Restriction  Other position/activity restrictions: postop R TKA    Subjective   General  Chart Reviewed: Yes  Patient assessed for rehabilitation services?: Yes  Additional Pertinent Hx: R TKA 2/15/22  Referring Practitioner: Benja Madison  Diagnosis: R TKA, L knee pain  PT Visit Information  PT Insurance Information: [employer] $40 copay maybe? Precert required  Subjective  Subjective: R knee is coming along ok, ankles weak per pt feel weak to him, L knee hurting too, plans to have TKA next winter, had injection yesterday. Did some homecare on R knee. Pain manageable but is in joint on R and needed injection L to be able to do more for R LE.  sleep is ok w/ some meds, hard to get to sleep bc not tired enough  R knee has been swollen too, but slowly better.     Hips and back hurt some too d/t weakness, reports some scoliosis too  Pain Screening  Patient Currently in Pain: Yes  Pain Assessment  Pain Assessment: 0-10  Pain Level: 6 (on the R, L knee 4/10, max pain 8/10)  Patient's Stated Pain Goal: 1 (0-1/10)  Pain Type: Surgical pain;Chronic pain  Pain Location: Knee  Pain Orientation: Left;Right  Pain Descriptors: Aching;Dull  Pain Frequency: Continuous  Pain Onset: On-going  Clinical Progression: Gradually improving  Functional Pain Assessment: Prevents or interferes with all active and some passive activities  Vital Signs  Patient Currently in Pain: Yes    Vision/Hearing       Orientation  Orientation  Overall Orientation Status: Within Normal Limits    Social/Functional History  Social/Functional History  Lives With: Significant other  Type of Home: House  Home Layout: One level  Home Access: Level entry (one small step)  Bathroom Shower/Tub: Walk-in shower (w/ a seat)  Bathroom Toilet: Handicap height  Bathroom Equipment: Grab bars around toilet  Home Equipment: Cane;Rolling walker  Receives Help From: Friend(s)  ADL Assistance: Needs assistance (some help toiletiing)  Homemaking Assistance: Needs assistance  Active : Yes  Mode of Transportation: Truck  Occupation: Retired  Leisure & Hobbies: fishing, mushrooming, outdoors    Objective     Observation/Palpation  Palpation: no real TTP noted either knee  Observation: ambulates w/o device w/ waddle type gait. Edema: noted in R LE, lower leg slightly pitting in places. AROM RLE (degrees)  RLE General AROM: lmee 5-115  AROM LLE (degrees)  LLE General AROM: 1-134 knee    Strength RLE  Strength RLE: WFL  Comment: easy testing quad and ham and good force development  Strength LLE  Strength LLE: WNL     Additional Measures  Special Tests: Mild hip pain R w/ IR, tight hams B w/ neg SLR, MORGAN ok. Assessment   Conditions Requiring Skilled Therapeutic Intervention  Body structures, Functions, Activity limitations: Decreased functional mobility ; Decreased ADL status; Decreased ROM; Decreased strength;Decreased high-level IADLs; Increased pain  Assessment: Pt is a 74 yo male who presents just over 4 weeks postop R TKA and also having L knee pain. He has Hx of back pain and some hip pain as well. He has noted edema in R lower leg, altered gait, waddle type, decreased core and LE strength w/ ROM deficits R knee. These limitations are affecting his aiblity to perform usual self care and ADL's as well as functional activity and he will benefit from PT to help restore ROM, gait, strength and function to return him to prior acctivity. Prior to 2021 he was getting up from a chair w/ min difficulty. Patient agrees with established plan of care and assisted in the development of their short term and long term goals. Patient had no adverse reaction with initial treatment and there are no barriers to learning.   Learning preferences include demonstration, practice,

## 2022-03-18 NOTE — PLAN OF CARE
Outpatient Physical Therapy           [place]           [x] Phone: [phone]   Fax: [phone]  [place]           [] Phone: [phone]   Fax: [phone]     To: Referring Practitioner: Jamir Robison    From: Aileen Fisher, PT, PT     Patient: Sandra Eckert       : 1950  Diagnosis: Diagnosis: R TKA, L knee pain   Treatment Diagnosis: Treatment Diagnosis: B knee pain, stiffness, weakness LE's and core   Date: 3/18/2022    Physical Therapy Certification/Re-Certification Form  Dear Dr. Jamir Robison,   The following patient has been evaluated for physical therapy services and for therapy to continue, insurance requires physician review of the treatment plan initially and every 90 days. Please review the attached evaluation and/or summary of the patient's plan of care, and verify that you agree therapy should continue by signing the attached document and sending it back to our office. Assessment:    Assessment: Pt is a 74 yo male who presents just over 4 weeks postop R TKA and also having L knee pain. He has Hx of back pain and some hip pain as well. He has noted edema in R lower leg, altered gait, waddle type, decreased core and LE strength w/ ROM deficits R knee. These limitations are affecting his aiblity to perform usual self care and ADL's as well as functional activity and he will benefit from PT to help restore ROM, gait, strength and function to return him to prior acctivity. Prior to  he was getting up from a chair w/ min difficulty. Patient agrees with established plan of care and assisted in the development of their short term and long term goals. Patient had no adverse reaction with initial treatment and there are no barriers to learning. Learning preferences include demonstration, practice, and handouts. Patient expressed understanding of HEP and appears to be motivated to participate in an active PT program including compliance with HEP expectations.        Plan of Care/Treatment to date:  [x] Therapeutic Exercise  [x] Modalities:  [x] Therapeutic Activity     [] Ultrasound  [] Electrical Stimulation  [x] Gait Training      [] Cervical Traction [] Lumbar Traction  [x] Neuromuscular Re-education    [x] Cold/hotpack [] Iontophoresis   [x] Instruction in HEP      [] Vasopneumatic    [] Dry Needling  [x] Manual Therapy               [] Aquatic Therapy       Other:          Frequency/Duration:  # Days per week: [] 1 day # Weeks: [] 1 week [] 5 weeks     [x] 2 days   [] 2 weeks [x] 6 weeks     [] 3 days   [] 3 weeks [] 7 weeks     [] 4 days   [] 4 weeks [] 8 weeks         [] 9 weeks [] 10 weeks         [] 11 weeks [] 12 weeks    Rehab Potential/Progress: [] Excellent [] Good [] Fair  [] Poor     Goals:    Patient goals : be able to get up and walk w/o stumbling or pain, get up from chair easier w/o pain, up and down from toilet w/o pain  Short term goals  Time Frame for Short term goals: 3 weeks  Short term goal 1: Pt will be compliant w/ HEP to help maximize recovery and restore function  Short term goal 2: Pt will be able to report at least 25% reduction in pain  Short term goal 3: Pt will be able to advance ROM and strength activity w/o pain  Long term goals  Time Frame for Long term goals : 6 weeks  Long term goal 1: Pt will be independent w/ HEP and be able to continue to progress and manage on his/her own  Long term goal 2: Pt will be able to return to at least 75% of usual activity w/ min pain, including fishing  Long term goal 3: Pt will have improved KOOS score by 10% or more      Electronically signed by:  Daniel Del Rio, PT, PT, MPT, ATC  3/18/2022, 10:31 AM    3/18/2022, 10:31 AM   If you have any questions or concerns, please don't hesitate to call.   Thank you for your referral.      Physician Signature:________________________________Date:_________ TIME: _____  By signing above, therapists plan is approved by physician

## 2022-03-18 NOTE — FLOWSHEET NOTE
Outpatient Physical Therapy  [place]           [x] Phone: [phone]   Fax: [phone]  [facility]           [] Phone: [phone]   Fax: [phone]        Physical Therapy Daily Treatment Note  Date:  3/18/2022    Patient Name:  Maribel Dyer   \"Hernando\"   :  1950  MRN: [id number]  Restrictions/Precautions: Other position/activity restrictions: postop R TKA  Diagnosis:   Diagnosis: R TKA, L knee pain  Date of Injury/Surgery: 2/15/22  Treatment Diagnosis: Treatment Diagnosis: B knee pain, stiffness, weakness LE's and core    Insurance/Certification information: PT Insurance Information: [employer] $40 copay maybe? Precert required: 4 total visits includes date of eval       auth # [id number]  22-3/28/22  Referring Physician:  Referring Practitioner: Jeffy Collins  Next Doctor Visit:    Plan of care signed (Y/N):  Pending cosign  Outcome Measure: KOOS 64%  Visit# / total visits:    thru 3/37   precert  (12 POC)   Pain level: 6/10   Goals:     Patient goals : be able to get up and walk w/o stumbling or pain, get up from chair easier w/o pain, up and down from toilet w/o pain  Short term goals  Time Frame for Short term goals: 3 weeks  Short term goal 1: Pt will be compliant w/ HEP to help maximize recovery and restore function  Short term goal 2: Pt will be able to report at least 25% reduction in pain  Short term goal 3: Pt will be able to advance ROM and strength activity w/o pain  Long term goals  Time Frame for Long term goals : 6 weeks  Long term goal 1: Pt will be independent w/ HEP and be able to continue to progress and manage on his/her own  Long term goal 2: Pt will be able to return to at least 75% of usual activity w/ min pain, including fishing  Long term goal 3: Pt will have improved KOOS score by 10% or more    Summary of Evaluation: Assessment: Pt is a 74 yo male who presents just over 4 weeks postop R TKA and also having L knee pain.   He has Hx of back pain and some hip pain as well.  He has noted edema in R lower leg, altered gait, waddle type, decreased core and LE strength w/ ROM deficits R knee. These limitations are affecting his aiblity to perform usual self care and ADL's as well as functional activity and he will benefit from PT to help restore ROM, gait, strength and function to return him to prior acctivity. Prior to 2021 he was getting up from a chair w/ min difficulty. Subjective:  See elgin         Any changes in Ambulatory Summary Sheet? None        Objective:  See eval   COVID screening questions were asked and patient attested that there had been no contact or symptoms        Exercises: (No more than 4 columns)   Exercise/Equipment 3/18/22  #1 Date Date           WARM UP                     TABLE      QS w/ SLR  10x     SAQ 10x3\"     Bridge  3x     abdom holds  Instruct 3x5\"     Long sit calf/ham stretch  Green strap 30\"     Heel prop  1'                           STANDING      STS  24\" table 10x     Heel raise  Review                                         PROPRIOCEPTION                                    MODALITIES Deferred ice                     Other Therapeutic Activities/Education: 3/18/2022: Patient received education on their current pathology and how their condition effects them with their functional activities. Patient understood discussion and questions were answered. Patient understands their activity limitations and understands rational for treatment progression. Home Exercise Program:    Access Code: [email or web address]  URL: [email or web address]  Date: 03/18/2022  Prepared by: Karna Sandifer    Exercises  Long Sitting Calf Stretch with Strap - 2-3 x daily - 7 x weekly - 1 sets - 3 reps - 30 seconds hold  Small Range Straight Leg Raise - 2-3 x daily - 7 x weekly - 2 sets - 10 reps  Sidelying Hip Abduction - 2-3 x daily - 7 x weekly - 2 sets - 10 reps  Supine Heel Slide with Strap - 2-3 x daily - 7 x weekly - 2-3 sets - 10 reps - 5-10 hold  Supine Bridge - 1-2 x daily - 7 x weekly - 2 sets - 10 reps  Supine Knee Extension Strengthening - 1-2 x daily - 7 x weekly - 1-2 sets - 10 reps - 3 seconds hold  Supine Transversus Abdominis Bracing - Hands on Stomach - 1-2 x daily - 7 x weekly - 1-2 sets - 10 reps - 5 seconds hold  Standing Heel Raise with Support - 2-3 x daily - 7 x weekly - 2 sets - 10 reps  Standing March with Counter Support - 1-2 x daily - 7 x weekly - 1-2 sets - 10 reps  Sit to Stand - 1-2 x daily - 7 x weekly - 1-2 sets - 10 reps  Supine Knee Extension Mobilization with Weight - 1-2 x daily - 7 x weekly - 1-2 sets - 2-3 reps - 3-5 mins hold      Manual Treatments:        Modalities:        Communication with other providers:  POC set for cosign 3/18/22      Assessment:   Pt is a 74 yo male who presents just over 4 weeks postop R TKA and also having L knee pain. He has Hx of back pain and some hip pain as well. He has noted edema in R lower leg, altered gait, waddle type, decreased core and LE strength w/ ROM deficits R knee. These limitations are affecting his aiblity to perform usual self care and ADL's as well as functional activity and he will benefit from PT to help restore ROM, gait, strength and function to return him to prior acctivity. Prior to 2021 he was getting up from a chair w/ min difficulty.       Plan for Next Session:   Progress ROM and strength to ana, core work, edema massage prn, functional activity, gait and balance, ice prn      Time In / Time Out:   0915/1015       Timed Code/Total Treatment Minutes:  30/60  2 TE 30', 1 Eval 30'       Next Progress Note due:  [place], GET WITH ME ON 3/28, WILL NEED TO COMPLETE A NOTE TO GET MORE RX PRECERTED    Plan of Care Interventions:  [x] Therapeutic Exercise  [x] Modalities:  [x] Therapeutic Activity     [] Ultrasound  [] Estim  [x] Gait Training      [] Cervical Traction [] Lumbar Traction  [x] Neuromuscular Re-education    [x] Cold/hotpack [] Iontophoresis   [x] Instruction in HEP      [] Vasopneumatic   [] Dry Needling    [x] Manual Therapy               [] Aquatic Therapy              Electronically signed by:  Jake Funes PT,  PT, MPT, ATC  3/18/2022, 10:29 AM     3/18/2022, 10:31 AM

## 2022-03-18 NOTE — TELEPHONE ENCOUNTER
Received call from [facility] at [facility] with Red Flag Complaint.     Complaint: swelling in both ankles - waiting for a script refill     Practice Name: [facility]     Caller's telephone number verified as [phone]    Unsuccessful attempt to re-connect with caller via phone, left message for return call to office    Reason for Disposition   Caller can't be reached by phone    Protocols used: INFORMATION ONLY CALL - NO TRIAGE-ADULT-

## 2022-03-23 ENCOUNTER — HOSPITAL ENCOUNTER (OUTPATIENT)
Dept: PHYSICAL THERAPY | Age: 72
Setting detail: THERAPIES SERIES
Discharge: HOME | End: 2022-03-23
Payer: MEDICARE

## 2022-03-23 PROCEDURE — 97016 VASOPNEUMATIC DEVICE THERAPY: CPT

## 2022-03-23 PROCEDURE — 97530 THERAPEUTIC ACTIVITIES: CPT

## 2022-03-23 PROCEDURE — 97110 THERAPEUTIC EXERCISES: CPT

## 2022-03-23 NOTE — FLOWSHEET NOTE
Outpatient Physical Therapy  [place]           [x] Phone: [phone]   Fax: [phone]  [facility]           [] Phone: [phone]   Fax: [phone]        Physical Therapy Daily Treatment Note  Date:  3/23/2022    Patient Name:  Angelina Beckford   \"Hernando\"   :  1950  MRN: [id number]  Restrictions/Precautions: Other position/activity restrictions: postop R TKA  Diagnosis:   Diagnosis: R TKA, L knee pain  Date of Injury/Surgery: 2/15/22  Treatment Diagnosis: Treatment Diagnosis: B knee pain, stiffness, weakness LE's and core    Insurance/Certification information: PT Insurance Information: [employer] $40 copay maybe? Precert required: 4 total visits includes date of eval       auth # [id number]  22-3/28/22  Referring Physician:  Referring Practitioner: Verenice Catherine Doctor Visit:    Plan of care signed (Y/N):  yes  Outcome Measure: KOOS 64%  Visit# / total visits:    thru    precert  (12 POC)   Pain level: 0/10   Goals:     Patient goals : be able to get up and walk w/o stumbling or pain, get up from chair easier w/o pain, up and down from toilet w/o pain  Short term goals  Time Frame for Short term goals: 3 weeks  Short term goal 1: Pt will be compliant w/ HEP to help maximize recovery and restore function  Short term goal 2: Pt will be able to report at least 25% reduction in pain  Short term goal 3: Pt will be able to advance ROM and strength activity w/o pain  Long term goals  Time Frame for Long term goals : 6 weeks  Long term goal 1: Pt will be independent w/ HEP and be able to continue to progress and manage on his/her own  Long term goal 2: Pt will be able to return to at least 75% of usual activity w/ min pain, including fishing  Long term goal 3: Pt will have improved KOOS score by 10% or more    Summary of Evaluation: Assessment: Pt is a 74 yo male who presents just over 4 weeks postop R TKA and also having L knee pain. He has Hx of back pain and some hip pain as well.   He has noted edema in R lower leg, altered gait, waddle type, decreased core and LE strength w/ ROM deficits R knee. These limitations are affecting his aiblity to perform usual self care and ADL's as well as functional activity and he will benefit from PT to help restore ROM, gait, strength and function to return him to prior acctivity. Prior to 2021 he was getting up from a chair w/ min difficulty. Subjective:    Pt reports no pain, just stiffness. Pain is experienced at night. Pt not compliant with HEP. Any changes in Ambulatory Summary Sheet? None        Objective:    COVID screening questions were asked and patient attested that there had been no contact or symptoms    Extensor lag noted  [place] L ear hears better   Lacking 5 degrees from ext. 110 degree AROM flexion  Noted B LE fatigue after STS   Need seated RB     Exercises: (No more than 4 columns)   Exercise/Equipment 3/18/22  #1 Date 3/23/22 #2 Date           WARM UP                     TABLE      QS w/ SLR  10x 10x QS w towel  10x SLR    SAQ 10x3\" 10x 3\"    Bridge  3x 3 x + 3 with TA cueing    abdom holds  Instruct 3x5\" 5 x 5 sec hold    Long sit calf/ham stretch  Green strap 30\" Seated green strap 1'    Heel prop  1'  1'                          STANDING      STS  24\" table 10x 10 x  Cued for posture and hands on table    Heel raise  Review  10x UE support     Step Ups   10x UE support 4in    HS curl   10x ea UE support                            PROPRIOCEPTION                                    MODALITIES Deferred ice Vaso 10'   Did not tolerate                    Other Therapeutic Activities/Education: 3/18/2022: Patient received education on their current pathology and how their condition effects them with their functional activities. Patient understood discussion and questions were answered. Patient understands their activity limitations and understands rational for treatment progression.          Home Exercise Program:    Access Code: [email or web address]  URL: [email or web address]  Date: 03/18/2022  Prepared by: Gianni Rump    Exercises  Long Sitting Calf Stretch with Strap - 2-3 x daily - 7 x weekly - 1 sets - 3 reps - 30 seconds hold  Small Range Straight Leg Raise - 2-3 x daily - 7 x weekly - 2 sets - 10 reps  Sidelying Hip Abduction - 2-3 x daily - 7 x weekly - 2 sets - 10 reps  Supine Heel Slide with Strap - 2-3 x daily - 7 x weekly - 2-3 sets - 10 reps - 5-10 hold  Supine Bridge - 1-2 x daily - 7 x weekly - 2 sets - 10 reps  Supine Knee Extension Strengthening - 1-2 x daily - 7 x weekly - 1-2 sets - 10 reps - 3 seconds hold  Supine Transversus Abdominis Bracing - Hands on Stomach - 1-2 x daily - 7 x weekly - 1-2 sets - 10 reps - 5 seconds hold  Standing Heel Raise with Support - 2-3 x daily - 7 x weekly - 2 sets - 10 reps  Standing March with Counter Support - 1-2 x daily - 7 x weekly - 1-2 sets - 10 reps  Sit to Stand - 1-2 x daily - 7 x weekly - 1-2 sets - 10 reps  Supine Knee Extension Mobilization with Weight - 1-2 x daily - 7 x weekly - 1-2 sets - 2-3 reps - 3-5 mins hold      Manual Treatments:        Modalities:  Vaso 10' supine with extension booster R knee 36 degrees. Did not tolerate full 10 min. Trial increase temp next time. Communication with other providers:  POC set for cosign 3/18/22      Assessment:     Pt tolerated treatment well with added exercises. Noted bilateral LE  fatigue, weakness and instability. Displayed extensor lag in R LE. Had importance of HEP explained. Pt would benefit from skilled therapy to improve R knee ROM, LE strength and proprioception.    Pain: 2/10 before ice, 4/10 after ice    Plan for Next Session:   Progress ROM and strength to ana, core work, edema massage prn, functional activity, gait and balance, ice prn      Time In / Time Out:   6057/7418      Timed Code/Total Treatment Minutes:  44'/54'  2TE 1 TA 1 VASO       Next Progress Note due:  [place], GET WITH ME ON 3/28, WILL NEED TO COMPLETE A NOTE TO GET MORE RX PRECERTED    Plan of Care Interventions:  [x] Therapeutic Exercise  [x] Modalities:  [x] Therapeutic Activity     [] Ultrasound  [] Estim  [x] Gait Training      [] Cervical Traction [] Lumbar Traction  [x] Neuromuscular Re-education    [x] Cold/hotpack [] Iontophoresis   [x] Instruction in HEP      [] Vasopneumatic   [] Dry Needling    [x] Manual Therapy               [] Aquatic Therapy              Electronically signed by:  FARIBA Samano  3/23/2022, 9:49 AM   Nahomy Nix PTA, CLT 3/23/22

## 2022-03-24 ENCOUNTER — OFFICE VISIT (OUTPATIENT)
Dept: FAMILY MEDICINE CLINIC | Age: 72
End: 2022-03-24
Payer: MEDICARE

## 2022-03-24 VITALS
HEART RATE: 74 BPM | OXYGEN SATURATION: 94 % | DIASTOLIC BLOOD PRESSURE: 74 MMHG | BODY MASS INDEX: 45.01 KG/M2 | TEMPERATURE: 96.7 F | SYSTOLIC BLOOD PRESSURE: 140 MMHG | WEIGHT: 296 LBS

## 2022-03-24 DIAGNOSIS — E66.01 CLASS 3 SEVERE OBESITY DUE TO EXCESS CALORIES WITH SERIOUS COMORBIDITY AND BODY MASS INDEX (BMI) OF 45.0 TO 49.9 IN ADULT (HCC): ICD-10-CM

## 2022-03-24 DIAGNOSIS — M17.11 PRIMARY OSTEOARTHRITIS OF RIGHT KNEE: ICD-10-CM

## 2022-03-24 DIAGNOSIS — M17.12 PRIMARY OSTEOARTHRITIS OF LEFT KNEE: ICD-10-CM

## 2022-03-24 DIAGNOSIS — M54.50 CHRONIC BILATERAL LOW BACK PAIN WITHOUT SCIATICA: ICD-10-CM

## 2022-03-24 DIAGNOSIS — R60.0 BILATERAL LOWER EXTREMITY EDEMA: Primary | ICD-10-CM

## 2022-03-24 DIAGNOSIS — G89.29 CHRONIC BILATERAL LOW BACK PAIN WITHOUT SCIATICA: ICD-10-CM

## 2022-03-24 PROCEDURE — 3017F COLORECTAL CA SCREEN DOC REV: CPT | Performed by: FAMILY MEDICINE

## 2022-03-24 PROCEDURE — 4040F PNEUMOC VAC/ADMIN/RCVD: CPT | Performed by: FAMILY MEDICINE

## 2022-03-24 PROCEDURE — G8428 CUR MEDS NOT DOCUMENT: HCPCS | Performed by: FAMILY MEDICINE

## 2022-03-24 PROCEDURE — 4004F PT TOBACCO SCREEN RCVD TLK: CPT | Performed by: FAMILY MEDICINE

## 2022-03-24 PROCEDURE — G8417 CALC BMI ABV UP PARAM F/U: HCPCS | Performed by: FAMILY MEDICINE

## 2022-03-24 PROCEDURE — 99214 OFFICE O/P EST MOD 30 MIN: CPT | Performed by: FAMILY MEDICINE

## 2022-03-24 PROCEDURE — 1123F ACP DISCUSS/DSCN MKR DOCD: CPT | Performed by: FAMILY MEDICINE

## 2022-03-24 PROCEDURE — G8484 FLU IMMUNIZE NO ADMIN: HCPCS | Performed by: FAMILY MEDICINE

## 2022-03-24 RX ORDER — FUROSEMIDE 20 MG/1
20 TABLET ORAL 2 TIMES DAILY
Qty: 60 TABLET | Refills: 0 | Status: SHIPPED | OUTPATIENT
Start: 2022-03-24 | End: 2022-08-25 | Stop reason: SDUPTHER

## 2022-03-24 RX ORDER — POTASSIUM CHLORIDE 750 MG/1
10 TABLET, EXTENDED RELEASE ORAL 2 TIMES DAILY
Qty: 60 TABLET | Refills: 0 | Status: SHIPPED | OUTPATIENT
Start: 2022-03-24 | End: 2022-10-17 | Stop reason: SDUPTHER

## 2022-03-24 RX ORDER — HYDROCODONE BITARTRATE AND ACETAMINOPHEN 10; 325 MG/1; MG/1
1 TABLET ORAL EVERY 6 HOURS PRN
Qty: 120 TABLET | Refills: 0 | Status: SHIPPED | OUTPATIENT
Start: 2022-03-24 | End: 2022-03-29 | Stop reason: SDUPTHER

## 2022-03-24 RX ORDER — POTASSIUM CHLORIDE 750 MG/1
10 TABLET, EXTENDED RELEASE ORAL 2 TIMES DAILY
Qty: 60 TABLET | Refills: 2 | Status: SHIPPED | OUTPATIENT
Start: 2022-03-24 | End: 2022-06-21 | Stop reason: SDUPTHER

## 2022-03-24 RX ORDER — FUROSEMIDE 20 MG/1
20 TABLET ORAL 2 TIMES DAILY
Qty: 60 TABLET | Refills: 2 | Status: SHIPPED | OUTPATIENT
Start: 2022-03-24 | End: 2022-06-21 | Stop reason: SDUPTHER

## 2022-03-24 ASSESSMENT — PATIENT HEALTH QUESTIONNAIRE - PHQ9
1. LITTLE INTEREST OR PLEASURE IN DOING THINGS: 0
2. FEELING DOWN, DEPRESSED OR HOPELESS: 0
SUM OF ALL RESPONSES TO PHQ QUESTIONS 1-9: 0
SUM OF ALL RESPONSES TO PHQ QUESTIONS 1-9: 0
SUM OF ALL RESPONSES TO PHQ9 QUESTIONS 1 & 2: 0
SUM OF ALL RESPONSES TO PHQ QUESTIONS 1-9: 0
SUM OF ALL RESPONSES TO PHQ QUESTIONS 1-9: 0

## 2022-03-24 NOTE — PROGRESS NOTES
Edema: Patient complains of edema. The location of the edema is lower leg(s) bilateral.  The edema has been moderate. Onset of symptoms was several years ago, intermittent since that time. The edema is present all day. The patient states the problem is long-standing. The swelling has been aggravated by dependency of involved area and Recent decrease bloody due to right knee replacement last month, relieved by diuretics, and been associated with nothing. Cardiac risk factors include advanced age (older than 54 for men, 72 for women), male gender and sedentary lifestyle. He had held off on his Lasix in January 2022 but then after his surgery, the edema returned and he had to restart it. He had run out last week. It was restarted on Friday    Chronic back pain. This is usually well controlled with Norco.  He needs a refill    O:   Vitals:    03/24/22 1033   BP: (!) 140/74   Pulse: 74   Temp: 96.7 °F (35.9 °C)   SpO2: 94%     No acute distress. Alert and Oriented x 3, obese  HEENT: Atraumatic. Normocephalic. PERRLA, EOMI, Conjunctiva clear  NECK: without thyromegaly, lymphadenopathy, JVD  LUNGS:Clear to ascultation bilaterally. Breathing comfortably  CARDIOVASCULAR:  Regular rate and rhythm, no murmurs, rubs, or gallops  EXTREMITY: Full range of motion. No clubbing/cyanosis. 1+ pitting edema to mid calf  NEURO: Cranial nerves II-XII grossly intact. Strength 5/5, DTR 2/4. SKIN: Warm, Dry, No rash. PSYCH: Mood and Affect normal.    A:    Diagnosis Orders   1. Bilateral lower extremity edema  potassium chloride (KLOR-CON M) 10 MEQ extended release tablet   Needs improvement furosemide (LASIX) 20 MG tablet   2. Primary osteoarthritis of left knee   Improved HYDROcodone-acetaminophen (NORCO)  MG per tablet   3. Primary osteoarthritis of right knee   Improved HYDROcodone-acetaminophen (NORCO)  MG per tablet   4.  Chronic bilateral low back pain without sciatica   Fairly well controlled HYDROcodone-acetaminophen (NORCO)  MG per tablet   5. Class 3 severe obesity due to excess calories with serious comorbidity and body mass index (BMI) of 45.0 to 49.9 in adult [facility])     Needs improved    P: Restart Lasix 20 mg twice daily and potassium 10 mEq twice daily  Norco 10-3 25 4 times daily as needed pain.   Follow-up 3 months

## 2022-03-24 NOTE — PATIENT INSTRUCTIONS
Patient Education        Leg and Ankle Edema: Care Instructions  Your Care Instructions  Swelling in the legs, ankles, and feet is called edema. It is common after you sit or stand for a while. Long plane flights or car rides often cause swelling in the legs and feet. You may also have swelling if you have to stand for long periods of time at your job. Problems with the veins in the legs (varicose veins) and changes in hormones can also cause swelling. Sometimes the swelling in the ankles and feet is caused by a more serious problem, such as heart failure, infection, blood clots, or liver or kidney disease. Follow-up care is a key part of your treatment and safety. Be sure to make and go to all appointments, and call your doctor if you are having problems. It's also a good idea to know your test results and keep a list of the medicines you take. How can you care for yourself at home? · If your doctor gave you medicine, take it as prescribed. Call your doctor if you think you are having a problem with your medicine. · Whenever you are resting, raise your legs up. Try to keep the swollen area higher than the level of your heart. · Take breaks from standing or sitting in one position. ? Walk around to increase the blood flow in your lower legs. ? Move your feet and ankles often while you stand, or tighten and relax your leg muscles. · Wear support stockings. Put them on in the morning, before swelling gets worse. · Eat a balanced diet. Lose weight if you need to. · Limit the amount of salt (sodium) in your diet. Salt holds fluid in the body and may increase swelling. When should you call for help? Call 911 anytime you think you may need emergency care. For example, call if:    · You have symptoms of a blood clot in your lung (called a pulmonary embolism). These may include:  ? Sudden chest pain. ? Trouble breathing. ? Coughing up blood.    Call your doctor now or seek immediate medical care if:    · You have signs of a blood clot, such as:  ? Pain in your calf, back of the knee, thigh, or groin. ? Redness and swelling in your leg or groin.     · You have symptoms of infection, such as:  ? Increased pain, swelling, warmth, or redness. ? Red streaks or pus. ? A fever. Watch closely for changes in your health, and be sure to contact your doctor if:    · Your swelling is getting worse.     · You have new or worsening pain in your legs.     · You do not get better as expected. Where can you learn more? Go to [email or web address] and sign in to your [employer] account. Enter Q160 in the [employer]Arch[employer] box to learn more about \"Leg and Ankle Edema: Care Instructions. \"     If you do not have an account, please click on the \"Sign Up Now\" link. Current as of: July 1, 2021               Content Version: 13.1  © 2006-2021 Healthwise, [facility]. Care instructions adapted under license by [facility] ([facility]). If you have questions about a medical condition or this instruction, always ask your healthcare professional. [place] [address] any warranty or liability for your use of this information.

## 2022-03-25 ENCOUNTER — TELEPHONE (OUTPATIENT)
Dept: FAMILY MEDICINE CLINIC | Age: 72
End: 2022-03-25

## 2022-03-25 NOTE — TELEPHONE ENCOUNTER
Patient stated he picked up his prescription for his potassium and it says take twice daily. He stated he has always taken it once daily. I informed him it has always been written for twice daily. He want to know if you want him to continue taking it once daily or start taking it twice daily.

## 2022-03-28 ENCOUNTER — OFFICE VISIT (OUTPATIENT)
Dept: ORTHOPEDIC SURGERY | Age: 72
End: 2022-03-28

## 2022-03-28 ENCOUNTER — HOSPITAL ENCOUNTER (OUTPATIENT)
Dept: PHYSICAL THERAPY | Age: 72
Setting detail: THERAPIES SERIES
Discharge: HOME | End: 2022-03-28
Payer: MEDICARE

## 2022-03-28 VITALS
HEART RATE: 84 BPM | HEIGHT: 68 IN | OXYGEN SATURATION: 96 % | WEIGHT: 296 LBS | BODY MASS INDEX: 44.86 KG/M2 | RESPIRATION RATE: 15 BRPM

## 2022-03-28 DIAGNOSIS — Z96.651 S/P TKR (TOTAL KNEE REPLACEMENT), RIGHT: Primary | ICD-10-CM

## 2022-03-28 PROCEDURE — 99024 POSTOP FOLLOW-UP VISIT: CPT | Performed by: ORTHOPAEDIC SURGERY

## 2022-03-28 PROCEDURE — 97530 THERAPEUTIC ACTIVITIES: CPT

## 2022-03-28 PROCEDURE — 97140 MANUAL THERAPY 1/> REGIONS: CPT

## 2022-03-28 PROCEDURE — 97110 THERAPEUTIC EXERCISES: CPT

## 2022-03-28 ASSESSMENT — ENCOUNTER SYMPTOMS
COLOR CHANGE: 0
BACK PAIN: 0
CHEST TIGHTNESS: 0

## 2022-03-28 NOTE — PROGRESS NOTES
Outpatient Physical Therapy           [place]           [] Phone: [phone]   Fax: [phone]  [place]           [] Phone: [phone]   Fax: [phone]      To: Andrae Diaz       From: Duglas Joyner, PT, PT     Patient: Karli Monsivais                  : 1950  Diagnosis: R TKA, L knee pain    Date: 3/28/2022  Treatment Diagnosis:B knee pain, stiffness, weakness LE's and core      [x]  Progress Note                []  Discharge Note    Evaluation Date:  3/18/2022  Total Visits to date:   2 Cancels/No-shows to date:  1    Subjective:  Pt stated that bending and straightening his knee really hurts. Pt stated that his back pain was high today. Pain 2/10 currently but w/ WB and movements up to -8/10. Plan of Care/Treatment to date:  [x] Therapeutic Exercise    [x] Modalities:  [x] Therapeutic Activity     [] Ultrasound  [] Electrical Stimulation  [x] Gait Training      [] Cervical Traction   [] Lumbar Traction  [x] Neuromuscular Re-education  [] Cold/hotpack [] Iontophoresis  [x] Instruction in HEP      Other:  [x] Manual Therapy       [x]  Vasopneumatic  [] Aquatic Therapy       []   Dry Needle Therapy                      Objective/Significant Findings At Last Visit/Comments:  COVID screening questions were asked and patient attested that there had been no contact or symptoms     Fair quad set  Continues to have an extension lag with SLR  Needed B UE support for standing activities and step ups   Noted soft tissue restrictions in hamstrings and quads        AROM   120° knee flexion after manual   3-4°  From full extension after manual       Assessment:   Pt has been seen for just 3 sessions so far w/ good progress on ROM but strength remains poor and altered gait is likely aggravating LBP some too.   He has soft tissue restrictions and LE weakness w/ functional activity deficits and will continue to benefit from skilled therapy interventions to address remaining impairments, improve mobility and strength and progress toward goal completion while reducing risk for re-injury or further decline. Pain unchanged after Rx. Goal Status:  [] Achieved [x] Partially Achieved  [] Not Achieved   Patient goals : be able to get up and walk w/o stumbling or pain, get up from chair easier w/o pain, up and down from toilet w/o pain  Short term goals  Time Frame for Short term goals: 3 weeks  Short term goal 1: Pt will be compliant w/ HEP to help maximize recovery and restore function  Partially met  Short term goal 2: Pt will be able to report at least 25% reduction in pain   Back pain currently limiting, but some progress   Short term goal 3: Pt will be able to advance ROM and strength activity w/o pain  Only had 3 sessions, some progress  Long term goals  Time Frame for Long term goals : 6 weeks  Long term goal 1: Pt will be independent w/ HEP and be able to continue to progress and manage on his/her own  Long term goal 2: Pt will be able to return to at least 75% of usual activity w/ min pain, including fishing  Long term goal 3: Pt will have improved KOOS score by 10% or more    Frequency/Duration:  # Days per week: [] 1 day # Weeks: [] 1 week [] 4 weeks [] 8 weeks     [x] 2 days   [] 2 weeks [] 5 weeks [] 10 weeks     [] 3 days   [] 3 weeks [x] 6 weeks [] 12 weeks       Rehab Potential: [] Excellent [x] Good [] Fair  [] Poor         Patient Status: [x] Continue per initial plan of Care     [] Patient now discharged     [x] Additional visits requested, Please re-certify for additional visits:   Per insurance, as requested per original POC      Requested frequency/duration:  /week for weeks    If we are requesting more visits, we fully anticipate the patient's condition is expected to improve within the treatment timeframe we are requesting.     Electronically signed by:  Stephen Dior, PT, PT, MPT, ATC  3/28/2022, 9:45 AM    3/28/2022, 2:21 PM   If you have any questions or concerns, please don't hesitate to call.   Thank you for your referral.    Physician Signature:______________________ Date:______ Time: ________  By signing above, therapists plan is approved by physician

## 2022-03-28 NOTE — FLOWSHEET NOTE
Outpatient Physical Therapy  [facility]           [x] Phone: [phone]   Fax: [phone]  [place]           [] Phone: [phone]   Fax: [phone]        Physical Therapy Daily Treatment Note  Date:  3/28/2022    Patient Name:  Raghavendra Neil   \"[place]\"   :  1950  MRN: [id number]  Restrictions/Precautions: Other position/activity restrictions: postop R TKA  Diagnosis:   Diagnosis: R TKA, L knee pain  Date of Injury/Surgery: 2/15/22  Treatment Diagnosis: Treatment Diagnosis: B knee pain, stiffness, weakness LE's and core    Insurance/Certification information: PT Insurance Information: [employer] $40 copay maybe?    Precert required: 4 total visits includes date of eval       auth # [id number]  22-3/28/22.   4 more visits from 22-22  Referring Physician:  Referring Practitioner: Baldo Catherine Doctor Visit:    Plan of care signed (Y/N):  yes  Outcome Measure: KOOS 64%  Visit# / total visits:   (3/4 thru 3/28),  /4 thru - per precert  (12 POC)   Pain level: 2/10   Goals:     Patient goals : be able to get up and walk w/o stumbling or pain, get up from chair easier w/o pain, up and down from toilet w/o pain  Short term goals  Time Frame for Short term goals: 3 weeks  Short term goal 1: Pt will be compliant w/ HEP to help maximize recovery and restore function  Partially met  Short term goal 2: Pt will be able to report at least 25% reduction in pain   Back pain currently limiting, but some progress   Short term goal 3: Pt will be able to advance ROM and strength activity w/o pain  Only had 3 sessions, some progress  Long term goals  Time Frame for Long term goals : 6 weeks  Long term goal 1: Pt will be independent w/ HEP and be able to continue to progress and manage on his/her own  Long term goal 2: Pt will be able to return to at least 75% of usual activity w/ min pain, including fishing  Long term goal 3: Pt will have improved KOOS score by 10% or more    Summary of Evaluation: Assessment: Pt is a 72 yo male who presents just over 4 weeks postop R TKA and also having L knee pain. He has Hx of back pain and some hip pain as well. He has noted edema in R lower leg, altered gait, waddle type, decreased core and LE strength w/ ROM deficits R knee. These limitations are affecting his aiblity to perform usual self care and ADL's as well as functional activity and he will benefit from PT to help restore ROM, gait, strength and function to return him to prior acctivity. Prior to 2021 he was getting up from a chair w/ min difficulty. Subjective:  Pt stated that bending and straightening his knee really hurts. Pt stated that his back pain was high today. Any changes in Ambulatory Summary Sheet?   None        Objective:    COVID screening questions were asked and patient attested that there had been no contact or symptoms    Fair quad set  Continues to have an extension lag with SLR  Needed B UE support for standing activities and step ups   Noted soft tissue restrictions in hamstrings and quads      AROM   120° knee flexion after manual   3-4°  From full extension after manual     Exercises: (No more than 4 columns)   Exercise/Equipment 3/18/22  #1 Date 3/23/22 #2 3/28/2022 #3           WARM UP         Nu-step    L- 3 x 5'          TABLE      QS w/ SLR  10x 10x QS w towel  10x SLR 10x2 w/ cues for QS   SAQ 10x3\" 10x 3\" 10x2 5\"    Bridge  3x 3 x + 3 with TA cueing --   abdom holds  Instruct 3x5\" 5 x 5 sec hold W/ SLR   Long sit calf/ham stretch  Green strap 30\" Seated green strap 1' ---   Heel prop  1'  1'  1'                         STANDING      STS  24\" table 10x 10 x  Cued for posture and hands on table 10x   Calf       Heel raise  Review  10x UE support  10x2 w/ B UE heavy support   Step Ups   10x UE support 4in 4\" lateral  Up,over, and down x 10 ea LE w/ UE support  6\" ant step up 10x2    HS curl   10x ea UE support  10x2 ea  LE                          PROPRIOCEPTION      Wobble board   30\" x2 ea dir                           MODALITIES Deferred ice Vaso 10'   Did not tolerate declined                   Other Therapeutic Activities/Education: 3/18/2022: Patient received education on their current pathology and how their condition effects them with their functional activities. Patient understood discussion and questions were answered. Patient understands their activity limitations and understands rational for treatment progression. 3/28/2022 Instructed patient to contact clinic prior to coming on Friday due to today being end date and need for new precert to get approved. Pt stated that he would call if he didn't hear from us first.  Home Exercise Program:    Access Code: [email or web address]  URL: [email or web address]  Date: 03/18/2022  Prepared by: Kenya Salamanca    Exercises  Long Sitting Calf Stretch with Strap - 2-3 x daily - 7 x weekly - 1 sets - 3 reps - 30 seconds hold  Small Range Straight Leg Raise - 2-3 x daily - 7 x weekly - 2 sets - 10 reps  Sidelying Hip Abduction - 2-3 x daily - 7 x weekly - 2 sets - 10 reps  Supine Heel Slide with Strap - 2-3 x daily - 7 x weekly - 2-3 sets - 10 reps - 5-10 hold  Supine Bridge - 1-2 x daily - 7 x weekly - 2 sets - 10 reps  Supine Knee Extension Strengthening - 1-2 x daily - 7 x weekly - 1-2 sets - 10 reps - 3 seconds hold  Supine Transversus Abdominis Bracing - Hands on Stomach - 1-2 x daily - 7 x weekly - 1-2 sets - 10 reps - 5 seconds hold  Standing Heel Raise with Support - 2-3 x daily - 7 x weekly - 2 sets - 10 reps  Standing March with Counter Support - 1-2 x daily - 7 x weekly - 1-2 sets - 10 reps  Sit to Stand - 1-2 x daily - 7 x weekly - 1-2 sets - 10 reps  Supine Knee Extension Mobilization with Weight - 1-2 x daily - 7 x weekly - 1-2 sets - 2-3 reps - 3-5 mins hold      Manual Treatments:  PROM and STM to hamstrings and quads x 10'       Modalities:  Vaso 10' supine with extension booster R knee 36 degrees.  Did not tolerate full 10 min. Trial increase temp next time. Communication with other providers:  POC set for cosign 3/18/22      Assessment: Pt tolerated treatment fair today. Pt is making gains with ROM and strength. Pt will continue to benefit from more strengthening and extension ROM. Pt rated pain at 2/10 after treatment.          Plan for Next Session:   Progress ROM and strength to ana, core work, edema massage prn, functional activity, gait and balance, ice prn      Time In / Time Out:  1112/1153      Timed Code/Total Treatment Minutes:  41'/41' 1 man (10') 1 TE ( 20) 1 TA ( 11')       Next Progress Note due:      Plan of Care Interventions:  [x] Therapeutic Exercise  [x] Modalities:  [x] Therapeutic Activity     [] Ultrasound  [] Estim  [x] Gait Training      [] Cervical Traction [] Lumbar Traction  [x] Neuromuscular Re-education    [x] Cold/hotpack [] Iontophoresis   [x] Instruction in HEP      [] Vasopneumatic   [] Dry Needling    [x] Manual Therapy               [] Aquatic Therapy              Electronically signed by:  Maddison Pena PTA  3/28/2022, 9:31 AM      3/28/2022,12:47 PM

## 2022-03-28 NOTE — PROGRESS NOTES
Patient returns to the office today for s/p check of the right TKA DOS 2/15/22. Pt states pain today is a 2/10 pt states he is still working with therapy which is going well. Pt states he does have a difficult time sleeping but believes it is due to his back pain.

## 2022-03-28 NOTE — PROGRESS NOTES
Subjective:      Patient ID: Adwoa Willis is a 73 y.o. male. Patient returns to the office today for s/p check of the right TKA DOS 2/15/22. Pt states pain today is a 2/10 pt states he is still working with therapy which is going well. Pt states he does have a difficult time sleeping but believes it is due to his back pain. He comes in today for his 6-week postop recheck. Overall he states that he is feeling great and is not having any pain or issues with his right knee. Patient denies any new injury to the involved extremity/ joint, denies numbness or tingling in the involved extremity and denies fever or chills. He does continue having pain in his left knee but he would like to hold off on surgical treatment until next winter for that side. Review of Systems   Constitutional: Negative for activity change, chills and fever. Respiratory: Negative for chest tightness. Cardiovascular: Negative for chest pain. Musculoskeletal: Positive for arthralgias, gait problem and myalgias. Negative for back pain and joint swelling. Skin: Negative for color change, pallor, rash and wound. Neurological: Negative for weakness and numbness. Past Medical History:   Diagnosis Date    Acid reflux     Anxiety     Arthritis     \"Right Knee\"    Asthma     Chronic back pain     Cough     Occ Productive Cough \"Milky\" Sputum    Diverticulitis Dx Early 2000's    Hepatitis Dx 18's    \"In The Hospital A Few Days\"    [place]     Bilateral Ears    HTN (hypertension)     Hyperlipidemia     Nocturia     Shortness of breath on exertion     Slow urinary stream     Tingling     \"Tingling Right Hand\"    Wears dentures     Full Upper, Partial Lower    Wears glasses     Wears partial dentures     Lower       Objective:   Physical Exam  Constitutional:       Appearance: He is well-developed. HENT:      Head: Normocephalic.    Eyes:      Pupils: Pupils are equal, round, and reactive to light. Pulmonary:      Effort: Pulmonary effort is normal.   Musculoskeletal:         General: Swelling and tenderness present. No deformity. Normal range of motion. Cervical back: Normal range of motion. Right hip: Normal.      Left hip: Normal.      Right knee: No swelling, deformity, effusion, erythema, ecchymosis, lacerations or bony tenderness. Normal range of motion. No tenderness. No medial joint line, lateral joint line, MCL, LCL or patellar tendon tenderness. No LCL laxity or MCL laxity. Normal alignment and normal patellar mobility. Left knee: Swelling, bony tenderness and crepitus present. No deformity, effusion, erythema, ecchymosis or lacerations. Normal range of motion. Tenderness present over the medial joint line, lateral joint line and patellar tendon. No MCL or LCL tenderness. No LCL laxity or MCL laxity. Normal alignment and normal patellar mobility. Skin:     General: Skin is warm and dry. Capillary Refill: Capillary refill takes less than 2 seconds. Coloration: Skin is not pale. Findings: No erythema or rash. Neurological:      Mental Status: He is alert and oriented to person, place, and time. Left knee-Skin intact with no erythema, ecchymosis or lacerations present. 0-130    Right knee-Incision clean, dry, intact, with no erythema, no drainage, and no signs of infection. 0-130    XRAY  X-ray 4 views of the left knee from October 27, 2021 reviewed by me today in the office demonstrates age appropriate bone density throughout with severe degenerative changes with 75% medial patellofemoral joint space narrowing, medial joint space collapse, moderate osteophyte formation in the patellofemoral compartment with normal tracking of the patella, no acute osseous abnormalities.     XR KNEE RIGHT (3 VIEWS)    Result Date: 3/28/2022  XRAY X-ray 3 views of the right knee obtained and reviewed by me today in the office demonstrates age appropriate bone density throughout with well-positioned right total knee arthroplasty, there has been no change in position components compared to prior x-rays, normal tracking patella, no acute osseous abnormalities. Impression: Stable right total knee arthroplasty with no acute process. Assessment:       Right TKA, 6 weeks  Left knee OA, severe        Plan:      I discussed with him today his x-ray findings. I explained to him that his implants are stable and remain in good alignment. I discussed with him today that he is continuing to progress extremely well. I discussed with the patient today antibiotic prophylaxis for procedures. I discussed with the patient today that their are symptoms are normal and should improve with time. Continue weight-bearing as tolerated. Continue range of motion exercises as instructed. Ice and elevate as needed. Tylenol or Motrin for pain. Follow up in 6 weeks for recheck with x-rays of the right knee. We will plan on proceeding with surgical treatment for his left knee next winter.             Tyesha Petit DO

## 2022-03-29 DIAGNOSIS — G89.29 CHRONIC BILATERAL LOW BACK PAIN WITHOUT SCIATICA: ICD-10-CM

## 2022-03-29 DIAGNOSIS — M17.11 PRIMARY OSTEOARTHRITIS OF RIGHT KNEE: ICD-10-CM

## 2022-03-29 DIAGNOSIS — M54.50 CHRONIC BILATERAL LOW BACK PAIN WITHOUT SCIATICA: ICD-10-CM

## 2022-03-29 DIAGNOSIS — M17.12 PRIMARY OSTEOARTHRITIS OF LEFT KNEE: ICD-10-CM

## 2022-03-29 RX ORDER — HYDROCODONE BITARTRATE AND ACETAMINOPHEN 10; 325 MG/1; MG/1
1 TABLET ORAL EVERY 6 HOURS PRN
Qty: 120 TABLET | Refills: 0 | Status: SHIPPED | OUTPATIENT
Start: 2022-03-29 | End: 2022-04-28 | Stop reason: SDUPTHER

## 2022-03-29 NOTE — TELEPHONE ENCOUNTER
----- Message from Jackie Alvarado sent at 3/29/2022  5:00 PM EDT -----  Subject: Refill Request    QUESTIONS  Name of Medication? HYDROcodone-acetaminophen (NORCO)  MG per tablet  Patient-reported dosage and instructions? Takes one every six hours as   needed. How many days do you have left? 0  Preferred Pharmacy? [facility]  Pharmacy phone number (if available)? [phone]  ---------------------------------------------------------------------------  --------------  CALL BACK INFO  What is the best way for the office to contact you? Do not leave any   message, patient will call back for answer  Preferred Call Back Phone Number?  [phone]

## 2022-03-29 NOTE — TELEPHONE ENCOUNTER
Patient stated [facility] informed him they stopped sending out controlled substances. He would like it sent [facility].  I called [address] [place] [address] 153 said it looked like it was cancelled and transferred me to pharmacist. I had to leave voicemail for pharmacist.

## 2022-04-01 ENCOUNTER — HOSPITAL ENCOUNTER (OUTPATIENT)
Dept: PHYSICAL THERAPY | Age: 72
Setting detail: THERAPIES SERIES
Discharge: HOME | End: 2022-04-01
Payer: MEDICARE

## 2022-04-01 PROCEDURE — 97530 THERAPEUTIC ACTIVITIES: CPT

## 2022-04-01 PROCEDURE — 97140 MANUAL THERAPY 1/> REGIONS: CPT

## 2022-04-01 PROCEDURE — 97110 THERAPEUTIC EXERCISES: CPT

## 2022-04-01 NOTE — FLOWSHEET NOTE
Outpatient Physical Therapy  [place]           [x] Phone: [phone]   Fax: [phone]  [facility]           [] Phone: [phone]   Fax: [phone]        Physical Therapy Daily Treatment Note  Date:  2022    Patient Name:  Betty Bell   \"[place]\"   :  1950  MRN: [id number]  Restrictions/Precautions: Other position/activity restrictions: postop R TKA  Diagnosis:   Diagnosis: R TKA, L knee pain  Date of Injury/Surgery: 2/15/22  Treatment Diagnosis: Treatment Diagnosis: B knee pain, stiffness, weakness LE's and core    Insurance/Certification information: PT Insurance Information: [employer] $40 copay maybe?    Precert required: 4 total visits includes date of eval       auth # [id number]  22-3/28/22.   4 more visits from 22-22  Referring Physician:  Referring Practitioner: Tee Mclean  Next Doctor Visit:    Plan of care signed (Y/N):  yes  Outcome Measure: KOOS 64%  Visit# / total visits:   (4/4 thru 3/28),  /4 thru - per precert  (12 POC)   Pain level: 0/10   Goals:     Patient goals : be able to get up and walk w/o stumbling or pain, get up from chair easier w/o pain, up and down from toilet w/o pain  Short term goals  Time Frame for Short term goals: 3 weeks  Short term goal 1: Pt will be compliant w/ HEP to help maximize recovery and restore function  Partially met  Short term goal 2: Pt will be able to report at least 25% reduction in pain   Back pain currently limiting, but some progress   Short term goal 3: Pt will be able to advance ROM and strength activity w/o pain  Only had 3 sessions, some progress  Long term goals  Time Frame for Long term goals : 6 weeks  Long term goal 1: Pt will be independent w/ HEP and be able to continue to progress and manage on his/her own  Long term goal 2: Pt will be able to return to at least 75% of usual activity w/ min pain, including fishing  Long term goal 3: Pt will have improved KOOS score by 10% or more    Summary of Evaluation: Assessment: Pt is a 74 yo male who presents just over 4 weeks postop R TKA and also having L knee pain. He has Hx of back pain and some hip pain as well. He has noted edema in R lower leg, altered gait, waddle type, decreased core and LE strength w/ ROM deficits R knee. These limitations are affecting his aiblity to perform usual self care and ADL's as well as functional activity and he will benefit from PT to help restore ROM, gait, strength and function to return him to prior acctivity. Prior to 2021 he was getting up from a chair w/ min difficulty. Subjective:  Knee hurts more at night and thinks the injection is wearing off L knee some, cracking and popping more. LB still hurting too. Trying to knee flat hurts LB. Any changes in Ambulatory Summary Sheet? None        Objective:  COVID screening questions were asked and patient attested that there had been no contact or symptoms    Continues to have a mild extension lag with SLR  Needed UE support for standing activities and step ups   Noted soft tissue restrictions in hamstrings and quads  Generally poor endurance/activity tolerance.      AROM   121° knee flexion    3°  From full extension after manual PROM to 0° w/ good push      Exercises: (No more than 4 columns)   Exercise/Equipment 3/18/22  #1   3/23/22 #2 3/28/2022 #3 4/1/22  #4            WARM UP          Nu-step    L- 3 x 5'  L5, 5'          TABLE    Man as below   QS    -- Heel up 5\" x10   QS w/ SLR  10x 10x QS w towel  10x SLR 10x2 w/ cues for QS 10x2    SAQ 10x3\" 10x 3\" 10x2 5\"  20x 3\"    Bridge  3x 3 x + 3 with TA cueing -- 10x    abdom holds  Instruct 3x5\" 5 x 5 sec hold W/ SLR    Long sit calf/ham stretch  Green strap 30\" Seated green strap 1' --- Strap 30\" x2   Heel prop  1'  1'  1'  2' w/ some OP intermittently                            STANDING       STS  24\" table 10x 10 x  Cued for posture and hands on table 10x 10x    Calf        Heel raise  Review  10x UE support  10x2 w/ B UE heavy support 20x, UE support    Step Ups   10x UE support 4in 4\" lateral  Up,over, and down x 10 ea LE w/ UE support  6\" ant step up 10x2  6\" 10x2 1 UE   HS curl   10x ea UE support  10x2 ea  LE GTB 20x    TKE TB   -- GTB 5\" x15                      PROPRIOCEPTION       Wobble board   30\" x2 ea dir --                               MODALITIES Deferred ice Vaso 10'   Did not tolerate declined --                     Other Therapeutic Activities/Education: 3/18/2022: Patient received education on their current pathology and how their condition effects them with their functional activities. Patient understood discussion and questions were answered. Patient understands their activity limitations and understands rational for treatment progression. 3/28/2022 Instructed patient to contact clinic prior to coming on Friday due to today being end date and need for new precert to get approved. Pt stated that he would call if he didn't hear from us first.  Home Exercise Program:    Access Code: [email or web address]  URL: [employer]/  Date: 03/18/2022  Prepared by: Ana Hooker    Exercises  Long Sitting Calf Stretch with Strap - 2-3 x daily - 7 x weekly - 1 sets - 3 reps - 30 seconds hold  Small Range Straight Leg Raise - 2-3 x daily - 7 x weekly - 2 sets - 10 reps  Sidelying Hip Abduction - 2-3 x daily - 7 x weekly - 2 sets - 10 reps  Supine Heel Slide with Strap - 2-3 x daily - 7 x weekly - 2-3 sets - 10 reps - 5-10 hold  Supine Bridge - 1-2 x daily - 7 x weekly - 2 sets - 10 reps  Supine Knee Extension Strengthening - 1-2 x daily - 7 x weekly - 1-2 sets - 10 reps - 3 seconds hold  Supine Transversus Abdominis Bracing - Hands on Stomach - 1-2 x daily - 7 x weekly - 1-2 sets - 10 reps - 5 seconds hold  Standing Heel Raise with Support - 2-3 x daily - 7 x weekly - 2 sets - 10 reps  Standing March with Counter Support - 1-2 x daily - 7 x weekly - 1-2 sets - 10 reps  Sit to Stand - 1-2 x daily - 7 x weekly - 1-2 sets - 10 reps  Supine Knee Extension Mobilization with Weight - 1-2 x daily - 7 x weekly - 1-2 sets - 2-3 reps - 3-5 mins hold      Manual Treatments: Stick roll to hamstrings and calf in prone w/ some ext stretch, passive OP for knee ext w/ heel up       Modalities:  Vaso 10' supine with extension booster R knee 36 degrees. Did not tolerate full 10 min. Trial increase temp next time. Communication with other providers:  POC set for cosign 3/18/22      Assessment: Pt tolerated treatment fairly well today. Pt is making gains with ROM and strength but is stll lacking full extension and terminal quad strength w/ limited overall activity tolerance. Pt will continue to benefit from more strengthening and extension ROM to help improve functional activity and restore PLOF. Pt rated pain unchanged in LB or knee after treatment.          Plan for Next Session:   Progress ROM and strength to ana, core work, edema massage prn, functional activity, gait and balance, ice prn      Time In / Time Out:   1115/1200      Timed Code/Total Treatment Minutes:   45/45     1 man (10') 1 TE (25') 1 TA (10')     Next Progress Note due:  See PN 3/28/22, next due 4/18/22    Plan of Care Interventions:  [x] Therapeutic Exercise  [x] Modalities:  [x] Therapeutic Activity     [] Ultrasound  [] Estim  [x] Gait Training      [] Cervical Traction [] Lumbar Traction  [x] Neuromuscular Re-education    [x] Cold/hotpack [] Iontophoresis   [x] Instruction in HEP      [] Vasopneumatic   [] Dry Needling    [x] Manual Therapy               [] Aquatic Therapy              Electronically signed by:  Aric Bynum, PT, PT, MPT, ATC  4/1/2022, 6:48 AM       4/1/2022, 12:25 PM

## 2022-04-05 DIAGNOSIS — J41.1 MUCOPURULENT CHRONIC BRONCHITIS (HCC): ICD-10-CM

## 2022-04-06 DIAGNOSIS — J41.1 MUCOPURULENT CHRONIC BRONCHITIS (HCC): ICD-10-CM

## 2022-04-08 ENCOUNTER — HOSPITAL ENCOUNTER (OUTPATIENT)
Dept: PHYSICAL THERAPY | Age: 72
Setting detail: THERAPIES SERIES
Discharge: HOME | End: 2022-04-08
Payer: MEDICARE

## 2022-04-08 PROCEDURE — 97140 MANUAL THERAPY 1/> REGIONS: CPT

## 2022-04-08 PROCEDURE — 97110 THERAPEUTIC EXERCISES: CPT

## 2022-04-08 PROCEDURE — 97530 THERAPEUTIC ACTIVITIES: CPT

## 2022-04-08 PROCEDURE — 97016 VASOPNEUMATIC DEVICE THERAPY: CPT

## 2022-04-08 NOTE — FLOWSHEET NOTE
Outpatient Physical Therapy  [place]           [x] Phone: [phone]   Fax: [phone]  [facility]           [] Phone: [phone]   Fax: [phone]        Physical Therapy Daily Treatment Note  Date:  2022    Patient Name:  Tammy Ambrocio   \"[place]\"   :  1950  MRN: [id number]  Restrictions/Precautions: Other position/activity restrictions: postop R TKA  Diagnosis:   Diagnosis: R TKA, L knee pain  Date of Injury/Surgery: 2/15/22  Treatment Diagnosis: Treatment Diagnosis: B knee pain, stiffness, weakness LE's and core    Insurance/Certification information: PT Insurance Information: [employer] $40 copay maybe?    Precert required: 4 total visits includes date of eval       auth # [id number]  22-3/28/22.   4 more visits from 22-22  Referring Physician:  Referring Practitioner: Jg Romero  Next Doctor Visit:    Plan of care signed (Y/N):  yes  Outcome Measure: KOOS 64%  Visit# / total visits:   (4/ thru 3/28),  1/4 thru - per precert  (12 POC)   Pain level: 4/10 at R knee, 6/10 at R ankle    Goals:     Patient goals : be able to get up and walk w/o stumbling or pain, get up from chair easier w/o pain, up and down from toilet w/o pain  Short term goals  Time Frame for Short term goals: 3 weeks  Short term goal 1: Pt will be compliant w/ HEP to help maximize recovery and restore function  Partially met  Short term goal 2: Pt will be able to report at least 25% reduction in pain   Some progress   Short term goal 3: Pt will be able to advance ROM and strength activity w/o pain   Some progress    Long term goals  Time Frame for Long term goals : 6 weeks  Long term goal 1: Pt will be independent w/ HEP and be able to continue to progress and manage on his/her own  Long term goal 2: Pt will be able to return to at least 75% of usual activity w/ min pain, including fishing  Long term goal 3: Pt will have improved KOOS score by 10% or more    Summary of Evaluation: Assessment: Pt is a 74 yo male who presents just over 4 weeks postop R TKA and also having L knee pain. He has Hx of back pain and some hip pain as well. He has noted edema in R lower leg, altered gait, waddle type, decreased core and LE strength w/ ROM deficits R knee. These limitations are affecting his aiblity to perform usual self care and ADL's as well as functional activity and he will benefit from PT to help restore ROM, gait, strength and function to return him to prior acctivity. Prior to 2021 he was getting up from a chair w/ min difficulty. Subjective:   Twisted R ankle putting in tub yesterday so everything is sore today. Knee pain is up a little bit too. Other than this set back, he has been doing ok. Pain dependent on activity. Any changes in Ambulatory Summary Sheet? None        Objective:  COVID screening questions were asked and patient attested that there had been no contact or symptoms    Min to no limp noted. TTP at achilles but not medial or lateral malleolus. Continues to have a mild extension lag with SLR  Cues w/ STS to keep trunk upright and stick buttocks back more  Generally poor endurance/activity tolerance.      AROM   123° knee flexion    5°  From full extension after manual PROM to 0° w/ good push      Exercises: (No more than 4 columns)   Exercise/Equipment 3/28/2022 #3 4/1/22  #4 4/8/22  #5           WARM UP         Nu-step  L- 3 x 5'  L5, 5' L4, 5'         TABLE  Man as below Man as below    QS  -- Heel up 5\" x10 Heel up 10x5\"    QS w/ SLR  10x2 w/ cues for QS 10x2  10x2   SAQ 10x2 5\"  20x 3\"  20x3\"    Bridge  -- 10x  10x    abdom holds  W/ SLR     Long sit calf/ham stretch  --- Strap 30\" x2 Man    Heel prop  1'  2' w/ some OP intermittently  Man                         STANDING      STS  10x 10x  23\" 10x   Calf       Heel raise  10x2 w/ B UE heavy support 20x, UE support  held   Step Ups  4\" lateral  Up,over, and down x 10 ea LE w/ UE support  6\" ant step up 10x2  6\" 10x2 1 UE held HS curl  10x2 ea  LE GTB 20x  GTB 20x   TKE TB -- GTB 5\" x15 GTB 5\" x10                    PROPRIOCEPTION      Wobble board 30\" x2 ea dir -- -                           MODALITIES declined -- Vaso 10'                   Other Therapeutic Activities/Education: 3/18/2022: Patient received education on their current pathology and how their condition effects them with their functional activities. Patient understood discussion and questions were answered. Patient understands their activity limitations and understands rational for treatment progression. 3/28/2022 Instructed patient to contact clinic prior to coming on Friday due to today being end date and need for new precert to get approved. Pt stated that he would call if he didn't hear from us first.  Home Exercise Program:    Access Code: [email or web address]  URL: [email or web address]  Date: 03/18/2022  Prepared by: Steph Ortiz    Exercises  Long Sitting Calf Stretch with Strap - 2-3 x daily - 7 x weekly - 1 sets - 3 reps - 30 seconds hold  Small Range Straight Leg Raise - 2-3 x daily - 7 x weekly - 2 sets - 10 reps  Sidelying Hip Abduction - 2-3 x daily - 7 x weekly - 2 sets - 10 reps  Supine Heel Slide with Strap - 2-3 x daily - 7 x weekly - 2-3 sets - 10 reps - 5-10 hold  Supine Bridge - 1-2 x daily - 7 x weekly - 2 sets - 10 reps  Supine Knee Extension Strengthening - 1-2 x daily - 7 x weekly - 1-2 sets - 10 reps - 3 seconds hold  Supine Transversus Abdominis Bracing - Hands on Stomach - 1-2 x daily - 7 x weekly - 1-2 sets - 10 reps - 5 seconds hold  Standing Heel Raise with Support - 2-3 x daily - 7 x weekly - 2 sets - 10 reps  Standing March with Counter Support - 1-2 x daily - 7 x weekly - 1-2 sets - 10 reps  Sit to Stand - 1-2 x daily - 7 x weekly - 1-2 sets - 10 reps  Supine Knee Extension Mobilization with Weight - 1-2 x daily - 7 x weekly - 1-2 sets - 2-3 reps - 3-5 mins hold      Manual Treatments: hamstrings and calf stretches, min STM to achilels, passive OP for knee ext w/ heel propped       Modalities:  Vaso low to R knee, 10' pt recumb w/ leg on pillow         Communication with other providers:  POC set for cosign 3/18/22      Assessment: Pt tolerated treatment fairly well today despite new injury to ankle w/ modifications. Pt is making gains with ROM and strength but is stll lacking full extension and terminal quad strength w/ limited overall activity tolerance. Pt will continue to benefit from more strengthening and extension ROM to help improve functional activity and restore PLOF. Pt rated pain \"less\" in knee and calf after treatment.          Plan for Next Session:   Progress ROM and strength to ana, core work, edema massage prn, functional activity, gait and balance, ice prn      Time In / Time Out:    1005/1100    Timed Code/Total Treatment Minutes:   45/55    1 man (10') 1 TE (25') 1 TA (10')  1 vaso 10'    Next Progress Note due:  See PN 3/28/22, next due 4/18/22    Plan of Care Interventions:  [x] Therapeutic Exercise  [x] Modalities:  [x] Therapeutic Activity     [] Ultrasound  [] Estim  [x] Gait Training      [] Cervical Traction [] Lumbar Traction  [x] Neuromuscular Re-education    [x] Cold/hotpack [] Iontophoresis   [x] Instruction in HEP      [] Vasopneumatic   [] Dry Needling    [x] Manual Therapy               [] Aquatic Therapy              Electronically signed by:  Colin Orourke, PT, PT, MPT, ATC  4/8/2022, 6:53 AM         4/8/2022, 11:13 AM

## 2022-04-11 ENCOUNTER — HOSPITAL ENCOUNTER (OUTPATIENT)
Dept: PHYSICAL THERAPY | Age: 72
Setting detail: THERAPIES SERIES
Discharge: HOME | End: 2022-04-11
Payer: MEDICARE

## 2022-04-11 PROCEDURE — 97110 THERAPEUTIC EXERCISES: CPT

## 2022-04-11 NOTE — FLOWSHEET NOTE
Outpatient Physical Therapy  [place]           [x] Phone: [phone]   Fax: [phone]  [place]           [] Phone: [phone]   Fax: [phone]        Physical Therapy Daily Treatment Note  Date:  2022    Patient Name:  Eileen Ambrocio   \"[place]\"   :  1950  MRN: [id number]  Restrictions/Precautions: Other position/activity restrictions: postop R TKA  Diagnosis:   Diagnosis: R TKA, L knee pain  Date of Injury/Surgery: 2/15/22  Treatment Diagnosis: Treatment Diagnosis: B knee pain, stiffness, weakness LE's and core    Insurance/Certification information: PT Insurance Information: [employer] $40 copay maybe?    Precert required: 4 total visits includes date of eval       auth # [id number]  22-3/28/22.   4 more visits from 22-22  Referring Physician:  Referring Practitioner: Mary Montero  Next Doctor Visit:    Plan of care signed (Y/N):  yes  Outcome Measure: KOOS 64%  Visit# / total visits:   (4/4 thru 3/28),  2/4 thru 08- per precert  (12 POC)   Pain level: 6/10 at R knee    Goals:     Patient goals : be able to get up and walk w/o stumbling or pain, get up from chair easier w/o pain, up and down from toilet w/o pain  Short term goals  Time Frame for Short term goals: 3 weeks  Short term goal 1: Pt will be compliant w/ HEP to help maximize recovery and restore function  Partially met  Short term goal 2: Pt will be able to report at least 25% reduction in pain   Some progress   Short term goal 3: Pt will be able to advance ROM and strength activity w/o pain   Some progress    Long term goals  Time Frame for Long term goals : 6 weeks  Long term goal 1: Pt will be independent w/ HEP and be able to continue to progress and manage on his/her own  Long term goal 2: Pt will be able to return to at least 75% of usual activity w/ min pain, including fishing  Long term goal 3: Pt will have improved KOOS score by 10% or more    Summary of Evaluation: Assessment: Pt is a 74 yo male who presents just over 4 weeks postop R TKA and also having L knee pain. He has Hx of back pain and some hip pain as well. He has noted edema in R lower leg, altered gait, waddle type, decreased core and LE strength w/ ROM deficits R knee. These limitations are affecting his aiblity to perform usual self care and ADL's as well as functional activity and he will benefit from PT to help restore ROM, gait, strength and function to return him to prior acctivity. Prior to 2021 he was getting up from a chair w/ min difficulty. Subjective:   Pt stated that he has been sore since last visit and after installing son's bathtub. Reports that the whole knee hurts 6/10 and has stayed swollen. Pt is not compliant with HEP. Pt says he always feels better after therapy. Any changes in Ambulatory Summary Sheet?   None        Objective:  COVID screening questions were asked and patient attested that there had been no contact or symptoms    AROM   123° knee flexion    5°  From full extension after manual PROM to 0° w/ good push    Cued for glut engagement in bridges   Cue for posture and technique during STS; pt favor L LE>R LE   Note decreased endurance in bridges and STS    Exercises: (No more than 4 columns)   Exercise/Equipment 3/28/2022 #3 4/1/22  #4 4/8/22  #5 4/11/22 #6            WARM UP          Nu-step  L- 3 x 5'  L5, 5' L4, 5' 5'          TABLE  Man as below Man as below     QS  -- Heel up 5\" x10 Heel up 10x5\"  Heel up 10x5\"   QS w/ SLR  10x2 w/ cues for QS 10x2  10x2 10x2   SAQ 10x2 5\"  20x 3\"  20x3\"  10x3\" + 10x3\" #1   Bridge  -- 10x  10x  2x10   abdom holds  W/ SLR      Long sit calf/ham stretch  --- Strap 30\" x2 Man  Strap 30\" x2 HS, calf strap 30\", foam 30\"   Heel prop  1'  2' w/ some OP intermittently  Man                             STANDING       STS  10x 10x  23\" 10x 10x   Calf        Heel raise  10x2 w/ B UE heavy support 20x, UE support  held 20x UE support    Step Ups  4\" lateral  Up,over, and down x 10 ea LE w/ UE support  6\" ant step up 10x2  6\" 10x2 1 UE held    HS curl  10x2 ea  LE GTB 20x  GTB 20x    TKE TB -- GTB 5\" x15 GTB 5\" x10                       PROPRIOCEPTION       Wobble board 30\" x2 ea dir -- -                                MODALITIES declined -- Vaso 10'                      Other Therapeutic Activities/Education: 3/18/2022: Patient received education on their current pathology and how their condition effects them with their functional activities. Patient understood discussion and questions were answered. Patient understands their activity limitations and understands rational for treatment progression. 3/28/2022 Instructed patient to contact clinic prior to coming on Friday due to today being end date and need for new precert to get approved. Pt stated that he would call if he didn't hear from us first.  Home Exercise Program:    Access Code: [email or web address]  URL: [employer]/  Date: 03/18/2022  Prepared by: Jeanna Ahn    Exercises  Long Sitting Calf Stretch with Strap - 2-3 x daily - 7 x weekly - 1 sets - 3 reps - 30 seconds hold  Small Range Straight Leg Raise - 2-3 x daily - 7 x weekly - 2 sets - 10 reps  Sidelying Hip Abduction - 2-3 x daily - 7 x weekly - 2 sets - 10 reps  Supine Heel Slide with Strap - 2-3 x daily - 7 x weekly - 2-3 sets - 10 reps - 5-10 hold  Supine Bridge - 1-2 x daily - 7 x weekly - 2 sets - 10 reps  Supine Knee Extension Strengthening - 1-2 x daily - 7 x weekly - 1-2 sets - 10 reps - 3 seconds hold  Supine Transversus Abdominis Bracing - Hands on Stomach - 1-2 x daily - 7 x weekly - 1-2 sets - 10 reps - 5 seconds hold  Standing Heel Raise with Support - 2-3 x daily - 7 x weekly - 2 sets - 10 reps  Standing March with Counter Support - 1-2 x daily - 7 x weekly - 1-2 sets - 10 reps  Sit to Stand - 1-2 x daily - 7 x weekly - 1-2 sets - 10 reps  Supine Knee Extension Mobilization with Weight - 1-2 x daily - 7 x weekly - 1-2 sets - 2-3 reps - 3-5 mins hold      Manual Treatments:       Modalities:        Communication with other providers:  POC set for cosign 3/18/22      Assessment:   Pt tolerated increased reps well with no increase in pain. Pt demonstrated improved endurance. Pt needs to improve LE strength for functional activity. Pt would benefit from skilled therapy to improve LE ROM and strength and safe community ambulation.    0/10 pain        Plan for Next Session:   Progress ROM and strength to ana, core work, edema massage prn, functional activity, gait and balance, ice prn      Time In / Time Out:    1040/1118    Timed Code/Total Treatment Minutes:   38/38: 3 TE    Next Progress Note due:  See PN 3/28/22, next due 4/18/22    Plan of Care Interventions:  [x] Therapeutic Exercise  [x] Modalities:  [x] Therapeutic Activity     [] Ultrasound  [] Estim  [x] Gait Training      [] Cervical Traction [] Lumbar Traction  [x] Neuromuscular Re-education    [x] Cold/hotpack [] Iontophoresis   [x] Instruction in HEP      [] Vasopneumatic   [] Dry Needling    [x] Manual Therapy               [] Aquatic Therapy              Electronically signed by:  FARIBA Meyer 4/11/2022, 7:40 AM   4/11/2022, 7:40 AM   Luis Alberto Sales PTA CLDON 4/11/22

## 2022-04-14 NOTE — FLOWSHEET NOTE
Outpatient Physical Therapy  [place]           [x] Phone: [phone]   Fax: [phone]  [facility]           [] Phone: [phone]   Fax: [phone]        Physical Therapy Daily Treatment Note  Date:  2022    Patient Name:  Ani James   \"[place]\"   :  1950  MRN: [id number]  Restrictions/Precautions: Other position/activity restrictions: postop R TKA  Diagnosis:   Diagnosis: R TKA, L knee pain  Date of Injury/Surgery: 2/15/22  Treatment Diagnosis: Treatment Diagnosis: B knee pain, stiffness, weakness LE's and core    Insurance/Certification information: PT Insurance Information: [employer] $40 copay maybe?    Precert required: 4 total visits includes date of eval       auth # [id number]  22-3/28/22.   4 more visits from 22-22  Referring Physician:  Referring Practitioner: Stephany Montoya  Next Doctor Visit:    Plan of care signed (Y/N):  yes  Outcome Measure: KOOS 64%  Visit# / total visits:   (4/4 thru 3/28),  3/4 thru - per precert  (12 POC)   Pain level: 4/10 at R knee    Goals:     Patient goals : be able to get up and walk w/o stumbling or pain, get up from chair easier w/o pain, up and down from toilet w/o pain  Short term goals  Time Frame for Short term goals: 3 weeks  Short term goal 1: Pt will be compliant w/ HEP to help maximize recovery and restore function  Partially met  Short term goal 2: Pt will be able to report at least 25% reduction in pain   Some progress   Short term goal 3: Pt will be able to advance ROM and strength activity w/o pain   Some progress    Long term goals  Time Frame for Long term goals : 6 weeks  Long term goal 1: Pt will be independent w/ HEP and be able to continue to progress and manage on his/her own  Long term goal 2: Pt will be able to return to at least 75% of usual activity w/ min pain, including fishing  Long term goal 3: Pt will have improved KOOS score by 10% or more    Summary of Evaluation: Assessment: Pt is a 74 yo male who presents just over 4 weeks postop R TKA and also having L knee pain. He has Hx of back pain and some hip pain as well. He has noted edema in R lower leg, altered gait, waddle type, decreased core and LE strength w/ ROM deficits R knee. These limitations are affecting his aiblity to perform usual self care and ADL's as well as functional activity and he will benefit from PT to help restore ROM, gait, strength and function to return him to prior acctivity. Prior to 2021 he was getting up from a chair w/ min difficulty. Subjective:  Pt says pain is 4/10. Pt says he has a hard time sitting and getting up. If he starts moving around knee feels better but back starts hurting. Pt not compliant with HEP. Any changes in Ambulatory Summary Sheet?   None        Objective:  COVID screening questions were asked and patient attested that there had been no contact or symptoms    AROM 112 degrees  PROM: 121 degrees   AROM: 8° lacking from full extension end of tx    Cued for TA engagement during SLR   Cued to engage adductors during bridge   Noted decreased generally deconditioning and endurance  Cued for TKE during Step Ups   Difficulty long sitting for calf stretch; performed standing with black wedge    Exercises: (No more than 4 columns)   Exercise/Equipment 3/28/2022 #3 4/1/22  #4 4/8/22  #5 4/11/22 #6 4/15/22 #7             WARM UP           Nu-step  L- 3 x 5'  L5, 5' L4, 5' 5' 5' lvl 5           TABLE  Man as below Man as below      QS  -- Heel up 5\" x10 Heel up 10x5\"  Heel up 10x5\"    QS w/ SLR  10x2 w/ cues for QS 10x2  10x2 10x2 10x2 + TA engagment    SAQ 10x2 5\"  20x 3\"  20x3\"  10x3\" + 10x3\" #1    Bridge  -- 10x  10x  2x10 10x2 + TA    abdom holds  W/ SLR       Long sit calf/ham stretch  --- Strap 30\" x2 Man  Strap 30\" x2 HS, calf strap 30\", foam 30\"    Heel prop  1'  2' w/ some OP intermittently  Man   Heel prop #5 4 mins                               STANDING        STS  10x 10x  23\" 10x 10x    Calf Stretch Black wedge 30s x2    Heel raise  10x2 w/ B UE heavy support 20x, UE support  held 20x UE support     Step Ups  4\" lateral  Up,over, and down x 10 ea LE w/ UE support  6\" ant step up 10x2  6\" 10x2 1 UE held  4\" 10x1 R emphasis on TKE   HS curl  10x2 ea  LE GTB 20x  GTB 20x     TKE TB -- GTB 5\" x15 GTB 5\" x10                          PROPRIOCEPTION        Wobble board 30\" x2 ea dir -- -                                     MODALITIES declined -- Vaso 10'  Vaso 10'                       Other Therapeutic Activities/Education: 3/18/2022: Patient received education on their current pathology and how their condition effects them with their functional activities. Patient understood discussion and questions were answered. Patient understands their activity limitations and understands rational for treatment progression. 3/28/2022 Instructed patient to contact clinic prior to coming on Friday due to today being end date and need for new precert to get approved. Pt stated that he would call if he didn't hear from us first.  Home Exercise Program:    Access Code: [email or web address]  URL: [email or web address]  Date: 03/18/2022  Prepared by: Steph Ortiz    Exercises  Long Sitting Calf Stretch with Strap - 2-3 x daily - 7 x weekly - 1 sets - 3 reps - 30 seconds hold  Small Range Straight Leg Raise - 2-3 x daily - 7 x weekly - 2 sets - 10 reps  Sidelying Hip Abduction - 2-3 x daily - 7 x weekly - 2 sets - 10 reps  Supine Heel Slide with Strap - 2-3 x daily - 7 x weekly - 2-3 sets - 10 reps - 5-10 hold  Supine Bridge - 1-2 x daily - 7 x weekly - 2 sets - 10 reps  Supine Knee Extension Strengthening - 1-2 x daily - 7 x weekly - 1-2 sets - 10 reps - 3 seconds hold  Supine Transversus Abdominis Bracing - Hands on Stomach - 1-2 x daily - 7 x weekly - 1-2 sets - 10 reps - 5 seconds hold  Standing Heel Raise with Support - 2-3 x daily - 7 x weekly - 2 sets - 10 reps  Standing March with Counter Support - 1-2 x daily - 7 x weekly - 1-2 sets - 10 reps  Sit to Stand - 1-2 x daily - 7 x weekly - 1-2 sets - 10 reps  Supine Knee Extension Mobilization with Weight - 1-2 x daily - 7 x weekly - 1-2 sets - 2-3 reps - 3-5 mins hold      Manual Treatments:       Modalities:  Vaso 10', supine 40 degrees with extension bolster       Communication with other providers:  POC set for cosign 3/18/22      Assessment:   Pt tolerated increased reps well with no increase in pain. Pt demonstrated low endurance and is not compliant with HEP, Pt is not making steady progress towards goals and had decreased motivation. Pt would benefit from skilled therapy to improve LE strength and ROM, and balance for safe and functional return to hobbies and to prevent further injury.    0/10 pain        Plan for Next Session:   Progress ROM and strength to ana, core work, edema massage prn, functional activity, gait and balance, ice prn      Time In / Time Out:    1040/1128    Timed Code/Total Treatment Minutes:   38/48: 3 TE, 1 vaso     Next Progress Note due:  See PN 3/28/22, next due 4/18/22    Plan of Care Interventions:  [x] Therapeutic Exercise  [x] Modalities:  [x] Therapeutic Activity     [] Ultrasound  [] Estim  [x] Gait Training      [] Cervical Traction [] Lumbar Traction  [x] Neuromuscular Re-education    [x] Cold/hotpack [] Iontophoresis   [x] Instruction in HEP      [] Vasopneumatic   [] Dry Needling    [x] Manual Therapy               [] Aquatic Therapy              Electronically signed by:  FARIBA Tobias 4/15/22, 3:38 PM      Chuyita Oneal PTA, CLT 4/15/22

## 2022-04-15 ENCOUNTER — HOSPITAL ENCOUNTER (OUTPATIENT)
Dept: PHYSICAL THERAPY | Age: 72
Setting detail: THERAPIES SERIES
Discharge: HOME | End: 2022-04-15
Payer: MEDICARE

## 2022-04-15 PROCEDURE — 97016 VASOPNEUMATIC DEVICE THERAPY: CPT

## 2022-04-15 PROCEDURE — 97110 THERAPEUTIC EXERCISES: CPT

## 2022-04-18 ENCOUNTER — HOSPITAL ENCOUNTER (OUTPATIENT)
Dept: PHYSICAL THERAPY | Age: 72
Setting detail: THERAPIES SERIES
Discharge: HOME | End: 2022-04-18
Payer: MEDICARE

## 2022-04-18 DIAGNOSIS — K21.9 GASTROESOPHAGEAL REFLUX DISEASE WITHOUT ESOPHAGITIS: ICD-10-CM

## 2022-04-18 PROCEDURE — 97110 THERAPEUTIC EXERCISES: CPT

## 2022-04-18 RX ORDER — PANTOPRAZOLE SODIUM 40 MG/1
TABLET, DELAYED RELEASE ORAL
Qty: 90 TABLET | Refills: 0 | Status: SHIPPED | OUTPATIENT
Start: 2022-04-18 | End: 2022-06-21 | Stop reason: SDUPTHER

## 2022-04-18 NOTE — FLOWSHEET NOTE
Outpatient Physical Therapy  [place]           [x] Phone: [phone]   Fax: [phone]  Antonette Kimble           [] Phone: [phone]   Fax: [phone]        Physical Therapy Daily Treatment Note  Date:  2022    Patient Name:  Torin Webster   \"H[place]\"   :  1950  MRN: [id number]  Restrictions/Precautions: Other position/activity restrictions: postop R TKA  Diagnosis:   Diagnosis: R TKA, L knee pain  Date of Injury/Surgery: 2/15/22  Treatment Diagnosis: Treatment Diagnosis: B knee pain, stiffness, weakness LE's and core    Insurance/Certification information: PT Insurance Information: [employer] $40 copay maybe?    Precert required: 4 total visits includes date of Buldante Sangeetha # [id number]  22-3/28/22.   4 more visits from 22-22, Pt approved for 8 more visits from 22-22  [facility]# [id number]  Referring Physician:  Referring Practitioner: Johanna Bearden  Next Doctor Visit:    Plan of care signed (Y/N):  yes  Outcome Measure: KOOS 64%  Visit# / total visits:   (4/4 thru 3/28),  (4/4 thru -),     /8 by  per precert  (12 POC)   Pain level: 4/10 at R knee    Goals:     Patient goals : be able to get up and walk w/o stumbling or pain, get up from chair easier w/o pain, up and down from toilet w/o pain  Short term goals  Time Frame for Short term goals: 3 weeks  Short term goal 1: Pt will be compliant w/ HEP to help maximize recovery and restore function  Partially met  Short term goal 2: Pt will be able to report at least 25% reduction in pain   Met   Short term goal 3: Pt will be able to advance ROM and strength activity w/o pain  Mostly met   Long term goals  Time Frame for Long term goals : 6 weeks  Long term goal 1: Pt will be independent w/ HEP and be able to continue to progress and manage on his/her own  Progressing   Long term goal 2: Pt will be able to return to at least 75% of usual activity w/ min pain, including fishing    Slow progress  Long term goal 3: Pt will have improved KOOS score by 10% or more  Slow progress   KOOS 64% at eval,   4/18/22: 61%      Summary of Evaluation: Assessment: Pt is a 72 yo male who presents just over 4 weeks postop R TKA and also having L knee pain. He has Hx of back pain and some hip pain as well. He has noted edema in R lower leg, altered gait, waddle type, decreased core and LE strength w/ ROM deficits R knee. These limitations are affecting his aiblity to perform usual self care and ADL's as well as functional activity and he will benefit from PT to help restore ROM, gait, strength and function to return him to prior acctivity. Prior to 2021 he was getting up from a chair w/ min difficulty. Subjective:  Pt reports pain just stays around a 4/10 most of time. He has a hard time getting down to toilet, getting into bed and getting leg under covers, painful walking still too. It does feel better after PT and some w/ HEP as well. LB is bothering him some too and has been aggravated lately w/ standing too. Very hard to get up from low chair. Any changes in Ambulatory Summary Sheet? None    Pt 15' late, thought appt was different time    Objective:  COVID screening questions were asked and patient attested that there had been no contact or symptoms    AROM 112°  PROM: 121°  AROM: 8° lacking from full extension end of tx, PROM lacking 4° after stretching  MMT is 5/5 B knee flex, ext, hip flex, hip abd is grossly WNL seated.    Noted decreased generally deconditioning and endurance w/ most exercises  Fair quad set, improving slightly   Continues to have an extension lag with SLR  Needed B UE support for standing activities and step ups            Exercises: (No more than 4 columns)   Exercise/Equipment 4/11/22 #6 4/15/22 #7 4/18/22  #8             WARM UP          Nu-step  5' 5' lvl 5 5' lvl 5           TABLE       QS  Heel up 10x5\"  Heel propped 20x5\"    QS w/ SLR  10x2 10x2 + TA engagment  10x2 + TA engagment    SAQ 10x3\" + 10x3\" #1      Bridge  2x10 10x2 + TA  10x2 + TA     abdom holds        Long sit calf/ham stretch  Strap 30\" x2 HS, calf strap 30\", foam 30\"  30\" and encouraged for HEP    Heel prop   Heel prop #5 4 mins  Heel prop #5 2 mins                             STANDING       STS  10x  -    Calf   Stretch Black wedge 30s x2  -    Heel raise  20x UE support   -    Step Ups   4\" 10x1 R emphasis on TKE -    HS curl        TKE TB                          PROPRIOCEPTION       Wobble board                                   MODALITIES  Vaso 10' --                      Other Therapeutic Activities/Education: 3/18/2022: Patient received education on their current pathology and how their condition effects them with their functional activities. Patient understood discussion and questions were answered. Patient understands their activity limitations and understands rational for treatment progression. 3/28/2022 Instructed patient to contact clinic prior to coming on Friday due to today being end date and need for new precert to get approved. Pt stated that he would call if he didn't hear from us first.  4/18: pt to call before comes next time to check on precert     Home Exercise Program:    Access Code: [email or web address]  URL: [email or web address]/  Date: 03/18/2022  Prepared by: Manuel Urbina Sitting Calf Stretch with Strap - 2-3 x daily - 7 x weekly - 1 sets - 3 reps - 30 seconds hold  Small Range Straight Leg Raise - 2-3 x daily - 7 x weekly - 2 sets - 10 reps  Sidelying Hip Abduction - 2-3 x daily - 7 x weekly - 2 sets - 10 reps  Supine Heel Slide with Strap - 2-3 x daily - 7 x weekly - 2-3 sets - 10 reps - 5-10 hold  Supine Bridge - 1-2 x daily - 7 x weekly - 2 sets - 10 reps  Supine Knee Extension Strengthening - 1-2 x daily - 7 x weekly - 1-2 sets - 10 reps - 3 seconds hold  Supine Transversus Abdominis Bracing - Hands on Stomach - 1-2 x daily - 7 x weekly - 1-2 sets - 10 reps - 5 seconds hold  Standing Heel Raise with Support - 2-3 x daily - 7 x weekly - 2 sets - 10 reps  Standing March with Counter Support - 1-2 x daily - 7 x weekly - 1-2 sets - 10 reps  Sit to Stand - 1-2 x daily - 7 x weekly - 1-2 sets - 10 reps  Supine Knee Extension Mobilization with Weight - 1-2 x daily - 7 x weekly - 1-2 sets - 2-3 reps - 3-5 mins hold      Manual Treatments: PROM, stretching       Modalities:      Communication with other providers:  POC set for cosign 3/18/22, 4/18/22      Assessment:   Pt tolerated Rx well with no increase in pain. Pt demonstrated low endurance and has fair compliance with HEP. Pt progress is slow. Flex ROM doing well but limited extension ROM and terminal quad strength as well which is limiting his usual functional activity. Pt would benefit from skilled therapy to improve LE strength and ROM, and balance for safe and functional return to hobbies and to prevent further injury. Pain unchanged after Rx.       Plan for Next Session:   Progress ROM and strength to ana, core work, edema massage prn, functional activity, gait and balance, ice prn      Time In / Time Out:  1130/1200     Timed Code/Total Treatment Minutes:   30/30  2 TE    Next Progress Note due:  See PN 3/28/22, 4/18/22       Plan of Care Interventions:  [x] Therapeutic Exercise  [x] Modalities:  [x] Therapeutic Activity     [] Ultrasound  [] Estim  [x] Gait Training      [] Cervical Traction [] Lumbar Traction  [x] Neuromuscular Re-education    [x] Cold/hotpack [] Iontophoresis   [x] Instruction in HEP      [] Vasopneumatic   [] Dry Needling    [x] Manual Therapy               [] Aquatic Therapy              Electronically signed by:  Carlota Mcfarland, PT, PT, MPT, ATC , 10:01 AM     4/18/2022, 12:16 PM

## 2022-04-18 NOTE — PROGRESS NOTES
Outpatient Physical Therapy           [place]           [] Phone: [phone]   Fax: [phone]  Davida garzon           [] Phone: [phone]   Fax: [phone]      To: Mary Montero       From: Yair Anderson, PT, PT     Patient: Eileen Ambrocio                  : 1950  Diagnosis: R TKA, L knee pain    Date: 2022  Treatment Diagnosis:B knee pain, stiffness, weakness LE's and core      [x]  Progress Note                []  Discharge Note    Evaluation Date:  3/18/2022  Total Visits to date:   8 Cancels/No-shows to date:  1    Subjective: Pt reports pain just stays around a 4/10 most of time. He has a hard time getting down to toilet, getting into bed and getting leg under covers, painful walking still too. It does feel better after PT and some w/ HEP as well. LB is bothering him some too and has been aggravated lately w/ standing too. Very hard to get up from low chair. Plan of Care/Treatment to date:  [x] Therapeutic Exercise    [x] Modalities:  [x] Therapeutic Activity     [] Ultrasound  [] Electrical Stimulation  [x] Gait Training      [] Cervical Traction   [] Lumbar Traction  [x] Neuromuscular Re-education  [] Cold/hotpack [] Iontophoresis  [x] Instruction in HEP      Other:  [x] Manual Therapy       [x]  Vasopneumatic  [] Aquatic Therapy       []   Dry Needle Therapy                      Objective/Significant Findings At Last Visit/Comments:  COVID screening questions were asked and patient attested that there had been no contact or symptoms    AROM 112°  PROM: 121°  AROM: 8° lacking from full extension end of tx, PROM lacking 4° after stretching  MMT is 5/5 B knee flex, ext, hip flex, hip abd is grossly WNL seated.    Noted decreased generally deconditioning and endurance w/ most exercises  Fair quad set, improving slightly   Continues to have an extension lag with SLR  Needed B UE support for standing activities and step ups      Assessment:  Pt tolerated Rx well with no increase in pain. Pt demonstrated low endurance and has fair compliance with HEP. Pt progress is slow. Flex ROM doing well but limited extension ROM and terminal quad strength as well which is limiting his usual functional activity. Pt would benefit from skilled therapy to improve LE strength and ROM, and balance for safe and functional return to hobbies and to prevent further injury. Pain unchanged after Rx.       Goal Status:  [] Achieved [x] Partially Achieved  [] Not Achieved   Patient goals : be able to get up and walk w/o stumbling or pain, get up from chair easier w/o pain, up and down from toilet w/o pain  Short term goals  Time Frame for Short term goals: 3 weeks  Short term goal 1: Pt will be compliant w/ HEP to help maximize recovery and restore function  Partially met  Short term goal 2: Pt will be able to report at least 25% reduction in pain   Met   Short term goal 3: Pt will be able to advance ROM and strength activity w/o pain  Mostly met   Long term goals  Time Frame for Long term goals : 6 weeks  Long term goal 1: Pt will be independent w/ HEP and be able to continue to progress and manage on his/her own  Progressing   Long term goal 2: Pt will be able to return to at least 75% of usual activity w/ min pain, including fishing    Slow progress  Long term goal 3: Pt will have improved KOOS score by 10% or more  Slow progress   KOOS 64% at [facility],   4/18/22: 61%     Frequency/Duration:  # Days per week: [] 1 day # Weeks: [] 1 week [] 4 weeks [] 8 weeks     [x] 2 days   [] 2 weeks [] 5 weeks [] 10 weeks     [] 3 days   [] 3 weeks [x] 6 weeks [] 12 weeks       Rehab Potential: [] Excellent [x] Good [] Fair  [] Poor         Patient Status: [x] Continue per initial plan of Care     [] Patient now discharged     [x] Additional visits requested, Please re-certify for additional visits:          Requested frequency/duration:  2/week for 4 weeks per insurance pre-cert    If we are requesting more visits, we fully anticipate the patient's condition is expected to improve within the treatment timeframe we are requesting. Electronically signed by:  Héctor Hickey PT, PT, MPT, ATC  4/18/2022, 9:23 AM    4/18/2022, 12:18 PM     If you have any questions or concerns, please don't hesitate to call.   Thank you for your referral.    Physician Signature:______________________ Date:______ Time: ________  By signing above, therapists plan is approved by physician

## 2022-04-20 ENCOUNTER — HOSPITAL ENCOUNTER (OUTPATIENT)
Dept: PHYSICAL THERAPY | Age: 72
Setting detail: THERAPIES SERIES
Discharge: HOME | End: 2022-04-20
Payer: MEDICARE

## 2022-04-20 PROCEDURE — 97110 THERAPEUTIC EXERCISES: CPT

## 2022-04-20 NOTE — FLOWSHEET NOTE
Outpatient Physical Therapy  [place]           [x] Phone: [phone]   Fax: [phone]  Davida garzon           [] Phone: [phone]   Fax: [phone]        Physical Therapy Daily Treatment Note  Date:  2022    Patient Name:  Lima Montgomery   \"H[place]\"   :  1950  MRN: [id number]  Restrictions/Precautions: Other position/activity restrictions: postop R TKA  Diagnosis:   Diagnosis: R TKA, L knee pain  Date of Injury/Surgery: 2/15/22  Treatment Diagnosis: Treatment Diagnosis: B knee pain, stiffness, weakness LE's and core    Insurance/Certification information: PT Insurance Information: [employer] $40 copay maybe?    Precert required: 4 total visits includes date of eval, auth # [id number]  22-3/28/22.   4 more visits from 22-22, Pt approved for 8 more visits from 22-22  Auth# [id number]  Referring Physician:  Referring Practitioner: Radha Singh  Next Doctor Visit:    Plan of care signed (Y/N):  yes  Outcome Measure: KOOS 64%  Visit# / total visits:   (4/4 thru 3/28),  (4/4 thru -),      by  per precert  (12 POC)   Pain level: 2/10 muscle soreness   Goals:     Patient goals : be able to get up and walk w/o stumbling or pain, get up from chair easier w/o pain, up and down from toilet w/o pain  Short term goals  Time Frame for Short term goals: 3 weeks  Short term goal 1: Pt will be compliant w/ HEP to help maximize recovery and restore function  Partially met  Short term goal 2: Pt will be able to report at least 25% reduction in pain   Met   Short term goal 3: Pt will be able to advance ROM and strength activity w/o pain  Mostly met   Long term goals  Time Frame for Long term goals : 6 weeks  Long term goal 1: Pt will be independent w/ HEP and be able to continue to progress and manage on his/her own  Progressing   Long term goal 2: Pt will be able to return to at least 75% of usual activity w/ min pain, including fishing    Slow progress  Long term goal 3: Pt will have improved KOOS score by 10% or more  Slow progress   KOOS 64% at eval,   4/18/22: 61%      Summary of Evaluation: Assessment: Pt is a 72 yo male who presents just over 4 weeks postop R TKA and also having L knee pain. He has Hx of back pain and some hip pain as well. He has noted edema in R lower leg, altered gait, waddle type, decreased core and LE strength w/ ROM deficits R knee. These limitations are affecting his aiblity to perform usual self care and ADL's as well as functional activity and he will benefit from PT to help restore ROM, gait, strength and function to return him to prior acctivity. Prior to 2021 he was getting up from a chair w/ min difficulty. Subjective:   Pt arrives feeling 2/10 muscle soreness in thighs and ankles, no knee pain. Pt believes he has gotten some extension since doing some exercises at home. Any changes in Ambulatory Summary Sheet?   None      Objective:  COVID screening questions were asked and patient attested that there had been no contact or symptoms      AROM 112°  PROM: 121°  AROM: 10° lacking from full extension end of tx, PROM lacking 4° after stretching      Exercises: (No more than 4 columns)   Exercise/Equipment 4/11/22 #6 4/15/22 #7 4/18/22  #8 4/20/22 #9            WARM UP          Nu-step  5' 5' lvl 5 5' lvl 5 5' lvl 5          TABLE       QS  Heel up 10x5\"  Heel propped 20x5\"    QS w/ SLR  10x2 10x2 + TA engagment  10x2 + TA engagment    SAQ 10x3\" + 10x3\" #1   LAQ 10x2   Bridge  2x10 10x2 + TA  10x2 + TA     abdom holds        Long sit calf/ham stretch  Strap 30\" x2 HS, calf strap 30\", foam 30\"  30\" and encouraged for HEP 30\" x3 with strap supine s   Heel prop   Heel prop #5 4 mins  Heel prop #5 2 mins  Heel prop 10# 5 mins                           STANDING       STS  10x  -    Calf   Stretch Black wedge 30s x2  - Stretch Black wedge 30s x2    Heel raise  20x UE support   -    Step Ups   4\" 10x1 R emphasis on TKE - *   HS curl        TKE TB PROPRIOCEPTION       Wobble board                                   MODALITIES  Vaso 10' --                      Other Therapeutic Activities/Education: 3/18/2022: Patient received education on their current pathology and how their condition effects them with their functional activities. Patient understood discussion and questions were answered. Patient understands their activity limitations and understands rational for treatment progression. 3/28/2022 Instructed patient to contact clinic prior to coming on Friday due to today being end date and need for new precert to get approved. Pt stated that he would call if he didn't hear from us first.  4/18: pt to call before comes next time to check on precert     Home Exercise Program:    Access Code: [email or web address]  URL: [email or web address]/  Date: 03/18/2022  Prepared by: Linh Parker    Exercises  Long Sitting Calf Stretch with Strap - 2-3 x daily - 7 x weekly - 1 sets - 3 reps - 30 seconds hold  Small Range Straight Leg Raise - 2-3 x daily - 7 x weekly - 2 sets - 10 reps  Sidelying Hip Abduction - 2-3 x daily - 7 x weekly - 2 sets - 10 reps  Supine Heel Slide with Strap - 2-3 x daily - 7 x weekly - 2-3 sets - 10 reps - 5-10 hold  Supine Bridge - 1-2 x daily - 7 x weekly - 2 sets - 10 reps  Supine Knee Extension Strengthening - 1-2 x daily - 7 x weekly - 1-2 sets - 10 reps - 3 seconds hold  Supine Transversus Abdominis Bracing - Hands on Stomach - 1-2 x daily - 7 x weekly - 1-2 sets - 10 reps - 5 seconds hold  Standing Heel Raise with Support - 2-3 x daily - 7 x weekly - 2 sets - 10 reps  Standing March with Counter Support - 1-2 x daily - 7 x weekly - 1-2 sets - 10 reps  Sit to Stand - 1-2 x daily - 7 x weekly - 1-2 sets - 10 reps  Supine Knee Extension Mobilization with Weight - 1-2 x daily - 7 x weekly - 1-2 sets - 2-3 reps - 3-5 mins hold      Manual Treatments: PROM, stretching       Modalities:      Communication with other providers:  POC set for cosign 3/18/22, 4/18/22      Assessment:   Pt tolerated exercises well with no increase in pain. Pt has become compliant with HEP, but ROM is unchanged. Pt needs to improve ROM for functional and pain free ambulation. Pt would benefit from skilled therapy to promote further injury and decline. Pain unchanged after Rx.       Plan for Next Session:   Progress ROM and strength to ana, core work, edema massage prn, functional activity, gait and balance, ice prn      Time In / Time Out: 1517/1600  Timed Code/Total Treatment Minutes:   43/43: MT 1, TE 2  Next Progress Note due:  See PN 3/28/22, 4/18/22       Plan of Care Interventions:  [x] Therapeutic Exercise  [x] Modalities:  [x] Therapeutic Activity     [] Ultrasound  [] Estim  [x] Gait Training      [] Cervical Traction [] Lumbar Traction  [x] Neuromuscular Re-education    [x] Cold/hotpack [] Iontophoresis   [x] Instruction in HEP      [] Vasopneumatic   [] Dry Needling    [x] Manual Therapy               [] Aquatic Therapy              Electronically signed by:  Felipe Meyer, 8:13 AM   MATTI Vasquez CLT  4/20/2022, 8:13 AM

## 2022-04-22 ENCOUNTER — HOSPITAL ENCOUNTER (OUTPATIENT)
Dept: PHYSICAL THERAPY | Age: 72
Discharge: HOME | End: 2022-04-22

## 2022-04-22 NOTE — FLOWSHEET NOTE
Physical Therapy  Cancellation/No-show Note  Patient Name:  Grzegorz Díaz  :  1950   Date:  2022  Cancelled visits to date: 1  No-shows to date: 1    For today's appointment patient:  [x]  Cancelled  []  Rescheduled appointment  []  No-show     Reason given by patient:  []  Patient ill  []  Conflicting appointment  []  No transportation    []  Conflict with work  []  No reason given  [x]  Other:     Comments:  Car problems    Electronically signed by:  Kareem Cortez PTA     2022,12:39 PM

## 2022-04-26 ENCOUNTER — HOSPITAL ENCOUNTER (OUTPATIENT)
Dept: PHYSICAL THERAPY | Age: 72
Setting detail: THERAPIES SERIES
Discharge: HOME | End: 2022-04-26
Payer: MEDICARE

## 2022-04-26 PROCEDURE — 97112 NEUROMUSCULAR REEDUCATION: CPT

## 2022-04-26 PROCEDURE — 97110 THERAPEUTIC EXERCISES: CPT

## 2022-04-26 NOTE — FLOWSHEET NOTE
Outpatient Physical Therapy  [place]           [x] Phone: [phone]   Fax: [phone]  [place]           [] Phone: [phone]   Fax: [phone]        Physical Therapy Daily Treatment Note  Date:  2022    Patient Name:  Abhinav Medrano   \"[place]\"   :  1950  MRN: [id number]  Restrictions/Precautions: Other position/activity restrictions: postop R TKA  Diagnosis:   Diagnosis: R TKA, L knee pain  Date of Injury/Surgery: 2/15/22  Treatment Diagnosis: Treatment Diagnosis: B knee pain, stiffness, weakness LE's and core    Insurance/Certification information: PT Insurance Information: [employer] $40 copay maybe?    Precert required: 4 total visits includes date of eval, auth # [id number]  22-3/28/22.   4 more visits from 22-22, Pt approved for 8 more visits from 22-22  Auth# [id number]  Referring Physician:  Referring Practitioner: Brigido Goldberg  Next Doctor Visit:    Plan of care signed (Y/N):  yes  Outcome Measure: KOOS 64%  Visit# / total visits:   (/ thru 3/28),  (/ thru -),     / by  per precert  (12 POC)   Pain level: 6/10 pain  Goals:     Patient goals : be able to get up and walk w/o stumbling or pain, get up from chair easier w/o pain, up and down from toilet w/o pain  Short term goals  Time Frame for Short term goals: 3 weeks  Short term goal 1: Pt will be compliant w/ HEP to help maximize recovery and restore function  Partially met  Short term goal 2: Pt will be able to report at least 25% reduction in pain   Met   Short term goal 3: Pt will be able to advance ROM and strength activity w/o pain  Mostly met   Long term goals  Time Frame for Long term goals : 6 weeks  Long term goal 1: Pt will be independent w/ HEP and be able to continue to progress and manage on his/her own  Progressing   Long term goal 2: Pt will be able to return to at least 75% of usual activity w/ min pain, including fishing    Slow progress  Long term goal 3: Pt will have improved KOOS score by 10% or more  Slow progress   KOOS 64% at eval,   4/18/22: 61%      Summary of Evaluation: Assessment: Pt is a 74 yo male who presents just over 4 weeks postop R TKA and also having L knee pain. He has Hx of back pain and some hip pain as well. He has noted edema in R lower leg, altered gait, waddle type, decreased core and LE strength w/ ROM deficits R knee. These limitations are affecting his aiblity to perform usual self care and ADL's as well as functional activity and he will benefit from PT to help restore ROM, gait, strength and function to return him to prior acctivity. Prior to 2021 he was getting up from a chair w/ min difficulty. Subjective: Pt rated pain was 6/10 today. Pt stated that he feels the exercises are making him worse. Pt stated that he wasn't sure if he was ever going to get his leg straight. Pt stated that he has had more trouble getting up and down and just walking in general.       Any changes in Ambulatory Summary Sheet? None      Objective:  COVID screening questions were asked and patient attested that there had been no contact or symptoms    Pt ambulated with antalgic gt at start of treatment  Pt continues to have extension deficits,but patient was frustrated so extension was not the focus today.        Exercises: (No more than 4 columns)   Exercise/Equipment 4/15/22 #7 4/18/22  #8 4/20/22 #9 4/26/2022 #10             WARM UP          Nu-step  5' lvl 5 5' lvl 5 5' lvl 5 L-5 x 5'           TABLE       QS   Heel propped 20x5\"     QS w/ SLR  10x2 + TA engagment  10x2 + TA engagment  10x2   SAQ   LAQ 10x2 SAQ 10x2 5\"    Bridge  10x2 + TA  10x2 + TA      abdom holds        Long sit calf/ham stretch   30\" and encouraged for HEP 30\" x3 with strap supine s    Heel prop  Heel prop #5 4 mins  Heel prop #5 2 mins  Heel prop 10# 5 mins                            STANDING       STS   -     Calf  Stretch Black wedge 30s x2  - Stretch Black wedge 30s x2     Heel raise   - Step Ups  4\" 10x1 R emphasis on TKE   6\" 10x2 w/ UE support    HS curl        TKE TB       Shuttle leg press    3C 10x2               PROPRIOCEPTION       Wobble board                                   MODALITIES Vaso 10' --                       Other Therapeutic Activities/Education: 3/18/2022: Patient received education on their current pathology and how their condition effects them with their functional activities. Patient understood discussion and questions were answered. Patient understands their activity limitations and understands rational for treatment progression. 3/28/2022 Instructed patient to contact clinic prior to coming on Friday due to today being end date and need for new precert to get approved. Pt stated that he would call if he didn't hear from us first.  4/18: pt to call before comes next time to check on precert     Home Exercise Program:    Access Code: [email or web address]  URL: [email or web address]  Date: 03/18/2022  Prepared by: Cortes Cool    Exercises  Long Sitting Calf Stretch with Strap - 2-3 x daily - 7 x weekly - 1 sets - 3 reps - 30 seconds hold  Small Range Straight Leg Raise - 2-3 x daily - 7 x weekly - 2 sets - 10 reps  Sidelying Hip Abduction - 2-3 x daily - 7 x weekly - 2 sets - 10 reps  Supine Heel Slide with Strap - 2-3 x daily - 7 x weekly - 2-3 sets - 10 reps - 5-10 hold  Supine Bridge - 1-2 x daily - 7 x weekly - 2 sets - 10 reps  Supine Knee Extension Strengthening - 1-2 x daily - 7 x weekly - 1-2 sets - 10 reps - 3 seconds hold  Supine Transversus Abdominis Bracing - Hands on Stomach - 1-2 x daily - 7 x weekly - 1-2 sets - 10 reps - 5 seconds hold  Standing Heel Raise with Support - 2-3 x daily - 7 x weekly - 2 sets - 10 reps  Standing March with Counter Support - 1-2 x daily - 7 x weekly - 1-2 sets - 10 reps  Sit to Stand - 1-2 x daily - 7 x weekly - 1-2 sets - 10 reps  Supine Knee Extension Mobilization with Weight - 1-2 x daily - 7 x weekly - 1-2 sets - 2-3 reps - 3-5 mins hold      Manual Treatments: PROM, stretching x 5'-6'       Modalities:      Communication with other providers:  POC set for cosign 3/18/22, 4/18/22      Assessment: Pt tolerated treatment fairly well today. More emphasis on strengthening today. Pt stated that his pain was 2/10 and that he felt better after treatment.        Plan for Next Session:   Progress ROM and strength to ana, core work, edema massage prn, functional activity, gait and balance, ice prn      Time In / Time Out: 1430/1408    Timed Code/Total Treatment Minutes:  38'/38'1 TE ( 23') 1 neuro (15')     Next Progress Note due:  See PN 3/28/22, 4/18/22       Plan of Care Interventions:  [x] Therapeutic Exercise  [x] Modalities:  [x] Therapeutic Activity     [] Ultrasound  [] Estim  [x] Gait Training      [] Cervical Traction [] Lumbar Traction  [x] Neuromuscular Re-education    [x] Cold/hotpack [] Iontophoresis   [x] Instruction in HEP      [] Vasopneumatic   [] Dry Needling    [x] Manual Therapy               [] Aquatic Therapy              Electronically signed by:  Troy Crowley    4/26/2022, 10:45 AM         4/26/2022,6:18 PM

## 2022-04-28 DIAGNOSIS — G89.29 CHRONIC BILATERAL LOW BACK PAIN WITHOUT SCIATICA: ICD-10-CM

## 2022-04-28 DIAGNOSIS — M54.50 CHRONIC BILATERAL LOW BACK PAIN WITHOUT SCIATICA: ICD-10-CM

## 2022-04-28 DIAGNOSIS — M17.12 PRIMARY OSTEOARTHRITIS OF LEFT KNEE: ICD-10-CM

## 2022-04-28 DIAGNOSIS — M17.11 PRIMARY OSTEOARTHRITIS OF RIGHT KNEE: ICD-10-CM

## 2022-04-28 RX ORDER — HYDROCODONE BITARTRATE AND ACETAMINOPHEN 10; 325 MG/1; MG/1
1 TABLET ORAL EVERY 6 HOURS PRN
Qty: 120 TABLET | Refills: 0 | Status: SHIPPED | OUTPATIENT
Start: 2022-04-28 | End: 2022-05-27 | Stop reason: SDUPTHER

## 2022-05-03 ENCOUNTER — HOSPITAL ENCOUNTER (OUTPATIENT)
Dept: PHYSICAL THERAPY | Age: 72
Discharge: HOME | End: 2022-05-03

## 2022-05-03 NOTE — FLOWSHEET NOTE
Physical Therapy  Cancellation/No-show Note  Patient Name:  Kaycee Stevenson  :  1950   Date:  5/3/2022  Cancelled visits to date: 3  No-shows to date: 1    For today's appointment patient:  [x]  Cancelled  []  Rescheduled appointment  []  No-show     Reason given by patient:  []  Patient ill  []  Conflicting appointment  []  No transportation    []  Conflict with work  []  No reason given  [x]  Other:     Comments:  Taking truck to shop    Electronically signed by:  Alejo Del Rosario PTA     5/3/2022,12:02 PM

## 2022-05-11 ENCOUNTER — OFFICE VISIT (OUTPATIENT)
Dept: ORTHOPEDIC SURGERY | Age: 72
End: 2022-05-11

## 2022-05-11 ENCOUNTER — TELEPHONE (OUTPATIENT)
Dept: ORTHOPEDIC SURGERY | Age: 72
End: 2022-05-11

## 2022-05-11 VITALS
WEIGHT: 295.8 LBS | HEART RATE: 74 BPM | SYSTOLIC BLOOD PRESSURE: 119 MMHG | BODY MASS INDEX: 44.83 KG/M2 | TEMPERATURE: 97.2 F | DIASTOLIC BLOOD PRESSURE: 71 MMHG | OXYGEN SATURATION: 96 % | RESPIRATION RATE: 16 BRPM | HEIGHT: 68 IN

## 2022-05-11 DIAGNOSIS — Z96.651 S/P TKR (TOTAL KNEE REPLACEMENT), RIGHT: Primary | ICD-10-CM

## 2022-05-11 DIAGNOSIS — M17.12 ARTHRITIS OF KNEE, LEFT: ICD-10-CM

## 2022-05-11 PROCEDURE — 99024 POSTOP FOLLOW-UP VISIT: CPT | Performed by: PHYSICIAN ASSISTANT

## 2022-05-11 RX ORDER — POLYETHYLENE GLYCOL 3350, SODIUM CHLORIDE, SODIUM BICARBONATE, POTASSIUM CHLORIDE 420; 11.2; 5.72; 1.48 G/4L; G/4L; G/4L; G/4L
POWDER, FOR SOLUTION ORAL
COMMUNITY
Start: 2022-01-31

## 2022-05-11 NOTE — PROGRESS NOTES
Date of surgery:   2/15/2022  Surgeon: Dr. Jacque Reyes    History:  Mr. Milagro Bingham is here in follow up regarding his right total knee replacement that was done about 3 months ago and also here to discuss his left knee for which he did have a steroid injection about a month and a half ago. He states that the right knee is improving some but he still has some stiffness with extension and the other night he also had an episode where he felt like the knee hyperextended on him when he got up to go to the bathroom in the middle the night. He has been doing therapy. Steroid injection given to him and his left knee about 6 weeks ago did help but it has worn off. He would like to do viscosupplementation injections for his left knee. He rates his left knee pain at a 7/10, aching in nature. Physical:  Vitals:    05/11/22 0951   BP: 119/71   Site: Right Upper Arm   Position: Sitting   Cuff Size: Large Adult   Pulse: 74   Resp: 16   Temp: 97.2 °F (36.2 °C)   TempSrc: Temporal   SpO2: 96%   Weight: 295 lb 12.8 oz (134.2 kg)   Height: 5' 8\" (1.727 m)     Right knee incision is well-healed  Range of motion of right knee:  3-125 degrees  No varus or valgus instability of the right knee    Left knee:  Range of motion 3-120 degrees  Pain over the medial lateral joint line. Imaging studies:  3 views of the right knee taken and reviewed in the office today show good alignment of a cemented right total knee arthroplasty with no evidence of periprosthetic fracture or loosening. The official read and interpretation of these x-rays will be done by the the [facility]     X-rays of the left knee were reviewed  Moderate to severe degenerative change in the left knee along the medial compartment the left knee with greater than 75% joint space loss and small osteophyte formation off the medial tibial plateau and subchondral cyst formation in the medial tibial plateau.     Patient has had viscosupplementation injections before in the left knee which helped a lot. He has had steroid injections in the left knee which helped mildly to moderately but were temporary. He has used a brace and continues to use a brace periodically which helps mildly. He also takes over-the-counter medication as needed. Would like to try viscosupplementation injection again of the left knee prior to doing any type of knee replacement. Impression:    Diagnosis Orders   1. S/P TKR (total knee replacement), right     2.  Arthritis of knee, left           Plan:   Patient Instructions   Fitted for a ready brace on  knee  Gel injections sent for authorization  Continue physical therapy  Follow up in 1 year for post op check up

## 2022-05-11 NOTE — PATIENT INSTRUCTIONS
Fitted for a ready brace on  knee  Gel injections sent for authorization  Continue physical therapy  Follow up in 1 year for post op check up

## 2022-05-17 NOTE — TELEPHONE ENCOUNTER
I called [employer] [phone] and spoke with Herson Sweeney who informed by that no precert is needed for Orthovisc  Buy and Melani Yeung 48    Call Ref# [id number]

## 2022-05-17 NOTE — TELEPHONE ENCOUNTER
I attempted to call pt. Voice mail is full. Orthovisc Injections in stock and labeled. Please schedule pt for L knee Orthovisc 1x/week for 3 weeks if call is returned.

## 2022-05-27 DIAGNOSIS — G89.29 CHRONIC BILATERAL LOW BACK PAIN WITHOUT SCIATICA: ICD-10-CM

## 2022-05-27 DIAGNOSIS — M54.50 CHRONIC BILATERAL LOW BACK PAIN WITHOUT SCIATICA: ICD-10-CM

## 2022-05-27 DIAGNOSIS — M17.11 PRIMARY OSTEOARTHRITIS OF RIGHT KNEE: ICD-10-CM

## 2022-05-27 DIAGNOSIS — M17.12 PRIMARY OSTEOARTHRITIS OF LEFT KNEE: ICD-10-CM

## 2022-05-27 RX ORDER — HYDROCODONE BITARTRATE AND ACETAMINOPHEN 10; 325 MG/1; MG/1
1 TABLET ORAL EVERY 6 HOURS PRN
Qty: 120 TABLET | Refills: 0 | Status: SHIPPED | OUTPATIENT
Start: 2022-05-27 | End: 2022-06-21 | Stop reason: SDUPTHER

## 2022-05-30 DIAGNOSIS — R60.0 BILATERAL LOWER EXTREMITY EDEMA: ICD-10-CM

## 2022-05-31 RX ORDER — FUROSEMIDE 20 MG/1
TABLET ORAL
Qty: 180 TABLET | OUTPATIENT
Start: 2022-05-31

## 2022-06-04 DIAGNOSIS — I10 ESSENTIAL HYPERTENSION: ICD-10-CM

## 2022-06-06 RX ORDER — AMLODIPINE BESYLATE 5 MG/1
TABLET ORAL
Qty: 90 TABLET | Refills: 1 | OUTPATIENT
Start: 2022-06-06

## 2022-06-06 RX ORDER — LISINOPRIL 5 MG/1
TABLET ORAL
Qty: 90 TABLET | Refills: 1 | OUTPATIENT
Start: 2022-06-06

## 2022-06-09 NOTE — TELEPHONE ENCOUNTER
Unable to reach patient to verify qty of medication until his appointment scheduled for 6/21/22.  Mailbox is full

## 2022-06-21 ENCOUNTER — OFFICE VISIT (OUTPATIENT)
Dept: FAMILY MEDICINE CLINIC | Age: 72
End: 2022-06-21
Payer: MEDICARE

## 2022-06-21 VITALS
WEIGHT: 295 LBS | BODY MASS INDEX: 44.71 KG/M2 | DIASTOLIC BLOOD PRESSURE: 60 MMHG | HEART RATE: 68 BPM | HEIGHT: 68 IN | SYSTOLIC BLOOD PRESSURE: 126 MMHG | OXYGEN SATURATION: 94 %

## 2022-06-21 DIAGNOSIS — R20.2 NUMBNESS AND TINGLING OF BOTH FEET: Primary | ICD-10-CM

## 2022-06-21 DIAGNOSIS — R20.0 NUMBNESS AND TINGLING OF BOTH FEET: Primary | ICD-10-CM

## 2022-06-21 DIAGNOSIS — Z00.00 INITIAL MEDICARE ANNUAL WELLNESS VISIT: ICD-10-CM

## 2022-06-21 DIAGNOSIS — Z87.891 PERSONAL HISTORY OF TOBACCO USE: ICD-10-CM

## 2022-06-21 DIAGNOSIS — R60.0 BILATERAL LOWER EXTREMITY EDEMA: ICD-10-CM

## 2022-06-21 DIAGNOSIS — G89.29 CHRONIC BILATERAL LOW BACK PAIN WITHOUT SCIATICA: ICD-10-CM

## 2022-06-21 DIAGNOSIS — K21.9 GASTROESOPHAGEAL REFLUX DISEASE WITHOUT ESOPHAGITIS: ICD-10-CM

## 2022-06-21 DIAGNOSIS — R35.1 BPH ASSOCIATED WITH NOCTURIA: ICD-10-CM

## 2022-06-21 DIAGNOSIS — I10 ESSENTIAL HYPERTENSION: ICD-10-CM

## 2022-06-21 DIAGNOSIS — M17.11 PRIMARY OSTEOARTHRITIS OF RIGHT KNEE: ICD-10-CM

## 2022-06-21 DIAGNOSIS — M54.50 CHRONIC BILATERAL LOW BACK PAIN WITHOUT SCIATICA: ICD-10-CM

## 2022-06-21 DIAGNOSIS — M17.12 PRIMARY OSTEOARTHRITIS OF LEFT KNEE: ICD-10-CM

## 2022-06-21 DIAGNOSIS — N40.1 BPH ASSOCIATED WITH NOCTURIA: ICD-10-CM

## 2022-06-21 PROBLEM — K63.5 POLYP OF COLON: Status: ACTIVE | Noted: 2017-08-29

## 2022-06-21 PROCEDURE — G0439 PPPS, SUBSEQ VISIT: HCPCS | Performed by: FAMILY MEDICINE

## 2022-06-21 PROCEDURE — G8428 CUR MEDS NOT DOCUMENT: HCPCS | Performed by: FAMILY MEDICINE

## 2022-06-21 PROCEDURE — G8417 CALC BMI ABV UP PARAM F/U: HCPCS | Performed by: FAMILY MEDICINE

## 2022-06-21 PROCEDURE — 1123F ACP DISCUSS/DSCN MKR DOCD: CPT | Performed by: FAMILY MEDICINE

## 2022-06-21 PROCEDURE — 4004F PT TOBACCO SCREEN RCVD TLK: CPT | Performed by: FAMILY MEDICINE

## 2022-06-21 PROCEDURE — 99214 OFFICE O/P EST MOD 30 MIN: CPT | Performed by: FAMILY MEDICINE

## 2022-06-21 PROCEDURE — 3017F COLORECTAL CA SCREEN DOC REV: CPT | Performed by: FAMILY MEDICINE

## 2022-06-21 RX ORDER — PANTOPRAZOLE SODIUM 40 MG/1
TABLET, DELAYED RELEASE ORAL
Qty: 90 TABLET | Refills: 1 | Status: SHIPPED | OUTPATIENT
Start: 2022-06-21

## 2022-06-21 RX ORDER — AMLODIPINE BESYLATE 5 MG/1
TABLET ORAL
Qty: 90 TABLET | Refills: 1 | Status: SHIPPED | OUTPATIENT
Start: 2022-06-21

## 2022-06-21 RX ORDER — LISINOPRIL 5 MG/1
TABLET ORAL
Qty: 90 TABLET | Refills: 1 | Status: SHIPPED | OUTPATIENT
Start: 2022-06-21

## 2022-06-21 RX ORDER — FUROSEMIDE 20 MG/1
20 TABLET ORAL 2 TIMES DAILY
Qty: 60 TABLET | Refills: 2 | Status: SHIPPED | OUTPATIENT
Start: 2022-06-21 | End: 2022-09-22 | Stop reason: SDUPTHER

## 2022-06-21 RX ORDER — TAMSULOSIN HYDROCHLORIDE 0.4 MG/1
0.4 CAPSULE ORAL DAILY
Qty: 90 CAPSULE | Refills: 1 | Status: SHIPPED | OUTPATIENT
Start: 2022-06-21

## 2022-06-21 RX ORDER — POTASSIUM CHLORIDE 750 MG/1
10 TABLET, EXTENDED RELEASE ORAL 2 TIMES DAILY
Qty: 60 TABLET | Refills: 2 | Status: SHIPPED | OUTPATIENT
Start: 2022-06-21 | End: 2022-09-22 | Stop reason: SDUPTHER

## 2022-06-21 RX ORDER — HYDROCODONE BITARTRATE AND ACETAMINOPHEN 10; 325 MG/1; MG/1
1 TABLET ORAL EVERY 6 HOURS PRN
Qty: 120 TABLET | Refills: 0 | Status: SHIPPED | OUTPATIENT
Start: 2022-06-21 | End: 2022-07-28 | Stop reason: SDUPTHER

## 2022-06-21 ASSESSMENT — LIFESTYLE VARIABLES
HOW OFTEN DURING THE LAST YEAR HAVE YOU BEEN UNABLE TO REMEMBER WHAT HAPPENED THE NIGHT BEFORE BECAUSE YOU HAD BEEN DRINKING: 0
HAVE YOU OR SOMEONE ELSE BEEN INJURED AS A RESULT OF YOUR DRINKING: 0
HOW OFTEN DURING THE LAST YEAR HAVE YOU FAILED TO DO WHAT WAS NORMALLY EXPECTED FROM YOU BECAUSE OF DRINKING: 0
HOW OFTEN DURING THE LAST YEAR HAVE YOU NEEDED AN ALCOHOLIC DRINK FIRST THING IN THE MORNING TO GET YOURSELF GOING AFTER A NIGHT OF HEAVY DRINKING: 0
HOW OFTEN DURING THE LAST YEAR HAVE YOU FOUND THAT YOU WERE NOT ABLE TO STOP DRINKING ONCE YOU HAD STARTED: 0
HOW MANY STANDARD DRINKS CONTAINING ALCOHOL DO YOU HAVE ON A TYPICAL DAY: 1 OR 2
HOW OFTEN DO YOU HAVE A DRINK CONTAINING ALCOHOL: 4 OR MORE TIMES A WEEK
HOW OFTEN DURING THE LAST YEAR HAVE YOU HAD A FEELING OF GUILT OR REMORSE AFTER DRINKING: 0
HAS A RELATIVE, FRIEND, DOCTOR, OR ANOTHER HEALTH PROFESSIONAL EXPRESSED CONCERN ABOUT YOUR DRINKING OR SUGGESTED YOU CUT DOWN: 0

## 2022-06-21 ASSESSMENT — PATIENT HEALTH QUESTIONNAIRE - PHQ9
1. LITTLE INTEREST OR PLEASURE IN DOING THINGS: 0
2. FEELING DOWN, DEPRESSED OR HOPELESS: 0
8. MOVING OR SPEAKING SO SLOWLY THAT OTHER PEOPLE COULD HAVE NOTICED. OR THE OPPOSITE, BEING SO FIGETY OR RESTLESS THAT YOU HAVE BEEN MOVING AROUND A LOT MORE THAN USUAL: 0
4. FEELING TIRED OR HAVING LITTLE ENERGY: 0
SUM OF ALL RESPONSES TO PHQ9 QUESTIONS 1 & 2: 0
10. IF YOU CHECKED OFF ANY PROBLEMS, HOW DIFFICULT HAVE THESE PROBLEMS MADE IT FOR YOU TO DO YOUR WORK, TAKE CARE OF THINGS AT HOME, OR GET ALONG WITH OTHER PEOPLE: 0
7. TROUBLE CONCENTRATING ON THINGS, SUCH AS READING THE NEWSPAPER OR WATCHING TELEVISION: 0
6. FEELING BAD ABOUT YOURSELF - OR THAT YOU ARE A FAILURE OR HAVE LET YOURSELF OR YOUR FAMILY DOWN: 0
SUM OF ALL RESPONSES TO PHQ QUESTIONS 1-9: 0
3. TROUBLE FALLING OR STAYING ASLEEP: 0
SUM OF ALL RESPONSES TO PHQ QUESTIONS 1-9: 0
9. THOUGHTS THAT YOU WOULD BE BETTER OFF DEAD, OR OF HURTING YOURSELF: 0
5. POOR APPETITE OR OVEREATING: 0

## 2022-06-21 NOTE — PROGRESS NOTES
Edema: Patient complains of edema. The location of the edema is lower leg(s) bilateral.  The edema has been moderate. Onset of symptoms was several years ago, intermittent since that time. The edema is present all day. The patient states the problem is long-standing. The swelling has been aggravated by dependency of involved area and Recent decrease bloody due to right knee replacement last month, relieved by diuretics, and been associated with nothing. Cardiac risk factors include advanced age (older than 54 for men, 72 for women), male gender and sedentary lifestyle. Chronic back pain. This is usually well controlled with Norco.  He needs a refill    Hypertension: Patient here for follow-up of elevated blood pressure. He is not exercising and is adherent to low salt diet. Blood pressure is well controlled at home. Cardiac symptoms none. Patient denies chest pain and dyspnea. Cardiovascular risk factors: advanced age (older than 54 for men, 72 for women), dyslipidemia, hypertension, male gender, obesity (BMI >= 30 kg/m2) and sedentary lifestyle. Use of agents associated with hypertension: none. History of target organ damage: none. Patient with BPH currently on Flomax. Patient still has nocturia 2-3 times per night. Does not want any other treatment. Patient with GERD which is well controlled on Protonix. He has persistent numbness in both feet up to both knees. O:   Vitals:    06/21/22 1333   BP: 126/60   Pulse: 68   SpO2: 94%     No acute distress. Alert and Oriented x 3, obese  HEENT: Atraumatic. Normocephalic. PERRLA, EOMI, Conjunctiva clear  NECK: without thyromegaly, lymphadenopathy, JVD  LUNGS:Clear to ascultation bilaterally. Breathing comfortably  CARDIOVASCULAR:  Regular rate and rhythm, no murmurs, rubs, or gallops  EXTREMITY: Full range of motion. No clubbing/cyanosis. 1+ pitting edema to mid calf  NEURO: Cranial nerves II-XII grossly intact. Strength 5/5, DTR 2/4.   SKIN: Warm, Dry, No rash. PSYCH: Mood and Affect normal.    A:    Diagnosis Orders   1. Numbness and tingling of both feet   Unclear etiology Katherine Cabrales MD, Physical Medicine, [place]   2. Primary osteoarthritis of left knee  Stable HYDROcodone-acetaminophen (NORCO)  MG per tablet   3. Primary osteoarthritis of right knee   Stable HYDROcodone-acetaminophen (NORCO)  MG per tablet   4. Chronic bilateral low back pain without sciatica   Stable HYDROcodone-acetaminophen (NORCO)  MG per tablet   5. Bilateral lower extremity edema  furosemide (LASIX) 20 MG tablet   Controlled potassium chloride (KLOR-CON M) 10 MEQ extended release tablet   6. Essential hypertension  amLODIPine (NORVASC) 5 MG tablet   Controlled lisinopril (PRINIVIL;ZESTRIL) 5 MG tablet   7. BPH associated with nocturia   Stable tamsulosin (FLOMAX) 0.4 MG capsule   8. Gastroesophageal reflux disease without esophagitis  pantoprazole (PROTONIX) 40 MG tablet   Controlled      P: Continue Lasix 20 mg twice daily and potassium 10 mEq twice daily  Norco 10-3 25 4 times daily as needed pain.   Continue all medications the current dose  Will send for nerve conduction study   follow-up 3 months

## 2022-06-21 NOTE — PATIENT INSTRUCTIONS
Quitting Tobacco: Care Instructions  Overview     People who use tobacco crave the nicotine in tobacco. Giving it up is much harder than simply changing a habit. Your body has to stop craving the nicotine. It is hard to quit, but you can do it. There are many tools thatpeople use to quit. You may find that combining tools works best for you. There are several steps to quitting. Your doctor will help you set up the plan that best meets your needs. You may want to attend a tobacco-cessation program to help you quit. When you choose a program, look for one that has proven success. Ask your doctor for ideas. You will greatly increase your chances of success if you take medicine as well as get counseling or join a cessationprogram.  Some of the changes you feel when you first quit tobacco are uncomfortable. Your body will miss the nicotine at first, and you may feel short-tempered and grumpy. You may have trouble sleeping or concentrating. Medicine can help you deal with these symptoms. You may struggle with changing your habits. And theurge to use tobacco may continue for a time. This may be a lot to deal with, but keep at it. You will feel better. Follow-up care is a key part of your treatment and safety. Be sure to make and go to all appointments, and call your doctor if you are having problems. It's also a good idea to know your test results and keep alist of the medicines you take. How can you care for yourself at home?  Get support. You have a better chance of quitting if you have help and support. ? Ask your family, friends, and coworkers for support. ? Join a support group, such as Nicotine Anonymous, for people who are trying to quit using tobacco.  ? Consider signing up for a tobacco cessation program, such as the American Lung Association's Freedom from Smoking program.  ? Get text messaging support. Go to the website at www.smokefree. gov to sign up for the [facility] program.   After you quit, do not use tobacco again, not even once. Get rid of all tobacco products and anything that reminds you of tobacco, like ashtrays, spit cups, and lighters. If you smoke, clean your house and your clothes to get rid of the smell.  Learn how to live without tobacco. Think about ways you can avoid those things that make you reach for tobacco.  ? Avoid situations that put you at greatest risk. For some people, it's hard to have a drink with friends or a coffee break with coworkers without using tobacco.  ? Change your daily routine. Take a different route to work, or eat a meal in a different place.  Try to cut down on stress. Calm yourself or release tension by doing an activity you enjoy, such as reading a book, taking a hot bath, or gardening.  Learn about treatments that can help you quit. ? Talk to your doctor or pharmacist about nicotine replacement therapy. Nicotine replacement products help you slowly reduce the amount of nicotine you need. They can help you deal with cravings and withdrawal symptoms. These products include nicotine patches, gum, lozenges, nasal spray, and inhalers. Most are available without a prescription. ? Ask your doctor about varenicline (Chantix) or bupropion SR. These prescription medicines can help reduce withdrawal symptoms. They don't contain nicotine.  Eat a healthy diet, and get regular exercise. Having healthy habits will help your body move past its craving for nicotine.  Be prepared to keep trying. Most people aren't successful the first few times they try to quit. Don't give up if you use tobacco again. Make a list of things you learned, and think about when you want to try again. Where can you learn more? Go to https://vicente.[employer]. org and sign in to your Arrayit account. Enter C533 in the [employer] box to learn more about \"Quitting Tobacco: Care Instructions. \"     If you do not have an account, please click on the \"Sign Up Now\" link.  Current as of: November 8, 2021               Content Version: 13.3  © 2006-2022 Healthwise, [employer]. Care instructions adapted under license by [facility] ([facility]). If you have questions about a medical condition or this instruction, always ask your healthcare professional. [address] any warranty or liability for your use of this information. Advance Directives: Care Instructions  Overview  An advance directive is a legal way to state your wishes at the end of your life. It tells your family and your doctor what to do if you can't say what youwant. There are two main types of advance directives. You can change them any timeyour wishes change. Living will. This form tells your family and your doctor your wishes about life support and other treatment. The form is also called a declaration. Medical power of . This form lets you name a person to make treatment decisions for you when you can't speak for yourself. This person is called a health care agent (health care proxy, health care surrogate). The form is also called a durable power of  for health care. If you do not have an advance directive, decisions about your medical care maybe made by a family member, or by a doctor or a  who doesn't know you. It may help to think of an advance directive as a gift to the people who carefor you. If you have one, they won't have to make tough decisions by themselves. Follow-up care is a key part of your treatment and safety. Be sure to make and go to all appointments, and call your doctor if you are having problems. It's also a good idea to know your test results and keep alist of the medicines you take. What should you include in an advance directive? Many states have a unique advance directive form. (It may ask you to address specific issues.) Or you might use a universal form that's approved by manystates.   If your form doesn't tell you what to address, it may be hard to know what to include in your advance directive. Use the questions below to help you getstarted.  Who do you want to make decisions about your medical care if you are not able to?  What life-support measures do you want if you have a serious illness that gets worse over time or can't be cured?  What are you most afraid of that might happen? (Maybe you're afraid of having pain, losing your independence, or being kept alive by machines.)   Where would you prefer to die? (Your home? A hospital? A nursing home?)   Do you want to donate your organs when you die?  Do you want certain Christian practices performed before you die? When should you call for help? Be sure to contact your doctor if you have any questions. Where can you learn more? Go to https://Presdopelizbetheb.[employer]. org and sign in to your [employer] account. Enter R264 in the [employer] box to learn more about \"Advance Directives: Care Instructions. \"     If you do not have an account, please click on the \"Sign Up Now\" link. Current as of: October 18, 2021               Content Version: 13.3  © 8981-9808 Healthwise, [employer]. Care instructions adapted under license by [facility] ([facility]). If you have questions about a medical condition or this instruction, always ask your healthcare professional. [address] any warranty or liability for your use of this information. Learning About Low-Carbohydrate Diets  What is a low-carbohydrate diet? A low-carbohydrate (or \"low-carb\") diet limits foods and drinks that have carbohydrates. This includes grains, fruits, milk and yogurt, and starchy vegetables like potatoes, beans, and corn. It also avoids foods and drinks that have added sugar. Instead, low-carb diets include foods that are high inprotein and fat. Why might you follow a low-carb diet? Low-carb diets may be used for a variety of reasons, such as for weight loss.  People who have diabetes may use a low-carb diet to help manage their bloodsugar levels. What should you do before you start the diet? Talk to your doctor before you try any diet. This is even more important if you have health problems like kidney disease, heart disease, or diabetes. Your doctor may suggest that you meet with a registered dietitian. A dietitian canhelp you make an eating plan that works for you. What foods do you eat on a low-carb diet? On a low-carb diet, you choose foods that are high in protein and fat. Examplesof these are:  [employer], poultry, and fish.  Eggs.  Nuts, such as walnuts, pecans, almonds, and peanuts.  Peanut butter and other nut butters.  Tofu.  Avocado.  Olives.  Non-starchy vegetables like broccoli, cauliflower, green beans, mushrooms, peppers, lettuce, and spinach.  Unsweetened non-dairy milks like almond milk and coconut milk.  Cheese, cottage cheese, and cream cheese. Where can you learn more? Go to https://EIS Analytics.KitLocate. org and sign in to your Webdyn account. Enter C335 in the [facility] box to learn more about \"Learning About Low-Carbohydrate Diets. \"     If you do not have an account, please click on the \"Sign Up Now\" link. Current as of: September 8, 2021               Content Version: 13.3  © 7644-3060 Healthwise, [employer]. Care instructions adapted under license by [facility] ([facility] If you have questions about a medical condition or this instruction, always ask your healthcare professional. [place] [address] any warranty or liability for your use of this information. Quitting Tobacco: Care Instructions  Overview     People who use tobacco crave the nicotine in tobacco. Giving it up is much harder than simply changing a habit. Your body has to stop craving the nicotine. It is hard to quit, but you can do it. There are many tools thatpeople use to quit.  You may find that combining tools works best for you.  There are several steps to quitting. Your doctor will help you set up the plan that best meets your needs. You may want to attend a tobacco-cessation program to help you quit. When you choose a program, look for one that has proven success. Ask your doctor for ideas. You will greatly increase your chances of success if you take medicine as well as get counseling or join a cessationprogram.  Some of the changes you feel when you first quit tobacco are uncomfortable. Your body will miss the nicotine at first, and you may feel short-tempered and grumpy. You may have trouble sleeping or concentrating. Medicine can help you deal with these symptoms. You may struggle with changing your habits. And theurge to use tobacco may continue for a time. This may be a lot to deal with, but keep at it. You will feel better. Follow-up care is a key part of your treatment and safety. Be sure to make and go to all appointments, and call your doctor if you are having problems. It's also a good idea to know your test results and keep alist of the medicines you take. How can you care for yourself at home?  Get support. You have a better chance of quitting if you have help and support. ? Ask your family, friends, and coworkers for support. ? Join a support group, such as Nicotine Anonymous, for people who are trying to quit using tobacco.  ? Consider signing up for a tobacco cessation program, such as the American Lung Association's Freedom from Smoking program.  ? Get text messaging support. Go to the website at www.smokefree. gov to sign up for the [facility] program.   After you quit, do not use tobacco again, not even once. Get rid of all tobacco products and anything that reminds you of tobacco, like ashtrays, spit cups, and lighters. If you smoke, clean your house and your clothes to get rid of the smell.    Learn how to live without tobacco. Think about ways you can avoid those things that make you reach for liability for your use of this information. Personalized Preventive Plan for [place]as - 6/21/2022  Medicare offers a range of preventive health benefits. Some of the tests and screenings are paid in full while other may be subject to a deductible, co-insurance, and/or copay. Some of these benefits include a comprehensive review of your medical history including lifestyle, illnesses that may run in your family, and various assessments and screenings as appropriate. After reviewing your medical record and screening and assessments performed today your provider may have ordered immunizations, labs, imaging, and/or referrals for you. A list of these orders (if applicable) as well as your Preventive Care list are included within your After Visit Summary for your review. Other Preventive Recommendations:    · A preventive eye exam performed by an eye specialist is recommended every 1-2 years to screen for glaucoma; cataracts, macular degeneration, and other eye disorders. · A preventive dental visit is recommended every 6 months. · Try to get at least 150 minutes of exercise per week or 10,000 steps per day on a pedometer . · Order or download the FREE \"Exercise & Physical Activity: Your Everyday Guide\" from [employer]. Call 3-250.956.3016 or search [employer] online. · You need 4788-2436 mg of calcium and 8867-4411 IU of vitamin D per day. It is possible to meet your calcium requirement with diet alone, but a vitamin D supplement is usually necessary to meet this goal.  · When exposed to the sun, use a sunscreen that protects against both UVA and UVB radiation with an SPF of 30 or greater. Reapply every 2 to 3 hours or after sweating, drying off with a towel, or swimming. · Always wear a seat belt when traveling in a car. Always wear a helmet when riding a bicycle or motorcycle.

## 2022-06-21 NOTE — PROGRESS NOTES
Medicare Annual Wellness Visit    Elana Arora is here for 3 Month Follow-Up (edema and back pain) and Medicare AWV    Assessment & Plan   Numbness and tingling of both feet  -     Madelyn Hamilton MD, Physical Medicine, [place]  Primary osteoarthritis of left knee  -     HYDROcodone-acetaminophen (NORCO)  MG per tablet; Take 1 tablet by mouth every 6 hours as needed for Pain for up to 30 days. Intended supply: 30 days, Disp-120 tablet, R-0Normal  Primary osteoarthritis of right knee  -     HYDROcodone-acetaminophen (NORCO)  MG per tablet; Take 1 tablet by mouth every 6 hours as needed for Pain for up to 30 days. Intended supply: 30 days, Disp-120 tablet, R-0Normal  Chronic bilateral low back pain without sciatica  -     HYDROcodone-acetaminophen (NORCO)  MG per tablet; Take 1 tablet by mouth every 6 hours as needed for Pain for up to 30 days. Intended supply: 30 days, Disp-120 tablet, R-0Normal  Bilateral lower extremity edema  -     furosemide (LASIX) 20 MG tablet; Take 1 tablet by mouth 2 times daily, Disp-60 tablet, R-2Normal  -     potassium chloride (KLOR-CON M) 10 MEQ extended release tablet; Take 1 tablet by mouth 2 times daily, Disp-60 tablet, R-2Normal  Essential hypertension  -     amLODIPine (NORVASC) 5 MG tablet; TAKE 1 TABLET BY MOUTH EVERY DAY IN THE MORNING, Disp-90 tablet, R-1Normal  -     lisinopril (PRINIVIL;ZESTRIL) 5 MG tablet; TAKE 1 TABLET BY MOUTH EVERY DAY IN THE MORNING, Disp-90 tablet, R-1Normal  BPH associated with nocturia  -     tamsulosin (FLOMAX) 0.4 MG capsule; Take 1 capsule by mouth daily, Disp-90 capsule, R-1Normal  Gastroesophageal reflux disease without esophagitis  -     pantoprazole (PROTONIX) 40 MG tablet; TAKE 1 TABLET EVERY MORNING BEFORE BREAKFAST, Disp-90 tablet, R-1Normal  Personal history of tobacco use  -     CT Lung Screening;  Future  Initial Medicare annual wellness visit      Recommendations for Preventive Services Due: see orders and patient instructions/AVS.  Recommended screening schedule for the next 5-10 years is provided to the patient in written form: see Patient Instructions/AVS.     No follow-ups on file. Subjective       Patient's complete Health Risk Assessment and screening values have been reviewed and are found in Flowsheets. The following problems were reviewed today and where indicated follow up appointments were made and/or referrals ordered. Positive Risk Factor Screenings with Interventions:       Tobacco Use:     Tobacco Use: High Risk    Smoking Tobacco Use: Current Every Day Smoker    Smokeless Tobacco Use: Former User     E-Cigarettes/Vaping Use     Questions Responses    E-Cigarette/Vaping Use Never User    Start Date     Passive Exposure     Quit Date     Counseling Given     Comments         Substance Use - Tobacco Interventions:  tobacco cessation tips and resources provided           Opioid Risk: (High risk score ?55) Opioid risk score: 79    Last PDMP [place] as Reviewed:  Review User Review Instant Review Result   Rocco Fails 6/21/2022  1:36 PM Reviewed PDMP [1]     Last Controlled Substance Monitoring Documentation      Orders Only from 1/31/2022 in [facility]   Periodic Controlled Substance Monitoring Possible medication side effects, risk of tolerance/dependence & alternative treatments discussed., Obtaining appropriate analgesic effect of treatment.  filed at 01/31/2022 [address] and ACP:  General  In general, how would you say your health is?: Fair  In the past 7 days, have you experienced any of the following: New or Increased Pain, New or Increased Fatigue, Loneliness, Social Isolation, Stress or Anger?: No  Do you get the social and emotional support that you need?: Yes  Do you have a Living Will?: Yes    Advance Directives     Power of [address] [place] [address] Will ACP-Advance Directive ACP-Power of     Not on File Not on File Not on File Not on File General Health Risk Interventions:  · none    Health Habits/Nutrition:     Physical Activity: Insufficiently Active    Days of Exercise per Week: 5 days    Minutes of Exercise per Session: 20 min     Have you lost any weight without trying in the past 3 months?: No  Body mass index: (!) 44.85  Have you seen the dentist within the past year?: N/A - wear dentures    Health Habits/Nutrition Interventions:  · Nutritional issues:  educational materials for healthy, well-balanced diet provided    Hearing/Vision:  Do you or your family notice any trouble with your hearing that hasn't been managed with hearing aids?: (!) Yes  Do you have difficulty driving, watching TV, or doing any of your daily activities because of your eyesight?: No  Have you had an eye exam within the past year?: Yes   Visual Acuity Screening    Right eye Left eye Both eyes   Without correction: 20/20-1 20/25 20/20   With correction:        Hearing/Vision Interventions:  · Hearing concerns:  patient declines any further evaluation/treatment for hearing issues            Objective   Vitals:    06/21/22 1333   BP: 126/60   Site: Right Upper Arm   Position: Sitting   Cuff Size: Large Adult   Pulse: 68   SpO2: 94%   Weight: 295 lb (133.8 kg)   Height: 5' 8\" (1.727 m)      Body mass index is 44.85 kg/m². Allergies   Allergen Reactions    Sulfa Antibiotics Rash     Prior to Visit Medications    Medication Sig Taking? Authorizing Provider   HYDROcodone-acetaminophen (NORCO)  MG per tablet Take 1 tablet by mouth every 6 hours as needed for Pain for up to 30 days.  Intended supply: 30 days Yes Nolberto Hamilton MD   furosemide (LASIX) 20 MG tablet Take 1 tablet by mouth 2 times daily Yes Nolberto Hamilton MD   potassium chloride (KLOR-CON M) 10 MEQ extended release tablet Take 1 tablet by mouth 2 times daily Yes Nolberto Hamilton MD   amLODIPine (NORVASC) 5 MG tablet TAKE 1 TABLET BY MOUTH EVERY DAY IN THE MORNING Yes Nolberto Hamilton MD tamsulosin (FLOMAX) 0.4 MG capsule Take 1 capsule by mouth daily Yes Home Perez MD   lisinopril (PRINIVIL;ZESTRIL) 5 MG tablet TAKE 1 TABLET BY MOUTH EVERY DAY IN THE MORNING Yes Home Perez MD   pantoprazole (PROTONIX) 40 MG tablet TAKE 1 TABLET EVERY MORNING BEFORE BREAKFAST Yes Home Perez MD   polyethylene glycol-electrolytes (NULYTELY LEMON-LIME) 420 g solution mix and drink entire container between 6 pm and 10 pm night before procedure  Patient not taking: Reported on 5/11/2022  Historical Provider, MD   fluticasone-salmeterol (ADVAIR DISKUS) 250-50 MCG/DOSE AEPB INHALE 1 PUFF INTO THE LUNGS EVERY 12 HOURS  Home Perez MD   furosemide (LASIX) 20 MG tablet Take 1 tablet by mouth 2 times daily  Home Perez MD   potassium chloride (KLOR-CON M) 10 MEQ extended release tablet Take 1 tablet by mouth 2 times daily  Home Perez MD   aspirin 325 MG EC tablet Take 1 tablet by mouth 2 times daily for 14 days  Malika Hein DO   Spacer/Aero-Holding Chambers (AEROCHAMBER MV) MISC Use with albuterol. Home Perez MD   Compression Stockings MISC by Does not apply route 20-30 mM HG    Wear daily  Home Perez MD   sildenafil (VIAGRA) 100 MG tablet Take 1 tablet by mouth as needed for Erectile Dysfunction  Home Perez MD   albuterol sulfate HFA (PROAIR HFA) 108 (90 Base) MCG/ACT inhaler Inhale 2 puffs into the lungs every 6 hours as needed for Wheezing  Home Perez MD       [facility] (Including outside providers/suppliers regularly involved in providing care):   Patient Care Team:  Home Perez MD as PCP - Prem Auguste MD as PCP - [facility]     Reviewed and updated this visit:  Tobacco  Allergies  Med Hx  Surg Hx  Soc Hx  Fam Hx                LDCT Screening: Discussed with patient the benefits and harms of screening, follow-up diagnostic testing, over-diagnosis, false positive rate, and total radiation exposure.  Counseled on the importance of adherence to annual lung cancer LDCT screening, impact of comorbidities, ability and willingness to undergo diagnosis and treatment. Counseled on the importance of maintaining cigarette smoking abstinence and cessation. Patient has a history of heavy tobacco use of over 30 pack years. Patient does not present signs or symptoms of lung cancer. Advance Care Planning   Advanced Care Planning: Discussed the patients choices for care and treatment in case of a health event that adversely affects decision-making abilities. Also discussed the patients long-term treatment options. Reviewed with the patient the appropriate state-specific advance directive documents. Reviewed the process of designating a competent adult as an Agent (or -in-fact) that could take make health care decisions for the patient if incompetent. Patient was asked to complete the declaration forms, either acknowledge the forms by a public notary or an eligible witness and provide a signed copy to the practice office. Time spent (minutes): 5    Cardiovascular Disease Risk Counseling: Assessed the patient's risk to develop cardiovascular disease and reviewed main risk factors. Reviewed steps to reduce disease risk including:   · Quitting tobacco use, reducing amount smoked, or not starting the habit  · Making healthy food choices  · Being physically active and gradualy increasing activity levels   · Reduce weight and determine a healthy BMI goal  · Monitor blood pressure and treat if higher than 140/90 mmHg  · Maintain blood total cholesterol levels under 5 mmol/l or 190 mg/dl  · Maintain LDL cholesterol levels under 3.0 mmol/l or 115 mg/dl   · Control blood glucose levels  · Consider taking aspirin (75 mg daily), once blood pressure is controlled   Provided a follow up plan.   Time spent (minutes): 3  Tobacco Cessation Counseling: Patient advised about behavior change, including information about personal health harms, usage of appropriate cessation measures and benefits of cessation. Time spent (minutes): 3    Obesity Counseling: Assessed behavioral health risks and factors affecting choice of behavior. Suggested weight control approaches, including dietary changes behavioral modification and follow up plan. Provided educational and support documentation.   Time spent (minutes): 3

## 2022-06-22 DIAGNOSIS — R60.0 BILATERAL LOWER EXTREMITY EDEMA: ICD-10-CM

## 2022-06-23 RX ORDER — FUROSEMIDE 20 MG/1
TABLET ORAL
Qty: 180 TABLET | OUTPATIENT
Start: 2022-06-23

## 2022-06-30 ENCOUNTER — OFFICE VISIT (OUTPATIENT)
Dept: ORTHOPEDIC SURGERY | Age: 72
End: 2022-06-30
Payer: MEDICARE

## 2022-06-30 VITALS
DIASTOLIC BLOOD PRESSURE: 82 MMHG | WEIGHT: 297 LBS | HEIGHT: 68 IN | BODY MASS INDEX: 45.01 KG/M2 | RESPIRATION RATE: 16 BRPM | HEART RATE: 83 BPM | SYSTOLIC BLOOD PRESSURE: 134 MMHG | OXYGEN SATURATION: 96 %

## 2022-06-30 DIAGNOSIS — M17.12 PRIMARY OSTEOARTHRITIS OF LEFT KNEE: Primary | ICD-10-CM

## 2022-06-30 PROCEDURE — 99999 PR OFFICE/OUTPT VISIT,PROCEDURE ONLY: CPT | Performed by: PHYSICIAN ASSISTANT

## 2022-06-30 PROCEDURE — 20610 DRAIN/INJ JOINT/BURSA W/O US: CPT | Performed by: PHYSICIAN ASSISTANT

## 2022-06-30 RX ORDER — POTASSIUM CHLORIDE 750 MG/1
TABLET, EXTENDED RELEASE ORAL
Qty: 180 TABLET | OUTPATIENT
Start: 2022-06-30

## 2022-06-30 NOTE — PATIENT INSTRUCTIONS
Orthovisc # 2  Rest both knees for 24-48 hours  Work on ROM and strengthening of knees and legs   May take NSAIDS or anti-inflammatories as needed  Weightbearing as tolerated  Follow up in one week for the 2nd injection

## 2022-06-30 NOTE — PROGRESS NOTES
VISCO-SUPPLEMENTATION INJECTION (Number 1)  I reviewed and agree with the portions of the HPI, review of systems, vital documentation and plan performed by my staff and have added/addended where appropriate. HISTORY OF PRESENT ILLNESS (HPI):   Albert Salas is a 81y.o. year old male who is here for follow up for visco-supplementation injection number 1 for   1. Primary osteoarthritis of left knee        PAST HISTORY:   Unless otherwise specified in this note, the past history is reviewed today with the patient and is as follows-    Allergies   Allergen Reactions    Sulfa Antibiotics Rash       Current Outpatient Medications   Medication Sig Dispense Refill    HYDROcodone-acetaminophen (NORCO)  MG per tablet Take 1 tablet by mouth every 6 hours as needed for Pain for up to 30 days.  Intended supply: 30 days 120 tablet 0    furosemide (LASIX) 20 MG tablet Take 1 tablet by mouth 2 times daily 60 tablet 2    potassium chloride (KLOR-CON M) 10 MEQ extended release tablet Take 1 tablet by mouth 2 times daily 60 tablet 2    amLODIPine (NORVASC) 5 MG tablet TAKE 1 TABLET BY MOUTH EVERY DAY IN THE MORNING 90 tablet 1    tamsulosin (FLOMAX) 0.4 MG capsule Take 1 capsule by mouth daily 90 capsule 1    lisinopril (PRINIVIL;ZESTRIL) 5 MG tablet TAKE 1 TABLET BY MOUTH EVERY DAY IN THE MORNING 90 tablet 1    pantoprazole (PROTONIX) 40 MG tablet TAKE 1 TABLET EVERY MORNING BEFORE BREAKFAST 90 tablet 1    polyethylene glycol-electrolytes (NULYTELY LEMON-LIME) 420 g solution mix and drink entire container between 6 pm and 10 pm night before procedure      fluticasone-salmeterol (ADVAIR DISKUS) 250-50 MCG/DOSE AEPB INHALE 1 PUFF INTO THE LUNGS EVERY 12 HOURS 180 each 1    furosemide (LASIX) 20 MG tablet Take 1 tablet by mouth 2 times daily 60 tablet 0    potassium chloride (KLOR-CON M) 10 MEQ extended release tablet Take 1 tablet by mouth 2 times daily 60 tablet 0    aspirin 325 MG EC tablet Take 1 tablet by mouth 2 times daily for 14 days 28 tablet 0    Spacer/Aero-Holding Chambers (AEROCHAMBER MV) MISC Use with albuterol. 1 each 0    Compression Stockings MISC by Does not apply route 20-30 mM HG    Wear daily 2 each 0    sildenafil (VIAGRA) 100 MG tablet Take 1 tablet by mouth as needed for Erectile Dysfunction 30 tablet 2    albuterol sulfate HFA (PROAIR HFA) 108 (90 Base) MCG/ACT inhaler Inhale 2 puffs into the lungs every 6 hours as needed for Wheezing 3 Inhaler 1     No current facility-administered medications for this visit. PHYSICAL EXAM:   /82 (Site: Left Upper Arm, Position: Sitting, Cuff Size: Large Adult)   Pulse 83   Resp 16   Ht 5' 8\" (1.727 m)   Wt 297 lb (134.7 kg)   SpO2 96%   BMI 45.16 kg/m²     The left knee is examined:  Inspection:  Skin is intact. No erythema. Palpation:  No swelling or acute tenderness. Neuro/Vascular/Motor:  Sensation to light touch intact. Capillary refill brisk. No focal motor deficits. DIAGNOSIS:     1. Primary osteoarthritis of left knee        PROCEDURE:   Left Knee Aspiration / Injection Procedure:  Multiple treatment options were discussed. Joint injection was recommended. Details of the procedure, potential risks, and potential benefits were discussed. Patient's questions were answered. Patient elected to proceed with procedure. Medication: Orthovisc 2 mL  NDC #: [id number]  Lot #:[id number]  Procedure: Sterile technique was used as the skin over the injection site was double prepped with alcohol then the knee joint was then injected through the superior lateral joint with the above listed medication. A sterile bandage was placed over the injection site. The patient tolerated the procedure well without complication.  The patient is scheduled for the second injection in one week

## 2022-07-07 ENCOUNTER — OFFICE VISIT (OUTPATIENT)
Dept: ORTHOPEDIC SURGERY | Age: 72
End: 2022-07-07
Payer: MEDICARE

## 2022-07-07 VITALS
DIASTOLIC BLOOD PRESSURE: 70 MMHG | SYSTOLIC BLOOD PRESSURE: 117 MMHG | HEIGHT: 68 IN | WEIGHT: 297 LBS | BODY MASS INDEX: 45.01 KG/M2 | HEART RATE: 85 BPM

## 2022-07-07 DIAGNOSIS — M17.12 PRIMARY OSTEOARTHRITIS OF LEFT KNEE: Primary | ICD-10-CM

## 2022-07-07 PROCEDURE — 20610 DRAIN/INJ JOINT/BURSA W/O US: CPT | Performed by: PHYSICIAN ASSISTANT

## 2022-07-07 PROCEDURE — 99999 PR OFFICE/OUTPT VISIT,PROCEDURE ONLY: CPT | Performed by: PHYSICIAN ASSISTANT

## 2022-07-07 NOTE — PATIENT INSTRUCTIONS
Gelsyn 3 # 3  Rest both knees for 24-48 hours  Work on ROM and strengthening of knees and legs   May take NSAIDS or anti-inflammatories as needed  Weightbearing as tolerated  Follow up in one week for the 3rd injection

## 2022-07-07 NOTE — PROGRESS NOTES
VISCO-SUPPLEMENTATION INJECTION (Number 2)  I reviewed and agree with the portions of the HPI, review of systems, vital documentation and plan performed by my staff and have added/addended where appropriate. HISTORY OF PRESENT ILLNESS (HPI):   Gloria Taveras is a 81y.o. year old male who is here for follow up for visco-supplementation injection number 2 for   No diagnosis found. PAST HISTORY:   Unless otherwise specified in this note, the past history is reviewed today with the patient and is as follows-    Allergies   Allergen Reactions    Sulfa Antibiotics Rash       Current Outpatient Medications   Medication Sig Dispense Refill    HYDROcodone-acetaminophen (NORCO)  MG per tablet Take 1 tablet by mouth every 6 hours as needed for Pain for up to 30 days.  Intended supply: 30 days 120 tablet 0    furosemide (LASIX) 20 MG tablet Take 1 tablet by mouth 2 times daily 60 tablet 2    potassium chloride (KLOR-CON M) 10 MEQ extended release tablet Take 1 tablet by mouth 2 times daily 60 tablet 2    amLODIPine (NORVASC) 5 MG tablet TAKE 1 TABLET BY MOUTH EVERY DAY IN THE MORNING 90 tablet 1    tamsulosin (FLOMAX) 0.4 MG capsule Take 1 capsule by mouth daily 90 capsule 1    lisinopril (PRINIVIL;ZESTRIL) 5 MG tablet TAKE 1 TABLET BY MOUTH EVERY DAY IN THE MORNING 90 tablet 1    pantoprazole (PROTONIX) 40 MG tablet TAKE 1 TABLET EVERY MORNING BEFORE BREAKFAST 90 tablet 1    polyethylene glycol-electrolytes (NULYTELY LEMON-LIME) 420 g solution mix and drink entire container between 6 pm and 10 pm night before procedure      fluticasone-salmeterol (ADVAIR DISKUS) 250-50 MCG/DOSE AEPB INHALE 1 PUFF INTO THE LUNGS EVERY 12 HOURS 180 each 1    furosemide (LASIX) 20 MG tablet Take 1 tablet by mouth 2 times daily 60 tablet 0    potassium chloride (KLOR-CON M) 10 MEQ extended release tablet Take 1 tablet by mouth 2 times daily 60 tablet 0    aspirin 325 MG EC tablet Take 1 tablet by mouth 2 times daily for 14 days 28 tablet 0    Spacer/Aero-Holding Chambers (AEROCHAMBER MV) MISC Use with albuterol. 1 each 0    Compression Stockings MISC by Does not apply route 20-30 mM HG    Wear daily 2 each 0    sildenafil (VIAGRA) 100 MG tablet Take 1 tablet by mouth as needed for Erectile Dysfunction 30 tablet 2    albuterol sulfate HFA (PROAIR HFA) 108 (90 Base) MCG/ACT inhaler Inhale 2 puffs into the lungs every 6 hours as needed for Wheezing 3 Inhaler 1     No current facility-administered medications for this visit. PHYSICAL EXAM:   There were no vitals taken for this visit. The left knee is examined:  Inspection:  Skin is intact. No erythema. Palpation:  No swelling or acute tenderness. Neuro/Vascular/Motor:  Sensation to light touch intact. Capillary refill brisk. No focal motor deficits. DIAGNOSIS:     No diagnosis found. PROCEDURE:   Left Knee Aspiration / Injection Procedure:  Multiple treatment options were discussed. Joint injection was recommended. Details of the procedure, potential risks, and potential benefits were discussed. Patient's questions were answered. Patient elected to proceed with procedure. Medication: Orthovisc 2 mL  NDC #: [id number]  Lot #:[id number]  Procedure: Sterile technique was used as the skin over the injection site was double prepped with alcohol then the knee joint was then injected through the superior lateral joint with the above listed medication. A sterile bandage was placed over the injection site. The patient tolerated the procedure well without complication.  The patient is scheduled for the second injection in one week

## 2022-07-14 ENCOUNTER — OFFICE VISIT (OUTPATIENT)
Dept: ORTHOPEDIC SURGERY | Age: 72
End: 2022-07-14
Payer: MEDICARE

## 2022-07-14 VITALS
WEIGHT: 295 LBS | BODY MASS INDEX: 44.71 KG/M2 | HEIGHT: 68 IN | OXYGEN SATURATION: 97 % | RESPIRATION RATE: 16 BRPM | SYSTOLIC BLOOD PRESSURE: 134 MMHG | DIASTOLIC BLOOD PRESSURE: 80 MMHG | HEART RATE: 76 BPM

## 2022-07-14 DIAGNOSIS — M54.50 ACUTE MIDLINE LOW BACK PAIN, UNSPECIFIED WHETHER SCIATICA PRESENT: ICD-10-CM

## 2022-07-14 DIAGNOSIS — M17.12 PRIMARY OSTEOARTHRITIS OF LEFT KNEE: ICD-10-CM

## 2022-07-14 PROCEDURE — 20610 DRAIN/INJ JOINT/BURSA W/O US: CPT

## 2022-07-14 PROCEDURE — 99999 PR OFFICE/OUTPT VISIT,PROCEDURE ONLY: CPT

## 2022-07-14 NOTE — PROGRESS NOTES
VISCO-SUPPLEMENTATION INJECTION (Number 3)  I reviewed and agree with the portions of the HPI, review of systems, vital documentation and plan performed by my staff and have added/addended where appropriate.     HISTORY OF PRESENT ILLNESS (HPI):   Herb Valera is a 81y.o. year old male who is here for follow up for visco-supplementation injection number 3 for   1. Primary osteoarthritis of left knee       Patient also stated is his knees began to feel better he would like to be seen for his low back pain as well as the fluid retention in his bilateral legs. PAST HISTORY:   Unless otherwise specified in this note, the past history is reviewed today with the patient and is as follows-          Allergies   Allergen Reactions    Sulfa Antibiotics Rash         Current Facility-Administered Medications          Current Outpatient Medications   Medication Sig Dispense Refill    HYDROcodone-acetaminophen (NORCO)  MG per tablet Take 1 tablet by mouth every 6 hours as needed for Pain for up to 30 days.  Intended supply: 30 days 120 tablet 0    furosemide (LASIX) 20 MG tablet Take 1 tablet by mouth 2 times daily 60 tablet 2    potassium chloride (KLOR-CON M) 10 MEQ extended release tablet Take 1 tablet by mouth 2 times daily 60 tablet 2    amLODIPine (NORVASC) 5 MG tablet TAKE 1 TABLET BY MOUTH EVERY DAY IN THE MORNING 90 tablet 1    tamsulosin (FLOMAX) 0.4 MG capsule Take 1 capsule by mouth daily 90 capsule 1    lisinopril (PRINIVIL;ZESTRIL) 5 MG tablet TAKE 1 TABLET BY MOUTH EVERY DAY IN THE MORNING 90 tablet 1    pantoprazole (PROTONIX) 40 MG tablet TAKE 1 TABLET EVERY MORNING BEFORE BREAKFAST 90 tablet 1    polyethylene glycol-electrolytes (NULYTELY LEMON-LIME) 420 g solution mix and drink entire container between 6 pm and 10 pm night before procedure        fluticasone-salmeterol (ADVAIR DISKUS) 250-50 MCG/DOSE AEPB INHALE 1 PUFF INTO THE LUNGS EVERY 12 HOURS 180 each 1    furosemide (LASIX) 20 MG felt it reasonable to refer the patient to Dr. Trinidad Deleon for a formal evaluation of his low back pain. I encouraged the patient to return to his PCP for an evaluation as to the cause of his bilateral edema in his calves and ankles. I encouraged him to wear compression stockings to help with his circulation in his lower extremities.     Rogerio Field PA-C

## 2022-07-14 NOTE — PROGRESS NOTES
VISCO-SUPPLEMENTATION INJECTION (Number 3)  I reviewed and agree with the portions of the HPI, review of systems, vital documentation and plan performed by my staff and have added/addended where appropriate. HISTORY OF PRESENT ILLNESS (HPI):   Gloria Rodriguez is a 81y.o. year old male who is here for follow up for visco-supplementation injection number 3 for   1. Primary osteoarthritis of left knee        PAST HISTORY:   Unless otherwise specified in this note, the past history is reviewed today with the patient and is as follows-    Allergies   Allergen Reactions    Sulfa Antibiotics Rash       Current Outpatient Medications   Medication Sig Dispense Refill    HYDROcodone-acetaminophen (NORCO)  MG per tablet Take 1 tablet by mouth every 6 hours as needed for Pain for up to 30 days.  Intended supply: 30 days 120 tablet 0    furosemide (LASIX) 20 MG tablet Take 1 tablet by mouth 2 times daily 60 tablet 2    potassium chloride (KLOR-CON M) 10 MEQ extended release tablet Take 1 tablet by mouth 2 times daily 60 tablet 2    amLODIPine (NORVASC) 5 MG tablet TAKE 1 TABLET BY MOUTH EVERY DAY IN THE MORNING 90 tablet 1    tamsulosin (FLOMAX) 0.4 MG capsule Take 1 capsule by mouth daily 90 capsule 1    lisinopril (PRINIVIL;ZESTRIL) 5 MG tablet TAKE 1 TABLET BY MOUTH EVERY DAY IN THE MORNING 90 tablet 1    pantoprazole (PROTONIX) 40 MG tablet TAKE 1 TABLET EVERY MORNING BEFORE BREAKFAST 90 tablet 1    polyethylene glycol-electrolytes (NULYTELY LEMON-LIME) 420 g solution mix and drink entire container between 6 pm and 10 pm night before procedure      fluticasone-salmeterol (ADVAIR DISKUS) 250-50 MCG/DOSE AEPB INHALE 1 PUFF INTO THE LUNGS EVERY 12 HOURS 180 each 1    furosemide (LASIX) 20 MG tablet Take 1 tablet by mouth 2 times daily 60 tablet 0    potassium chloride (KLOR-CON M) 10 MEQ extended release tablet Take 1 tablet by mouth 2 times daily 60 tablet 0    aspirin 325 MG EC tablet Take 1 tablet by mouth 2 times daily for 14 days 28 tablet 0    Spacer/Aero-Holding Chambers (AEROCHAMBER MV) MISC Use with albuterol. 1 each 0    Compression Stockings MISC by Does not apply route 20-30 mM HG    Wear daily 2 each 0    sildenafil (VIAGRA) 100 MG tablet Take 1 tablet by mouth as needed for Erectile Dysfunction 30 tablet 2    albuterol sulfate HFA (PROAIR HFA) 108 (90 Base) MCG/ACT inhaler Inhale 2 puffs into the lungs every 6 hours as needed for Wheezing 3 Inhaler 1     No current facility-administered medications for this visit. PHYSICAL EXAM:   There were no vitals taken for this visit. The left knee is examined:  Inspection:  Skin is intact. No erythema. Palpation:  No swelling or acute tenderness. Neuro/Vascular/Motor:  Sensation to light touch intact. Capillary refill brisk. No focal motor deficits. DIAGNOSIS:     1. Primary osteoarthritis of left knee        PROCEDURE:   Left Knee Aspiration / Injection Procedure:  Multiple treatment options were discussed. Joint injection was recommended. Details of the procedure, potential risks, and potential benefits were discussed. Patient's questions were answered. Patient elected to proceed with procedure. Medication: Orthovisc 2 mL  NDC #: [id number]  Lot #:[id number]  Procedure: Sterile technique was used as the skin over the injection site was double prepped with alcohol then the knee joint was then injected through the superior lateral joint with the above listed medication. A sterile bandage was placed over the injection site. The patient tolerated the procedure well without complication.

## 2022-07-18 ENCOUNTER — TELEPHONE (OUTPATIENT)
Dept: FAMILY MEDICINE CLINIC | Age: 72
End: 2022-07-18

## 2022-07-18 NOTE — TELEPHONE ENCOUNTER
Spoke with patient advised patient to call insurance to see who is in network and call office back so we can send the referral

## 2022-07-18 NOTE — TELEPHONE ENCOUNTER
----- Message from Ernestina Clark sent at 7/18/2022  3:43 PM EDT -----  Subject: Message to Provider    QUESTIONS  Information for Provider? Patient was referred to Josiah Alan MD but   Dr's office has not called or returned any of his calls/messages. Patient   would like a referral to another Dr in the [facility] location that is in   network. Patient would like the phone number for the Dr you send him to. Please call patient.  ---------------------------------------------------------------------------  --------------  Maude MORRIS  [phone]; OK to leave message on voicemail  ---------------------------------------------------------------------------  --------------  SCRIPT ANSWERS  Relationship to Patient?  Self

## 2022-07-27 DIAGNOSIS — M54.50 CHRONIC BILATERAL LOW BACK PAIN WITHOUT SCIATICA: ICD-10-CM

## 2022-07-27 DIAGNOSIS — M17.11 PRIMARY OSTEOARTHRITIS OF RIGHT KNEE: ICD-10-CM

## 2022-07-27 DIAGNOSIS — M17.12 PRIMARY OSTEOARTHRITIS OF LEFT KNEE: ICD-10-CM

## 2022-07-27 DIAGNOSIS — G89.29 CHRONIC BILATERAL LOW BACK PAIN WITHOUT SCIATICA: ICD-10-CM

## 2022-07-28 DIAGNOSIS — M54.50 CHRONIC BILATERAL LOW BACK PAIN WITHOUT SCIATICA: ICD-10-CM

## 2022-07-28 DIAGNOSIS — M17.11 PRIMARY OSTEOARTHRITIS OF RIGHT KNEE: ICD-10-CM

## 2022-07-28 DIAGNOSIS — G89.29 CHRONIC BILATERAL LOW BACK PAIN WITHOUT SCIATICA: ICD-10-CM

## 2022-07-28 DIAGNOSIS — M17.12 PRIMARY OSTEOARTHRITIS OF LEFT KNEE: ICD-10-CM

## 2022-07-28 RX ORDER — HYDROCODONE BITARTRATE AND ACETAMINOPHEN 10; 325 MG/1; MG/1
1 TABLET ORAL EVERY 6 HOURS PRN
Qty: 120 TABLET | Refills: 0 | Status: SHIPPED | OUTPATIENT
Start: 2022-07-28 | End: 2022-07-28 | Stop reason: SDUPTHER

## 2022-07-28 RX ORDER — HYDROCODONE BITARTRATE AND ACETAMINOPHEN 10; 325 MG/1; MG/1
1 TABLET ORAL EVERY 6 HOURS PRN
Qty: 120 TABLET | Refills: 0 | Status: SHIPPED | OUTPATIENT
Start: 2022-07-28 | End: 2022-08-25 | Stop reason: SDUPTHER

## 2022-07-28 NOTE — TELEPHONE ENCOUNTER
Spoke with Stephanie Gerber, Pharmacist at [facility] [employer] to cancel [employer].  Patient has also been notified that script was being sent to [facility] on  [address]

## 2022-07-28 NOTE — TELEPHONE ENCOUNTER
Patient called to inform us that script for Rexann Odor did not go to the [address] [facility] as requested. It looks like script was sent to [address]. Can script be resent to [facility]?

## 2022-08-22 DIAGNOSIS — R60.0 BILATERAL LOWER EXTREMITY EDEMA: ICD-10-CM

## 2022-08-23 RX ORDER — FUROSEMIDE 20 MG/1
TABLET ORAL
Qty: 180 TABLET | OUTPATIENT
Start: 2022-08-23

## 2022-08-24 NOTE — TELEPHONE ENCOUNTER
Pt scheduled an appointment for 9/22/2022 at 11:15. Pt stated that he will need a bridge prescription.

## 2022-08-25 DIAGNOSIS — M54.50 CHRONIC BILATERAL LOW BACK PAIN WITHOUT SCIATICA: ICD-10-CM

## 2022-08-25 DIAGNOSIS — M17.11 PRIMARY OSTEOARTHRITIS OF RIGHT KNEE: ICD-10-CM

## 2022-08-25 DIAGNOSIS — M17.12 PRIMARY OSTEOARTHRITIS OF LEFT KNEE: ICD-10-CM

## 2022-08-25 DIAGNOSIS — G89.29 CHRONIC BILATERAL LOW BACK PAIN WITHOUT SCIATICA: ICD-10-CM

## 2022-08-25 RX ORDER — FUROSEMIDE 20 MG/1
20 TABLET ORAL 2 TIMES DAILY
Qty: 60 TABLET | Refills: 0 | Status: SHIPPED | OUTPATIENT
Start: 2022-08-25 | End: 2022-10-17 | Stop reason: SDUPTHER

## 2022-08-25 RX ORDER — HYDROCODONE BITARTRATE AND ACETAMINOPHEN 10; 325 MG/1; MG/1
1 TABLET ORAL EVERY 6 HOURS PRN
Qty: 120 TABLET | Refills: 0 | Status: SHIPPED | OUTPATIENT
Start: 2022-08-25 | End: 2022-09-22 | Stop reason: SDUPTHER

## 2022-08-25 NOTE — DISCHARGE SUMMARY
Outpatient Physical Therapy  [facility]           [x] Phone: [phone]   Fax: [phone]  [facility]           [] Phone: [phone]   Fax: [phone]        Physical Therapy Daily Discharge Note  Date:  2022    Patient Name:  Denton Cabot    :  1950  MRN: [id number]    Restrictions/Precautions: Other position/activity restrictions: postop R TKA  Diagnosis:   Diagnosis: R TKA, L knee pain  Date of Injury/Surgery: 2/15/22  Treatment Diagnosis: Treatment Diagnosis: B knee pain, stiffness, weakness LE's and core    Insurance/Certification information: PT Insurance Information: [employer] $40 copay maybe?    Precert required: 4 total visits includes date of eval, auth # [id number]  22-3/28/22.   4 more visits from 22-22, Pt approved for 8 more visits from 22-22  Auth# [id number]  Referring Physician:  Referring Practitioner: Carlton Clinton  Next Doctor Visit:    Plan of care signed (Y/N):  yes  Outcome Measure: KOOS 64%  Visit# / total visits:   (/ thru 3/28),  (/ thru -),      by  per precert  (12 POC)   Pain level:      6/10 pain  Goals:     Patient goals : be able to get up and walk w/o stumbling or pain, get up from chair easier w/o pain, up and down from toilet w/o pain  Short term goals  Time Frame for Short term goals: 3 weeks  Short term goal 1: Pt will be compliant w/ HEP to help maximize recovery and restore function  Partially met  Short term goal 2: Pt will be able to report at least 25% reduction in pain   Met   Short term goal 3: Pt will be able to advance ROM and strength activity w/o pain  Mostly met   Long term goals  Time Frame for Long term goals : 6 weeks  Long term goal 1: Pt will be independent w/ HEP and be able to continue to progress and manage on his/her own  Progressing   Long term goal 2: Pt will be able to return to at least 75% of usual activity w/ min pain, including fishing    Slow progress  Long term goal 3: Pt will have improved KOOS score by 10% or more  Slow progress   KOOS 64% at eval,   4/18/22: 61%        Summary of Evaluation: Assessment: Pt is a 72 yo male who presents just over 4 weeks postop R TKA and also having L knee pain. He has Hx of back pain and some hip pain as well. He has noted edema in R lower leg, altered gait, waddle type, decreased core and LE strength w/ ROM deficits R knee. These limitations are affecting his aiblity to perform usual self care and ADL's as well as functional activity and he will benefit from PT to help restore ROM, gait, strength and function to return him to prior acctivity. Prior to 2021 he was getting up from a chair w/ min difficulty. Objective:    Unable to complete an assessment of the patient and their progress towards their goals secondary to discontinuation of therapy. Jersey Frias last appointment was on 4/26/22, he cancelled his last several appointments d/t L knee pain and has not returned. Communication with other providers:    Faxed Discharge note secondary to discontinuation of therapy sevices      Assessment:    Jersey Frias has discontinued therapy services and at this time they will be discharged from our facility. If their is any future needs please don't hesitate to call our offices and resubmit a new therapy order. We appreciate your referral and letting us serve your patients.        Interventions PRN:  [x] Therapeutic Exercise  [x] Modalities:  [x] Therapeutic Activity     [] Ultrasound  [] Estim  [] Gait Training      [] Cervical Traction [] Lumbar Traction  [x] Neuromuscular Re-education    [x] Cold/hotpack [] Iontophoresis   [x] Instruction in HEP      [x] Vasopneumatic   [] Dry Needling    [x] Manual Therapy               [] Aquatic Therapy              Electronically signed by:    Flaquito Zavaleta, PT,  PT, MPT, ATC   ,  8/25/2022, 12:45 PM

## 2022-09-07 DIAGNOSIS — J41.1 MUCOPURULENT CHRONIC BRONCHITIS (HCC): ICD-10-CM

## 2022-09-07 RX ORDER — FLUTICASONE PROPIONATE AND SALMETEROL 250; 50 UG/1; UG/1
POWDER RESPIRATORY (INHALATION)
OUTPATIENT
Start: 2022-09-07

## 2022-09-22 ENCOUNTER — OFFICE VISIT (OUTPATIENT)
Dept: FAMILY MEDICINE CLINIC | Age: 72
End: 2022-09-22
Payer: MEDICARE

## 2022-09-22 VITALS
SYSTOLIC BLOOD PRESSURE: 120 MMHG | HEIGHT: 68 IN | BODY MASS INDEX: 45.68 KG/M2 | WEIGHT: 301.4 LBS | OXYGEN SATURATION: 93 % | HEART RATE: 86 BPM | TEMPERATURE: 97 F | DIASTOLIC BLOOD PRESSURE: 72 MMHG

## 2022-09-22 DIAGNOSIS — M54.50 CHRONIC BILATERAL LOW BACK PAIN WITHOUT SCIATICA: ICD-10-CM

## 2022-09-22 DIAGNOSIS — M17.11 PRIMARY OSTEOARTHRITIS OF RIGHT KNEE: ICD-10-CM

## 2022-09-22 DIAGNOSIS — G89.29 CHRONIC BILATERAL LOW BACK PAIN WITHOUT SCIATICA: ICD-10-CM

## 2022-09-22 DIAGNOSIS — M17.12 PRIMARY OSTEOARTHRITIS OF LEFT KNEE: ICD-10-CM

## 2022-09-22 DIAGNOSIS — R60.0 BILATERAL LOWER EXTREMITY EDEMA: ICD-10-CM

## 2022-09-22 DIAGNOSIS — Z00.00 ENCOUNTER FOR ANNUAL WELLNESS VISIT (AWV) IN MEDICARE PATIENT: Primary | ICD-10-CM

## 2022-09-22 PROCEDURE — 3017F COLORECTAL CA SCREEN DOC REV: CPT | Performed by: FAMILY MEDICINE

## 2022-09-22 PROCEDURE — 99214 OFFICE O/P EST MOD 30 MIN: CPT | Performed by: FAMILY MEDICINE

## 2022-09-22 PROCEDURE — G8417 CALC BMI ABV UP PARAM F/U: HCPCS | Performed by: FAMILY MEDICINE

## 2022-09-22 PROCEDURE — G8428 CUR MEDS NOT DOCUMENT: HCPCS | Performed by: FAMILY MEDICINE

## 2022-09-22 PROCEDURE — G0439 PPPS, SUBSEQ VISIT: HCPCS | Performed by: FAMILY MEDICINE

## 2022-09-22 PROCEDURE — 1123F ACP DISCUSS/DSCN MKR DOCD: CPT | Performed by: FAMILY MEDICINE

## 2022-09-22 PROCEDURE — 4004F PT TOBACCO SCREEN RCVD TLK: CPT | Performed by: FAMILY MEDICINE

## 2022-09-22 RX ORDER — FUROSEMIDE 20 MG/1
20 TABLET ORAL 2 TIMES DAILY
Qty: 60 TABLET | Refills: 2 | Status: SHIPPED | OUTPATIENT
Start: 2022-09-22

## 2022-09-22 RX ORDER — POTASSIUM CHLORIDE 750 MG/1
10 TABLET, EXTENDED RELEASE ORAL 2 TIMES DAILY
Qty: 60 TABLET | Refills: 2 | Status: SHIPPED | OUTPATIENT
Start: 2022-09-22

## 2022-09-22 RX ORDER — HYDROCODONE BITARTRATE AND ACETAMINOPHEN 10; 325 MG/1; MG/1
1 TABLET ORAL EVERY 6 HOURS PRN
Qty: 120 TABLET | Refills: 0 | Status: SHIPPED | OUTPATIENT
Start: 2022-09-22 | End: 2022-10-27 | Stop reason: SDUPTHER

## 2022-09-22 ASSESSMENT — PATIENT HEALTH QUESTIONNAIRE - PHQ9
SUM OF ALL RESPONSES TO PHQ QUESTIONS 1-9: 4
SUM OF ALL RESPONSES TO PHQ QUESTIONS 1-9: 4
6. FEELING BAD ABOUT YOURSELF - OR THAT YOU ARE A FAILURE OR HAVE LET YOURSELF OR YOUR FAMILY DOWN: 0
2. FEELING DOWN, DEPRESSED OR HOPELESS: 1
3. TROUBLE FALLING OR STAYING ASLEEP: 0
9. THOUGHTS THAT YOU WOULD BE BETTER OFF DEAD, OR OF HURTING YOURSELF: 0
5. POOR APPETITE OR OVEREATING: 1
1. LITTLE INTEREST OR PLEASURE IN DOING THINGS: 1
7. TROUBLE CONCENTRATING ON THINGS, SUCH AS READING THE NEWSPAPER OR WATCHING TELEVISION: 1
4. FEELING TIRED OR HAVING LITTLE ENERGY: 0
SUM OF ALL RESPONSES TO PHQ QUESTIONS 1-9: 4
8. MOVING OR SPEAKING SO SLOWLY THAT OTHER PEOPLE COULD HAVE NOTICED. OR THE OPPOSITE, BEING SO FIGETY OR RESTLESS THAT YOU HAVE BEEN MOVING AROUND A LOT MORE THAN USUAL: 0
SUM OF ALL RESPONSES TO PHQ QUESTIONS 1-9: 4
SUM OF ALL RESPONSES TO PHQ9 QUESTIONS 1 & 2: 2
10. IF YOU CHECKED OFF ANY PROBLEMS, HOW DIFFICULT HAVE THESE PROBLEMS MADE IT FOR YOU TO DO YOUR WORK, TAKE CARE OF THINGS AT HOME, OR GET ALONG WITH OTHER PEOPLE: 0

## 2022-09-22 ASSESSMENT — LIFESTYLE VARIABLES
HOW OFTEN DO YOU HAVE A DRINK CONTAINING ALCOHOL: NEVER
HOW MANY STANDARD DRINKS CONTAINING ALCOHOL DO YOU HAVE ON A TYPICAL DAY: PATIENT DOES NOT DRINK

## 2022-09-22 NOTE — PROGRESS NOTES
Medicare Annual Wellness Visit    Herbert Oliva is here for Medicare AWV, Medication Refill, and Swelling    Assessment & Plan   Primary osteoarthritis of left knee  -     HYDROcodone-acetaminophen (NORCO)  MG per tablet; Take 1 tablet by mouth every 6 hours as needed for Pain for up to 30 days. Intended supply: 30 days, Disp-120 tablet, R-0Normal  Primary osteoarthritis of right knee  -     HYDROcodone-acetaminophen (NORCO)  MG per tablet; Take 1 tablet by mouth every 6 hours as needed for Pain for up to 30 days. Intended supply: 30 days, Disp-120 tablet, R-0Normal  Chronic bilateral low back pain without sciatica  -     HYDROcodone-acetaminophen (NORCO)  MG per tablet; Take 1 tablet by mouth every 6 hours as needed for Pain for up to 30 days. Intended supply: 30 days, Disp-120 tablet, R-0Normal  Bilateral lower extremity edema  -     Comprehensive Metabolic Panel  -     furosemide (LASIX) 20 MG tablet; Take 1 tablet by mouth 2 times daily, Disp-60 tablet, R-2Normal  -     potassium chloride (KLOR-CON M) 10 MEQ extended release tablet; Take 1 tablet by mouth 2 times daily, Disp-60 tablet, R-2Normal    Recommendations for Preventive Services Due: see orders and patient instructions/AVS.  Recommended screening schedule for the next 5-10 years is provided to the patient in written form: see Patient Instructions/AVS.     Return in about 3 months (around 12/22/2022) for chronic pain. Subjective       Patient's complete Health Risk Assessment and screening values have been reviewed and are found in Flowsheets. The following problems were reviewed today and where indicated follow up appointments were made and/or referrals ordered.     Positive Risk Factor Screenings with Interventions:    Fall Risk:  Do you feel unsteady or are you worried about falling? : (!) yes  2 or more falls in past year?: (!) yes  Fall with injury in past year?: (!) yes   Fall Risk Interventions:    Home safety tips provided      Tobacco Use:  Tobacco Use: High Risk    Smoking Tobacco Use: Every Day    Smokeless Tobacco Use: Former     E-cigarette/Vaping       Questions Responses    E-cigarette/Vaping Use Never User    Start Date     Passive Exposure     Quit Date     Counseling Given     Comments           Substance Use - Tobacco Interventions:  patient is not ready to work toward tobacco cessation at this time         Opioid Risk: (High risk score ?55) Opioid risk score: 79    Last PDMP [place] as Reviewed:  Review User Review Instant Review Result   Bi Andrade 9/22/2022 11:28 AM Reviewed PDMP [1]     Last Controlled Substance Monitoring Documentation      Flowsheet Row Orders Only from 1/31/2022 in [facility]   Periodic Controlled Substance Monitoring Possible medication side effects, risk of tolerance/dependence & alternative treatments discussed., Obtaining appropriate analgesic effect of treatment.  filed at 01/31/2022 [facility] and ACP:  General  In general, how would you say your health is?: Fair  In the past 7 days, have you experienced any of the following: New or Increased Pain, New or Increased Fatigue, Loneliness, Social Isolation, Stress or Anger?: No  Do you get the social and emotional support that you need?: (!) No  Do you have a Living Will?: Yes    Advance Directives       Power of [address] [place] [address] Will ACP-Advance Directive ACP-Power of     Not on File Not on File Not on File Not on File        General Health Risk Interventions:  Inadequate social/emotional support: patient declines any further intervention for this issue    Health Habits/Nutrition:  Physical Activity: Inactive    Days of Exercise per Week: 0 days    Minutes of Exercise per Session: 0 min     Have you lost any weight without trying in the past 3 months?: No  Body mass index: (!) 45.82  Have you seen the dentist within the past year?: (!) No  Health Habits/Nutrition Interventions:  Nutritional issues:  educational materials for healthy, well-balanced diet provided  Dental exam overdue:  patient encouraged to make appointment with his/her dentist    Hearing/Vision:  Do you or your family notice any trouble with your hearing that hasn't been managed with hearing aids?: (!) Yes  Do you have difficulty driving, watching TV, or doing any of your daily activities because of your eyesight?: No  Have you had an eye exam within the past year?: Yes  Vision Screening    Right eye Left eye Both eyes   Without correction 20/25 20/25 20/20   With correction        Hearing/Vision Interventions:  Hearing concerns:  patient declines any further evaluation/treatment for hearing issues            Objective   Vitals:    09/22/22 1116   BP: 120/72   Site: Left Upper Arm   Position: Sitting   Cuff Size: Large Adult   Pulse: 86   Temp: 97 °F (36.1 °C)   TempSrc: Infrared   SpO2: 93%   Weight: (!) 301 lb 6.4 oz (136.7 kg)   Height: 5' 8\" (1.727 m)      Body mass index is 45.83 kg/m². Allergies   Allergen Reactions    Sulfa Antibiotics Rash     Prior to Visit Medications    Medication Sig Taking? Authorizing Provider   HYDROcodone-acetaminophen (NORCO)  MG per tablet Take 1 tablet by mouth every 6 hours as needed for Pain for up to 30 days.  Intended supply: 30 days Yes Daniel Sampson MD   furosemide (LASIX) 20 MG tablet Take 1 tablet by mouth 2 times daily Yes Daniel Sampson MD   potassium chloride (KLOR-CON M) 10 MEQ extended release tablet Take 1 tablet by mouth 2 times daily Yes Daniel Sampson MD   furosemide (LASIX) 20 MG tablet Take 1 tablet by mouth 2 times daily  Daniel Sampson MD   amLODIPine (NORVASC) 5 MG tablet TAKE 1 TABLET BY MOUTH EVERY DAY IN THE MORNING  Daniel Sampson MD   tamsulosin (FLOMAX) 0.4 MG capsule Take 1 capsule by mouth daily  Daniel Sampson MD   lisinopril (PRINIVIL;ZESTRIL) 5 MG tablet TAKE 1 TABLET BY MOUTH EVERY DAY IN THE MORNING  Daniel Sampson MD   pantoprazole (PROTONIX) 40 MG tablet TAKE 1 TABLET EVERY MORNING BEFORE BREAKFAST  Salvatore Billings MD   polyethylene glycol-electrolytes (NULYTELY LEMON-LIME) 420 g solution mix and drink entire container between 6 pm and 10 pm night before procedure  Historical Provider, MD   fluticasone-salmeterol (ADVAIR DISKUS) 250-50 MCG/DOSE AEPB INHALE 1 PUFF INTO THE LUNGS EVERY 12 HOURS  Salvatore Billings MD   potassium chloride (KLOR-CON M) 10 MEQ extended release tablet Take 1 tablet by mouth 2 times daily  Salvatore Billings MD   aspirin 325 MG EC tablet Take 1 tablet by mouth 2 times daily for 14 days  Kiko Ibrahim DO   Spacer/Aero-Holding Chambers (AEROCHAMBER MV) MISC Use with albuterol. Salvatore Billings MD   Compression Stockings MISC by Does not apply route 20-30 mM HG    Wear daily  Salvatore Billings MD   sildenafil (VIAGRA) 100 MG tablet Take 1 tablet by mouth as needed for Erectile Dysfunction  Salvatore Billings MD   albuterol sulfate HFA (PROAIR HFA) 108 (90 Base) MCG/ACT inhaler Inhale 2 puffs into the lungs every 6 hours as needed for Wheezing  Salvatore Billings MD       CareTeam (Including outside providers/suppliers regularly involved in providing care):   Patient Care Team:  Salvatore Billings MD as PCP - Vassie Holstein, MD as PCP - [facility]     Reviewed and updated this visit:  Allergies              Advance Care Planning   Advanced Care Planning: Discussed the patients choices for care and treatment in case of a health event that adversely affects decision-making abilities. Also discussed the patients long-term treatment options. Reviewed with the patient the appropriate state-specific advance directive documents. Reviewed the process of designating a competent adult as an Agent (or -in-fact) that could take make health care decisions for the patient if incompetent.  Patient was asked to complete the declaration forms, either acknowledge the forms by a public notary or an eligible witness and provide a signed copy to the practice office. Time spent (minutes): 5     Cardiovascular Disease Risk Counseling: Assessed the patient's risk to develop cardiovascular disease and reviewed main risk factors. Reviewed steps to reduce disease risk including:   Quitting tobacco use, reducing amount smoked, or not starting the habit  Making healthy food choices  Being physically active and gradualy increasing activity levels   Reduce weight and determine a healthy BMI goal  Monitor blood pressure and treat if higher than 140/90 mmHg  Maintain blood total cholesterol levels under 5 mmol/l or 190 mg/dl  Maintain LDL cholesterol levels under 3.0 mmol/l or 115 mg/dl   Control blood glucose levels  Consider taking aspirin (75 mg daily), once blood pressure is controlled   Provided a follow up plan. Time spent (minutes): 3  Obesity Counseling: Assessed behavioral health risks and factors affecting choice of behavior. Suggested weight control approaches, including dietary changes behavioral modification and follow up plan. Provided educational and support documentation. Time spent (minutes): 3  Tobacco Cessation Counseling: Patient advised about behavior change, including information about personal health harms, usage of appropriate cessation measures and benefits of cessation.   Time spent (minutes): 3

## 2022-09-22 NOTE — PATIENT INSTRUCTIONS
Advance Directives: Care Instructions  Overview  An advance directive is a legal way to state your wishes at the end of your life. It tells your family and your doctor what to do if you can't say what you want. There are two main types of advance directives. You can change them any time your wishes change. Living will. This form tells your family and your doctor your wishes about life support and other treatment. The form is also called a declaration. Medical power of . This form lets you name a person to make treatment decisions for you when you can't speak for yourself. This person is called a health care agent (health care proxy, health care surrogate). The form is also called a durable power of  for health care. If you do not have an advance directive, decisions about your medical care may be made by a family member, or by a doctor or a  who doesn't know you. It may help to think of an advance directive as a gift to the people who care for you. If you have one, they won't have to make tough decisions by themselves. Follow-up care is a key part of your treatment and safety. Be sure to make and go to all appointments, and call your doctor if you are having problems. It's also a good idea to know your test results and keep a list of the medicines you take. What should you include in an advance directive? Many states have a unique advance directive form. (It may ask you to address specific issues.) Or you might use a universal form that's approved by many states. If your form doesn't tell you what to address, it may be hard to know what to include in your advance directive. Use the questions below to help you get started. Who do you want to make decisions about your medical care if you are not able to? What life-support measures do you want if you have a serious illness that gets worse over time or can't be cured? What are you most afraid of that might happen?  (Maybe you're afraid of having pain, losing your independence, or being kept alive by machines.)  Where would you prefer to die? (Your home? A hospital? A nursing home?)  Do you want to donate your organs when you die? Do you want certain Confucianist practices performed before you die? When should you call for help? Be sure to contact your doctor if you have any questions. Where can you learn more? Go to https://chpepiceweb.[employer] org and sign in to your [employer] account. Enter R264 in the NewGoTos box to learn more about \"Advance Directives: Care Instructions. \"     If you do not have an account, please click on the \"Sign Up Now\" link. Current as of: June 16, 2022               Content Version: 13.4  © 2489-7726 Healthwise, [email or web address]. Care instructions adapted under license by [facility] ([facility]). If you have questions about a medical condition or this instruction, always ask your healthcare professional. [address] any warranty or liability for your use of this information. Learning About Low-Carbohydrate Diets  What is a low-carbohydrate diet? A low-carbohydrate (or \"low-carb\") diet limits foods and drinks that have carbohydrates. This includes grains, fruits, milk and yogurt, and starchy vegetables like potatoes, beans, and corn. It also avoids foods and drinks that have added sugar. Instead, low-carb diets include foods that are high in protein and fat. Why might you follow a low-carb diet? Low-carb diets may be used for a variety of reasons, such as for weight loss. People who have diabetes may use a low-carb diet to help manage their blood sugar levels. What should you do before you start the diet? Talk to your doctor before you try any diet. This is even more important if you have health problems like kidney disease, heart disease, or diabetes. Your doctor may suggest that you meet with a registered dietitian.  A dietitian can help you make an eating plan that works for you. What foods do you eat on a low-carb diet? On a low-carb diet, you choose foods that are high in protein and fat. Examples of these are:  Meat, poultry, and fish. Eggs. Nuts, such as walnuts, pecans, almonds, and peanuts. Peanut butter and other nut butters. Tofu. Avocado. [place]. Non-starchy vegetables like broccoli, cauliflower, green beans, mushrooms, peppers, lettuce, and spinach. Unsweetened non-dairy milks like almond milk and coconut milk. Cheese, cottage cheese, and cream cheese. Where can you learn more? Go to https://Get 2 It SalespePowerseteb.commercetools. org and sign in to your [employer] account. Enter C335 in the ElasticBox box to learn more about \"Learning About Low-Carbohydrate Diets. \"     If you do not have an account, please click on the \"Sign Up Now\" link. Current as of: May 9, 2022               Content Version: 13.4  © 7567-5332 Healthwise, [employer]. Care instructions adapted under license by [facility] ([facility]). If you have questions about a medical condition or this instruction, always ask your healthcare professional. [address] any warranty or liability for your use of this information. Personalized Preventive Plan for Simran Harman - 9/22/2022  Medicare offers a range of preventive health benefits. Some of the tests and screenings are paid in full while other may be subject to a deductible, co-insurance, and/or copay. Some of these benefits include a comprehensive review of your medical history including lifestyle, illnesses that may run in your family, and various assessments and screenings as appropriate. After reviewing your medical record and screening and assessments performed today your provider may have ordered immunizations, labs, imaging, and/or referrals for you. A list of these orders (if applicable) as well as your Preventive Care list are included within your After Visit Summary for your review.     Other Preventive Recommendations:    A preventive eye exam performed by an eye specialist is recommended every 1-2 years to screen for glaucoma; cataracts, macular degeneration, and other eye disorders. A preventive dental visit is recommended every 6 months. Try to get at least 150 minutes of exercise per week or 10,000 steps per day on a pedometer . Order or download the FREE \"Exercise & Physical Activity: Your Everyday Guide\" from [employer]. Call 0-922.228.2874 or search [employer] online. You need 2598-8166 mg of calcium and 0968-2381 IU of vitamin D per day. It is possible to meet your calcium requirement with diet alone, but a vitamin D supplement is usually necessary to meet this goal.  When exposed to the sun, use a sunscreen that protects against both UVA and UVB radiation with an SPF of 30 or greater. Reapply every 2 to 3 hours or after sweating, drying off with a towel, or swimming. Always wear a seat belt when traveling in a car. Always wear a helmet when riding a bicycle or motorcycle.

## 2022-09-22 NOTE — PROGRESS NOTES
Edema: Patient complains of edema. The location of the edema is lower leg(s) bilateral.  The edema has been moderate. Onset of symptoms was several years ago, intermittent since that time. The edema is present all day. The patient states the problem is long-standing. The swelling has been aggravated by dependency of involved area and Recent decrease bloody due to right knee replacement last month , relieved by diuretics, and been associated with nothing. Cardiac risk factors include advanced age (older than 54 for men, 72 for women), male gender and sedentary lifestyle. Chronic back pain. This is usually well controlled with Norco.  He needs a refill    He has persistent numbness in both feet up to both knees. O:   Vitals:    09/22/22 1116   BP: 120/72   Pulse: 86   Temp: 97 °F (36.1 °C)   SpO2: 93%     No acute distress. Alert and Oriented x 3, obese  HEENT: Atraumatic. Normocephalic. PERRLA, EOMI, Conjunctiva clear  NECK: without thyromegaly, lymphadenopathy, JVD  LUNGS:Clear to ascultation bilaterally. Breathing comfortably  CARDIOVASCULAR:  Regular rate and rhythm, no murmurs, rubs, or gallops  EXTREMITY: Full range of motion. No clubbing/cyanosis. 1+ pitting edema to mid calf  NEURO: Cranial nerves II-XII grossly intact. Strength 5/5, DTR 2/4. SKIN: Warm, Dry, No rash. PSYCH: Mood and Affect normal.    A:    Diagnosis Orders   1. Primary osteoarthritis of left knee  HYDROcodone-acetaminophen (NORCO)  MG per tablet   Fairly well controlled   2. Primary osteoarthritis of right knee  HYDROcodone-acetaminophen (NORCO)  MG per tablet   Fairly well controlled   3. Chronic bilateral low back pain without sciatica  HYDROcodone-acetaminophen (NORCO)  MG per tablet   Fairly well controlled   4.  Bilateral lower extremity edema  Comprehensive Metabolic Panel   Needs improvement furosemide (LASIX) 20 MG tablet    potassium chloride (KLOR-CON M) 10 MEQ extended release tablet            P: Continue Lasix 20 mg twice daily and potassium 10 mEq twice daily  Keeps legs elevated  Wear compression stockings  Norco 10-3 25 4 times daily as needed pain.   Continue all medications the current dose    follow-up 3 months

## 2022-09-23 DIAGNOSIS — R73.09 ELEVATED GLUCOSE LEVEL: Primary | ICD-10-CM

## 2022-09-23 LAB
ALBUMIN/GLOBULIN RATIO: 1 RATIO (ref 0.8–2.6)
ALBUMIN: 4 G/DL (ref 3.5–5.2)
ALP BLD-CCNC: 97 U/L (ref 23–144)
ALT SERPL-CCNC: ABNORMAL U/L (ref 0–60)
AST SERPL-CCNC: 29 U/L (ref 0–55)
BILIRUB SERPL-MCNC: 0.5 MG/DL (ref 0–1.2)
BUN BLDV-MCNC: 13 MG/DL (ref 3–29)
BUN/CREAT BLD: 16 (ref 7–25)
CALCIUM SERPL-MCNC: 9.5 MG/DL (ref 8.5–10.5)
CHLORIDE BLD-SCNC: 99 MEQ/L (ref 96–110)
CO2: 24 MEQ/L (ref 19–32)
CREAT SERPL-MCNC: 0.8 MG/DL (ref 0.5–1.4)
GLOBULIN: 3.9 G/DL (ref 1.9–3.6)
GLOMERULAR FILTRATION RATE: 95 MLS/MIN/1.73M2
GLUCOSE BLD-MCNC: 143 MG/DL (ref 70–99)
POTASSIUM SERPL-SCNC: ABNORMAL MEQ/L (ref 3.4–5.3)
SODIUM BLD-SCNC: 136 MEQ/L (ref 135–148)
STATUS: ABNORMAL
TOTAL PROTEIN: 7.9 G/DL (ref 6–8.3)

## 2022-09-23 PROCEDURE — 83036 HEMOGLOBIN GLYCOSYLATED A1C: CPT | Performed by: FAMILY MEDICINE

## 2022-10-13 ENCOUNTER — TELEPHONE (OUTPATIENT)
Dept: NEUROSURGERY | Age: 72
End: 2022-10-13

## 2022-10-13 ENCOUNTER — OFFICE VISIT (OUTPATIENT)
Dept: ORTHOPEDIC SURGERY | Age: 72
End: 2022-10-13
Payer: MEDICARE

## 2022-10-13 VITALS
RESPIRATION RATE: 16 BRPM | WEIGHT: 297 LBS | HEIGHT: 68 IN | DIASTOLIC BLOOD PRESSURE: 76 MMHG | SYSTOLIC BLOOD PRESSURE: 126 MMHG | HEART RATE: 74 BPM | BODY MASS INDEX: 45.01 KG/M2 | OXYGEN SATURATION: 98 %

## 2022-10-13 DIAGNOSIS — M17.12 ARTHRITIS OF KNEE, LEFT: ICD-10-CM

## 2022-10-13 DIAGNOSIS — M54.50 LOW BACK PAIN, UNSPECIFIED BACK PAIN LATERALITY, UNSPECIFIED CHRONICITY, UNSPECIFIED WHETHER SCIATICA PRESENT: Primary | ICD-10-CM

## 2022-10-13 PROCEDURE — 4004F PT TOBACCO SCREEN RCVD TLK: CPT | Performed by: PHYSICIAN ASSISTANT

## 2022-10-13 PROCEDURE — 20610 DRAIN/INJ JOINT/BURSA W/O US: CPT | Performed by: PHYSICIAN ASSISTANT

## 2022-10-13 PROCEDURE — 1123F ACP DISCUSS/DSCN MKR DOCD: CPT | Performed by: PHYSICIAN ASSISTANT

## 2022-10-13 PROCEDURE — 3017F COLORECTAL CA SCREEN DOC REV: CPT | Performed by: PHYSICIAN ASSISTANT

## 2022-10-13 PROCEDURE — G8484 FLU IMMUNIZE NO ADMIN: HCPCS | Performed by: PHYSICIAN ASSISTANT

## 2022-10-13 PROCEDURE — G8427 DOCREV CUR MEDS BY ELIG CLIN: HCPCS | Performed by: PHYSICIAN ASSISTANT

## 2022-10-13 PROCEDURE — G8417 CALC BMI ABV UP PARAM F/U: HCPCS | Performed by: PHYSICIAN ASSISTANT

## 2022-10-13 NOTE — TELEPHONE ENCOUNTER
Spoke to patient. Appointment scheduled on Tuesday, 10/18/22 @ 340 pm with Aida Salgado PA-C. Location of appointment given to patient. Patient agreeable and verbalized understanding. Nothing further needed at this time.

## 2022-10-13 NOTE — TELEPHONE ENCOUNTER
Received referral from Jeanna Brown PA-C in our orthopedic office for low back pain. Patient had previous MRI lumbar spine obtained by Dr Anette Harmon on 11/2/21. Surgical History: Right Total Knee Arthroplasty - 2/15/22      Please advise if you are agreeable to seeing patient in the office and if any further imaging is needed prior. Thank you.           MRI Lumbar Spine without Contrast 11/2/21  Impression   Epidural lipomatosis causing moderate to severe thecal sac stenosis worst   from L4-S1.

## 2022-10-13 NOTE — PROGRESS NOTES
Review of Systems   Constitutional: Negative. Negative for chills and fever. HENT: Negative. Eyes: Negative. Respiratory: Negative. Negative for shortness of breath. Cardiovascular: Negative. Gastrointestinal: Negative. Genitourinary: Negative. Musculoskeletal:  Positive for arthralgias and myalgias. Skin: Negative. Negative for rash and wound. Neurological: Negative. Psychiatric/Behavioral: Negative. HPI:  Bhanu Oliva is a 81 y.o. male that returns to the office today with complaints of left knee pain. He had viscosupplementation injections in the left knee with mild relief but today he is having more pain and would like a steroid injection of the left knee. He has history of a right knee replacement but does not want to get the left knee replaced yet. He rates his pain today at a 5/10, aching and worse with weightbearing.     Past Medical History:   Diagnosis Date    Acid reflux     Anxiety     Arthritis     \"Right Knee\"    Asthma     Chronic back pain     Cough     Occ Productive Cough \"Milky\" Sputum    Diverticulitis Dx Early 2000's    Hepatitis Dx 1970's    \"In The Hospital A [facility]\"    [place] (hard of hearing)     Bilateral Ears    HTN (hypertension)     Hyperlipidemia     Nocturia     Shortness of breath on exertion     Slow urinary stream     Tingling     \"Tingling Right Hand\"    Wears dentures     Full Upper, Partial Lower    Wears glasses     Wears partial dentures     Lower       Past Surgical History:   Procedure Laterality Date    APPENDECTOMY  1960's    CARPAL TUNNEL RELEASE Right 06/22/2017    COLONOSCOPY  Last Done Early 2000's    Polyps Removed In Past    DENTAL SURGERY      Teeth Extracted In Past    EYE SURGERY Left Late 1980's    Cataract With Lens Implant    FRACTURE SURGERY Left 2000's    Broken Left Wrist    HERNIA REPAIR  8461'L    Umbilical Hernia Repair    KNEE ARTHROSCOPY Right 04/13/2017    meniscus repair    TONSILLECTOMY AND ADENOIDECTOMY amLODIPine (NORVASC) 5 MG tablet TAKE 1 TABLET BY MOUTH EVERY DAY IN THE MORNING 90 tablet 1    tamsulosin (FLOMAX) 0.4 MG capsule Take 1 capsule by mouth daily 90 capsule 1    lisinopril (PRINIVIL;ZESTRIL) 5 MG tablet TAKE 1 TABLET BY MOUTH EVERY DAY IN THE MORNING 90 tablet 1    pantoprazole (PROTONIX) 40 MG tablet TAKE 1 TABLET EVERY MORNING BEFORE BREAKFAST 90 tablet 1    polyethylene glycol-electrolytes (NULYTELY) 420 g solution mix and drink entire container between 6 pm and 10 pm night before procedure      fluticasone-salmeterol (ADVAIR DISKUS) 250-50 MCG/DOSE AEPB INHALE 1 PUFF INTO THE LUNGS EVERY 12 HOURS 180 each 1    potassium chloride (KLOR-CON M) 10 MEQ extended release tablet Take 1 tablet by mouth 2 times daily 60 tablet 0    Spacer/Aero-Holding Chambers (AEROCHAMBER MV) MISC Use with albuterol. 1 each 0    Compression Stockings MISC by Does not apply route 20-30 mM HG    Wear daily 2 each 0    sildenafil (VIAGRA) 100 MG tablet Take 1 tablet by mouth as needed for Erectile Dysfunction 30 tablet 2    albuterol sulfate HFA (PROAIR HFA) 108 (90 Base) MCG/ACT inhaler Inhale 2 puffs into the lungs every 6 hours as needed for Wheezing 3 Inhaler 1    aspirin 325 MG EC tablet Take 1 tablet by mouth 2 times daily for 14 days 28 tablet 0     No current facility-administered medications for this visit. Allergies   Allergen Reactions    Sulfa Antibiotics Rash       Review of Systems:  See above      Physical Exam:   /76 (Site: Left Upper Arm, Position: Sitting, Cuff Size: Medium Adult)   Pulse 74   Resp 16   Ht 5' 8\" (1.727 m)   Wt 297 lb (134.7 kg)   SpO2 98%   BMI 45.16 kg/m²        Gait is Antalgic. The patient can bear weight on the injured extremity. Gen/Psych:Examination reveals a pleasant individual in no acute distress. The patient is oriented to time, place and person. The patient's mood and affect are appropriate. Patient appears well nourished.  Body habitus is obese Lymph:  mild lymphedema in bilateral lower extremities     Skin intact in bilateral lower extremities with no ulcerations, lesions, rash, erythema. Vascular: There are mild varicosities in bilateral lower extremities, sensation intact to light touch over bilateral lower extremities. left leg/knee exam:  Leg alignment:     neutral  Quadriceps/hamstring atrophy:   no  Knee effusion:    no   Knee erythema:   no  ROM:     0-110 degrees  Varus laxity at 0 and 30 deg's: no  Valguslaxity at 0 and 30 deg's: no  Recurvatum:    no  Tenderness at:   Diffuse tenderness    Bilateral Knee strength is 5/5 flexion and extension  There is + L2-S1 motor and sensory function in bilateral lower extremities    Outside record review: office notes and prior x-rays reviewed    Imaging studies:  X-rays of the left knee reviewed from prior visits show moderate to severe degenerative change within medial compartment left knee. Impression:    Diagnosis Orders   1. Primary osteoarthritis of right knee  GA ARTHROCENTESIS ASPIR&/INJ MAJOR JT/BURSA W/O US      2. Low back pain, unspecified back pain laterality, unspecified chronicity, unspecified whether sciatica present  [address] PA-C, Neurosurgery, [place]              Plan:    Patient Instructions   Referral sent to Milan Coyne  Continue to weight bear as tolerated  Continue range of motion  Ice and elevate as needed  Tylenol or Motrin for pain  Injection given today in the office   Rest for 24-48 hours  Follow up as needed      We are committed to providing you the best care possible. If you receive a survey after visiting one of our offices, please take time to share your experience concerning your physician office visit. These surveys are confidential and no health information about you is shared. We are eager to improve for you and we are counting on your feedback to help make that happen.       Left knee injection procedure note    Pre-procedure diagnosis:  Left knee DJD    Post-procedure diagnosis:  same    Procedure: The planned procedure/risks/benefits/alternatives were discussed with the patient. Risks include, but are not limited to, increased pain, drug reaction, infection, bleeding, lack of improvement, neurovascular injury, and increased blood sugar levels. The patient understood and all of their questions were answered. The Left knee was prepped with alcohol then a 25 gauge needle was advanced into the inferior-lateral joint without difficulty. The joint was then injected with 1 ml 1% lidocaine, 1ml of Kenalog (40 mg/ml), 1ml 0.5% bupivacaine the injection site was cleansed with isopropyl alcohol and a band-aid was placed. Complications:  None, the patient tolerated the procedure well. Instructions: The patient was advised to rest the knee and decrease activity for the next 24 to 48 hours. May use prescription or OTC pain relievers as needed for any post-injection pain as well as ice.

## 2022-10-13 NOTE — PATIENT INSTRUCTIONS
Referral sent to Milan Coyne  Continue to weight bear as tolerated  Continue range of motion  Ice and elevate as needed  Tylenol or Motrin for pain  Injection given today in the office   Rest for 24-48 hours  Follow up as needed      We are committed to providing you the best care possible. If you receive a survey after visiting one of our offices, please take time to share your experience concerning your physician office visit. These surveys are confidential and no health information about you is shared. We are eager to improve for you and we are counting on your feedback to help make that happen.

## 2022-10-17 ASSESSMENT — ENCOUNTER SYMPTOMS
GASTROINTESTINAL NEGATIVE: 1
SHORTNESS OF BREATH: 0
EYES NEGATIVE: 1
RESPIRATORY NEGATIVE: 1

## 2022-10-17 NOTE — PROGRESS NOTES
Patient is a 81 y.o. y.o. male who presents to the office today as a new patient for an evaluation of low back pain. Patient referred by Monie Kingston PA-C. Pain scale 8/10 today. States he is taking Norco for the pain with little relief. Denies any numbness, tingling or weakness in any extremities. Patient has a history of right knee arthroplasty 2/15/22 by Dr Enrique Chan. States he has had back pain for several years and is getting worse since having TKA right knee 2/15/22 by Dr Enrique Chan. States ever since the surgery he has been having swelling in his legs and feet and it's hard for him to walk. Patient has been ambulating without any assistive device.            MRI Lumbar Spine without Contrast 11/2/21  Impression   Epidural lipomatosis causing moderate to severe thecal sac stenosis worst   from L4-S1.

## 2022-10-17 NOTE — PATIENT INSTRUCTIONS
Notify the neurosurgical office urgently if you begin to develop any numbness or tingling in extremities, weakness in extremities, or loss of bowel or bladder control. [facility] will be calling you to schedule your MRI. If you have not heard from anyone regarding testing within 2 business days please call Central Scheduling at [phone].

## 2022-10-18 ENCOUNTER — OFFICE VISIT (OUTPATIENT)
Dept: NEUROSURGERY | Age: 72
End: 2022-10-18
Payer: MEDICARE

## 2022-10-18 VITALS
HEIGHT: 70 IN | BODY MASS INDEX: 42.8 KG/M2 | SYSTOLIC BLOOD PRESSURE: 138 MMHG | RESPIRATION RATE: 14 BRPM | DIASTOLIC BLOOD PRESSURE: 74 MMHG | OXYGEN SATURATION: 93 % | WEIGHT: 299 LBS | HEART RATE: 85 BPM

## 2022-10-18 DIAGNOSIS — M48.062 SPINAL STENOSIS OF LUMBAR REGION WITH NEUROGENIC CLAUDICATION: Primary | ICD-10-CM

## 2022-10-18 PROCEDURE — 1123F ACP DISCUSS/DSCN MKR DOCD: CPT | Performed by: PHYSICIAN ASSISTANT

## 2022-10-18 PROCEDURE — 4004F PT TOBACCO SCREEN RCVD TLK: CPT | Performed by: PHYSICIAN ASSISTANT

## 2022-10-18 PROCEDURE — G8417 CALC BMI ABV UP PARAM F/U: HCPCS | Performed by: PHYSICIAN ASSISTANT

## 2022-10-18 PROCEDURE — 3017F COLORECTAL CA SCREEN DOC REV: CPT | Performed by: PHYSICIAN ASSISTANT

## 2022-10-18 PROCEDURE — 99203 OFFICE O/P NEW LOW 30 MIN: CPT | Performed by: PHYSICIAN ASSISTANT

## 2022-10-18 PROCEDURE — G8484 FLU IMMUNIZE NO ADMIN: HCPCS | Performed by: PHYSICIAN ASSISTANT

## 2022-10-18 PROCEDURE — G8427 DOCREV CUR MEDS BY ELIG CLIN: HCPCS | Performed by: PHYSICIAN ASSISTANT

## 2022-10-18 NOTE — PROGRESS NOTES
Neurosurgery Office Note    HPI:  81 y.o. 1950  Who presents today with complaints of low back pain with radiation into his bilateral knees. With this he has numbness and tingling that comes and goes. He reports back pain being present for many years but has worsened over the past few months. On chart review he has seen Dr. Davis Gunter in office last year, he ordered an MRI of the lumbar spine in November which showed moderate to severe stenosis of L4-S1. He did not have any follow-up regarding his lumbar MRI. Patient reports difficulty with prolonged ambulation, he states that he was reaching for chair after walking from our lobby into our examination room which is less than 40-50 feet. He reports improvement in pain with forward flexion. He does not use a walker or cane for ambulation but states that he should probably use one. He does admit to some urinary urgency and saddle paresthesias. He did have a right knee replacement with Dr. Blanquita Tsai in February of this year. Patient is not on any antiplatelets or anticoagulants. PMHx positive for arthritis, diverticulitis, hepatitis, hypertension, nocturia. Past Surgical history positive for appendectomy, right carpal tunnel release, left eye surgery, hernia repair, right total knee arthroplasty.                     Past Medical and Surgical History:       Diagnosis Date    Acid reflux     Anxiety     Arthritis     \"Right Knee\"    Asthma     Chronic back pain     Cough     Occ Productive Cough \"Milky\" Sputum    Diverticulitis Dx Early 2000's    Hepatitis Dx 1970's    \"In The Hospital A Few Days\"    [place] (hard of hearing)     Bilateral Ears    HTN (hypertension)     Hyperlipidemia     Nocturia     Shortness of breath on exertion     Slow urinary stream     Tingling     \"Tingling Right Hand\"    Wears dentures     Full Upper, Partial Lower    Wears glasses     Wears partial dentures     Lower         Procedure Laterality Date    APPENDECTOMY  1960's    CARPAL TUNNEL RELEASE Right 06/22/2017    COLONOSCOPY  Last Done Early 2000's    Polyps Removed In Past    DENTAL SURGERY      Teeth Extracted In Past    EYE SURGERY Left Late 1980's    Cataract With Lens Implant    FRACTURE SURGERY Left 2000's    Broken Left Wrist    HERNIA REPAIR  6444'K    Umbilical Hernia Repair    KNEE ARTHROSCOPY Right 04/13/2017    meniscus repair    TONSILLECTOMY AND ADENOIDECTOMY  1960's    TOTAL KNEE ARTHROPLASTY Right 2/15/2022    RIGHT KNEE TOTAL ARTHROPLASTY performed by Watson Reyes DO at [address] [place] [address] History:    TOBACCO:   reports that he has been smoking cigarettes. He started smoking about 57 years ago. He has a 26.00 pack-year smoking history. He quit smokeless tobacco use about a year ago. ETOH:   reports that he does not currently use alcohol after a past usage of about 3.0 - 6.0 standard drinks per week. Family History:       Problem Relation Age of Onset    Stroke Mother     Heart Disease Mother     Cancer Father         Prostate Cancer    Cancer Son         Leukemia       Current Medications:    No current facility-administered medications for this visit.     Allergies   Allergen Reactions    Sulfa Antibiotics Rash        REVIEW OF SYSTEMS:    CONSTITUTIONAL:  negative for fevers, chills, diaphoresis, activity change, appetite change, fatigue  EYES:  negative for blurred vision, eye discharge, visual disturbance and icterus  HEENT:  negative for hearing loss, tinnitus, ear drainage, sinus pressure, nasal congestion  RESPIRATORY:  No cough, shortness of breath, hemoptysis  GASTROINTESTINAL:  negative for nausea, vomiting, diarrhea, constipation  GENITOURINARY:  negative for frequency, dysuria, urinary incontinence, decreased urine volume, and hematuria  HEMATOLOGIC/LYMPHATIC:  negative for easy bruising, bleeding and lymphadenopathy  MUSCULOSKELETAL: positive for back pain, negative for joint swelling, decreased range of motion and muscle weakness  NEUROLOGICAL: negative for headaches, slurred speech, unilateral weakness  PSYCHIATRIC/BEHAVIORAL: negative for hallucinations, behavioral problems, confusion and agitation. Objective:   PHYSICAL EXAM:      VITALS:  /74 (Site: Left Upper Arm, Position: Sitting, Cuff Size: Large Adult)   Pulse 85   Resp 14   Ht 5' 10\" (1.778 m)   Wt 299 lb (135.6 kg)   SpO2 93%   BMI 42.90 kg/m²      24HR INTAKE/OUTPUT:  No intake or output data in the 24 hours ending 10/18/22 1530  CONSTITUTIONAL:  Awake, alert, cooperative, no apparent distress, and appears stated age  HEENT: NCAT, PERRL, EOMI. Sclera white, conjunctive full. PSYCHIATRIC: Oriented to person place and time. No obvious depression or anxiety. MUSCULOSKELETAL: No obvious misalignment or effusion of the joints. No clubbing  SKIN:  normal skin color, texture, turgor and no redness, warmth, or swelling. NEUROLOGIC: Alert and oriented x4, face symmetrical, no obvious droop, speech clear and coherent, sensation intact to light touch and pinprick sensation, forward flexion with ambulation. Upper extremity strength: Bilateral upper extremities able to resist gravity  Lower extremity strength: Bilateral lower extremities 5/5 throughout, bilateral patellar DTR 1+    DATA:    Old records have been reviewed      Radiology Review:  All pertinent images / reports were reviewed as a part of this visit. MRI of the lumbar spine 11/2/21     Impression   Epidural lipomatosis causing moderate to severe thecal sac stenosis worst   from L4-S1.       RECOMMENDATIONS:   Unavailable     Assessment and plan:     1. Spinal stenosis of lumbar region with neurogenic claudication  - Patient with chronic low back pain with radiation into bilateral knees that has been worsening over the past several months. Reviewed MRI of the lumbar spine obtained November 2021. He has moderate to severe central canal stenosis L4-S1 due to epidural lipomatosis.   I have ordered a repeat MRI of the lumbar spine stat as patient does endorse some saddle paresthesias. He does not have ann urinary or bowel incontinence. He is able to ambulate without any assistive devices currently. Patient was given strict return precautions. Will resend referral to Dr. Julio Acuna for follow-up of MRI. Patient advised to cease smoking and to lose weight to optimize surgical outcome. -     MRI LUMBAR SPINE WO CONTRAST; Future       Next steps: Obtain lumbar spine MRI, follow-up with Dr. Julio Acuna    Electronically signed by Joel Cosme PA-C on 10/18/2022 at 3:30 PM      Supervising physician: Clayton Petersen. Julio Acuna MD.  Dr. Julio Acuna was readily and continuously available by phone for direct consultation regarding the care of this patient. Time spent with patient in consultation, education, and collaboration with medical time is >50% of total time spent on case, including time spent in chart review and dictation. Total time spent: 30 minutes    Thank you for the opportunity to participate in the care of your patient.

## 2022-10-19 ENCOUNTER — CLINICAL DOCUMENTATION (OUTPATIENT)
Dept: NEUROSURGERY | Age: 72
End: 2022-10-19

## 2022-10-19 NOTE — PROGRESS NOTES
Letter sent to referring provider Herrera Barrios PA-C via [employer] ([employer]) - New patient visit 10/18/22.

## 2022-10-19 NOTE — LETTER
[facility]  [facility] [address]  Phone: [phone]  Fax: [phone]           [place] Gong[place]      October 19, 2022     Patient: Андрей Mcintyre   MR Number: [id number]   YOB: 1950   Date of Visit: 10/18/2022       Dear Dorina Harmon PA-C:      Thank you for referring Magalie Maxwell to me for evaluation/treatment. The relevant portions of my assessment and plan of care are available for your review in EPIC. If you have questions, please do not hesitate to call me. I look forward to following Tessa Arora along with you.       Sincerely,        Rad Landaverde PA-C

## 2022-10-26 ENCOUNTER — HOSPITAL ENCOUNTER (OUTPATIENT)
Dept: MRI IMAGING | Age: 72
Discharge: HOME | End: 2022-10-26
Payer: MEDICARE

## 2022-10-26 DIAGNOSIS — M48.062 SPINAL STENOSIS OF LUMBAR REGION WITH NEUROGENIC CLAUDICATION: ICD-10-CM

## 2022-10-26 PROCEDURE — 72148 MRI LUMBAR SPINE W/O DYE: CPT

## 2022-10-27 ENCOUNTER — TELEPHONE (OUTPATIENT)
Dept: FAMILY MEDICINE CLINIC | Age: 72
End: 2022-10-27

## 2022-10-27 ENCOUNTER — TELEPHONE (OUTPATIENT)
Dept: NEUROSURGERY | Age: 72
End: 2022-10-27

## 2022-10-27 DIAGNOSIS — M17.11 PRIMARY OSTEOARTHRITIS OF RIGHT KNEE: ICD-10-CM

## 2022-10-27 DIAGNOSIS — G89.29 CHRONIC BILATERAL LOW BACK PAIN WITHOUT SCIATICA: ICD-10-CM

## 2022-10-27 DIAGNOSIS — M17.12 PRIMARY OSTEOARTHRITIS OF LEFT KNEE: ICD-10-CM

## 2022-10-27 DIAGNOSIS — M54.50 CHRONIC BILATERAL LOW BACK PAIN WITHOUT SCIATICA: ICD-10-CM

## 2022-10-27 RX ORDER — HYDROCODONE BITARTRATE AND ACETAMINOPHEN 10; 325 MG/1; MG/1
1 TABLET ORAL EVERY 6 HOURS PRN
Qty: 120 TABLET | Refills: 0 | Status: SHIPPED | OUTPATIENT
Start: 2022-10-27 | End: 2022-11-28 | Stop reason: SDUPTHER

## 2022-10-27 NOTE — TELEPHONE ENCOUNTER
Received MRI lumbar spine results - DOS 10/26/22 via fax. Results faxed to Dr Preston Cowan office (referral already faxed to his office on 10/19/22 after patient seen in the office 10/18/22).

## 2022-10-27 NOTE — TELEPHONE ENCOUNTER
Patient called asking for refill of [place] and for it to be sent to [facility] [address] [facility]

## 2022-11-16 DIAGNOSIS — I10 ESSENTIAL HYPERTENSION: ICD-10-CM

## 2022-11-16 RX ORDER — LISINOPRIL 5 MG/1
TABLET ORAL
Qty: 90 TABLET | Refills: 1 | OUTPATIENT
Start: 2022-11-16

## 2022-11-16 RX ORDER — AMLODIPINE BESYLATE 5 MG/1
TABLET ORAL
Qty: 90 TABLET | Refills: 1 | OUTPATIENT
Start: 2022-11-16

## 2022-11-23 DIAGNOSIS — R60.0 BILATERAL LOWER EXTREMITY EDEMA: ICD-10-CM

## 2022-11-23 RX ORDER — FUROSEMIDE 20 MG/1
TABLET ORAL
Qty: 180 TABLET | OUTPATIENT
Start: 2022-11-23

## 2022-11-28 ENCOUNTER — TELEPHONE (OUTPATIENT)
Dept: FAMILY MEDICINE CLINIC | Age: 72
End: 2022-11-28

## 2022-11-28 DIAGNOSIS — M17.12 PRIMARY OSTEOARTHRITIS OF LEFT KNEE: ICD-10-CM

## 2022-11-28 DIAGNOSIS — M17.11 PRIMARY OSTEOARTHRITIS OF RIGHT KNEE: ICD-10-CM

## 2022-11-28 DIAGNOSIS — G89.29 CHRONIC BILATERAL LOW BACK PAIN WITHOUT SCIATICA: ICD-10-CM

## 2022-11-28 DIAGNOSIS — M54.50 CHRONIC BILATERAL LOW BACK PAIN WITHOUT SCIATICA: ICD-10-CM

## 2022-11-28 RX ORDER — HYDROCODONE BITARTRATE AND ACETAMINOPHEN 10; 325 MG/1; MG/1
1 TABLET ORAL EVERY 6 HOURS PRN
Qty: 120 TABLET | Refills: 0 | Status: SHIPPED | OUTPATIENT
Start: 2022-11-28 | End: 2022-11-29 | Stop reason: SDUPTHER

## 2022-11-29 DIAGNOSIS — M17.11 PRIMARY OSTEOARTHRITIS OF RIGHT KNEE: ICD-10-CM

## 2022-11-29 DIAGNOSIS — M17.12 PRIMARY OSTEOARTHRITIS OF LEFT KNEE: ICD-10-CM

## 2022-11-29 DIAGNOSIS — M54.50 CHRONIC BILATERAL LOW BACK PAIN WITHOUT SCIATICA: ICD-10-CM

## 2022-11-29 DIAGNOSIS — G89.29 CHRONIC BILATERAL LOW BACK PAIN WITHOUT SCIATICA: ICD-10-CM

## 2022-11-29 RX ORDER — HYDROCODONE BITARTRATE AND ACETAMINOPHEN 10; 325 MG/1; MG/1
1 TABLET ORAL EVERY 6 HOURS PRN
Qty: 120 TABLET | Refills: 0 | Status: SHIPPED | OUTPATIENT
Start: 2022-11-29 | End: 2022-11-30 | Stop reason: SDUPTHER

## 2022-11-29 NOTE — TELEPHONE ENCOUNTER
Patient called stating the Viola Conn was supposed to go to [facility] and not the mail away as they will not mail that prescription. Can you please send to [facility] on [address].

## 2022-11-30 DIAGNOSIS — G89.29 CHRONIC BILATERAL LOW BACK PAIN WITHOUT SCIATICA: ICD-10-CM

## 2022-11-30 DIAGNOSIS — M17.11 PRIMARY OSTEOARTHRITIS OF RIGHT KNEE: ICD-10-CM

## 2022-11-30 DIAGNOSIS — M17.12 PRIMARY OSTEOARTHRITIS OF LEFT KNEE: ICD-10-CM

## 2022-11-30 DIAGNOSIS — M54.50 CHRONIC BILATERAL LOW BACK PAIN WITHOUT SCIATICA: ICD-10-CM

## 2022-11-30 RX ORDER — HYDROCODONE BITARTRATE AND ACETAMINOPHEN 10; 325 MG/1; MG/1
1 TABLET ORAL EVERY 6 HOURS PRN
Qty: 120 TABLET | Refills: 0 | Status: SHIPPED | OUTPATIENT
Start: 2022-11-30 | End: 2022-12-30

## 2022-11-30 NOTE — TELEPHONE ENCOUNTER
Prescription sent to [facility]. If they do not have it, he left to come in and  a hardcopy prescription and take it to a pharmacy.

## 2022-11-30 NOTE — TELEPHONE ENCOUNTER
Patient called stating [facility] notified him they are out of the medication and don't know when it will be in. He would like it sent to [facility] on [facility] [facility].

## 2022-11-30 NOTE — TELEPHONE ENCOUNTER
Pt states that the pharmacy said that the Idalmis Dilshad is on back order and they are not sure when it will be in stock. Pt would like the prescription to be sent to [facility].

## 2022-12-05 DIAGNOSIS — R60.0 BILATERAL LOWER EXTREMITY EDEMA: ICD-10-CM

## 2022-12-05 RX ORDER — POTASSIUM CHLORIDE 750 MG/1
TABLET, EXTENDED RELEASE ORAL
Qty: 180 TABLET | OUTPATIENT
Start: 2022-12-05

## 2022-12-19 ENCOUNTER — OFFICE VISIT (OUTPATIENT)
Dept: FAMILY MEDICINE CLINIC | Age: 72
End: 2022-12-19
Payer: MEDICARE

## 2022-12-19 VITALS
TEMPERATURE: 96 F | WEIGHT: 308.8 LBS | HEART RATE: 95 BPM | DIASTOLIC BLOOD PRESSURE: 72 MMHG | OXYGEN SATURATION: 93 % | SYSTOLIC BLOOD PRESSURE: 120 MMHG | BODY MASS INDEX: 44.31 KG/M2

## 2022-12-19 DIAGNOSIS — M17.12 PRIMARY OSTEOARTHRITIS OF LEFT KNEE: ICD-10-CM

## 2022-12-19 DIAGNOSIS — I10 ESSENTIAL HYPERTENSION: ICD-10-CM

## 2022-12-19 DIAGNOSIS — N40.1 BPH ASSOCIATED WITH NOCTURIA: ICD-10-CM

## 2022-12-19 DIAGNOSIS — K21.9 GASTROESOPHAGEAL REFLUX DISEASE WITHOUT ESOPHAGITIS: ICD-10-CM

## 2022-12-19 DIAGNOSIS — R60.0 BILATERAL LOWER EXTREMITY EDEMA: ICD-10-CM

## 2022-12-19 DIAGNOSIS — J06.9 VIRAL URI: Primary | ICD-10-CM

## 2022-12-19 DIAGNOSIS — R35.1 BPH ASSOCIATED WITH NOCTURIA: ICD-10-CM

## 2022-12-19 DIAGNOSIS — G89.29 CHRONIC BILATERAL LOW BACK PAIN WITHOUT SCIATICA: ICD-10-CM

## 2022-12-19 DIAGNOSIS — M17.11 PRIMARY OSTEOARTHRITIS OF RIGHT KNEE: ICD-10-CM

## 2022-12-19 DIAGNOSIS — M54.50 CHRONIC BILATERAL LOW BACK PAIN WITHOUT SCIATICA: ICD-10-CM

## 2022-12-19 PROCEDURE — 3074F SYST BP LT 130 MM HG: CPT | Performed by: FAMILY MEDICINE

## 2022-12-19 PROCEDURE — 3078F DIAST BP <80 MM HG: CPT | Performed by: FAMILY MEDICINE

## 2022-12-19 PROCEDURE — G8428 CUR MEDS NOT DOCUMENT: HCPCS | Performed by: FAMILY MEDICINE

## 2022-12-19 PROCEDURE — G8484 FLU IMMUNIZE NO ADMIN: HCPCS | Performed by: FAMILY MEDICINE

## 2022-12-19 PROCEDURE — 1123F ACP DISCUSS/DSCN MKR DOCD: CPT | Performed by: FAMILY MEDICINE

## 2022-12-19 PROCEDURE — G8417 CALC BMI ABV UP PARAM F/U: HCPCS | Performed by: FAMILY MEDICINE

## 2022-12-19 PROCEDURE — 99214 OFFICE O/P EST MOD 30 MIN: CPT | Performed by: FAMILY MEDICINE

## 2022-12-19 PROCEDURE — 3017F COLORECTAL CA SCREEN DOC REV: CPT | Performed by: FAMILY MEDICINE

## 2022-12-19 PROCEDURE — 4004F PT TOBACCO SCREEN RCVD TLK: CPT | Performed by: FAMILY MEDICINE

## 2022-12-19 RX ORDER — TAMSULOSIN HYDROCHLORIDE 0.4 MG/1
0.4 CAPSULE ORAL DAILY
Qty: 90 CAPSULE | Refills: 1 | Status: SHIPPED | OUTPATIENT
Start: 2022-12-19

## 2022-12-19 RX ORDER — POTASSIUM CHLORIDE 750 MG/1
10 TABLET, EXTENDED RELEASE ORAL 2 TIMES DAILY
Qty: 60 TABLET | Refills: 2 | Status: SHIPPED | OUTPATIENT
Start: 2022-12-19

## 2022-12-19 RX ORDER — LISINOPRIL 5 MG/1
TABLET ORAL
Qty: 90 TABLET | Refills: 1 | Status: SHIPPED | OUTPATIENT
Start: 2022-12-19

## 2022-12-19 RX ORDER — AMLODIPINE BESYLATE 5 MG/1
TABLET ORAL
Qty: 90 TABLET | Refills: 1 | Status: SHIPPED | OUTPATIENT
Start: 2022-12-19

## 2022-12-19 RX ORDER — FUROSEMIDE 20 MG/1
20 TABLET ORAL 2 TIMES DAILY
Qty: 60 TABLET | Refills: 2 | Status: SHIPPED | OUTPATIENT
Start: 2022-12-19

## 2022-12-19 RX ORDER — HYDROCODONE BITARTRATE AND ACETAMINOPHEN 10; 325 MG/1; MG/1
1 TABLET ORAL EVERY 6 HOURS PRN
Qty: 120 TABLET | Refills: 0 | Status: SHIPPED | OUTPATIENT
Start: 2022-12-19 | End: 2023-01-18

## 2022-12-19 RX ORDER — PANTOPRAZOLE SODIUM 40 MG/1
TABLET, DELAYED RELEASE ORAL
Qty: 90 TABLET | Refills: 1 | Status: SHIPPED | OUTPATIENT
Start: 2022-12-19

## 2022-12-19 ASSESSMENT — PATIENT HEALTH QUESTIONNAIRE - PHQ9
10. IF YOU CHECKED OFF ANY PROBLEMS, HOW DIFFICULT HAVE THESE PROBLEMS MADE IT FOR YOU TO DO YOUR WORK, TAKE CARE OF THINGS AT HOME, OR GET ALONG WITH OTHER PEOPLE: 0
1. LITTLE INTEREST OR PLEASURE IN DOING THINGS: 0
SUM OF ALL RESPONSES TO PHQ QUESTIONS 1-9: 0
SUM OF ALL RESPONSES TO PHQ QUESTIONS 1-9: 0
4. FEELING TIRED OR HAVING LITTLE ENERGY: 0
SUM OF ALL RESPONSES TO PHQ9 QUESTIONS 1 & 2: 0
SUM OF ALL RESPONSES TO PHQ QUESTIONS 1-9: 0
5. POOR APPETITE OR OVEREATING: 0
3. TROUBLE FALLING OR STAYING ASLEEP: 0
SUM OF ALL RESPONSES TO PHQ QUESTIONS 1-9: 0
2. FEELING DOWN, DEPRESSED OR HOPELESS: 0
9. THOUGHTS THAT YOU WOULD BE BETTER OFF DEAD, OR OF HURTING YOURSELF: 0
8. MOVING OR SPEAKING SO SLOWLY THAT OTHER PEOPLE COULD HAVE NOTICED. OR THE OPPOSITE, BEING SO FIGETY OR RESTLESS THAT YOU HAVE BEEN MOVING AROUND A LOT MORE THAN USUAL: 0
7. TROUBLE CONCENTRATING ON THINGS, SUCH AS READING THE NEWSPAPER OR WATCHING TELEVISION: 0
6. FEELING BAD ABOUT YOURSELF - OR THAT YOU ARE A FAILURE OR HAVE LET YOURSELF OR YOUR FAMILY DOWN: 0

## 2022-12-19 NOTE — PROGRESS NOTES
Ty Adjutant is a 81y.o. year old male who complains of moderate nasal blockage, yellow nasal discharge, post nasal drip, and sinus and nasal congestion for 10 days. Symptoms are improving over that time. He denies a history of chills and fevers and has a history of COPD. He has taken nothing for this. Chronic back pain. This is usually well controlled with Norco.  States that he no longer feels that it makes a difference in his pain. Was seen my neurosurgery on 10/18/22 and was sent to PT for now. Edema: Patient complains of edema. The location of the edema is lower leg(s) bilateral.  The edema has been moderate. Onset of symptoms was several years ago, intermittent since that time. The edema is present all day. The patient states the problem is long-standing. The swelling has been aggravated by dependency of involved area and Recent decrease bloody due to right knee replacement last month, relieved by diuretics, and been associated with nothing. Cardiac risk factors include advanced age (older than 54 for men, 72 for women), male gender and sedentary lifestyle. Has decreased his Lasix and potassium to once daily about 1 month ago and his edema has been controlled. Hypertension: Patient here for follow-up of elevated blood pressure. He is not exercising and is adherent to low salt diet. Blood pressure is well controlled at home. Cardiac symptoms none. Patient denies chest pain and dyspnea. Cardiovascular risk factors: advanced age (older than 54 for men, 72 for women), dyslipidemia, hypertension, male gender, obesity (BMI >= 30 kg/m2) and sedentary lifestyle. Use of agents associated with hypertension: none. History of target organ damage: none. Patient with BPH currently on Flomax. Patient still has nocturia 2-3 times per night. Does not want any other treatment. Patient with GERD which is well controlled on Protonix.     Had elevate blood sugar at last blood raw an an A1c was ordered, but patient did not have it drawn. Social History     Tobacco Use    Smoking status: Every Day     Packs/day: 0.50     Years: 52.00     Pack years: 26.00     Types: Cigarettes     Start date: 1965    Smokeless tobacco: Former     Quit date: 11/3/2021   Substance Use Topics    Alcohol use: Not Currently     Alcohol/week: 3.0 - 6.0 standard drinks     Types: 3 - 6 Cans of beer per week        Current Outpatient Medications   Medication Sig Dispense Refill    HYDROcodone-acetaminophen (NORCO)  MG per tablet Take 1 tablet by mouth every 6 hours as needed for Pain for up to 30 days. Intended supply: 30 days 120 tablet 0    furosemide (LASIX) 20 MG tablet Take 1 tablet by mouth 2 times daily 60 tablet 2    potassium chloride (KLOR-CON M) 10 MEQ extended release tablet Take 1 tablet by mouth 2 times daily 60 tablet 2    amLODIPine (NORVASC) 5 MG tablet TAKE 1 TABLET BY MOUTH EVERY DAY IN THE MORNING 90 tablet 1    tamsulosin (FLOMAX) 0.4 MG capsule Take 1 capsule by mouth daily 90 capsule 1    lisinopril (PRINIVIL;ZESTRIL) 5 MG tablet TAKE 1 TABLET BY MOUTH EVERY DAY IN THE MORNING 90 tablet 1    pantoprazole (PROTONIX) 40 MG tablet TAKE 1 TABLET EVERY MORNING BEFORE BREAKFAST 90 tablet 1    polyethylene glycol-electrolytes (NULYTELY) 420 g solution mix and drink entire container between 6 pm and 10 pm night before procedure      fluticasone-salmeterol (ADVAIR DISKUS) 250-50 MCG/DOSE AEPB INHALE 1 PUFF INTO THE LUNGS EVERY 12 HOURS 180 each 1    aspirin 325 MG EC tablet Take 1 tablet by mouth 2 times daily for 14 days 28 tablet 0    Spacer/Aero-Holding Chambers (AEROCHAMBER MV) MISC Use with albuterol.  1 each 0    Compression Stockings MISC by Does not apply route 20-30 mM HG    Wear daily 2 each 0    sildenafil (VIAGRA) 100 MG tablet Take 1 tablet by mouth as needed for Erectile Dysfunction 30 tablet 2    albuterol sulfate HFA (PROAIR HFA) 108 (90 Base) MCG/ACT inhaler Inhale 2 puffs into the lungs every 6 hours as needed for Wheezing 3 Inhaler 1     No current facility-administered medications for this visit. Allergies   Allergen Reactions    Sulfa Antibiotics Rash          Objective:      /72 (Site: Left Upper Arm, Position: Sitting, Cuff Size: Large Adult)   Pulse 95   Temp (!) 96 °F (35.6 °C) (Infrared)   Wt (!) 308 lb 12.8 oz (140.1 kg)   SpO2 93%   BMI 44.31 kg/m²   General: Alert and oriented, in no acute distress , obese  TM's are normal bilaterally. External ears are normal.  Pharynx mildly erythematous without exudate. Tongue, teeth and lips are normal.  Neck and supraclavicular regions are without adenopathy. Nose is congested. Sinuses non tender. The chest is clear to auscultation, without wheeze. No retractions, or use of accessory muscles of respiration noted. A1c 6.2%     Assessment:       Diagnosis Orders   1. Viral URI     improving   2. BPH associated with nocturia  tamsulosin (FLOMAX) 0.4 MG capsule   controlled   3. Primary osteoarthritis of left knee  HYDROcodone-acetaminophen (NORCO)  MG per tablet   Ok control   4. Primary osteoarthritis of right knee  HYDROcodone-acetaminophen (NORCO)  MG per tablet   Ok control   5. Chronic bilateral low back pain without sciatica  HYDROcodone-acetaminophen (NORCO)  MG per tablet   Needs improvement   6. Bilateral lower extremity edema  furosemide (LASIX) 20 MG tablet   controlled potassium chloride (KLOR-CON M) 10 MEQ extended release tablet      7. Essential hypertension  amLODIPine (NORVASC) 5 MG tablet   controlled lisinopril (PRINIVIL;ZESTRIL) 5 MG tablet      8. Gastroesophageal reflux disease without esophagitis  pantoprazole (PROTONIX) 40 MG tablet   controlled            Plan:    Follow-up with neurosurgery and PT. Recommend a low carbohydrate diet for glucose intolerance. Continue all meds at their current dose due to their effectiveness.     Symptomatic therapy also suggested: push fluids, rest, and ROV prn if symptoms persist or worsen. Call or return to office prn if these symptoms worsen or fail to improve.

## 2022-12-22 ENCOUNTER — HOSPITAL ENCOUNTER (OUTPATIENT)
Dept: PHYSICAL THERAPY | Age: 72
Setting detail: THERAPIES SERIES
Discharge: HOME | End: 2022-12-22
Payer: MEDICARE

## 2022-12-22 PROCEDURE — 97162 PT EVAL MOD COMPLEX 30 MIN: CPT

## 2022-12-22 PROCEDURE — 97110 THERAPEUTIC EXERCISES: CPT

## 2022-12-22 NOTE — PLAN OF CARE
Outpatient Physical Therapy                  [x] Phone: [phone]   Fax: [phone]    Pediatric Therapy                                    [] Phone: [phone]   Fax: [phone]  Pediatric [place]                                      [] Phone: [phone]   Fax: [phone]      To: Fan Pearson MD     From: Hossein Matias PT,    Patient: Marquis Montanez       : 1950  Diagnosis: Spinal stenosis w Neurgenic claudication   Treatment Diagnosis: Lumbar radiculopathy   Date: 2022    Physical Therapy Certification/Re-Certification Form  Dear Dr. Holger Mendoza  The following patient has been evaluated for physical therapy services and for therapy to continue, Please review the attached evaluation and/or summary of the patient's plan of care, and verify that you agree therapy should continue by signing the attached document and sending it back to our office. Patient is a  82 yo male who presents with chronic low back pain which impacts on adls;patient's goal is to decrease pain, increase activity levels. ;patient reports that pain  limits activities including sitting, standing, walking, bending, liftin; PT to address patient's goals, impairments and activity limitations with skilled interventions checked in plan of care;patient's level of function prior to onset of pain was independent; did not observe any barriers to learning during PT eval; learning preferences include demonstration, practice, and handouts; patient expressed understanding of HEP; patient appears to be motivated to participate in an active PT program and to be compliant with HEP expectations;patient assisted in developing treatment plan and goals; no DME is currently being used;      Current functional level (based on Oswestry)   : 76%    Assessment:  Assessment: Pt presents with complaints of chronic worsening low back pain and BLE symptoms. Symptoms worsen with standing more than a couple minutes.   He notes that he uses scooters in stores and limited standing/walking at home. He presents with decreased trunk and hip mobility, radicular complaints with standing, walking, difficulty with transfers. PT services to improve hip, trunk mobility, strength, decrease symptoms and improve functional activity tolerances. Plan of Care/Treatment to date:  [x] Therapeutic Exercise    [] Aquatics:  [x] Therapeutic Activity    [] Ultrasound  [] Elec Stimulation  [] Gait Training     [] Cervical Traction [] Lumbar Traction  [x] Neuromuscular Re-education [] Cold/hotpack [] Iontophoresis   [x] Instruction in HEP       [x] Manual Therapy     [] vasopneumatic            [] Self care home management        []Dry needling trigger point point/pain management          Frequency/Duration:  # Days per week: [] 1 day # Weeks: [] 1 week [x] 5 weeks     [x] 2 days   [] 2 weeks [] 6 weeks     [] 3 days   [] 3 weeks [] 7 weeks     [] 4 days   [] 4 weeks [] 8 weeks    Rehab Potential/Progress: [] Excellent [x] Good [] Fair  [] Poor     Goals:       Long Term Goals  Time Frame for Long Term Goals : 8 weeks  Long Term Goal 1: I in home program.  Long Term Goal 2: sit 1 hr with minimal pain  Long Term Goal 3: stand/walk 20mins  Long Term Goal 4: min pain/difficulty with transfers. Electronically signed by:  Jennifer Mustafa PT,  12/22/2022, 5:57 PM              If you have any questions or concerns, please don't hesitate to call.   Thank you for your referral.      Physician Signature:_________________Date:____________Time: ________  By signing above, therapists plan is approved by physician

## 2022-12-22 NOTE — PROGRESS NOTES
Physical Therapy: Initial Evaluation    Patient: Ty Adjutant (68 y.o. male)   Examination Date: 2022        :  1950 ;    Confirmed: Yes MRN: [id number]  CSN: [id number]   Insurance: Payor: Oliver Dalal / Plan: [facility] HMO / Product Type: *No Product type* /   Insurance ID: [id number] - (Medicare Managed) Secondary Insurance (if applicable):    Referring Physician: Twyla Brock MD     PCP: Serena Stock MD Visits to Date/Visits Approved:   /      No Show/Cancelled Appts:   /       Medical Diagnosis: Spinal stenosis, lumbar region with neurogenic claudication [M48.062] Spinal stenosis w Neurgenic claudication  Treatment Diagnosis: Lumbar radiculopathy     PERTINENT MEDICAL HISTORY           Medical History:     Past Medical History:   Diagnosis Date    Acid reflux     Anxiety     Arthritis     \"Right Knee\"    Asthma     Chronic back pain     Cough     Occ Productive Cough \"Milky\" Sputum    Diverticulitis Dx Early     Hepatitis Dx 's    \"In [facility] Days\"    [place] (hard of hearing)     Bilateral Ears    HTN (hypertension)     Hyperlipidemia     Nocturia     Shortness of breath on exertion     Slow urinary stream     Tingling     \"Tingling Right Hand\"    Wears dentures     Full Upper, Partial Lower    Wears glasses     Wears partial dentures     Lower     Surgical History:   Past Surgical History:   Procedure Laterality Date    APPENDECTOMY  's    CARPAL TUNNEL RELEASE Right 2017    COLONOSCOPY  Last Done Early     Polyps Removed In Past    DENTAL SURGERY      Teeth Extracted In Past    EYE SURGERY Left Late     Cataract With Lens Implant    FRACTURE SURGERY Left     Broken Left Wrist    HERNIA REPAIR  7177'D    Umbilical Hernia Repair    KNEE ARTHROSCOPY Right 2017    meniscus repair    TONSILLECTOMY AND ADENOIDECTOMY  's    TOTAL KNEE ARTHROPLASTY Right 2/15/2022    RIGHT KNEE TOTAL ARTHROPLASTY performed by Dorothy Flanagan DO at [facility] [place]       Medications:   Current Outpatient Medications:     tamsulosin (FLOMAX) 0.4 MG capsule, Take 1 capsule by mouth daily, Disp: 90 capsule, Rfl: 1    HYDROcodone-acetaminophen (NORCO)  MG per tablet, Take 1 tablet by mouth every 6 hours as needed for Pain for up to 30 days.  Intended supply: 30 days, Disp: 120 tablet, Rfl: 0    furosemide (LASIX) 20 MG tablet, Take 1 tablet by mouth 2 times daily, Disp: 60 tablet, Rfl: 2    potassium chloride (KLOR-CON M) 10 MEQ extended release tablet, Take 1 tablet by mouth 2 times daily, Disp: 60 tablet, Rfl: 2    amLODIPine (NORVASC) 5 MG tablet, TAKE 1 TABLET BY MOUTH EVERY DAY IN THE MORNING, Disp: 90 tablet, Rfl: 1    lisinopril (PRINIVIL;ZESTRIL) 5 MG tablet, TAKE 1 TABLET BY MOUTH EVERY DAY IN THE MORNING, Disp: 90 tablet, Rfl: 1    pantoprazole (PROTONIX) 40 MG tablet, TAKE 1 TABLET EVERY MORNING BEFORE BREAKFAST, Disp: 90 tablet, Rfl: 1    polyethylene glycol-electrolytes (NULYTELY) 420 g solution, mix and drink entire container between 6 pm and 10 pm night before procedure, Disp: , Rfl:     fluticasone-salmeterol (ADVAIR DISKUS) 250-50 MCG/DOSE AEPB, INHALE 1 PUFF INTO THE LUNGS EVERY 12 HOURS, Disp: 180 each, Rfl: 1    aspirin 325 MG EC tablet, Take 1 tablet by mouth 2 times daily for 14 days, Disp: 28 tablet, Rfl: 0    Spacer/Aero-Holding Chambers (AEROCHAMBER MV) MISC, Use with albuterol., Disp: 1 each, Rfl: 0    Compression Stockings MISC, by Does not apply route 20-30 mM HG  Wear daily, Disp: 2 each, Rfl: 0    sildenafil (VIAGRA) 100 MG tablet, Take 1 tablet by mouth as needed for Erectile Dysfunction, Disp: 30 tablet, Rfl: 2    albuterol sulfate HFA (PROAIR HFA) 108 (90 Base) MCG/ACT inhaler, Inhale 2 puffs into the lungs every 6 hours as needed for Wheezing, Disp: 3 Inhaler, Rfl: 1  Allergies: Sulfa antibiotics      SUBJECTIVE EXAMINATION      ,           Subjective History:    Subjective: chronic low back pain, progressively worsening. Additional Pertinent Hx (if applicable): chronic low back pain. Hx of R TKA, L needs to be done. has LBP and BLE symptoms with walking for about a minute or two. Pain is at lateral thighs and medial legs. Had surgical consult - prescribed PT. Learning/Language: Learning  Does the patient/guardian have any barriers to learning?: No barriers  Will there be a co-learner?: No  What is the preferred language of the patient/guardian?: English  How does the patient/guardian prefer to learn new concepts?: Listening, Reading, Demonstration, Pictures/Videos     Pain Screening    8/10    Functional Status         Social History:  Social History  Lives With: Significant other  Type of Home: House  Home Layout: One level  Bathroom Shower/Tub: Walk-in shower, Shower chair without back  Bathroom Toilet: Handicap height  Bathroom Equipment: Grab bars in shower    Occupation/Interests:  Occupation: Retired  Type of Occupation:  - retired. Prior Level of Function:    Independent        Current Level of Function:  very limited standing, walking, uses scooter in stores.       ADL Assistance: Independent (does get some help washing back/backside)  Homemaking Assistance: Independent  Homemaking Responsibilities: Yes  Ambulation Assistance: Independent  Transfer Assistance: Independent  Active : Yes    OBJECTIVE EXAMINATION   Restrictions:             Review of Systems:  Vision: Within Functional Limits  Hearing: Within functional limits  Overall Orientation Status: Within Normal Limits    VBI Screening / Lumbar Screening:        Regional Screen:         Observations:       Palpation:   Lumbar Spine Palpation: tender lumbar paraspinals, SP, LS junction, iliac crests, gluteals, greater trochanters    Ambulation/Gait (if applicable):       Balance Screen:       Neuro Screen:  Left Myotomes  Right Myotomes          Left LE Myotomes (Lower Quarter)  L1-2: Hip Flexion: Strong  L3-4: Knee Extension: Strong  L4-5: Ankle Dorsiflexion: Strong  S1-2: Plantarflexion: Strong  S2: Knee Flexion: Strong Right LE Myotomes (Lower Quarter)  L1-2: Hip Flexion: Strong  L3-4: Knee Extension: Strong  L4-5: Ankle Dorsiflexion: Strong  S1-2: Plantarflexion: Strong  S2: Knee Flexion: Strong       Sensation      Sensation  Overall Sensation Status:  (grossly intact to lt touch, but relates disesthesia at medial legs and lateral thighs.)     Left AROM  Right AROM          Limited hip flexion, soft tissue approximation and tightness  Limited hip flexion, soft tissue approximation and tightness          Lumbar Assessment   AROM Lumbar Spine   Lumbar spine general AROM: ext 10, felxion to knees. BSB limited. Trunk Strength      Abdominal 2-     Special Tests:   Special Tests Lumbar Spine  Lumbar Special Tests:  (SLR restricted HS B.  + quad and hip flexor tightness B, + NATANAEL for stiffness B.  R natanael more limited than L.)       ASSESSMENT     Impression: Assessment: Pt presents with complaints of chronic worsening low back pain and BLE symptoms. Symptoms worsen with standing more than a couple minutes. He notes that he uses scooters in stores and limited standing/walking at home. He presents with decreased trunk and hip mobility, radicular complaints with standing, walking, difficulty with transfers. PT services to improve hip, trunk mobility, strength, decrease symptoms and improve functional activity tolerances. Statement of Medical Necessity: Physical Therapy is both indicated and medically necessary as outlined in the POC to increase the likelihood of meeting the functionally related goals stated below. Patient's Activity Tolerance:        Patient's rehabilitation potential/prognosis is considered to be:       Factors which may impact rehabilitation potential include:          GOALS   Patient Goal(s): decrease pain      Long Term Goals Completed by 8 weeks Goal Status   I in home program.     sit 1 hr with minimal pain     stand/walk 20mins     min pain/difficulty with transfers. Therapist Signature: Wood Burrows, JOHNNIE    Date: 10/80/8662     I certify that the above Therapy Services are being furnished while the patient is under my care. I agree with the treatment plan and certify that this therapy is necessary. Physician's Signature:  ___________________________   Date:_______                                                                   Fifi Wilson MD        Physician Comments: _______________________________________________    Please sign and return to [id number]  [facility]. Please fax to the location listed below.  [facility] for this referral!    [address] [facility] [address], [address] [address]  Dept: [phone]  Dept Fax: [phone]  Loc: [phone]       POC NOTE

## 2022-12-22 NOTE — FLOWSHEET NOTE
Outpatient Physical Therapy  [place]           [x] Phone: [phone]   Fax: [phone]  [place]           [] Phone: [phone]   Fax: [phone]        Physical Therapy Daily Treatment Note  Date:  2022    Patient Name:  Isa Aschoff    :  1950  MRN: [id number]  Restrictions/Precautions: No data recorded      Diagnosis:   Spinal stenosis, lumbar region with neurogenic claudication [M48.062] Diagnosis: Spinal stenosis w Neurgenic claudication  Date of Injury/Surgery:   Treatment Diagnosis:  Lumbar radiculopathy  Insurance/Certification information: [employer] medicare  Referring Physician:  Nereida Ochoa MD     PCP: Kelley Borrego MD  Next Doctor Visit:    Plan of care signed (Y/N):    Outcome Measure:  Oswestry 76%  Visit# / total visits:   1/10  Pain level: 8/10   Goals:     Patient goals: decrease pain     Long Term Goals  Time Frame for Long Term Goals : 8 weeks  Long Term Goal 1: I in home program.  Long Term Goal 2: sit 1 hr with minimal pain  Long Term Goal 3: stand/walk 20mins  Long Term Goal 4: min pain/difficulty with transfers. Summary of Evaluation:  Assessment: Pt presents with complaints of chronic worsening low back pain and BLE symptoms. Symptoms worsen with standing more than a couple minutes. He notes that he uses scooters in stores and limited standing/walking at home. He presents with decreased trunk and hip mobility, radicular complaints with standing, walking, difficulty with transfers. PT services to improve hip, trunk mobility, strength, decrease symptoms and improve functional activity tolerances. Subjective:  See eval         Any changes in Ambulatory Summary Sheet?   None        Objective:  See eval   COVID screening questions were asked and patient attested that there had been no contact or symptoms        Exercises: (No more than 4 columns)   Exercise/Equipment Date 22 #1 Date Date           WARM UP         Sci fit            TABLE Knee to chest W strap 10ct hold, x 5     LTR 10ct hold x 5 each way     Seated HS stretch 30sec hold,      Adductor stretch Manually, knee extended        TA contraction      clamshells                        STANDING      Rows      Antirotation/pallof      SB stretch      Shld extension      PNF D2F      Squat/sit/stand         HS curl        PROPRIOCEPTION                                    MODALITIES                      Other Therapeutic Activities/Education:        Home Exercise Program:    Access Code: [email or web address]  URL: [email or web address]  Date: 12/22/2022  Prepared by: Morenita Dougherty    Exercises  Seated Hamstring Stretch - 3 x daily - 7 x weekly - 1 sets - 3 reps - 30 hold  Supine Lower Trunk Rotation - 3 x daily - 7 x weekly - 1 sets - 10 reps - 10 hold  Hooklying Single Knee to Chest Stretch with Towel - 3 x daily - 7 x weekly - 1 sets - 5 reps - 10 hold      Manual Treatments:        Modalities:        Communication with other providers:        Assessment:  (Response towards treatment session) (Pain Rating)  Assessment: Pt presents with complaints of chronic worsening low back pain and BLE symptoms. Symptoms worsen with standing more than a couple minutes. He notes that he uses scooters in stores and limited standing/walking at home. He presents with decreased trunk and hip mobility, radicular complaints with standing, walking, difficulty with transfers. PT services to improve hip, trunk mobility, strength, decrease symptoms and improve functional activity tolerances.       Plan for Next Session:        Time In / Time Out:    0182 Pt late/1545       Timed Code/Total Treatment Minutes:  8/50      Next Progress Note due:        Plan of Care Interventions:  [x] Therapeutic Exercise  [] Modalities:  [x] Therapeutic Activity     [] Ultrasound  [] Estim  [] Gait Training      [] Cervical Traction [] Lumbar Traction  [x] Neuromuscular Re-education    [] Cold/hotpack [] Iontophoresis   [x] Instruction in HEP      [] Vasopneumatic   [] Dry Needling    [] Manual Therapy               [] Aquatic Therapy              Electronically signed by:  Morteza Cantu PT, 12/22/2022, 3:36 PM

## 2023-01-04 ENCOUNTER — HOSPITAL ENCOUNTER (OUTPATIENT)
Dept: PHYSICAL THERAPY | Age: 73
Setting detail: THERAPIES SERIES
Discharge: HOME | End: 2023-01-04
Payer: MEDICARE

## 2023-01-04 PROCEDURE — 97110 THERAPEUTIC EXERCISES: CPT

## 2023-01-04 NOTE — FLOWSHEET NOTE
Outpatient Physical Therapy  [place]           [x] Phone: [phone]   Fax: [phone]  [facility]           [] Phone: [phone]   Fax: [phone]        Physical Therapy Daily Treatment Note  Date:  2023    Patient Name:  Ernst Omer    :  1950  MRN: [id number]  Restrictions/Precautions: No data recorded      Diagnosis:   Spinal stenosis, lumbar region with neurogenic claudication [M48.062]    Date of Injury/Surgery:   Treatment Diagnosis:  Lumbar radiculopathy  Insurance/Certification information: [employer] medicare  Referring Physician:  Ancelmo Fritz MD     PCP: Leidy Delatorre MD  Next Doctor Visit:    Plan of care signed (Y/N):    Outcome Measure:  Oswestry 76%  Visit# / total visits:   1/10  Pain level:      2/10           Goals:     Patient goals: decrease pain  Long Term Goals  Time Frame for Long Term Goals : 8 weeks  Long Term Goal 1: I in home program. Reports partial compliance   Long Term Goal 2: sit 1 hr with minimal pain  Long Term Goal 3: stand/walk 20mins  Long Term Goal 4: min pain/difficulty with transfers. Summary of Evaluation:  Assessment: Pt presents with complaints of chronic worsening low back pain and BLE symptoms. Symptoms worsen with standing more than a couple minutes. He notes that he uses scooters in stores and limited standing/walking at home. He presents with decreased trunk and hip mobility, radicular complaints with standing, walking, difficulty with transfers. PT services to improve hip, trunk mobility, strength, decrease symptoms and improve functional activity tolerances. Subjective:   Trav Gomez arrives to therapy stating that the back currently feels pretty good overall. No pain in the legs unless he stands for prolonged periods of time. Pain currently just a 2/10 across the low back. Reports only minimal compliance with HEP. Any changes in Ambulatory Summary Sheet?   None      8' late for 30' appt    Objective:  COVID screening questions were asked and patient attested that there had been no contact or symptoms    Tight hamstrings. Cues for proper activation of TA. Exercises: (No more than 4 columns)   Exercise/Equipment Date 12/22/22 #1 1/4/2023 #2 Date           WARM UP      Sci fit  S15, L1, 5'          TABLE      Knee to chest W strap 10ct hold, x 5 W/strap 10*5\"     LTR 10ct hold x 5 each way 10*5\" ea     Seated HS stretch 30sec hold,  2*30\"     Adductor stretch Manually, knee extended --    TA contraction  10*5\"     clamshells                        STANDING      Rows      Antirotation/pallof      SB stretch      Shld extension      PNF D2F      Squat/sit/stand      HS curl              PROPRIOCEPTION                                    MODALITIES                      Other Therapeutic Activities/Education:  Educated patient on the importance of regular compliance with HEP in order to see changes in symptoms. Home Exercise Program:    Access Code: [email or web address]  URL: [email or web address]  Date: 12/22/2022  Prepared by: Charlie Mix    Exercises  Seated Hamstring Stretch - 3 x daily - 7 x weekly - 1 sets - 3 reps - 30 hold  Supine Lower Trunk Rotation - 3 x daily - 7 x weekly - 1 sets - 10 reps - 10 hold  Hooklying Single Knee to Chest Stretch with Towel - 3 x daily - 7 x weekly - 1 sets - 5 reps - 10 hold      Manual Treatments:  none      Modalities:  none      Communication with other providers:  none      Assessment:  Elaine Ramirez treatment was shortened today as a result of him being late for a 30' appt. He is generally weak and deconditioned with limited mobility.  End session pain: same       Plan for Next Session:       Time In / Time Out:    1738/1802       Timed Code/Total Treatment Minutes:  24' 2 TE       Next Progress Note due:        Plan of Care Interventions:  [x] Therapeutic Exercise  [] Modalities:  [x] Therapeutic Activity     [] Ultrasound  [] Estim  [] Gait Training      [] Cervical Traction [] Lumbar Traction  [x] Neuromuscular Re-education    [] Cold/hotpack [] Iontophoresis   [x] Instruction in HEP      [] Vasopneumatic   [] Dry Needling    [] Manual Therapy               [] Aquatic Therapy              Electronically signed by:  Blanca Castro PTA     1/4/2023, 10:27 AM

## 2023-01-07 ENCOUNTER — HOSPITAL ENCOUNTER (OUTPATIENT)
Dept: PHYSICAL THERAPY | Age: 73
Setting detail: THERAPIES SERIES
Discharge: HOME | End: 2023-01-07
Payer: MEDICARE

## 2023-01-07 NOTE — FLOWSHEET NOTE
Physical Therapy  Cancellation/No-show Note  Patient Name:  Zeke Wright  :  1950   Date:  2023  Cancelled visits to date: 1  No-shows to date: 0    For today's appointment patient:  [x]  Cancelled  []  Rescheduled appointment  []  No-show     Reason given by patient:  []  Patient ill  []  Conflicting appointment  []  No transportation    []  Conflict with work  [x]  No reason given  []  Other:     Comments:      Electronically signed by:   Lima Henderson PTA Never

## 2023-01-09 ENCOUNTER — HOSPITAL ENCOUNTER (OUTPATIENT)
Dept: PHYSICAL THERAPY | Age: 73
Setting detail: THERAPIES SERIES
Discharge: HOME | End: 2023-01-09
Payer: MEDICARE

## 2023-01-09 PROCEDURE — 97530 THERAPEUTIC ACTIVITIES: CPT

## 2023-01-09 PROCEDURE — 97110 THERAPEUTIC EXERCISES: CPT

## 2023-01-09 PROCEDURE — 97140 MANUAL THERAPY 1/> REGIONS: CPT

## 2023-01-09 NOTE — FLOWSHEET NOTE
Outpatient Physical Therapy  [place]           [x] Phone: [phone]   Fax: [phone]  [place]           [] Phone: [phone]   Fax: [phone]        Physical Therapy Daily Treatment Note  Date:  2023    Patient Name:  Magali Olea    :  1950  MRN: [id number]  Restrictions/Precautions: No data recorded      Diagnosis:   Spinal stenosis, lumbar region with neurogenic claudication [M48.062]    Date of Injury/Surgery:   Treatment Diagnosis:  Lumbar radiculopathy  Insurance/Certification information: [employer] medicare  Referring Physician:  Tami Guo MD     PCP: Denae Anguiano MD  Next Doctor Visit:    Plan of care signed (Y/N):    Outcome Measure:  Oswestry 76%  Visit# / total visits:   3/10  Pain level:      4/10           Goals:     Patient goals: decrease pain  Long Term Goals  Time Frame for Long Term Goals : 8 weeks  Long Term Goal 1: I in home program. Reports partial compliance   Long Term Goal 2: sit 1 hr with minimal pain  Long Term Goal 3: stand/walk 20mins  Long Term Goal 4: min pain/difficulty with transfers. Summary of Evaluation:  Assessment: Pt presents with complaints of chronic worsening low back pain and BLE symptoms. Symptoms worsen with standing more than a couple minutes. He notes that he uses scooters in stores and limited standing/walking at home. He presents with decreased trunk and hip mobility, radicular complaints with standing, walking, difficulty with transfers. PT services to improve hip, trunk mobility, strength, decrease symptoms and improve functional activity tolerances. Subjective:   Pt stated that his pain was 4/10 across low back and hips and down to B feet. Pt stated that he missed Saturday appointment due to not feeling well. Any changes in Ambulatory Summary Sheet?   None          Objective:  COVID screening questions were asked and patient attested that there had been no contact or symptoms    Tight hamstrings = R hamstring > L      Exercises: (No more than 4 columns)   Exercise/Equipment Date 12/22/22 #1 1/4/2023 #2 1/9/2023 #3           WARM UP      Sci fit  S15, L1, 5' L-1 x 5'          TABLE      Knee to chest W strap 10ct hold, x 5 W/strap 10*5\"  Man   LTR 10ct hold x 5 each way 10*5\" ea  10x ea 3\" hold   Seated HS stretch 30sec hold,  2*30\"  Man 30\" x4 ea    Adductor stretch Manually, knee extended --    TA contraction  10*5\"  10 x 2 5\"    clamshells   H/L GTB 10x2                     STANDING      Rows   RTB 10 x 2 2\"   Antirotation/pallof   10x 3\" BSB   SB stretch      Shld extension   RTB 10 x 2 2\"    PNF D2F      Squat/sit/stand   10x   HS curl              PROPRIOCEPTION                                    MODALITIES                      Other Therapeutic Activities/Education:  Discussed anatomy of spine and talked about what was occurring during stenosis. Home Exercise Program:    Access Code: [email or web address]  URL: [email or web address]  Date: 12/22/2022  Prepared by: Selvin Bermudez    Exercises  Seated Hamstring Stretch - 3 x daily - 7 x weekly - 1 sets - 3 reps - 30 hold  Supine Lower Trunk Rotation - 3 x daily - 7 x weekly - 1 sets - 10 reps - 10 hold  Hooklying Single Knee to Chest Stretch with Towel - 3 x daily - 7 x weekly - 1 sets - 5 reps - 10 hold      Manual Treatments:  stretches as above and nerve glides B LE 90/90 x 8'  total       Modalities:  none      Communication with other providers:  none      Assessment:  Pt tolerated treatment fair today. Pt was able to increase activity in the gym today. Pt rated pain at 3/10 after treatment.      Plan for Next Session:       Time In / Time Out:   1300/1340       Timed Code/Total Treatment Minutes:  40'/ 36' 1 man ( 8') 1 TA ( 10') 1 TE ( 22')       Next Progress Note due:        Plan of Care Interventions:  [x] Therapeutic Exercise  [] Modalities:  [x] Therapeutic Activity     [] Ultrasound  [] Estim  [] Gait Training      [] Cervical Traction [] Lumbar Traction  [x] Neuromuscular Re-education    [] Cold/hotpack [] Iontophoresis   [x] Instruction in HEP      [] Vasopneumatic   [] Dry Needling    [] Manual Therapy               [] Aquatic Therapy              Electronically signed by:  Aileen Sosa     1/9/2023, 9:12 AM       1/9/2023,4:43 PM

## 2023-01-12 ENCOUNTER — HOSPITAL ENCOUNTER (OUTPATIENT)
Dept: PHYSICAL THERAPY | Age: 73
Setting detail: THERAPIES SERIES
Discharge: HOME | End: 2023-01-12
Payer: MEDICARE

## 2023-01-12 PROCEDURE — 97110 THERAPEUTIC EXERCISES: CPT

## 2023-01-12 PROCEDURE — 97530 THERAPEUTIC ACTIVITIES: CPT

## 2023-01-12 PROCEDURE — 97112 NEUROMUSCULAR REEDUCATION: CPT

## 2023-01-12 NOTE — FLOWSHEET NOTE
Outpatient Physical Therapy  [place]           [x] Phone: [phone]   Fax: [phone]  [facility]           [] Phone: [phone]   Fax: [phone]        Physical Therapy Daily Treatment Note  Date:  2023    Patient Name:  Billy Castaneda    :  1950  MRN: [id number]  Restrictions/Precautions: No data recorded      Diagnosis:   Spinal stenosis, lumbar region with neurogenic claudication [M48.062]    Date of Injury/Surgery:   Treatment Diagnosis:  Lumbar radiculopathy  Insurance/Certification information: [employer]  Referring Physician:  Michelle Willson MD     PCP: Simran Denton MD  Next Doctor Visit:    Plan of care signed (Y/N):    Outcome Measure:  Oswestry 76%  Visit# / total visits:   4/10  Pain level:    4-5/10           Goals:     Patient goals: decrease pain  Long Term Goals  Time Frame for Long Term Goals : 8 weeks  Long Term Goal 1: I in home program. Reports partial compliance   Long Term Goal 2: sit 1 hr with minimal pain  Long Term Goal 3: stand/walk 20mins  Long Term Goal 4: min pain/difficulty with transfers. Summary of Evaluation:  Assessment: Pt presents with complaints of chronic worsening low back pain and BLE symptoms. Symptoms worsen with standing more than a couple minutes. He notes that he uses scooters in stores and limited standing/walking at home. He presents with decreased trunk and hip mobility, radicular complaints with standing, walking, difficulty with transfers. PT services to improve hip, trunk mobility, strength, decrease symptoms and improve functional activity tolerances. Subjective:   Pt rated pain at 4-5/10 today. Pt stated that the knee to chest stretch felt good while PTA was doing it,but later had more pain. Pt sta  Any changes in Ambulatory Summary Sheet?   None          Objective:  COVID screening questions were asked and patient attested that there had been no contact or symptoms    Improved abdominal control Exercises: (No more than 4 columns)   Exercise/Equipment Date 12/22/22 #1 1/4/2023 #2 1/9/2023 #3 1/12/2023 #4            WARM UP       Sci fit  S15, L1, 5' L-1 x 5'  L-1 x 5'           TABLE       Knee to chest W strap 10ct hold, x 5 W/strap 10*5\"  Man GSB 10x2   LTR 10ct hold x 5 each way 10*5\" ea  10x ea 3\" hold 10 x 2 3\"  ea side   Seated HS stretch 30sec hold,  2*30\"  Man 30\" x4 ea  Supine man   Adductor stretch Manually, knee extended --     TA contraction  10*5\"  10 x 2 5\"  10 x 2 5\"                  clamshells   H/L GTB 10x2 H/L GTB 10 x 2                         STANDING       Rows   RTB 10 x 2 2\" RTB 10x2   Antirotation/pallof    RTB 10x ea side   SB stretch   10x 3\" BSB 15x 3\" BSB   Shld extension   RTB 10 x 2 2\"  RTB 10 x 2 2\"    PNF D2F       Squat/sit/stand   10x 21\" 10 x 2    HS curl      RTB 10x2          PROPRIOCEPTION                                          MODALITIES                         Other Therapeutic Activities/Education:  Discussed anatomy of spine and talked about what was occurring during stenosis. Home Exercise Program:    Access Code: [email or web address]  URL: [email or web address]  Date: 12/22/2022  Prepared by: Augusto Marrow    Exercises  Seated Hamstring Stretch - 3 x daily - 7 x weekly - 1 sets - 3 reps - 30 hold  Supine Lower Trunk Rotation - 3 x daily - 7 x weekly - 1 sets - 10 reps - 10 hold  Hooklying Single Knee to Chest Stretch with Towel - 3 x daily - 7 x weekly - 1 sets - 5 reps - 10 hold      Manual Treatments:  stretches as above and nerve glides B LE 90/90 x 8'  total       Modalities:  none      Communication with other providers:  none      Assessment:  Pt tolerated treatment fair today. Pt was able to increase activity in the gym today. Pt rated pain at 3/10 after treatment.      Plan for Next Session:       Time In / Time Out:   1259/1339       Timed Code/Total Treatment Minutes:  40'/ 40' 1 TA ( 8') 1 TE ( 22') 1 neuro (10')       Next Progress Note due: Plan of Care Interventions:  [x] Therapeutic Exercise  [] Modalities:  [x] Therapeutic Activity     [] Ultrasound  [] Estim  [] Gait Training      [] Cervical Traction [] Lumbar Traction  [x] Neuromuscular Re-education    [] Cold/hotpack [] Iontophoresis   [x] Instruction in HEP      [] Vasopneumatic   [] Dry Needling    [] Manual Therapy               [] Aquatic Therapy              Electronically signed by:  Amber García     1/12/2023, 12:53 PM     1/12/2023,1:49 PM

## 2023-01-16 ENCOUNTER — HOSPITAL ENCOUNTER (OUTPATIENT)
Dept: PHYSICAL THERAPY | Age: 73
Setting detail: THERAPIES SERIES
Discharge: HOME | End: 2023-01-16
Payer: MEDICARE

## 2023-01-16 PROCEDURE — 97112 NEUROMUSCULAR REEDUCATION: CPT

## 2023-01-16 PROCEDURE — 97110 THERAPEUTIC EXERCISES: CPT

## 2023-01-16 NOTE — FLOWSHEET NOTE
Outpatient Physical Therapy  [place]           [x] Phone: [phone]   Fax: [phone]  [place]           [] Phone: [phone]   Fax: [phone]        Physical Therapy Daily Treatment Note  Date:  2023    Patient Name:  Sai Reyes    :  1950  MRN: [id number]  Restrictions/Precautions: No data recorded      Diagnosis:   Spinal stenosis, lumbar region with neurogenic claudication [M48.062]    Date of Injury/Surgery:   Treatment Diagnosis:  Lumbar radiculopathy  Insurance/Certification information: [employer]are  Referring Physician:  Delroy Sterling MD     PCP: BIRGIT GARCÍA MD  Next Doctor Visit:    Plan of care signed (Y/N):    Outcome Measure:  Oswestry 76%  Visit# / total visits:   5/10  Pain level:    4/10           Goals:     Patient goals: decrease pain  Long Term Goals  Time Frame for Long Term Goals : 8 weeks  Long Term Goal 1: I in home program. Reports partial compliance   Long Term Goal 2: sit 1 hr with minimal pain  Long Term Goal 3: stand/walk 20mins  Long Term Goal 4: min pain/difficulty with transfers.        Summary of Evaluation:  Assessment: Pt presents with complaints of chronic worsening low back pain and BLE symptoms.  Symptoms worsen with standing more than a couple minutes.  He notes that he uses scooters in stores and limited standing/walking at home.  He presents with decreased trunk and hip mobility, radicular complaints with standing, walking, difficulty with transfers. PT services to improve hip, trunk mobility, strength, decrease symptoms and improve functional activity tolerances.        Subjective:   Pt stated that his pain was 4/10 today. Pt stated that he is feeling \"off\" today due to not getting much rest. Pt stated that he is having more breathing issues because he is rushing due to having a lot to do today. Pt stated that he has been without his inhaler for a couple of days, too. Pt stated that he wants Wednesday to be his last visit.  Any changes in Ambulatory Summary Sheet? None          Objective:  COVID screening questions were asked and patient attested that there had been no contact or symptoms    Increased SOB noted throughout   Difficulty lying  flat without labored breathing so his exercises were done mostly done  standing and seated today. Exercises: (No more than 4 columns)   Exercise/Equipment Date 12/22/22 #1 1/4/2023 #2 1/9/2023 #3 1/12/2023 #4 1/16/2023 #5             WARM UP        Sci fit  S15, L1, 5' L-1 x 5'  L-1 x 5'  L-1 x 5'            TABLE        Knee to chest W strap 10ct hold, x 5 W/strap 10*5\"  Man GSB 10x2 --   LTR 10ct hold x 5 each way 10*5\" ea  10x ea 3\" hold 10 x 2 3\"  ea side 10 x 2 3\" ea side    Seated HS stretch 30sec hold,  2*30\"  Man 30\" x4 ea  Supine man Seated manual stretching   Adductor stretch Manually, knee extended --      TA contraction  10*5\"  10 x 2 5\"  10 x 2 5\"  10 x  5\"                    clamshells   H/L GTB 10x2 H/L GTB 10 x 2                             STANDING        Rows   RTB 10 x 2 2\" RTB 10x2 GTB 10x2   Antirotation/pallof    RTB 10x ea side RTB 10 x 2  ea side   SB stretch   10x 3\" BSB 15x 3\" BSB --   Shld extension   RTB 10 x 2 2\"  RTB 10 x 2 2\"  GTB 10x2   PNF D2F        Squat/sit/stand   10x 21\" 10 x 2  21\" 10x2   HS curl      RTB 10x2 RTB 10 x 2 ea LE           PROPRIOCEPTION                                                MODALITIES                            Other Therapeutic Activities/Education: Encouraged patient to get refill for inhaler today. Home Exercise Program:    Access Code: [email or web address]  URL: [email or web address]  Date: 12/22/2022  Prepared by: Marilin Asencio    Exercises  Seated Hamstring Stretch - 3 x daily - 7 x weekly - 1 sets - 3 reps - 30 hold  Supine Lower Trunk Rotation - 3 x daily - 7 x weekly - 1 sets - 10 reps - 10 hold  Hooklying Single Knee to Chest Stretch with Towel - 3 x daily - 7 x weekly - 1 sets - 5 reps - 10 hold      Manual Treatments: stretches as above       Modalities:  none      Communication with other providers:  none      Assessment:  Pt tolerated treatment fair today. Pt was able to increase activity in the gym today. Pt rated pain at 3/10 after treatment.  Pt to see PT and be discharged as per patient request.     Plan for Next Session:       Time In / Time Out: 1306/ 1344       Timed Code/Total Treatment Minutes:  38'/38' 2 TE ( 23') 1 neuro (15')     Next Progress Note due:        Plan of Care Interventions:  [x] Therapeutic Exercise  [] Modalities:  [x] Therapeutic Activity     [] Ultrasound  [] Estim  [] Gait Training      [] Cervical Traction [] Lumbar Traction  [x] Neuromuscular Re-education    [] Cold/hotpack [] Iontophoresis   [x] Instruction in HEP      [] Vasopneumatic   [] Dry Needling    [] Manual Therapy               [] Aquatic Therapy              Electronically signed Nighat Lewis       1/16/2023,11:09 AM       1/16/2023,4:52 PM

## 2023-01-18 ENCOUNTER — HOSPITAL ENCOUNTER (OUTPATIENT)
Dept: PHYSICAL THERAPY | Age: 73
Setting detail: THERAPIES SERIES
Discharge: HOME | End: 2023-01-18
Payer: MEDICARE

## 2023-01-18 PROCEDURE — 97110 THERAPEUTIC EXERCISES: CPT

## 2023-01-18 NOTE — FLOWSHEET NOTE
Outpatient Physical Therapy  [place]           [x] Phone: [phone]   Fax: [phone]  [facility] [facility]           [] Phone: [phone]   Fax: [phone]        Physical Therapy Daily Treatment Note  Date:  2023    Patient Name:  Denton Cabot    :  1950  MRN: [id number]  Restrictions/Precautions: No data recorded      Diagnosis:   Spinal stenosis, lumbar region with neurogenic claudication [M48.062]    Date of Injury/Surgery:   Treatment Diagnosis:  Lumbar radiculopathy  Insurance/Certification information: [employer] medicare  Referring Physician:  Bryon Willoughby MD     PCP: Isaias Villalpando MD  Next Doctor Visit:  unsure  Plan of care signed (Y/N):    Outcome Measure:  Oswestry 76%    23 68%  Visit# / total visits:   6/10  Pain level:    4/10           Goals:     Patient goals: decrease pain  Long Term Goals  Time Frame for Long Term Goals : 8 weeks  Long Term Goal 1: I in home program.     Reports partial compliance   Long Term Goal 2: sit 1 hr with minimal pain    Sitting tolerance few minutes without leaning on something. Long Term Goal 3: stand/walk 20mins    Standing tolerance - limited to a couple minutes. Long Term Goal 4: min pain/difficulty with transfers. Mod pain        Summary of Evaluation:  Assessment: Pt presents with complaints of chronic worsening low back pain and BLE symptoms. Symptoms worsen with standing more than a couple minutes. He notes that he uses scooters in stores and limited standing/walking at home. He presents with decreased trunk and hip mobility, radicular complaints with standing, walking, difficulty with transfers. PT services to improve hip, trunk mobility, strength, decrease symptoms and improve functional activity tolerances. Subjective:   symptoms feel the same,  gets some very temporary benefit from therapy. Standing tolerance - limited to a couple minutes. BLE symptoms to B knees. Just wants to proceed with surgery.      Any changes in Ambulatory Summary Sheet? None          Objective:  COVID screening questions were asked and patient attested that there had been no contact or symptoms        Exercises: (No more than 4 columns)   Exercise/Equipment Date 12/22/22 #1 1/4/2023 #2 1/9/2023 #3 1/12/2023 #4 1/16/2023 #5 1/18/23 #6              WARM UP         Sci fit  S15, L1, 5' L-1 x 5'  L-1 x 5'  L-1 x 5'  Lvl 3 x 5 mins            TABLE         Knee to chest W strap 10ct hold, x 5 W/strap 10*5\"  Man GSB 10x2 -- GSB 10x3   LTR 10ct hold x 5 each way 10*5\" ea  10x ea 3\" hold 10 x 2 3\"  ea side 10 x 2 3\" ea side  2x5 SB   Seated HS stretch 30sec hold,  2*30\"  Man 30\" x4 ea  Supine man Seated manual stretching    Adductor stretch Manually, knee extended --       TA contraction  10*5\"  10 x 2 5\"  10 x 2 5\"  10 x  5\"  review                     clamshells   H/L GTB 10x2 H/L GTB 10 x 2   Blue supine 3x10                              STANDING         Rows   RTB 10 x 2 2\" RTB 10x2 GTB 10x2 Blue seated 3x10   Antirotation/pallof    RTB 10x ea side RTB 10 x 2  ea side Seated blue x 10   SB stretch   10x 3\" BSB 15x 3\" BSB --    Shld extension   RTB 10 x 2 2\"  RTB 10 x 2 2\"  GTB 10x2    PNF D2F         Squat/sit/stand   10x 21\" 10 x 2  21\" 10x2    HS curl      RTB 10x2 RTB 10 x 2 ea LE             PROPRIOCEPTION                                                      MODALITIES                               Other Therapeutic Activities/Education:   Home Exercise Program:    Access Code: [email or web address]  URL: [email or web address]  Date: 12/22/2022  Prepared by: Anna Kate    Exercises  Seated Hamstring Stretch - 3 x daily - 7 x weekly - 1 sets - 3 reps - 30 hold  Supine Lower Trunk Rotation - 3 x daily - 7 x weekly - 1 sets - 10 reps - 10 hold  Hooklying Single Knee to Chest Stretch with Towel - 3 x daily - 7 x weekly - 1 sets - 5 reps - 10 hold      Manual Treatments:  stretches as above       Modalities:  none      Communication with other providers:  none      Assessment:  Pt tolerated treatment fair today. Pt rated pain at 3/10 after treatment. Pt wishes to d/c PT and try to have surgery.      Plan for Next Session:       Time In / Time Out: 4564 (pt late)/1359       Timed Code/Total Treatment Minutes:  34/34    Next Progress Note due:        Plan of Care Interventions:  [x] Therapeutic Exercise  [] Modalities:  [x] Therapeutic Activity     [] Ultrasound  [] Estim  [] Gait Training      [] Cervical Traction [] Lumbar Traction  [x] Neuromuscular Re-education    [] Cold/hotpack [] Iontophoresis   [x] Instruction in HEP      [] Vasopneumatic   [] Dry Needling    [] Manual Therapy               [] Aquatic Therapy              Electronically signed by:  Micheal Monsivais, PT       1/18/2023,3:25 PM       1/18/2023,3:25 PM

## 2023-01-19 NOTE — PROGRESS NOTES
Outpatient Physical Therapy        [x] Phone: [phone]   Fax: [phone]   Physician: Diomedes Reece MD        From: Saida Umanzor PT,     Patient: Sun Qureshi                  : 1950  Diagnosis: Spinal stenosis, lumbar region with neurogenic claudication [M48.062]     Date: 2023  Treatment Diagnosis:  Lumbar radiculopathy    []  Progress Note                [x]  Discharge Note    Total Visits to date:   6 Cancels/No-shows to date:      Subjective:   pt states symptoms feel the same,  gets some very temporary benefit from therapy. Standing tolerance - limited to a couple minutes. BLE symptoms to B knees. Rates pain 4/10 today. Just wants to proceed with surgery. Plan of Care/Treatment to date:  [x] Therapeutic Exercise    [] Modalities:  [x] Therapeutic Activity     [] Ultrasound  [] Electric Stimulation  [] Gait Training      [] Cervical Traction    [] Lumbar Traction  [x] Neuromuscular Re-education  [] Cold/hotpack [] Iontophoresis  [x] Instruction in HEP      Other:  [x] Manual Therapy       []  Vasopneumatic  [] Self care management                           [] Dry needling trigger point/pain management                      Objective/Significant Findings At Last Visit/Comments:      Time Frame for Long Term Goals : 8 weeks  Long Term Goal 1: I in home program.                                              Reports partial/limited compliance   Long Term Goal 2: sit 1 hr with minimal pain                                     Sitting tolerance few minutes without leaning on something. Long Term Goal 3: stand/walk 20mins                                               Standing tolerance - limited to a couple minutes. Long Term Goal 4: min pain/difficulty with transfers. Mod pain     Oswestry:  68%  (was 76% at eval)      Pt wishes to d/c PT and try to have surgery.              Patient Status: [] Continue per initial plan of Care     [x] Patient now discharged     [] Additional visits requested, Please re-certify for additional visits:    Electronically signed by:  Jovan Tijerina PT, 1/19/2023, 7:38 AM    If you have any questions or concerns, please don't hesitate to call.   Thank you for your referral.

## 2023-01-26 ENCOUNTER — TELEPHONE (OUTPATIENT)
Dept: ORTHOPEDIC SURGERY | Age: 73
End: 2023-01-26

## 2023-01-26 ENCOUNTER — TELEPHONE (OUTPATIENT)
Dept: FAMILY MEDICINE CLINIC | Age: 73
End: 2023-01-26

## 2023-01-26 DIAGNOSIS — M54.50 CHRONIC BILATERAL LOW BACK PAIN WITHOUT SCIATICA: ICD-10-CM

## 2023-01-26 DIAGNOSIS — M17.12 PRIMARY OSTEOARTHRITIS OF LEFT KNEE: ICD-10-CM

## 2023-01-26 DIAGNOSIS — M17.11 PRIMARY OSTEOARTHRITIS OF RIGHT KNEE: ICD-10-CM

## 2023-01-26 DIAGNOSIS — G89.29 CHRONIC BILATERAL LOW BACK PAIN WITHOUT SCIATICA: ICD-10-CM

## 2023-01-26 RX ORDER — HYDROCODONE BITARTRATE AND ACETAMINOPHEN 10; 325 MG/1; MG/1
1 TABLET ORAL EVERY 6 HOURS PRN
Qty: 120 TABLET | Refills: 0 | Status: SHIPPED | OUTPATIENT
Start: 2023-01-26 | End: 2023-02-25

## 2023-01-26 NOTE — TELEPHONE ENCOUNTER
Patient called requesting refill of Norco. He wants it sent to [facility].

## 2023-02-16 ENCOUNTER — OFFICE VISIT (OUTPATIENT)
Dept: ORTHOPEDIC SURGERY | Age: 73
End: 2023-02-16

## 2023-02-16 VITALS
DIASTOLIC BLOOD PRESSURE: 83 MMHG | SYSTOLIC BLOOD PRESSURE: 137 MMHG | BODY MASS INDEX: 43.23 KG/M2 | HEART RATE: 68 BPM | WEIGHT: 302 LBS | HEIGHT: 70 IN

## 2023-02-16 DIAGNOSIS — M17.12 PRIMARY OSTEOARTHRITIS OF LEFT KNEE: Primary | ICD-10-CM

## 2023-02-16 NOTE — PATIENT INSTRUCTIONS
Continue to weight bear as tolerated  Continue range of motion  Ice and elevate as needed  Tylenol or Motrin for pain  Injection given today in the office   Rest for 24-48 hours  Follow up in 1 week for injection #2      We are committed to providing you the best care possible. If you receive a survey after visiting one of our offices, please take time to share your experience concerning your physician office visit. These surveys are confidential and no health information about you is shared. We are eager to improve for you and we are counting on your feedback to help make that happen.

## 2023-02-16 NOTE — PROGRESS NOTES
VISCO-SUPPLEMENTATION INJECTION (Number 1)  I reviewed and agree with the portions of the HPI, review of systems, vital documentation and plan performed by my staff and have added/addended where appropriate. HISTORY OF PRESENT ILLNESS (HPI):   Hunter Saez is a 81y.o. year old male who is here for follow up for visco-supplementation injection number 1 for   1. Primary osteoarthritis of left knee        PAST HISTORY:   Unless otherwise specified in this note, the past history is reviewed today with the patient and is as follows-    Allergies   Allergen Reactions    Sulfa Antibiotics Rash       Current Outpatient Medications   Medication Sig Dispense Refill    HYDROcodone-acetaminophen (NORCO)  MG per tablet Take 1 tablet by mouth every 6 hours as needed for Pain for up to 30 days. Intended supply: 30 days 120 tablet 0    tamsulosin (FLOMAX) 0.4 MG capsule Take 1 capsule by mouth daily 90 capsule 1    furosemide (LASIX) 20 MG tablet Take 1 tablet by mouth 2 times daily 60 tablet 2    potassium chloride (KLOR-CON M) 10 MEQ extended release tablet Take 1 tablet by mouth 2 times daily 60 tablet 2    amLODIPine (NORVASC) 5 MG tablet TAKE 1 TABLET BY MOUTH EVERY DAY IN THE MORNING 90 tablet 1    lisinopril (PRINIVIL;ZESTRIL) 5 MG tablet TAKE 1 TABLET BY MOUTH EVERY DAY IN THE MORNING 90 tablet 1    pantoprazole (PROTONIX) 40 MG tablet TAKE 1 TABLET EVERY MORNING BEFORE BREAKFAST 90 tablet 1    polyethylene glycol-electrolytes (NULYTELY) 420 g solution mix and drink entire container between 6 pm and 10 pm night before procedure      fluticasone-salmeterol (ADVAIR DISKUS) 250-50 MCG/DOSE AEPB INHALE 1 PUFF INTO THE LUNGS EVERY 12 HOURS 180 each 1    aspirin 325 MG EC tablet Take 1 tablet by mouth 2 times daily for 14 days 28 tablet 0    Spacer/Aero-Holding Chambers (AEROCHAMBER MV) MISC Use with albuterol.  1 each 0    Compression Stockings MISC by Does not apply route 20-30 mM HG    Wear daily 2 each 0 sildenafil (VIAGRA) 100 MG tablet Take 1 tablet by mouth as needed for Erectile Dysfunction 30 tablet 2    albuterol sulfate HFA (PROAIR HFA) 108 (90 Base) MCG/ACT inhaler Inhale 2 puffs into the lungs every 6 hours as needed for Wheezing 3 Inhaler 1     No current facility-administered medications for this visit. PHYSICAL EXAM:   /83 (Site: Left Lower Arm, Position: Sitting)   Pulse 68   Ht 5' 10\" (1.778 m)   Wt (!) 302 lb (137 kg)   BMI 43.33 kg/m²     The left knee is examined:  Inspection:  Skin is intact. No erythema. Palpation:  No swelling or acute tenderness. Neuro/Vascular/Motor:  Sensation to light touch intact. Capillary refill brisk. No focal motor deficits. DIAGNOSIS:     1. Primary osteoarthritis of left knee        PROCEDURE:   Left Knee Aspiration / Injection Procedure:  Multiple treatment options were discussed. Joint injection was recommended. Details of the procedure, potential risks, and potential benefits were discussed. Patient's questions were answered. Patient elected to proceed with procedure. Medication: Orthovisc 2 mL  NDC #: [id number]  Lot #:[id number]  Procedure: Sterile technique was used as the skin over the injection site was prepped with alcohol then the knee joint was then injected through the  lateral joint with the above listed medication. A sterile bandage was placed over the injection site. The patient tolerated the procedure well without complication.  The patient is scheduled for the second injection in one week

## 2023-02-23 ENCOUNTER — OFFICE VISIT (OUTPATIENT)
Dept: ORTHOPEDIC SURGERY | Age: 73
End: 2023-02-23
Payer: MEDICARE

## 2023-02-23 VITALS
OXYGEN SATURATION: 93 % | HEART RATE: 80 BPM | HEIGHT: 70 IN | RESPIRATION RATE: 16 BRPM | BODY MASS INDEX: 42.95 KG/M2 | WEIGHT: 300 LBS

## 2023-02-23 DIAGNOSIS — M17.12 PRIMARY OSTEOARTHRITIS OF LEFT KNEE: Primary | ICD-10-CM

## 2023-02-23 PROCEDURE — 99999 PR OFFICE/OUTPT VISIT,PROCEDURE ONLY: CPT | Performed by: PHYSICIAN ASSISTANT

## 2023-02-23 PROCEDURE — 20610 DRAIN/INJ JOINT/BURSA W/O US: CPT | Performed by: PHYSICIAN ASSISTANT

## 2023-02-23 NOTE — PATIENT INSTRUCTIONS
Orthovisc # 2  Rest both knees for 24-48 hours  Work on ROM and strengthening of knees and legs   May take NSAIDS or anti-inflammatories as needed  Weightbearing as tolerated  Follow up in one week for 3rd injection     We are committed to providing you the best care possible. If you receive a survey after visiting one of our offices, please take time to share your experience concerning your physician office visit. These surveys are confidential and no health information about you is shared. We are eager to improve for you and we are counting on your feedback to help make that happen.

## 2023-02-23 NOTE — PROGRESS NOTES
VISCO-SUPPLEMENTATION INJECTION (Number 2)  I reviewed and agree with the portions of the HPI, review of systems, vital documentation and plan performed by my staff and have added/addended where appropriate. HISTORY OF PRESENT ILLNESS (HPI):   Senait Griffin is a 81y.o. year old male who is here for follow up for visco-supplementation injection number 2 for   1. Primary osteoarthritis of left knee        PAST HISTORY:   Unless otherwise specified in this note, the past history is reviewed today with the patient and is as follows-    Allergies   Allergen Reactions    Sulfa Antibiotics Rash       Current Outpatient Medications   Medication Sig Dispense Refill    HYDROcodone-acetaminophen (NORCO)  MG per tablet Take 1 tablet by mouth every 6 hours as needed for Pain for up to 30 days. Intended supply: 30 days 120 tablet 0    tamsulosin (FLOMAX) 0.4 MG capsule Take 1 capsule by mouth daily 90 capsule 1    furosemide (LASIX) 20 MG tablet Take 1 tablet by mouth 2 times daily 60 tablet 2    potassium chloride (KLOR-CON M) 10 MEQ extended release tablet Take 1 tablet by mouth 2 times daily 60 tablet 2    amLODIPine (NORVASC) 5 MG tablet TAKE 1 TABLET BY MOUTH EVERY DAY IN THE MORNING 90 tablet 1    lisinopril (PRINIVIL;ZESTRIL) 5 MG tablet TAKE 1 TABLET BY MOUTH EVERY DAY IN THE MORNING 90 tablet 1    pantoprazole (PROTONIX) 40 MG tablet TAKE 1 TABLET EVERY MORNING BEFORE BREAKFAST 90 tablet 1    polyethylene glycol-electrolytes (NULYTELY) 420 g solution mix and drink entire container between 6 pm and 10 pm night before procedure      fluticasone-salmeterol (ADVAIR DISKUS) 250-50 MCG/DOSE AEPB INHALE 1 PUFF INTO THE LUNGS EVERY 12 HOURS 180 each 1    aspirin 325 MG EC tablet Take 1 tablet by mouth 2 times daily for 14 days 28 tablet 0    Spacer/Aero-Holding Chambers (AEROCHAMBER MV) MISC Use with albuterol.  1 each 0    Compression Stockings MISC by Does not apply route 20-30 mM HG    Wear daily 2 each 0 sildenafil (VIAGRA) 100 MG tablet Take 1 tablet by mouth as needed for Erectile Dysfunction 30 tablet 2    albuterol sulfate HFA (PROAIR HFA) 108 (90 Base) MCG/ACT inhaler Inhale 2 puffs into the lungs every 6 hours as needed for Wheezing 3 Inhaler 1     No current facility-administered medications for this visit. PHYSICAL EXAM:   Pulse 80   Resp 16   Ht 5' 10\" (1.778 m)   Wt 300 lb (136.1 kg)   SpO2 93%   BMI 43.05 kg/m²     The left knee is examined:  Inspection:  Skin is intact. No erythema. Palpation:  No swelling or acute tenderness. Neuro/Vascular/Motor:  Sensation to light touch intact. Capillary refill brisk. No focal motor deficits. DIAGNOSIS:     1. Primary osteoarthritis of left knee        PROCEDURE:   Left Knee Aspiration / Injection Procedure:  Multiple treatment options were discussed. Joint injection was recommended. Details of the procedure, potential risks, and potential benefits were discussed. Patient's questions were answered. Patient elected to proceed with procedure. Medication: Orthovisc 2 mL  NDC #: [id number]  Lot #:[id number]  Procedure: Sterile technique was used as the skin over the injection site was prepped with alcohol then the knee joint was then injected through the  lateral joint with the above listed medication. A sterile bandage was placed over the injection site. The patient tolerated the procedure well without complication. The patient is scheduled for the Third injection in one week. I will see the patient back on Wednesday next week and we will do his last injection on the left knee going through the lateral side and I will also see him for his shoulders at that point.

## 2023-02-24 DIAGNOSIS — G89.29 CHRONIC BILATERAL LOW BACK PAIN WITHOUT SCIATICA: ICD-10-CM

## 2023-02-24 DIAGNOSIS — M54.50 CHRONIC BILATERAL LOW BACK PAIN WITHOUT SCIATICA: ICD-10-CM

## 2023-02-24 DIAGNOSIS — M17.12 PRIMARY OSTEOARTHRITIS OF LEFT KNEE: ICD-10-CM

## 2023-02-24 DIAGNOSIS — M17.11 PRIMARY OSTEOARTHRITIS OF RIGHT KNEE: ICD-10-CM

## 2023-02-24 RX ORDER — HYDROCODONE BITARTRATE AND ACETAMINOPHEN 10; 325 MG/1; MG/1
1 TABLET ORAL EVERY 6 HOURS PRN
Qty: 120 TABLET | Refills: 0 | Status: SHIPPED | OUTPATIENT
Start: 2023-02-24 | End: 2023-03-26

## 2023-02-27 ENCOUNTER — OFFICE VISIT (OUTPATIENT)
Dept: ORTHOPEDIC SURGERY | Age: 73
End: 2023-02-27

## 2023-02-27 VITALS
SYSTOLIC BLOOD PRESSURE: 110 MMHG | WEIGHT: 300 LBS | DIASTOLIC BLOOD PRESSURE: 80 MMHG | OXYGEN SATURATION: 91 % | BODY MASS INDEX: 42.95 KG/M2 | HEIGHT: 70 IN | HEART RATE: 76 BPM | RESPIRATION RATE: 15 BRPM

## 2023-02-27 DIAGNOSIS — Z96.651 S/P TKR (TOTAL KNEE REPLACEMENT), RIGHT: Primary | ICD-10-CM

## 2023-02-27 DIAGNOSIS — E66.01 OBESITY, CLASS III, BMI 40-49.9 (MORBID OBESITY) (HCC): ICD-10-CM

## 2023-02-27 NOTE — PATIENT INSTRUCTIONS
Continue weight-bearing as tolerated. Continue range of motion exercises as instructed. Ice and elevate as needed. Tylenol or Motrin for pain.    Follow up in 5 years

## 2023-02-27 NOTE — PROGRESS NOTES
Patient returns to the office today for FU of the right TKA. Pt states he is constantly having achy pain in the right knee. Pt states he is going to be having a lumbar scope performed at the end of the month.

## 2023-02-28 ASSESSMENT — ENCOUNTER SYMPTOMS
COLOR CHANGE: 0
BACK PAIN: 0
CHEST TIGHTNESS: 0

## 2023-03-02 DIAGNOSIS — R60.0 BILATERAL LOWER EXTREMITY EDEMA: ICD-10-CM

## 2023-03-02 RX ORDER — POTASSIUM CHLORIDE 750 MG/1
TABLET, EXTENDED RELEASE ORAL
Qty: 180 TABLET | OUTPATIENT
Start: 2023-03-02

## 2023-03-06 DIAGNOSIS — R60.0 BILATERAL LOWER EXTREMITY EDEMA: ICD-10-CM

## 2023-03-06 RX ORDER — FUROSEMIDE 20 MG/1
TABLET ORAL
Qty: 180 TABLET | OUTPATIENT
Start: 2023-03-06

## 2023-03-08 ENCOUNTER — OFFICE VISIT (OUTPATIENT)
Dept: ORTHOPEDIC SURGERY | Age: 73
End: 2023-03-08

## 2023-03-08 VITALS
RESPIRATION RATE: 14 BRPM | OXYGEN SATURATION: 94 % | WEIGHT: 302 LBS | HEART RATE: 94 BPM | HEIGHT: 70 IN | BODY MASS INDEX: 43.23 KG/M2

## 2023-03-08 DIAGNOSIS — M17.12 PRIMARY OSTEOARTHRITIS OF LEFT KNEE: Primary | ICD-10-CM

## 2023-03-08 DIAGNOSIS — M75.41 IMPINGEMENT SYNDROME OF RIGHT SHOULDER: ICD-10-CM

## 2023-03-08 DIAGNOSIS — M75.42 IMPINGEMENT SYNDROME OF LEFT SHOULDER: ICD-10-CM

## 2023-03-08 NOTE — PATIENT INSTRUCTIONS
Shoulder examined in office today  Injection given into the bilateral shoulder  Avoid any high impact activities for a least 24 hours   Orthovisc # 3  Rest both knees for 24-48 hours  Work on ROM and strengthening of knees and legs   May take NSAIDS or anti-inflammatories as needed  Weightbearing as tolerated  Follow up as needed    We are committed to providing you the best care possible. If you receive a survey after visiting one of our offices, please take time to share your experience concerning your physician office visit. These surveys are confidential as no health information about you is shared. We are eager to improve for you and we are counting on your feedback to help make that happen.

## 2023-03-08 NOTE — PROGRESS NOTES
HISTORY OF PRESENT ILLNESS (HPI):   Reno Anderson is a 81y.o. year old male who is here for visco-supplementation injection number 3 for his arthritic left knee and he is also complaining about bilateral shoulder pain which we did discuss at his previous appointment. He has a history of rotator cuff tear in both shoulders which were not fixed years ago and he has had steroid injections in both shoulders in the past.  He states that pain is worse when he tries to lift his arms away from his body and especially if he tries to lift his arms above his head. Pain in the shoulders is rated at rest at a 3/10 but with motion a 7/10. 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 He feels like the injections in the left knee have helped but he still has some pain in the left knee. States he occasionally gets numbness going down both arms as well but has no significant neck problems. Review of Systems   Constitutional: Negative. HENT: Negative. Eyes: Negative. Respiratory: Negative. Cardiovascular: Negative. Gastrointestinal: Negative. Genitourinary: Negative. Musculoskeletal:  Positive for arthralgias and myalgias. Skin: Negative. Negative for rash and wound. Neurological:  Positive for numbness. Psychiatric/Behavioral: Negative. PAST HISTORY:   Unless otherwise specified in this note, the past history is reviewed today with the patient and is as follows-    Allergies   Allergen Reactions    Sulfa Antibiotics Rash       Current Outpatient Medications   Medication Sig Dispense Refill    HYDROcodone-acetaminophen (NORCO)  MG per tablet Take 1 tablet by mouth every 6 hours as needed for Pain for up to 30 days.  Intended supply: 30 days 120 tablet 0    tamsulosin (FLOMAX) 0.4 MG capsule Take 1 capsule by mouth daily 90 capsule 1    furosemide (LASIX) 20 MG tablet Take 1 tablet by mouth 2 times daily 60 tablet 2    potassium chloride (KLOR-CON M) 10 MEQ extended release tablet Take 1 tablet by mouth 2 times daily 60 tablet 2    amLODIPine (NORVASC) 5 MG tablet TAKE 1 TABLET BY MOUTH EVERY DAY IN THE MORNING 90 tablet 1    lisinopril (PRINIVIL;ZESTRIL) 5 MG tablet TAKE 1 TABLET BY MOUTH EVERY DAY IN THE MORNING 90 tablet 1    pantoprazole (PROTONIX) 40 MG tablet TAKE 1 TABLET EVERY MORNING BEFORE BREAKFAST 90 tablet 1    polyethylene glycol-electrolytes (NULYTELY) 420 g solution mix and drink entire container between 6 pm and 10 pm night before procedure      fluticasone-salmeterol (ADVAIR DISKUS) 250-50 MCG/DOSE AEPB INHALE 1 PUFF INTO THE LUNGS EVERY 12 HOURS 180 each 1    Spacer/Aero-Holding Chambers (AEROCHAMBER MV) MISC Use with albuterol. 1 each 0    Compression Stockings MISC by Does not apply route 20-30 mM HG    Wear daily 2 each 0    sildenafil (VIAGRA) 100 MG tablet Take 1 tablet by mouth as needed for Erectile Dysfunction 30 tablet 2    albuterol sulfate HFA (PROAIR HFA) 108 (90 Base) MCG/ACT inhaler Inhale 2 puffs into the lungs every 6 hours as needed for Wheezing 3 Inhaler 1    aspirin 325 MG EC tablet Take 1 tablet by mouth 2 times daily for 14 days 28 tablet 0     No current facility-administered medications for this visit. PHYSICAL EXAM:   Pulse 94   Resp 14   Ht 5' 10\" (1.778 m)   Wt (!) 302 lb (137 kg)   SpO2 94%   BMI 43.33 kg/m²     Gen/Psych:Examination reveals a pleasant individual in no acute distress. The patient is oriented to time, place and person. The patient's mood and affect are appropriate. Patient appears well nourished.  Body habitus is obese    Head: Head is atraumatic normocephalic,  ears are symmetric,     Eyes: Eyes show equal pupils bilaterally, extraocular muscles intact    Ears:  Hearing is mildly decreased to normal voice at 5 feet    Nose: Nares are patent bilaterally, no epistaxis,no rhinorrhea     Lymph: No obvious lymphedema in bilateral upper extremities     Skin intact in bilateral upper extremities with no ulcerations, lesions, rash, erythema. Vascular: There are now varicosities in bilateral upper  extremities, sensation intact to light touch over bilateral upper extremities. The left knee is examined:  Inspection:  Skin is intact. No erythema. Palpation:  No swelling or acute tenderness. Neuro/Vascular/Motor:  Sensation to light touch intact. Capillary refill brisk. No focal motor deficits. Bilateral shoulder exam:  Skin:  Clear with no erythema, there is no significant joint effusion  Deformity:  none  Atrophy:  none  Tenderness:  lateral acromial, deltoid muscle  Active ROM:   FE:120    IR side: sacrum           ER side: 30   Abduction: 90 degrees    Strength: FE:4/5     ER:5/5   Neer:  moderately positive  [place]:  severely positive            DIAGNOSIS:     1. Primary osteoarthritis of left knee    2. Impingement syndrome of left shoulder    3. Impingement syndrome of right shoulder        PROCEDURE:   Left Knee Aspiration / Injection Procedure:  Multiple treatment options were discussed. Joint injection was recommended. Details of the procedure, potential risks, and potential benefits were discussed. Patient's questions were answered. Patient elected to proceed with procedure. Medication: Orthovisc 2 mL  NDC #: [id number]  Lot #:[id number]  Procedure: Sterile technique was used as the skin over the injection site was prepped with alcohol then the knee joint was then injected through the  lateral joint with the above listed medication. A sterile bandage was placed over the injection site. The patient tolerated the procedure well without complication. The patient is scheduled as needed    Left Subacromial Bursa Aspiration / Injection Procedure:  Multiple treatment options were discussed. This injection was recommended as a part of the overall treatment plan. Details of the procedure, potential risks, and potential benefits were discussed.   Patient's questions were answered. Patient elected to proceed with procedure. Medication: 1 mL Kenalog 40 mg/ML, 1 mL 0.5% bupivacaine, 1 mL 1% lidocaine  Procedure:  Sterile technique was used as the skin over the injection site was prepped with alcohol. The left subacromial bursa was then injected with the above listed medication. A sterile bandage was placed over the injection site. The patient tolerated the procedure well without complication. right Subacromial Bursa Aspiration / Injection Procedure:  Multiple treatment options were discussed. This injection was recommended as a part of the overall treatment plan. Details of the procedure, potential risks, and potential benefits were discussed. Patient's questions were answered. Patient elected to proceed with procedure. Medication: 1 mL Kenalog 40 mg/ML, 1 mL 0.5% bupivacaine, 1 mL 1% lidocaine  Procedure:  Sterile technique was used as the skin over the injection site was prepped with alcohol. The right subacromial bursa was then injected with the above listed medication. A sterile bandage was placed over the injection site. The patient tolerated the procedure well without complication.

## 2023-03-09 ASSESSMENT — ENCOUNTER SYMPTOMS
GASTROINTESTINAL NEGATIVE: 1
RESPIRATORY NEGATIVE: 1
EYES NEGATIVE: 1

## 2023-03-21 ENCOUNTER — OFFICE VISIT (OUTPATIENT)
Dept: FAMILY MEDICINE CLINIC | Age: 73
End: 2023-03-21
Payer: MEDICARE

## 2023-03-21 VITALS
BODY MASS INDEX: 43.5 KG/M2 | DIASTOLIC BLOOD PRESSURE: 66 MMHG | HEART RATE: 80 BPM | OXYGEN SATURATION: 95 % | SYSTOLIC BLOOD PRESSURE: 108 MMHG | WEIGHT: 303.2 LBS | TEMPERATURE: 97.7 F

## 2023-03-21 DIAGNOSIS — G89.29 CHRONIC BILATERAL LOW BACK PAIN WITHOUT SCIATICA: Primary | ICD-10-CM

## 2023-03-21 DIAGNOSIS — M17.12 PRIMARY OSTEOARTHRITIS OF LEFT KNEE: ICD-10-CM

## 2023-03-21 DIAGNOSIS — M54.50 CHRONIC BILATERAL LOW BACK PAIN WITHOUT SCIATICA: Primary | ICD-10-CM

## 2023-03-21 DIAGNOSIS — M17.11 PRIMARY OSTEOARTHRITIS OF RIGHT KNEE: ICD-10-CM

## 2023-03-21 DIAGNOSIS — G47.33 OBSTRUCTIVE SLEEP APNEA SYNDROME: ICD-10-CM

## 2023-03-21 DIAGNOSIS — F10.20 UNCOMPLICATED ALCOHOL DEPENDENCE (HCC): ICD-10-CM

## 2023-03-21 DIAGNOSIS — J44.9 CHRONIC OBSTRUCTIVE PULMONARY DISEASE, UNSPECIFIED COPD TYPE (HCC): ICD-10-CM

## 2023-03-21 PROBLEM — G56.20 ULNAR NERVE ENTRAPMENT AT ELBOW: Status: ACTIVE | Noted: 2022-11-16

## 2023-03-21 PROCEDURE — 4004F PT TOBACCO SCREEN RCVD TLK: CPT | Performed by: FAMILY MEDICINE

## 2023-03-21 PROCEDURE — 3017F COLORECTAL CA SCREEN DOC REV: CPT | Performed by: FAMILY MEDICINE

## 2023-03-21 PROCEDURE — 99214 OFFICE O/P EST MOD 30 MIN: CPT | Performed by: FAMILY MEDICINE

## 2023-03-21 PROCEDURE — G8417 CALC BMI ABV UP PARAM F/U: HCPCS | Performed by: FAMILY MEDICINE

## 2023-03-21 PROCEDURE — G8427 DOCREV CUR MEDS BY ELIG CLIN: HCPCS | Performed by: FAMILY MEDICINE

## 2023-03-21 PROCEDURE — 3074F SYST BP LT 130 MM HG: CPT | Performed by: FAMILY MEDICINE

## 2023-03-21 PROCEDURE — 1123F ACP DISCUSS/DSCN MKR DOCD: CPT | Performed by: FAMILY MEDICINE

## 2023-03-21 PROCEDURE — G8484 FLU IMMUNIZE NO ADMIN: HCPCS | Performed by: FAMILY MEDICINE

## 2023-03-21 PROCEDURE — 3023F SPIROM DOC REV: CPT | Performed by: FAMILY MEDICINE

## 2023-03-21 PROCEDURE — 3078F DIAST BP <80 MM HG: CPT | Performed by: FAMILY MEDICINE

## 2023-03-21 RX ORDER — HYDROCODONE BITARTRATE AND ACETAMINOPHEN 10; 325 MG/1; MG/1
1 TABLET ORAL EVERY 6 HOURS PRN
Qty: 120 TABLET | Refills: 0 | Status: SHIPPED | OUTPATIENT
Start: 2023-03-21 | End: 2023-04-20

## 2023-03-21 ASSESSMENT — PATIENT HEALTH QUESTIONNAIRE - PHQ9
10. IF YOU CHECKED OFF ANY PROBLEMS, HOW DIFFICULT HAVE THESE PROBLEMS MADE IT FOR YOU TO DO YOUR WORK, TAKE CARE OF THINGS AT HOME, OR GET ALONG WITH OTHER PEOPLE: 0
2. FEELING DOWN, DEPRESSED OR HOPELESS: 0
5. POOR APPETITE OR OVEREATING: 0
4. FEELING TIRED OR HAVING LITTLE ENERGY: 0
9. THOUGHTS THAT YOU WOULD BE BETTER OFF DEAD, OR OF HURTING YOURSELF: 0
6. FEELING BAD ABOUT YOURSELF - OR THAT YOU ARE A FAILURE OR HAVE LET YOURSELF OR YOUR FAMILY DOWN: 0
SUM OF ALL RESPONSES TO PHQ QUESTIONS 1-9: 0
SUM OF ALL RESPONSES TO PHQ QUESTIONS 1-9: 0
8. MOVING OR SPEAKING SO SLOWLY THAT OTHER PEOPLE COULD HAVE NOTICED. OR THE OPPOSITE, BEING SO FIGETY OR RESTLESS THAT YOU HAVE BEEN MOVING AROUND A LOT MORE THAN USUAL: 0
SUM OF ALL RESPONSES TO PHQ QUESTIONS 1-9: 0
7. TROUBLE CONCENTRATING ON THINGS, SUCH AS READING THE NEWSPAPER OR WATCHING TELEVISION: 0
1. LITTLE INTEREST OR PLEASURE IN DOING THINGS: 0
SUM OF ALL RESPONSES TO PHQ9 QUESTIONS 1 & 2: 0
3. TROUBLE FALLING OR STAYING ASLEEP: 0
SUM OF ALL RESPONSES TO PHQ QUESTIONS 1-9: 0

## 2023-03-21 NOTE — PROGRESS NOTES
effectiveness. Consult sent to pain management for evaluation. Consult sent to sleep clinic for sleep study. For acute pain, rest, intermittent application of heat (do not sleep on heating pad), analgesics and muscle relaxants are recommended. Discussed longer term treatment plan of prn NSAID's and discussed a home back care exercise program with flexion exercise routine. Proper lifting with avoidance of heavy lifting discussed. Consider Physical Therapy and XRay studies if not improving. Call or return to clinic prn if these symptoms worsen or fail to improve as anticipated. Follow-up 3 months or as needed.

## 2023-05-15 DIAGNOSIS — I10 ESSENTIAL HYPERTENSION: ICD-10-CM

## 2023-05-15 RX ORDER — LISINOPRIL 5 MG/1
TABLET ORAL
Qty: 90 TABLET | Refills: 1 | OUTPATIENT
Start: 2023-05-15

## 2023-05-15 RX ORDER — AMLODIPINE BESYLATE 5 MG/1
TABLET ORAL
Qty: 90 TABLET | Refills: 1 | OUTPATIENT
Start: 2023-05-15

## 2023-05-16 ENCOUNTER — HOSPITAL ENCOUNTER (OUTPATIENT)
Dept: SLEEP CENTER | Age: 73
Discharge: HOME | End: 2023-05-16
Payer: MEDICARE

## 2023-05-16 VITALS
BODY MASS INDEX: 42.95 KG/M2 | SYSTOLIC BLOOD PRESSURE: 135 MMHG | DIASTOLIC BLOOD PRESSURE: 67 MMHG | HEART RATE: 76 BPM | HEIGHT: 70 IN | OXYGEN SATURATION: 91 % | WEIGHT: 300 LBS

## 2023-05-16 DIAGNOSIS — G47.33 OSA (OBSTRUCTIVE SLEEP APNEA): Primary | ICD-10-CM

## 2023-05-16 PROCEDURE — 99211 OFF/OP EST MAY X REQ PHY/QHP: CPT

## 2023-05-16 ASSESSMENT — SLEEP AND FATIGUE QUESTIONNAIRES
ESS TOTAL SCORE: 1
HOW LIKELY ARE YOU TO NOD OFF OR FALL ASLEEP WHILE LYING DOWN TO REST IN THE AFTERNOON WHEN CIRCUMSTANCES PERMIT: 1
HOW LIKELY ARE YOU TO NOD OFF OR FALL ASLEEP WHILE SITTING INACTIVE IN A PUBLIC PLACE: 0
HOW LIKELY ARE YOU TO NOD OFF OR FALL ASLEEP WHILE SITTING QUIETLY AFTER LUNCH WITHOUT ALCOHOL: 0
HOW LIKELY ARE YOU TO NOD OFF OR FALL ASLEEP WHILE SITTING AND READING: 0
NECK CIRCUMFERENCE (INCHES): 21
HOW LIKELY ARE YOU TO NOD OFF OR FALL ASLEEP IN A CAR, WHILE STOPPED FOR A FEW MINUTES IN TRAFFIC: 0
HOW LIKELY ARE YOU TO NOD OFF OR FALL ASLEEP WHILE SITTING AND TALKING TO SOMEONE: 0
HOW LIKELY ARE YOU TO NOD OFF OR FALL ASLEEP WHEN YOU ARE A PASSENGER IN A CAR FOR AN HOUR WITHOUT A BREAK: 0
HOW LIKELY ARE YOU TO NOD OFF OR FALL ASLEEP WHILE WATCHING TV: 0

## 2023-05-16 NOTE — PROGRESS NOTES
5/16/2023 1/14/20   Nelly Torres MD       Allergies as of 05/16/2023 - Fully Reviewed 05/16/2023   Allergen Reaction Noted    Sulfa antibiotics Rash 08/13/2013       Patient Active Problem List   Diagnosis    Smoker    Right bundle branch block    GERD (gastroesophageal reflux disease)    Uncomplicated alcohol dependence (HCC)    Hyperlipidemia    Low back pain    Cataract    Morbid obesity ([address]    Acute medial meniscal tear    Complex tear of lateral meniscus of right knee as current injury    Chondromalacia patellae, right knee    Primary osteoarthritis of right knee    Depression, neurotic    Edema    Benign essential HTN    Hypotestosteronism    Former smoker    Pain in the wrist    Flexor tenosynovitis of finger    Bilateral carpal tunnel syndrome    Decreased testosterone level    PNA (pneumonia)    Septicemia (HCC)    Acute respiratory failure with hypoxia (HCC)    Dependence on supplemental oxygen    Chronic obstructive pulmon disease w acute lower resp infct (HCC)    Legionnaire's disease ([address]    Lower leg edema    Prostate nodule with urinary obstruction    Bilateral primary osteoarthritis of knee    History of total right knee replacement    Polyp of colon    Ulnar nerve entrapment at elbow       Past Medical History:   Diagnosis Date    Acid reflux     Anxiety     Arthritis     \"Right Knee\"    Asthma     Chronic back pain     Cough     Occ Productive Cough \"Milky\" Sputum    Diverticulitis Dx Early 2000's    Hepatitis Dx 1970's    \"In [facility]\"    [place] (hard of hearing)     Bilateral Ears    HTN (hypertension)     Hyperlipidemia     Nocturia     Shortness of breath on exertion     Slow urinary stream     Tingling     \"Tingling Right Hand\"    Wears dentures     Full Upper, Partial Lower    Wears glasses     Wears partial dentures     Lower       Past Surgical History:   Procedure Laterality Date    APPENDECTOMY  1960's    CARPAL TUNNEL RELEASE Right 06/22/2017    COLONOSCOPY  Last Done

## 2023-06-16 DIAGNOSIS — I10 ESSENTIAL HYPERTENSION: ICD-10-CM

## 2023-06-16 DIAGNOSIS — K21.9 GASTROESOPHAGEAL REFLUX DISEASE WITHOUT ESOPHAGITIS: ICD-10-CM

## 2023-06-16 RX ORDER — PANTOPRAZOLE SODIUM 40 MG/1
TABLET, DELAYED RELEASE ORAL
Qty: 90 TABLET | Refills: 1 | OUTPATIENT
Start: 2023-06-16

## 2023-06-16 RX ORDER — LISINOPRIL 5 MG/1
TABLET ORAL
Qty: 90 TABLET | Refills: 1 | OUTPATIENT
Start: 2023-06-16

## 2023-06-16 RX ORDER — AMLODIPINE BESYLATE 5 MG/1
TABLET ORAL
Qty: 90 TABLET | Refills: 1 | OUTPATIENT
Start: 2023-06-16

## 2023-06-20 NOTE — PROGRESS NOTES
6/24/2022  [facility] Fly Gonzalez MD  Cardiovascular Disease     Next appt 6/30/23. LDL done 3/22/23   Has been taking his norco 5-325 mg, 3 tabs every 3 hours with good poin control of his chronic knee and back pain. However, they were prescribed 1-2 tabs every 6 hours. He is scheduled next week for another gel injection with orthopedics. They are considering bilateral knee replacements    Patient quit smoking 3 weeks ago. Doing well. O:   Vitals:    12/03/21 1037   BP: 120/76   Pulse: 87   Temp: 97.1 °F (36.2 °C)   SpO2: 95%     No acute distress. Alert and Oriented x 3, obese  HEENT: Atraumatic. Normocephalic. PERRLA, EOMI, Conjunctiva clear  NECK: without thyromegaly, lymphadenopathy, JVD  LUNGS:Clear to ascultation bilaterally. Breathing comfortably  CARDIOVASCULAR:  Regular rate and rhythm, no murmurs, rubs, or gallops  EXTREMITY: Full range of motion. No clubbing/cyanosis/edema  Antalgic gait    A:    Diagnosis Orders   1. Primary osteoarthritis of left knee   Needs improvement HYDROcodone-acetaminophen (NORCO)  MG per tablet   2. Primary osteoarthritis of right knee   Needs improvement HYDROcodone-acetaminophen (NORCO)  MG per tablet   3. Chronic bilateral low back pain without sciatica   Needs improvement HYDROcodone-acetaminophen (NORCO)  MG per tablet   4. Tobacco use   Resolving    5. Class 3 severe obesity due to excess calories with serious comorbidity and body mass index (BMI) of 40.0 to 44.9 in [facility])     Needs improvement    P: Will increase [place] to  1 p.o. 4 times daily. Reinforced that patient must take pain meds as prescribed. If he cannot do that, we will have to send him to pain management. Follow-up with orthopedics for chronic knee pain. Patient work on diet and exercise for weight loss. Follow-up 3 months.     Controlled Substance Monitoring:    Acute and Chronic Pain Monitoring:   RX Monitoring 12/3/2021   Acute Pain Prescriptions -   Periodic Controlled Substance Monitoring Possible medication side effects, risk of tolerance/dependence & alternative treatments discussed. ;No signs of potential drug abuse or diversion identified.;Obtaining appropriate analgesic effect of treatment. Chronic Pain > 50 MEDD Obtained or confirmed \"Consent for Opioid Use\" on file.

## 2023-06-27 ENCOUNTER — HOSPITAL ENCOUNTER (OUTPATIENT)
Dept: SLEEP CENTER | Age: 73
Discharge: HOME | End: 2023-06-27
Payer: MEDICARE

## 2023-06-27 VITALS — HEIGHT: 70 IN | WEIGHT: 300 LBS | BODY MASS INDEX: 42.95 KG/M2

## 2023-06-27 DIAGNOSIS — G47.33 OSA (OBSTRUCTIVE SLEEP APNEA): ICD-10-CM

## 2023-06-27 PROCEDURE — 95811 POLYSOM 6/>YRS CPAP 4/> PARM: CPT

## 2023-06-27 ASSESSMENT — SLEEP AND FATIGUE QUESTIONNAIRES
HOW LIKELY ARE YOU TO NOD OFF OR FALL ASLEEP IN A CAR, WHILE STOPPED FOR A FEW MINUTES IN TRAFFIC: 0
HOW LIKELY ARE YOU TO NOD OFF OR FALL ASLEEP WHILE WATCHING TV: 0
HOW LIKELY ARE YOU TO NOD OFF OR FALL ASLEEP WHILE LYING DOWN TO REST IN THE AFTERNOON WHEN CIRCUMSTANCES PERMIT: 1
HOW LIKELY ARE YOU TO NOD OFF OR FALL ASLEEP WHILE SITTING AND READING: 0
ESS TOTAL SCORE: 1
HOW LIKELY ARE YOU TO NOD OFF OR FALL ASLEEP WHILE SITTING QUIETLY AFTER LUNCH WITHOUT ALCOHOL: 0
HOW LIKELY ARE YOU TO NOD OFF OR FALL ASLEEP WHILE SITTING AND TALKING TO SOMEONE: 0
HOW LIKELY ARE YOU TO NOD OFF OR FALL ASLEEP WHEN YOU ARE A PASSENGER IN A CAR FOR AN HOUR WITHOUT A BREAK: 0
HOW LIKELY ARE YOU TO NOD OFF OR FALL ASLEEP WHILE SITTING INACTIVE IN A PUBLIC PLACE: 0

## 2023-07-03 ENCOUNTER — TELEPHONE (OUTPATIENT)
Dept: FAMILY MEDICINE CLINIC | Age: 73
End: 2023-07-03

## 2023-07-03 DIAGNOSIS — I10 ESSENTIAL HYPERTENSION: ICD-10-CM

## 2023-07-03 DIAGNOSIS — K21.9 GASTROESOPHAGEAL REFLUX DISEASE WITHOUT ESOPHAGITIS: ICD-10-CM

## 2023-07-03 RX ORDER — LISINOPRIL 5 MG/1
TABLET ORAL
Qty: 90 TABLET | Refills: 0 | Status: SHIPPED | OUTPATIENT
Start: 2023-07-03 | End: 2023-07-05 | Stop reason: SDUPTHER

## 2023-07-03 RX ORDER — AMLODIPINE BESYLATE 5 MG/1
TABLET ORAL
Qty: 90 TABLET | Refills: 0 | Status: SHIPPED | OUTPATIENT
Start: 2023-07-03 | End: 2023-07-05 | Stop reason: SDUPTHER

## 2023-07-03 RX ORDER — PANTOPRAZOLE SODIUM 40 MG/1
TABLET, DELAYED RELEASE ORAL
Qty: 90 TABLET | Refills: 0 | Status: SHIPPED | OUTPATIENT
Start: 2023-07-03 | End: 2023-07-05 | Stop reason: SDUPTHER

## 2023-07-05 RX ORDER — AMLODIPINE BESYLATE 5 MG/1
TABLET ORAL
Qty: 90 TABLET | Refills: 0 | Status: SHIPPED | OUTPATIENT
Start: 2023-07-05

## 2023-07-05 RX ORDER — LISINOPRIL 5 MG/1
TABLET ORAL
Qty: 90 TABLET | Refills: 0 | Status: SHIPPED | OUTPATIENT
Start: 2023-07-05

## 2023-07-05 RX ORDER — PANTOPRAZOLE SODIUM 40 MG/1
TABLET, DELAYED RELEASE ORAL
Qty: 90 TABLET | Refills: 0 | Status: SHIPPED | OUTPATIENT
Start: 2023-07-05

## 2023-07-19 ENCOUNTER — OFFICE VISIT (OUTPATIENT)
Dept: FAMILY MEDICINE CLINIC | Age: 73
End: 2023-07-19

## 2023-07-19 VITALS
DIASTOLIC BLOOD PRESSURE: 72 MMHG | HEART RATE: 83 BPM | WEIGHT: 306 LBS | TEMPERATURE: 96.9 F | BODY MASS INDEX: 43.91 KG/M2 | SYSTOLIC BLOOD PRESSURE: 120 MMHG | OXYGEN SATURATION: 94 %

## 2023-07-19 DIAGNOSIS — J41.1 MUCOPURULENT CHRONIC BRONCHITIS (HCC): ICD-10-CM

## 2023-07-19 DIAGNOSIS — M54.50 CHRONIC BILATERAL LOW BACK PAIN WITHOUT SCIATICA: ICD-10-CM

## 2023-07-19 DIAGNOSIS — J44.9 CHRONIC OBSTRUCTIVE PULMONARY DISEASE, UNSPECIFIED COPD TYPE (HCC): ICD-10-CM

## 2023-07-19 DIAGNOSIS — M17.12 PRIMARY OSTEOARTHRITIS OF LEFT KNEE: ICD-10-CM

## 2023-07-19 DIAGNOSIS — R60.0 BILATERAL LOWER EXTREMITY EDEMA: ICD-10-CM

## 2023-07-19 DIAGNOSIS — R73.09 ELEVATED GLUCOSE LEVEL: ICD-10-CM

## 2023-07-19 DIAGNOSIS — G89.29 CHRONIC BILATERAL LOW BACK PAIN WITHOUT SCIATICA: ICD-10-CM

## 2023-07-19 DIAGNOSIS — M17.11 PRIMARY OSTEOARTHRITIS OF RIGHT KNEE: ICD-10-CM

## 2023-07-19 DIAGNOSIS — Z13.6 SCREENING FOR CARDIOVASCULAR CONDITION: ICD-10-CM

## 2023-07-19 DIAGNOSIS — I10 ESSENTIAL HYPERTENSION: Primary | ICD-10-CM

## 2023-07-19 DIAGNOSIS — K21.9 GASTROESOPHAGEAL REFLUX DISEASE WITHOUT ESOPHAGITIS: ICD-10-CM

## 2023-07-19 DIAGNOSIS — N40.1 BPH ASSOCIATED WITH NOCTURIA: ICD-10-CM

## 2023-07-19 DIAGNOSIS — R35.1 BPH ASSOCIATED WITH NOCTURIA: ICD-10-CM

## 2023-07-19 DIAGNOSIS — Z13.1 SCREENING FOR DIABETES MELLITUS: ICD-10-CM

## 2023-07-19 RX ORDER — PANTOPRAZOLE SODIUM 40 MG/1
TABLET, DELAYED RELEASE ORAL
Qty: 90 TABLET | Refills: 0 | Status: SHIPPED | OUTPATIENT
Start: 2023-07-19

## 2023-07-19 RX ORDER — AMLODIPINE BESYLATE 5 MG/1
TABLET ORAL
Qty: 90 TABLET | Refills: 0 | Status: SHIPPED | OUTPATIENT
Start: 2023-07-19

## 2023-07-19 RX ORDER — POTASSIUM CHLORIDE 750 MG/1
10 TABLET, EXTENDED RELEASE ORAL 2 TIMES DAILY
Qty: 60 TABLET | Refills: 2 | Status: SHIPPED | OUTPATIENT
Start: 2023-07-19

## 2023-07-19 RX ORDER — HYDROCODONE BITARTRATE AND ACETAMINOPHEN 10; 325 MG/1; MG/1
1 TABLET ORAL EVERY 6 HOURS PRN
Qty: 120 TABLET | Refills: 0 | Status: SHIPPED | OUTPATIENT
Start: 2023-07-19 | End: 2023-07-20 | Stop reason: SDUPTHER

## 2023-07-19 RX ORDER — ALBUTEROL SULFATE 90 UG/1
2 AEROSOL, METERED RESPIRATORY (INHALATION) EVERY 6 HOURS PRN
Qty: 3 EACH | Refills: 1 | Status: SHIPPED | OUTPATIENT
Start: 2023-07-19

## 2023-07-19 RX ORDER — FUROSEMIDE 20 MG/1
20 TABLET ORAL 2 TIMES DAILY
Qty: 60 TABLET | Refills: 2 | Status: SHIPPED | OUTPATIENT
Start: 2023-07-19

## 2023-07-19 RX ORDER — LISINOPRIL 5 MG/1
TABLET ORAL
Qty: 90 TABLET | Refills: 0 | Status: SHIPPED | OUTPATIENT
Start: 2023-07-19

## 2023-07-19 RX ORDER — TAMSULOSIN HYDROCHLORIDE 0.4 MG/1
0.4 CAPSULE ORAL DAILY
Qty: 90 CAPSULE | Refills: 1 | Status: SHIPPED | OUTPATIENT
Start: 2023-07-19

## 2023-07-19 RX ORDER — FLUTICASONE PROPIONATE AND SALMETEROL 250; 50 UG/1; UG/1
1 POWDER RESPIRATORY (INHALATION) EVERY 12 HOURS
Qty: 60 EACH | Refills: 3 | Status: SHIPPED | OUTPATIENT
Start: 2023-07-19

## 2023-07-19 SDOH — ECONOMIC STABILITY: FOOD INSECURITY: WITHIN THE PAST 12 MONTHS, YOU WORRIED THAT YOUR FOOD WOULD RUN OUT BEFORE YOU GOT MONEY TO BUY MORE.: OFTEN TRUE

## 2023-07-19 SDOH — ECONOMIC STABILITY: FOOD INSECURITY: WITHIN THE PAST 12 MONTHS, THE FOOD YOU BOUGHT JUST DIDN'T LAST AND YOU DIDN'T HAVE MONEY TO GET MORE.: OFTEN TRUE

## 2023-07-19 SDOH — ECONOMIC STABILITY: INCOME INSECURITY: HOW HARD IS IT FOR YOU TO PAY FOR THE VERY BASICS LIKE FOOD, HOUSING, MEDICAL CARE, AND HEATING?: VERY HARD

## 2023-07-19 SDOH — ECONOMIC STABILITY: HOUSING INSECURITY
IN THE LAST 12 MONTHS, WAS THERE A TIME WHEN YOU DID NOT HAVE A STEADY PLACE TO SLEEP OR SLEPT IN A SHELTER (INCLUDING NOW)?: NO

## 2023-07-20 ENCOUNTER — TELEPHONE (OUTPATIENT)
Dept: FAMILY MEDICINE CLINIC | Age: 73
End: 2023-07-20

## 2023-07-20 DIAGNOSIS — M17.12 PRIMARY OSTEOARTHRITIS OF LEFT KNEE: ICD-10-CM

## 2023-07-20 DIAGNOSIS — M17.11 PRIMARY OSTEOARTHRITIS OF RIGHT KNEE: ICD-10-CM

## 2023-07-20 DIAGNOSIS — M54.50 CHRONIC BILATERAL LOW BACK PAIN WITHOUT SCIATICA: ICD-10-CM

## 2023-07-20 DIAGNOSIS — G89.29 CHRONIC BILATERAL LOW BACK PAIN WITHOUT SCIATICA: ICD-10-CM

## 2023-07-20 LAB
ALBUMIN SERPL-MCNC: 3.6 G/DL (ref 3.4–5)
ALBUMIN/GLOB SERPL: 1 {RATIO} (ref 1.1–2.2)
ALP SERPL-CCNC: 79 U/L (ref 40–129)
ALT SERPL-CCNC: 24 U/L (ref 10–40)
ANION GAP SERPL CALCULATED.3IONS-SCNC: 15 MMOL/L (ref 3–16)
AST SERPL-CCNC: 32 U/L (ref 15–37)
BILIRUB SERPL-MCNC: 0.4 MG/DL (ref 0–1)
BUN SERPL-MCNC: 11 MG/DL (ref 7–20)
CALCIUM SERPL-MCNC: 8.9 MG/DL (ref 8.3–10.6)
CHLORIDE SERPL-SCNC: 102 MMOL/L (ref 99–110)
CHOLEST SERPL-MCNC: 179 MG/DL (ref 0–199)
CO2 SERPL-SCNC: 21 MMOL/L (ref 21–32)
CREAT SERPL-MCNC: 0.6 MG/DL (ref 0.8–1.3)
EST. AVERAGE GLUCOSE BLD GHB EST-MCNC: 128.4 MG/DL
GFR SERPLBLD CREATININE-BSD FMLA CKD-EPI: >60 ML/MIN/{1.73_M2}
GLUCOSE SERPL-MCNC: 117 MG/DL (ref 70–99)
HBA1C MFR BLD: 6.1 %
HDLC SERPL-MCNC: 34 MG/DL (ref 40–60)
LDLC SERPL CALC-MCNC: 116 MG/DL
POTASSIUM SERPL-SCNC: 5.9 MMOL/L (ref 3.5–5.1)
PROT SERPL-MCNC: 7.3 G/DL (ref 6.4–8.2)
SODIUM SERPL-SCNC: 138 MMOL/L (ref 136–145)
TRIGL SERPL-MCNC: 147 MG/DL (ref 0–150)
VLDLC SERPL CALC-MCNC: 29 MG/DL

## 2023-07-20 RX ORDER — HYDROCODONE BITARTRATE AND ACETAMINOPHEN 10; 325 MG/1; MG/1
1 TABLET ORAL EVERY 6 HOURS PRN
Qty: 120 TABLET | Refills: 0 | Status: SHIPPED | OUTPATIENT
Start: 2023-07-20 | End: 2023-08-19

## 2023-07-20 NOTE — TELEPHONE ENCOUNTER
[facility] called stated they do not fill hydrocodone please send to local pharmacy.   [facility]

## 2023-07-21 ENCOUNTER — TELEPHONE (OUTPATIENT)
Dept: FAMILY MEDICINE CLINIC | Age: 73
End: 2023-07-21

## 2023-07-21 NOTE — TELEPHONE ENCOUNTER
Patient called stated he has only been taking 1 potassium daily for over a month. He did not take his potassium today.

## 2023-07-24 NOTE — TELEPHONE ENCOUNTER
If he is only taking 1 a day, he should stop it altogether due to his potassium measurement being high.

## 2023-09-11 ENCOUNTER — TELEPHONE (OUTPATIENT)
Dept: ORTHOPEDIC SURGERY | Age: 73
End: 2023-09-11

## 2023-09-14 NOTE — TELEPHONE ENCOUNTER
I called [phone] [facility] Team  [phone] and spoke with Derrell Guzmán who informed me that no Vallorie Gum is needed for  Orthovisc Buy and bill in office procedure for the left knee.      Yasmine Finley [phone]  [phone]  L knee M17.12  Call Ref# [id number]

## 2023-10-04 ENCOUNTER — OFFICE VISIT (OUTPATIENT)
Dept: ORTHOPEDIC SURGERY | Age: 73
End: 2023-10-04
Payer: MEDICARE

## 2023-10-04 VITALS
WEIGHT: 305 LBS | BODY MASS INDEX: 43.67 KG/M2 | RESPIRATION RATE: 16 BRPM | OXYGEN SATURATION: 94 % | HEART RATE: 76 BPM | HEIGHT: 70 IN

## 2023-10-04 DIAGNOSIS — M17.12 PRIMARY OSTEOARTHRITIS OF LEFT KNEE: Primary | ICD-10-CM

## 2023-10-04 PROCEDURE — 20610 DRAIN/INJ JOINT/BURSA W/O US: CPT | Performed by: PHYSICIAN ASSISTANT

## 2023-10-04 PROCEDURE — 99999 PR OFFICE/OUTPT VISIT,PROCEDURE ONLY: CPT | Performed by: PHYSICIAN ASSISTANT

## 2023-10-04 NOTE — PATIENT INSTRUCTIONS
Orthovisc # 1  Rest both knees for 24-48 hours  Work on ROM and strengthening of knees and legs   May take NSAIDS or anti-inflammatories as needed  Weightbearing as tolerated  Follow up in one week for the 2nd injection    We are committed to providing you the best care possible. If you receive a survey after visiting one of our offices, please take time to share your experience concerning your physician office visit. These surveys are confidential and no health information about you is shared. We are eager to improve for you and we are counting on your feedback to help make that happen.

## 2023-10-04 NOTE — PROGRESS NOTES
VISCO-SUPPLEMENTATION INJECTION (Number 1)    HISTORY OF PRESENT ILLNESS (HPI):   Hussain Cee is a 72y.o. year old male who is here for follow up for visco-supplementation injection number 1 for   1. Primary osteoarthritis of left knee    Patient states his left knee pain is a 6/10 today. PAST HISTORY:   Unless otherwise specified in this note, the past history is reviewed today with the patient and is as follows-    Allergies   Allergen Reactions    Sulfa Antibiotics Rash       Current Outpatient Medications   Medication Sig Dispense Refill    amLODIPine (NORVASC) 5 MG tablet TAKE 1 TABLET BY MOUTH EVERY DAY IN THE MORNING 90 tablet 0    lisinopril (PRINIVIL;ZESTRIL) 5 MG tablet TAKE 1 TABLET BY MOUTH EVERY DAY IN THE MORNING 90 tablet 0    pantoprazole (PROTONIX) 40 MG tablet TAKE 1 TABLET EVERY MORNING BEFORE BREAKFAST 90 tablet 0    tamsulosin (FLOMAX) 0.4 MG capsule Take 1 capsule by mouth daily 90 capsule 1    potassium chloride (KLOR-CON M) 10 MEQ extended release tablet Take 1 tablet by mouth 2 times daily 60 tablet 2    furosemide (LASIX) 20 MG tablet Take 1 tablet by mouth 2 times daily 60 tablet 2    albuterol sulfate HFA (PROAIR HFA) 108 (90 Base) MCG/ACT inhaler Inhale 2 puffs into the lungs every 6 hours as needed for Wheezing 3 each 1    fluticasone-salmeterol (ADVAIR DISKUS) 250-50 MCG/ACT AEPB diskus inhaler Inhale 1 puff into the lungs in the morning and 1 puff in the evening.  60 each 3    sildenafil (VIAGRA) 100 MG tablet Take 1 tablet by mouth as needed for Erectile Dysfunction 30 tablet 2    polyethylene glycol-electrolytes (NULYTELY) 420 g solution mix and drink entire container between 6 pm and 10 pm night before procedure (Patient not taking: Reported on 5/16/2023)      fluticasone-salmeterol (ADVAIR DISKUS) 250-50 MCG/DOSE AEPB INHALE 1 PUFF INTO THE LUNGS EVERY 12 HOURS (Patient not taking: Reported on 5/16/2023) 180 each 1    aspirin 325 MG EC tablet Take 1 tablet by mouth 2 times

## 2023-10-11 ENCOUNTER — OFFICE VISIT (OUTPATIENT)
Dept: ORTHOPEDIC SURGERY | Age: 73
End: 2023-10-11
Payer: MEDICARE

## 2023-10-11 ENCOUNTER — OFFICE VISIT (OUTPATIENT)
Dept: FAMILY MEDICINE CLINIC | Age: 73
End: 2023-10-11
Payer: MEDICARE

## 2023-10-11 VITALS
HEIGHT: 70 IN | OXYGEN SATURATION: 96 % | WEIGHT: 314 LBS | RESPIRATION RATE: 16 BRPM | HEART RATE: 85 BPM | BODY MASS INDEX: 44.95 KG/M2

## 2023-10-11 VITALS
HEART RATE: 88 BPM | SYSTOLIC BLOOD PRESSURE: 126 MMHG | OXYGEN SATURATION: 98 % | DIASTOLIC BLOOD PRESSURE: 72 MMHG | WEIGHT: 314.6 LBS | TEMPERATURE: 96.9 F | BODY MASS INDEX: 45.14 KG/M2

## 2023-10-11 DIAGNOSIS — I10 ESSENTIAL HYPERTENSION: ICD-10-CM

## 2023-10-11 DIAGNOSIS — R20.2 NUMBNESS AND TINGLING OF FOOT: Primary | ICD-10-CM

## 2023-10-11 DIAGNOSIS — E87.5 SERUM POTASSIUM ELEVATED: ICD-10-CM

## 2023-10-11 DIAGNOSIS — R20.0 NUMBNESS AND TINGLING OF FOOT: Primary | ICD-10-CM

## 2023-10-11 DIAGNOSIS — M17.12 PRIMARY OSTEOARTHRITIS OF LEFT KNEE: Primary | ICD-10-CM

## 2023-10-11 DIAGNOSIS — K21.9 GASTROESOPHAGEAL REFLUX DISEASE WITHOUT ESOPHAGITIS: ICD-10-CM

## 2023-10-11 DIAGNOSIS — G89.29 CHRONIC BILATERAL LOW BACK PAIN WITHOUT SCIATICA: ICD-10-CM

## 2023-10-11 DIAGNOSIS — M54.50 CHRONIC BILATERAL LOW BACK PAIN WITHOUT SCIATICA: ICD-10-CM

## 2023-10-11 PROCEDURE — 3017F COLORECTAL CA SCREEN DOC REV: CPT | Performed by: FAMILY MEDICINE

## 2023-10-11 PROCEDURE — 20610 DRAIN/INJ JOINT/BURSA W/O US: CPT

## 2023-10-11 PROCEDURE — 1123F ACP DISCUSS/DSCN MKR DOCD: CPT | Performed by: FAMILY MEDICINE

## 2023-10-11 PROCEDURE — 4004F PT TOBACCO SCREEN RCVD TLK: CPT | Performed by: FAMILY MEDICINE

## 2023-10-11 PROCEDURE — 36415 COLL VENOUS BLD VENIPUNCTURE: CPT | Performed by: FAMILY MEDICINE

## 2023-10-11 PROCEDURE — G0008 ADMIN INFLUENZA VIRUS VAC: HCPCS | Performed by: FAMILY MEDICINE

## 2023-10-11 PROCEDURE — 90694 VACC AIIV4 NO PRSRV 0.5ML IM: CPT | Performed by: FAMILY MEDICINE

## 2023-10-11 PROCEDURE — 3074F SYST BP LT 130 MM HG: CPT | Performed by: FAMILY MEDICINE

## 2023-10-11 PROCEDURE — G8484 FLU IMMUNIZE NO ADMIN: HCPCS | Performed by: FAMILY MEDICINE

## 2023-10-11 PROCEDURE — 99214 OFFICE O/P EST MOD 30 MIN: CPT | Performed by: FAMILY MEDICINE

## 2023-10-11 PROCEDURE — G8417 CALC BMI ABV UP PARAM F/U: HCPCS | Performed by: FAMILY MEDICINE

## 2023-10-11 PROCEDURE — 3078F DIAST BP <80 MM HG: CPT | Performed by: FAMILY MEDICINE

## 2023-10-11 PROCEDURE — G8428 CUR MEDS NOT DOCUMENT: HCPCS | Performed by: FAMILY MEDICINE

## 2023-10-11 PROCEDURE — 99999 PR OFFICE/OUTPT VISIT,PROCEDURE ONLY: CPT

## 2023-10-11 RX ORDER — AMLODIPINE BESYLATE 5 MG/1
TABLET ORAL
Qty: 90 TABLET | Refills: 0 | Status: SHIPPED | OUTPATIENT
Start: 2023-10-11

## 2023-10-11 RX ORDER — LISINOPRIL 5 MG/1
TABLET ORAL
Qty: 90 TABLET | Refills: 0 | Status: SHIPPED | OUTPATIENT
Start: 2023-10-11

## 2023-10-11 RX ORDER — PANTOPRAZOLE SODIUM 40 MG/1
TABLET, DELAYED RELEASE ORAL
Qty: 90 TABLET | Refills: 0 | Status: SHIPPED | OUTPATIENT
Start: 2023-10-11

## 2023-10-11 ASSESSMENT — PATIENT HEALTH QUESTIONNAIRE - PHQ9
6. FEELING BAD ABOUT YOURSELF - OR THAT YOU ARE A FAILURE OR HAVE LET YOURSELF OR YOUR FAMILY DOWN: 0
SUM OF ALL RESPONSES TO PHQ QUESTIONS 1-9: 0
3. TROUBLE FALLING OR STAYING ASLEEP: 0
4. FEELING TIRED OR HAVING LITTLE ENERGY: 0
8. MOVING OR SPEAKING SO SLOWLY THAT OTHER PEOPLE COULD HAVE NOTICED. OR THE OPPOSITE, BEING SO FIGETY OR RESTLESS THAT YOU HAVE BEEN MOVING AROUND A LOT MORE THAN USUAL: 0
10. IF YOU CHECKED OFF ANY PROBLEMS, HOW DIFFICULT HAVE THESE PROBLEMS MADE IT FOR YOU TO DO YOUR WORK, TAKE CARE OF THINGS AT HOME, OR GET ALONG WITH OTHER PEOPLE: 0
7. TROUBLE CONCENTRATING ON THINGS, SUCH AS READING THE NEWSPAPER OR WATCHING TELEVISION: 0
SUM OF ALL RESPONSES TO PHQ QUESTIONS 1-9: 0
1. LITTLE INTEREST OR PLEASURE IN DOING THINGS: 0
SUM OF ALL RESPONSES TO PHQ9 QUESTIONS 1 & 2: 0
SUM OF ALL RESPONSES TO PHQ QUESTIONS 1-9: 0
5. POOR APPETITE OR OVEREATING: 0
2. FEELING DOWN, DEPRESSED OR HOPELESS: 0
9. THOUGHTS THAT YOU WOULD BE BETTER OFF DEAD, OR OF HURTING YOURSELF: 0
SUM OF ALL RESPONSES TO PHQ QUESTIONS 1-9: 0

## 2023-10-11 NOTE — PATIENT INSTRUCTIONS
Orthovisc  # 2  Rest both knees for 24-48 hours  Work on ROM and strengthening of knees and legs   May take NSAIDS or anti-inflammatories as needed  Weightbearing as tolerated  Follow up in one week wth [place]    We are committed to providing you the best care possible. If you receive a survey after visiting one of our offices, please take time to share your experience concerning your physician office visit. These surveys are confidential and no health information about you is shared. We are eager to improve for you and we are counting on your feedback to help make that happen.

## 2023-10-12 LAB
POTASSIUM SERPL-SCNC: 4 MMOL/L (ref 3.5–5.1)
TSH SERPL DL<=0.005 MIU/L-ACNC: 1.25 UIU/ML (ref 0.27–4.2)
VIT B12 SERPL-MCNC: 539 PG/ML (ref 211–911)

## 2023-10-20 ENCOUNTER — NURSE ONLY (OUTPATIENT)
Dept: ORTHOPEDIC SURGERY | Age: 73
End: 2023-10-20

## 2023-10-20 VITALS — HEART RATE: 95 BPM | OXYGEN SATURATION: 96 % | RESPIRATION RATE: 16 BRPM | TEMPERATURE: 98.9 F

## 2023-10-20 DIAGNOSIS — M17.12 PRIMARY OSTEOARTHRITIS OF LEFT KNEE: Primary | ICD-10-CM

## 2023-10-20 DIAGNOSIS — T84.84XA PAIN DUE TO TOTAL RIGHT KNEE REPLACEMENT, INITIAL ENCOUNTER (HCC): ICD-10-CM

## 2023-10-20 DIAGNOSIS — Z96.651 PAIN DUE TO TOTAL RIGHT KNEE REPLACEMENT, INITIAL ENCOUNTER (HCC): ICD-10-CM

## 2023-10-20 NOTE — PATIENT INSTRUCTIONS
Continue to weight bear as tolerated  Continue range of motion  Ice and elevate as needed  Tylenol or Motrin for pain  Injection given into the left knee  No high impact activities for a least 24-48 hours  Central scheduling [phone] will be calling you to schedule your CT. If you do not hear from them with in a week give them a call. Follow up with Dr. Connor Velasquez as scheduled below for right knee CT. We are committed to providing you the best care possible. If you receive a survey after visiting one of our offices, please take time to share your experience concerning your physician office visit. These surveys are confidential and no health information about you is shared. We are eager to improve for you and we are counting on your feedback to help make that happen.

## 2023-10-20 NOTE — PROGRESS NOTES
VISCO-SUPPLEMENTATION INJECTION (Number 3)    HISTORY OF PRESENT ILLNESS (HPI):   Avinash Cabrera is a 72y.o. year old male who is here for follow up for visco-supplementation injection number 3 for primary osteoarthritis of left knee and further complaint of persistent pain within the right knee which was replaced over a year ago. PAST HISTORY:   Unless otherwise specified in this note, the past history is reviewed today with the patient and is as follows-    Allergies   Allergen Reactions    Sulfa Antibiotics Rash       Current Outpatient Medications   Medication Sig Dispense Refill    amLODIPine (NORVASC) 5 MG tablet TAKE 1 TABLET BY MOUTH EVERY DAY IN THE MORNING 90 tablet 0    lisinopril (PRINIVIL;ZESTRIL) 5 MG tablet TAKE 1 TABLET BY MOUTH EVERY DAY IN THE MORNING 90 tablet 0    pantoprazole (PROTONIX) 40 MG tablet TAKE 1 TABLET EVERY MORNING BEFORE BREAKFAST 90 tablet 0    tamsulosin (FLOMAX) 0.4 MG capsule Take 1 capsule by mouth daily 90 capsule 1    potassium chloride (KLOR-CON M) 10 MEQ extended release tablet Take 1 tablet by mouth 2 times daily 60 tablet 2    furosemide (LASIX) 20 MG tablet Take 1 tablet by mouth 2 times daily 60 tablet 2    albuterol sulfate HFA (PROAIR HFA) 108 (90 Base) MCG/ACT inhaler Inhale 2 puffs into the lungs every 6 hours as needed for Wheezing 3 each 1    fluticasone-salmeterol (ADVAIR DISKUS) 250-50 MCG/ACT AEPB diskus inhaler Inhale 1 puff into the lungs in the morning and 1 puff in the evening.  60 each 3    sildenafil (VIAGRA) 100 MG tablet Take 1 tablet by mouth as needed for Erectile Dysfunction 30 tablet 2    polyethylene glycol-electrolytes (NULYTELY) 420 g solution mix and drink entire container between 6 pm and 10 pm night before procedure (Patient not taking: Reported on 5/16/2023)      fluticasone-salmeterol (ADVAIR DISKUS) 250-50 MCG/DOSE AEPB INHALE 1 PUFF INTO THE LUNGS EVERY 12 HOURS (Patient not taking: Reported on 5/16/2023) 180 each 1

## 2023-10-27 ENCOUNTER — HOSPITAL ENCOUNTER (OUTPATIENT)
Dept: CT IMAGING | Age: 73
Discharge: HOME | End: 2023-10-27
Payer: MEDICARE

## 2023-10-27 DIAGNOSIS — Z96.651 PAIN DUE TO TOTAL RIGHT KNEE REPLACEMENT, INITIAL ENCOUNTER (HCC): ICD-10-CM

## 2023-10-27 DIAGNOSIS — T84.84XA PAIN DUE TO TOTAL RIGHT KNEE REPLACEMENT, INITIAL ENCOUNTER (HCC): ICD-10-CM

## 2023-10-27 PROCEDURE — 73700 CT LOWER EXTREMITY W/O DYE: CPT

## 2023-11-06 ENCOUNTER — TELEPHONE (OUTPATIENT)
Dept: FAMILY MEDICINE CLINIC | Age: 73
End: 2023-11-06

## 2023-11-06 NOTE — TELEPHONE ENCOUNTER
I can write a prescription for handicap placard but if he wants a handicap plate he has to start at the [facility] and they will give him the right paperwork for me to fill out.

## 2023-11-06 NOTE — TELEPHONE ENCOUNTER
Pt called in stating that he got a new car and is wanting to get a handicap license plate for it. Please advise.

## 2023-11-20 ENCOUNTER — OFFICE VISIT (OUTPATIENT)
Dept: ORTHOPEDIC SURGERY | Age: 73
End: 2023-11-20
Payer: MEDICARE

## 2023-11-20 VITALS — HEIGHT: 70 IN | RESPIRATION RATE: 16 BRPM | BODY MASS INDEX: 42.95 KG/M2 | WEIGHT: 300 LBS

## 2023-11-20 DIAGNOSIS — T84.84XA PAIN DUE TO TOTAL RIGHT KNEE REPLACEMENT, INITIAL ENCOUNTER (HCC): Primary | ICD-10-CM

## 2023-11-20 DIAGNOSIS — Z96.651 PAIN DUE TO TOTAL RIGHT KNEE REPLACEMENT, INITIAL ENCOUNTER (HCC): Primary | ICD-10-CM

## 2023-11-20 PROCEDURE — G8427 DOCREV CUR MEDS BY ELIG CLIN: HCPCS | Performed by: ORTHOPAEDIC SURGERY

## 2023-11-20 PROCEDURE — 3017F COLORECTAL CA SCREEN DOC REV: CPT | Performed by: ORTHOPAEDIC SURGERY

## 2023-11-20 PROCEDURE — 4004F PT TOBACCO SCREEN RCVD TLK: CPT | Performed by: ORTHOPAEDIC SURGERY

## 2023-11-20 PROCEDURE — G8484 FLU IMMUNIZE NO ADMIN: HCPCS | Performed by: ORTHOPAEDIC SURGERY

## 2023-11-20 PROCEDURE — 99213 OFFICE O/P EST LOW 20 MIN: CPT | Performed by: ORTHOPAEDIC SURGERY

## 2023-11-20 PROCEDURE — 1123F ACP DISCUSS/DSCN MKR DOCD: CPT | Performed by: ORTHOPAEDIC SURGERY

## 2023-11-20 PROCEDURE — G8417 CALC BMI ABV UP PARAM F/U: HCPCS | Performed by: ORTHOPAEDIC SURGERY

## 2023-11-20 ASSESSMENT — ENCOUNTER SYMPTOMS
BACK PAIN: 0
COLOR CHANGE: 0
CHEST TIGHTNESS: 0

## 2023-11-20 NOTE — PROGRESS NOTES
Patient returns to the office with right knee pain. Pt stated the pain has been constant and consistent for years since the knee replacements. Pt stated that the pain has not gotten better and the only time has relief is when sleeping. Pt did have a CT done.
Effort: Pulmonary effort is normal.   Musculoskeletal:         General: Swelling and tenderness present. No deformity. Normal range of motion. Cervical back: Normal range of motion. Right hip: Normal.      Left hip: Normal.      Right knee: No swelling, deformity, effusion, erythema, ecchymosis, lacerations or bony tenderness. Normal range of motion. Tenderness present. No medial joint line, lateral joint line, MCL, LCL or patellar tendon tenderness. No LCL laxity or MCL laxity. Normal alignment and normal patellar mobility. Left knee: Swelling, bony tenderness and crepitus present. No deformity, effusion, erythema, ecchymosis or lacerations. Normal range of motion. Tenderness present over the medial joint line, lateral joint line and patellar tendon. No MCL or LCL tenderness. No LCL laxity or MCL laxity. Normal alignment and normal patellar mobility. Skin:     General: Skin is warm and dry. Capillary Refill: Capillary refill takes less than 2 seconds. Coloration: Skin is not pale. Findings: No erythema or rash. Neurological:      Mental Status: He is alert and oriented to person, place, and time. Left knee-Skin intact with no erythema, ecchymosis or lacerations present. 0-130    Right knee-Incision clean, dry, intact, with no erythema, no drainage, and no signs of infection. 0-130    CT scan of the right knee from October 27, 2023 reviewed by me today in the office demonstrates well-positioned right total knee arthroplasty without any signs of loosening of the components, normal tracking of the patella, no acute osseous abnormalities. Assessment:       Right TKA, 2 years  Left knee OA, severe        Plan:      I discussed with him today his CT findings. I explained to him that his implants are stable and remain in good alignment. I reassured him that there is no sign of infection, loose implants or loose ligaments around his right knee.   I did offer him additional

## 2023-11-28 ENCOUNTER — PROCEDURE VISIT (OUTPATIENT)
Dept: PHYSICAL MEDICINE AND REHAB | Age: 73
End: 2023-11-28

## 2023-11-28 DIAGNOSIS — G89.29 CHRONIC BILATERAL LOW BACK PAIN WITHOUT SCIATICA: ICD-10-CM

## 2023-11-28 DIAGNOSIS — R20.2 PARESTHESIA OF BOTH FEET: ICD-10-CM

## 2023-11-28 DIAGNOSIS — M54.16 LUMBAR POLYRADICULOPATHY: ICD-10-CM

## 2023-11-28 DIAGNOSIS — M54.50 CHRONIC BILATERAL LOW BACK PAIN WITHOUT SCIATICA: ICD-10-CM

## 2023-11-28 DIAGNOSIS — G60.8 POLYNEUROPATHY, PERIPHERAL SENSORIMOTOR AXONAL: Primary | ICD-10-CM

## 2023-11-28 NOTE — PROGRESS NOTES
EMG REPORT     CHIEF COMPLAINT: Leg swelling, difficulty walking, back pain and leg burning. HISTORY OF PRESENT ILLNESS: 72 y.o. right hand dominant male with progressive leg weakness, restless leg symptoms, leg swelling and difficulty walking over the last 5 years or so. He reported having a lower back surgery last year that did not change his symptoms. He has restlessness at night with difficulty sleeping. During the day his legs feel heavy. He does not trip or fall but he does not feel comfortable walking. He gets burning and stinging in the lower legs and feet equally. He reported cramping in his thighs and calves. He rated his pain severity as 10/10. He has a history of right carpal tunnels surgery in the past.  No history of diabetes or any thyroid disorder. He does drink alcohol regularly. PHYSICAL EXAMINATION: Alert. Marked central obesity. Decreased lumbar lordosis with a well-healed midline surgical scar over the lumbar spine. Fair passive range of motion at the hips and knees. Negative straight leg raising. No lower limb DTRs. Marked swelling about both feet with some pitting. 4 -/5 strength with testing of ankle dorsiflexion and EDB bilaterally. Symmetric calf girth. Diminished perception of touch about the lower legs and feet symmetrically. NERVE CONDUCTION STUDIES:     MOTOR         LATENCY NORMAL AMPLITUDE DISTANCE COND. GISELLA.    R  PERONEAL   Absent < 6.2 msec  8 cm            RIGHT  TIBIAL Absent < 6.2 msec  8 cm            R TIB H REFLEX Absent < 50 msec      L TIB H REFLEX Absent < 50 msec         SENSORY  ANTIDROMIC        LATENCY NORMAL AMPLITUDE DISTANCE   R SUP PERONEAL Absent < 3.6 msec  10 cm          RIGHT  SURAL Absent < 4.0 msec  14 cm   LEFT  SURAL Absent < 4.0 msec  14 cm         NEEDLE EMG:      RIGHT   LEFT     Insertional Activity Spontaneous  Activity Volutional  MUAP's Insertional Activity Spontaneous  Activity Volutional  MUAP's   Lumbar paraspinals

## 2023-12-04 DIAGNOSIS — M54.16 LUMBAR RADICULOPATHY: ICD-10-CM

## 2023-12-04 DIAGNOSIS — R20.2 NUMBNESS AND TINGLING OF FOOT: Primary | ICD-10-CM

## 2023-12-04 DIAGNOSIS — R20.0 NUMBNESS AND TINGLING OF FOOT: Primary | ICD-10-CM

## 2023-12-04 RX ORDER — GABAPENTIN 300 MG/1
300 CAPSULE ORAL 3 TIMES DAILY
Qty: 90 CAPSULE | Refills: 5 | Status: SHIPPED | OUTPATIENT
Start: 2023-12-04 | End: 2024-06-01

## 2024-01-30 DIAGNOSIS — I10 ESSENTIAL HYPERTENSION: ICD-10-CM

## 2024-01-30 DIAGNOSIS — K21.9 GASTROESOPHAGEAL REFLUX DISEASE WITHOUT ESOPHAGITIS: ICD-10-CM

## 2024-02-01 RX ORDER — LISINOPRIL 5 MG/1
TABLET ORAL
Qty: 90 TABLET | Refills: 3 | OUTPATIENT
Start: 2024-02-01

## 2024-02-01 RX ORDER — AMLODIPINE BESYLATE 5 MG/1
TABLET ORAL
Qty: 90 TABLET | Refills: 3 | OUTPATIENT
Start: 2024-02-01

## 2024-02-01 RX ORDER — PANTOPRAZOLE SODIUM 40 MG/1
TABLET, DELAYED RELEASE ORAL
Qty: 90 TABLET | Refills: 3 | OUTPATIENT
Start: 2024-02-01

## 2024-02-06 ENCOUNTER — OFFICE VISIT (OUTPATIENT)
Dept: FAMILY MEDICINE CLINIC | Age: 74
End: 2024-02-06
Payer: MEDICARE

## 2024-02-06 ENCOUNTER — TELEPHONE (OUTPATIENT)
Dept: FAMILY MEDICINE CLINIC | Age: 74
End: 2024-02-06

## 2024-02-06 VITALS
HEART RATE: 90 BPM | OXYGEN SATURATION: 96 % | WEIGHT: 315 LBS | SYSTOLIC BLOOD PRESSURE: 130 MMHG | BODY MASS INDEX: 45.2 KG/M2 | DIASTOLIC BLOOD PRESSURE: 76 MMHG

## 2024-02-06 DIAGNOSIS — K21.9 GASTROESOPHAGEAL REFLUX DISEASE WITHOUT ESOPHAGITIS: ICD-10-CM

## 2024-02-06 DIAGNOSIS — G62.9 PERIPHERAL POLYNEUROPATHY: ICD-10-CM

## 2024-02-06 DIAGNOSIS — I10 ESSENTIAL HYPERTENSION: Primary | ICD-10-CM

## 2024-02-06 PROCEDURE — 99214 OFFICE O/P EST MOD 30 MIN: CPT | Performed by: FAMILY MEDICINE

## 2024-02-06 PROCEDURE — 3017F COLORECTAL CA SCREEN DOC REV: CPT | Performed by: FAMILY MEDICINE

## 2024-02-06 PROCEDURE — 3078F DIAST BP <80 MM HG: CPT | Performed by: FAMILY MEDICINE

## 2024-02-06 PROCEDURE — G8427 DOCREV CUR MEDS BY ELIG CLIN: HCPCS | Performed by: FAMILY MEDICINE

## 2024-02-06 PROCEDURE — 3075F SYST BP GE 130 - 139MM HG: CPT | Performed by: FAMILY MEDICINE

## 2024-02-06 PROCEDURE — 4004F PT TOBACCO SCREEN RCVD TLK: CPT | Performed by: FAMILY MEDICINE

## 2024-02-06 PROCEDURE — G8417 CALC BMI ABV UP PARAM F/U: HCPCS | Performed by: FAMILY MEDICINE

## 2024-02-06 PROCEDURE — G8484 FLU IMMUNIZE NO ADMIN: HCPCS | Performed by: FAMILY MEDICINE

## 2024-02-06 PROCEDURE — 1123F ACP DISCUSS/DSCN MKR DOCD: CPT | Performed by: FAMILY MEDICINE

## 2024-02-06 RX ORDER — AMLODIPINE BESYLATE 5 MG/1
TABLET ORAL
Qty: 90 TABLET | Refills: 1 | Status: SHIPPED | OUTPATIENT
Start: 2024-02-06

## 2024-02-06 RX ORDER — PANTOPRAZOLE SODIUM 40 MG/1
TABLET, DELAYED RELEASE ORAL
Qty: 90 TABLET | Refills: 1 | Status: SHIPPED | OUTPATIENT
Start: 2024-02-06 | End: 2024-02-06

## 2024-02-06 RX ORDER — PANTOPRAZOLE SODIUM 40 MG/1
TABLET, DELAYED RELEASE ORAL
Qty: 90 TABLET | Refills: 1 | Status: SHIPPED | OUTPATIENT
Start: 2024-02-06

## 2024-02-06 RX ORDER — LISINOPRIL 5 MG/1
TABLET ORAL
Qty: 90 TABLET | Refills: 1 | Status: SHIPPED | OUTPATIENT
Start: 2024-02-06

## 2024-02-06 RX ORDER — GABAPENTIN 600 MG/1
600 TABLET ORAL 3 TIMES DAILY
Qty: 90 TABLET | Refills: 2 | Status: SHIPPED | OUTPATIENT
Start: 2024-02-06 | End: 2024-05-06

## 2024-02-06 RX ORDER — LISINOPRIL 5 MG/1
TABLET ORAL
Qty: 90 TABLET | Refills: 1 | Status: SHIPPED | OUTPATIENT
Start: 2024-02-06 | End: 2024-02-06

## 2024-02-06 ASSESSMENT — PATIENT HEALTH QUESTIONNAIRE - PHQ9
7. TROUBLE CONCENTRATING ON THINGS, SUCH AS READING THE NEWSPAPER OR WATCHING TELEVISION: 0
4. FEELING TIRED OR HAVING LITTLE ENERGY: 3
3. TROUBLE FALLING OR STAYING ASLEEP: 3
6. FEELING BAD ABOUT YOURSELF - OR THAT YOU ARE A FAILURE OR HAVE LET YOURSELF OR YOUR FAMILY DOWN: 0
1. LITTLE INTEREST OR PLEASURE IN DOING THINGS: 3
8. MOVING OR SPEAKING SO SLOWLY THAT OTHER PEOPLE COULD HAVE NOTICED. OR THE OPPOSITE, BEING SO FIGETY OR RESTLESS THAT YOU HAVE BEEN MOVING AROUND A LOT MORE THAN USUAL: 0
10. IF YOU CHECKED OFF ANY PROBLEMS, HOW DIFFICULT HAVE THESE PROBLEMS MADE IT FOR YOU TO DO YOUR WORK, TAKE CARE OF THINGS AT HOME, OR GET ALONG WITH OTHER PEOPLE: 1
9. THOUGHTS THAT YOU WOULD BE BETTER OFF DEAD, OR OF HURTING YOURSELF: 0
5. POOR APPETITE OR OVEREATING: 0
SUM OF ALL RESPONSES TO PHQ QUESTIONS 1-9: 12
2. FEELING DOWN, DEPRESSED OR HOPELESS: 3
SUM OF ALL RESPONSES TO PHQ9 QUESTIONS 1 & 2: 6

## 2024-02-06 NOTE — PROGRESS NOTES
Essential hypertension  amLODIPine (NORVASC) 5 MG tablet   Controlled lisinopril (PRINIVIL;ZESTRIL) 5 MG tablet    DISCONTINUED: lisinopril (PRINIVIL;ZESTRIL) 5 MG tablet      2. Gastroesophageal reflux disease without esophagitis  pantoprazole (PROTONIX) 40 MG tablet   Controlled DISCONTINUED: pantoprazole (PROTONIX) 40 MG tablet      3. Peripheral polyneuropathy  gabapentin (NEURONTIN) 600 MG tablet   Needs improvement             P: Increase gabapentin to 600 mg 3 times daily  Continue all medications the current dose due to effectiveness  follow-up 3 months

## 2024-02-06 NOTE — TELEPHONE ENCOUNTER
Pharmacy called for clarification if the patient should be taking the Gabapentin 600 in addition to the 300 or if the 300 is supposed to be discontinued.   Staff pharmacist would like a call back [phone]

## 2024-02-26 ENCOUNTER — TELEPHONE (OUTPATIENT)
Dept: FAMILY MEDICINE CLINIC | Age: 74
End: 2024-02-26

## 2024-02-26 NOTE — TELEPHONE ENCOUNTER
He is only been on the new dose about 3 weeks so I usually do not increase it this soon.  Has he had any side effects on the higher dose?

## 2024-02-26 NOTE — TELEPHONE ENCOUNTER
Patient called stating you increased his Gabapentin form 300 mg to 600 mg. He said you told him if it did not work to call and you would increase it.

## 2024-02-27 DIAGNOSIS — G62.9 PERIPHERAL POLYNEUROPATHY: Primary | ICD-10-CM

## 2024-02-27 RX ORDER — GABAPENTIN 800 MG/1
800 TABLET ORAL 3 TIMES DAILY
Qty: 270 TABLET | Refills: 1 | Status: SHIPPED | OUTPATIENT
Start: 2024-02-27 | End: 2024-08-25

## 2024-03-14 ENCOUNTER — OFFICE VISIT (OUTPATIENT)
Dept: FAMILY MEDICINE CLINIC | Age: 74
End: 2024-03-14
Payer: MEDICARE

## 2024-03-14 VITALS
DIASTOLIC BLOOD PRESSURE: 72 MMHG | SYSTOLIC BLOOD PRESSURE: 118 MMHG | HEART RATE: 69 BPM | OXYGEN SATURATION: 96 % | TEMPERATURE: 97.7 F

## 2024-03-14 DIAGNOSIS — F43.21 GRIEF: Primary | ICD-10-CM

## 2024-03-14 DIAGNOSIS — F51.04 PSYCHOPHYSIOLOGICAL INSOMNIA: ICD-10-CM

## 2024-03-14 DIAGNOSIS — R60.0 BILATERAL LOWER EXTREMITY EDEMA: ICD-10-CM

## 2024-03-14 DIAGNOSIS — H91.13 PRESBYCUSIS OF BOTH EARS: ICD-10-CM

## 2024-03-14 PROCEDURE — G8484 FLU IMMUNIZE NO ADMIN: HCPCS | Performed by: FAMILY MEDICINE

## 2024-03-14 PROCEDURE — G8417 CALC BMI ABV UP PARAM F/U: HCPCS | Performed by: FAMILY MEDICINE

## 2024-03-14 PROCEDURE — 3074F SYST BP LT 130 MM HG: CPT | Performed by: FAMILY MEDICINE

## 2024-03-14 PROCEDURE — 4004F PT TOBACCO SCREEN RCVD TLK: CPT | Performed by: FAMILY MEDICINE

## 2024-03-14 PROCEDURE — 99214 OFFICE O/P EST MOD 30 MIN: CPT | Performed by: FAMILY MEDICINE

## 2024-03-14 PROCEDURE — 3017F COLORECTAL CA SCREEN DOC REV: CPT | Performed by: FAMILY MEDICINE

## 2024-03-14 PROCEDURE — 1123F ACP DISCUSS/DSCN MKR DOCD: CPT | Performed by: FAMILY MEDICINE

## 2024-03-14 PROCEDURE — G8428 CUR MEDS NOT DOCUMENT: HCPCS | Performed by: FAMILY MEDICINE

## 2024-03-14 PROCEDURE — 3078F DIAST BP <80 MM HG: CPT | Performed by: FAMILY MEDICINE

## 2024-03-14 RX ORDER — ZOLPIDEM TARTRATE 5 MG/1
5 TABLET ORAL NIGHTLY PRN
Qty: 14 TABLET | Refills: 0 | Status: SHIPPED | OUTPATIENT
Start: 2024-03-14 | End: 2024-03-28

## 2024-03-14 RX ORDER — ESCITALOPRAM OXALATE 10 MG/1
10 TABLET ORAL DAILY
Qty: 30 TABLET | Refills: 0 | Status: SHIPPED | OUTPATIENT
Start: 2024-03-14

## 2024-03-14 SDOH — ECONOMIC STABILITY: FOOD INSECURITY: WITHIN THE PAST 12 MONTHS, THE FOOD YOU BOUGHT JUST DIDN'T LAST AND YOU DIDN'T HAVE MONEY TO GET MORE.: NEVER TRUE

## 2024-03-14 SDOH — ECONOMIC STABILITY: FOOD INSECURITY: WITHIN THE PAST 12 MONTHS, YOU WORRIED THAT YOUR FOOD WOULD RUN OUT BEFORE YOU GOT MONEY TO BUY MORE.: NEVER TRUE

## 2024-03-14 SDOH — ECONOMIC STABILITY: INCOME INSECURITY: HOW HARD IS IT FOR YOU TO PAY FOR THE VERY BASICS LIKE FOOD, HOUSING, MEDICAL CARE, AND HEATING?: NOT HARD AT ALL

## 2024-03-14 ASSESSMENT — PATIENT HEALTH QUESTIONNAIRE - PHQ9
8. MOVING OR SPEAKING SO SLOWLY THAT OTHER PEOPLE COULD HAVE NOTICED. OR THE OPPOSITE, BEING SO FIGETY OR RESTLESS THAT YOU HAVE BEEN MOVING AROUND A LOT MORE THAN USUAL: 0
7. TROUBLE CONCENTRATING ON THINGS, SUCH AS READING THE NEWSPAPER OR WATCHING TELEVISION: 0
5. POOR APPETITE OR OVEREATING: 0
1. LITTLE INTEREST OR PLEASURE IN DOING THINGS: 0
9. THOUGHTS THAT YOU WOULD BE BETTER OFF DEAD, OR OF HURTING YOURSELF: 0
6. FEELING BAD ABOUT YOURSELF - OR THAT YOU ARE A FAILURE OR HAVE LET YOURSELF OR YOUR FAMILY DOWN: 0
SUM OF ALL RESPONSES TO PHQ QUESTIONS 1-9: 0
10. IF YOU CHECKED OFF ANY PROBLEMS, HOW DIFFICULT HAVE THESE PROBLEMS MADE IT FOR YOU TO DO YOUR WORK, TAKE CARE OF THINGS AT HOME, OR GET ALONG WITH OTHER PEOPLE: 0
2. FEELING DOWN, DEPRESSED OR HOPELESS: 0
3. TROUBLE FALLING OR STAYING ASLEEP: 0
SUM OF ALL RESPONSES TO PHQ QUESTIONS 1-9: 0
SUM OF ALL RESPONSES TO PHQ9 QUESTIONS 1 & 2: 0
4. FEELING TIRED OR HAVING LITTLE ENERGY: 0

## 2024-03-14 NOTE — PROGRESS NOTES
Subjective:       Sai Reyes is a 73 y.o. male who presents to discuss depression. Current symptoms include anhedonia, depressed mood, difficulty concentrating, insomnia, and psychomotor retardation. Symptoms have been gradually worsening since that time. Patient denies recurrent thoughts of death.  Patient's father-in-law shot himself on the street in front of his house 3 days ago.  Prior to this patient did not have any depression symptoms    Patient with longstanding lower extremity edema.  Is been on for many years and he takes Lasix from time to time but he stopped taking it \"a while back.\"  He has compression stockings but he does not wear them.  He says he cannot get them on.  Patient spends most of this time sitting up with his legs hanging down.  He states when he lays down at night he has to get up for 5 times a night to void.    Patient's wife states that he cannot hear when she talks to them.  Patient denies any pain in his ears or ringing.  Patient does listen to the television very loudly.      Patient's medications, allergies, past medical, surgical, social and family histories were reviewed and updated as appropriate.    Review of Systems  ROS:  Energy level fair overall, and weight is stable.  No chest pain or shortness of breath.  Bowels have been normal without constipation or diarrhea.  No shakes or tremors.       Objective:      /72 (Site: Left Upper Arm, Position: Sitting, Cuff Size: Large Adult)   Pulse 69   Temp 97.7 °F (36.5 °C) (Infrared)   SpO2 96%    General:  alert, appears stated age, and cooperative   Neck: Thyroid not palpable, not enlarged, no nodules detected.    Chest: clear with no wheezes or rales.  No retractions, or use of accessory muscles noted.   Cardiovascular: PMI is not displaced, and no thrill noted.  Regular rate and rhythm with no rub, murmur or gallop.  No peripheral edema.  Pedal pulses are normal.    Affect & Behavior:  good grooming, good insight,

## 2024-03-25 ENCOUNTER — OFFICE VISIT (OUTPATIENT)
Dept: ORTHOPEDIC SURGERY | Age: 74
End: 2024-03-25
Payer: MEDICARE

## 2024-03-25 VITALS
HEART RATE: 59 BPM | SYSTOLIC BLOOD PRESSURE: 110 MMHG | HEIGHT: 70 IN | BODY MASS INDEX: 45.2 KG/M2 | DIASTOLIC BLOOD PRESSURE: 73 MMHG | OXYGEN SATURATION: 91 %

## 2024-03-25 DIAGNOSIS — M17.12 PRIMARY OSTEOARTHRITIS OF LEFT KNEE: Primary | ICD-10-CM

## 2024-03-25 PROCEDURE — 99214 OFFICE O/P EST MOD 30 MIN: CPT | Performed by: ORTHOPAEDIC SURGERY

## 2024-03-25 PROCEDURE — 3017F COLORECTAL CA SCREEN DOC REV: CPT | Performed by: ORTHOPAEDIC SURGERY

## 2024-03-25 PROCEDURE — G8484 FLU IMMUNIZE NO ADMIN: HCPCS | Performed by: ORTHOPAEDIC SURGERY

## 2024-03-25 PROCEDURE — 3078F DIAST BP <80 MM HG: CPT | Performed by: ORTHOPAEDIC SURGERY

## 2024-03-25 PROCEDURE — 4004F PT TOBACCO SCREEN RCVD TLK: CPT | Performed by: ORTHOPAEDIC SURGERY

## 2024-03-25 PROCEDURE — 3074F SYST BP LT 130 MM HG: CPT | Performed by: ORTHOPAEDIC SURGERY

## 2024-03-25 PROCEDURE — G8427 DOCREV CUR MEDS BY ELIG CLIN: HCPCS | Performed by: ORTHOPAEDIC SURGERY

## 2024-03-25 PROCEDURE — 1123F ACP DISCUSS/DSCN MKR DOCD: CPT | Performed by: ORTHOPAEDIC SURGERY

## 2024-03-25 PROCEDURE — G8417 CALC BMI ABV UP PARAM F/U: HCPCS | Performed by: ORTHOPAEDIC SURGERY

## 2024-03-25 ASSESSMENT — ENCOUNTER SYMPTOMS
CHEST TIGHTNESS: 0
BACK PAIN: 0
COLOR CHANGE: 0

## 2024-03-25 NOTE — PATIENT INSTRUCTIONS
We will schedule surgery at soonest convenience. If you have any questions regarding your surgery please call our office and ask to speak with Juliette [phone]    We are committed to providing you the best care possible.     If you receive a survey after visiting one of our offices, please take time to share your experience concerning your physician office visit.  These surveys are confidential and no health information about you is shared.     We are eager to improve for you and we are counting on your feedback to help make that happen.

## 2024-03-25 NOTE — PROGRESS NOTES
Patient seen in office today for LT knee pain. X-rays taken. Has been having pain for years. Has tried cortisone injections and gel injection without relief. 10/10 pain level with ambulation.  Pain on anterior side of knee joint. Will have sharp pains. Using cane for ambulation.    
HTN (hypertension)     Hyperlipidemia     Nocturia     Shortness of breath on exertion     Slow urinary stream     Tingling     \"Tingling Right Hand\"    Wears dentures     Full Upper, Partial Lower    Wears glasses     Wears partial dentures     Lower       Objective:   Physical Exam  Constitutional:       Appearance: He is well-developed.   HENT:      Head: Normocephalic.   Eyes:      Pupils: Pupils are equal, round, and reactive to light.   Pulmonary:      Effort: Pulmonary effort is normal.   Musculoskeletal:         General: Swelling and tenderness present. No deformity. Normal range of motion.      Cervical back: Normal range of motion.      Right hip: Normal.      Left hip: Normal.      Right knee: No swelling, deformity, effusion, erythema, ecchymosis, lacerations or bony tenderness. Normal range of motion. No tenderness. No medial joint line, lateral joint line, MCL, LCL or patellar tendon tenderness. No LCL laxity or MCL laxity. Normal alignment and normal patellar mobility.      Left knee: Swelling, bony tenderness and crepitus present. No deformity, effusion, erythema, ecchymosis or lacerations. Normal range of motion. Tenderness present over the medial joint line, lateral joint line and patellar tendon. No MCL or LCL tenderness. No LCL laxity or MCL laxity.Normal alignment and normal patellar mobility.   Skin:     General: Skin is warm and dry.      Capillary Refill: Capillary refill takes less than 2 seconds.      Coloration: Skin is not pale.      Findings: No erythema or rash.   Neurological:      Mental Status: He is alert and oriented to person, place, and time.     Left knee-Skin intact with no erythema, ecchymosis or lacerations present.  0-130    Right knee-Incision clean, dry, intact, with no erythema, no drainage, and no signs of infection.  0-130    XR KNEE LEFT (3 VIEWS)    Result Date: 3/25/2024  XRAY X-ray 3 views of the left knee obtained and reviewed by me today in the office demonstrates

## 2024-03-26 ENCOUNTER — TELEPHONE (OUTPATIENT)
Dept: ORTHOPEDIC SURGERY | Age: 74
End: 2024-03-26

## 2024-03-26 DIAGNOSIS — M17.12 PRIMARY OSTEOARTHRITIS OF LEFT KNEE: Primary | ICD-10-CM

## 2024-03-26 DIAGNOSIS — M25.562 CHRONIC PAIN OF LEFT KNEE: ICD-10-CM

## 2024-03-26 DIAGNOSIS — G89.29 CHRONIC PAIN OF LEFT KNEE: ICD-10-CM

## 2024-03-26 NOTE — TELEPHONE ENCOUNTER
Patient scheduled for    Left Total Knee Arthroplasty  Date: 4/25/24  Facility: [facility]  Surgeon: Brennan Erazo DO  Product: Jf    Surgery Request created/emailed 3/26/24  Pathway Orders scanned 3/26/24  PCP Clearance routed via [employer] to Dr. Rivas 3/26/24    Pre Op Appt 3/25/24    [employer] Insurance  Contacted 3/26/24 via [email or web address] with clinicals uploaded  Status: Approved CPT 33710 ICD M17.12/M25.562 for 23hr observation  Auth# [id number]  Date Span: 4/25/24-7/25/24    [facility]    PT order submitted to [employer] [facility] 3/26/24

## 2024-03-27 ENCOUNTER — TELEPHONE (OUTPATIENT)
Dept: ORTHOPEDIC SURGERY | Age: 74
End: 2024-03-27

## 2024-03-27 NOTE — TELEPHONE ENCOUNTER
Message from PEYMAN Vasquez  PT:    Patient has an order in for his total knee but he refused the pre op appt and is only going to the post op one FYI

## 2024-04-08 ENCOUNTER — TELEPHONE (OUTPATIENT)
Dept: FAMILY MEDICINE CLINIC | Age: 74
End: 2024-04-08

## 2024-04-08 DIAGNOSIS — G62.9 PERIPHERAL POLYNEUROPATHY: ICD-10-CM

## 2024-04-08 RX ORDER — GABAPENTIN 600 MG/1
1200 TABLET ORAL 3 TIMES DAILY
Qty: 180 TABLET | Refills: 3 | Status: SHIPPED | OUTPATIENT
Start: 2024-04-08 | End: 2024-10-05

## 2024-04-09 NOTE — PROGRESS NOTES
4/9/24 - .Reminded of pre-testing on 4/10/24 @0830.   Bring insurance card, picture ID and a list of your home medications. Check in at the information desk in the lobby upon your arrival. You can eat and take morning medications. Patient verbalized understanding.

## 2024-04-10 ENCOUNTER — HOSPITAL ENCOUNTER (OUTPATIENT)
Dept: GENERAL RADIOLOGY | Age: 74
Discharge: HOME | End: 2024-04-10
Payer: MEDICARE

## 2024-04-10 ENCOUNTER — HOSPITAL ENCOUNTER (OUTPATIENT)
Age: 74
Discharge: HOME | End: 2024-04-12
Payer: MEDICARE

## 2024-04-10 ENCOUNTER — HOSPITAL ENCOUNTER (OUTPATIENT)
Age: 74
Discharge: HOME | End: 2024-04-10
Payer: MEDICARE

## 2024-04-10 ENCOUNTER — HOSPITAL ENCOUNTER (OUTPATIENT)
Dept: PREADMISSION TESTING | Age: 74
Discharge: HOME | End: 2024-04-10
Payer: MEDICARE

## 2024-04-10 VITALS
TEMPERATURE: 97 F | RESPIRATION RATE: 16 BRPM | BODY MASS INDEX: 44.95 KG/M2 | OXYGEN SATURATION: 90 % | HEIGHT: 70 IN | WEIGHT: 314 LBS | HEART RATE: 84 BPM | DIASTOLIC BLOOD PRESSURE: 67 MMHG | SYSTOLIC BLOOD PRESSURE: 124 MMHG

## 2024-04-10 DIAGNOSIS — Z01.818 PRE-OP TESTING: Primary | ICD-10-CM

## 2024-04-10 DIAGNOSIS — Z01.818 PRE-OP TESTING: ICD-10-CM

## 2024-04-10 LAB
ANION GAP SERPL CALCULATED.3IONS-SCNC: 12 MMOL/L (ref 7–16)
BASOPHILS ABSOLUTE: 0.2 K/CU MM
BASOPHILS RELATIVE PERCENT: 1.5 % (ref 0–1)
BILIRUBIN URINE: NEGATIVE MG/DL
BLOOD, URINE: NEGATIVE
BUN SERPL-MCNC: 12 MG/DL (ref 6–23)
CALCIUM SERPL-MCNC: 8.5 MG/DL (ref 8.3–10.6)
CHLORIDE BLD-SCNC: 100 MMOL/L (ref 99–110)
CLARITY: CLEAR
CO2: 23 MMOL/L (ref 21–32)
COLOR: YELLOW
COMMENT UA: ABNORMAL
CREAT SERPL-MCNC: 0.6 MG/DL (ref 0.9–1.3)
DIFFERENTIAL TYPE: ABNORMAL
EKG ATRIAL RATE: 72 BPM
EKG DIAGNOSIS: NORMAL
EKG P AXIS: 53 DEGREES
EKG P-R INTERVAL: 170 MS
EKG Q-T INTERVAL: 424 MS
EKG QRS DURATION: 140 MS
EKG QTC CALCULATION (BAZETT): 464 MS
EKG R AXIS: -30 DEGREES
EKG T AXIS: 33 DEGREES
EKG VENTRICULAR RATE: 72 BPM
EOSINOPHILS ABSOLUTE: 0.4 K/CU MM
EOSINOPHILS RELATIVE PERCENT: 3.9 % (ref 0–3)
ERYTHROCYTE SEDIMENTATION RATE: 25 MM/HR (ref 0–20)
GFR SERPL CREATININE-BSD FRML MDRD: >90 ML/MIN/1.73M2
GLUCOSE SERPL-MCNC: 255 MG/DL (ref 70–99)
GLUCOSE, URINE: 100 MG/DL
HCT VFR BLD CALC: 52.9 % (ref 42–52)
HEMOGLOBIN: 17 GM/DL (ref 13.5–18)
IMMATURE NEUTROPHIL %: 0.7 % (ref 0–0.43)
KETONES, URINE: NEGATIVE MG/DL
LEUKOCYTE ESTERASE, URINE: NEGATIVE
LYMPHOCYTES ABSOLUTE: 4.4 K/CU MM
LYMPHOCYTES RELATIVE PERCENT: 41 % (ref 24–44)
MCH RBC QN AUTO: 29.6 PG (ref 27–31)
MCHC RBC AUTO-ENTMCNC: 32.1 % (ref 32–36)
MCV RBC AUTO: 92 FL (ref 78–100)
MONOCYTES ABSOLUTE: 1.3 K/CU MM
MONOCYTES RELATIVE PERCENT: 11.9 % (ref 0–4)
NEUTROPHILS RELATIVE PERCENT: 41 % (ref 36–66)
NITRITE URINE, QUANTITATIVE: NEGATIVE
NUCLEATED RBC %: 0 %
PDW BLD-RTO: 12.3 % (ref 11.7–14.9)
PH, URINE: 5.5 (ref 5–8)
PLATELET # BLD: 207 K/CU MM (ref 140–440)
PMV BLD AUTO: 11 FL (ref 7.5–11.1)
POTASSIUM SERPL-SCNC: 4.7 MMOL/L (ref 3.5–5.1)
PROTEIN UA: NEGATIVE MG/DL
RBC # BLD: 5.75 M/CU MM (ref 4.6–6.2)
SEGMENTED NEUTROPHILS ABSOLUTE COUNT: 4.4 K/CU MM
SODIUM BLD-SCNC: 135 MMOL/L (ref 135–145)
SPECIFIC GRAVITY UA: >1.03 (ref 1–1.03)
TOTAL IMMATURE NEUTOROPHIL: 0.07 K/CU MM
TOTAL NUCLEATED RBC: 0 K/CU MM
UROBILINOGEN, URINE: 1 MG/DL (ref 0.2–1)
WBC # BLD: 10.6 K/CU MM (ref 4–10.5)

## 2024-04-10 PROCEDURE — 85652 RBC SED RATE AUTOMATED: CPT

## 2024-04-10 PROCEDURE — 81003 URINALYSIS AUTO W/O SCOPE: CPT

## 2024-04-10 PROCEDURE — 36415 COLL VENOUS BLD VENIPUNCTURE: CPT

## 2024-04-10 PROCEDURE — 93005 ELECTROCARDIOGRAM TRACING: CPT | Performed by: ORTHOPAEDIC SURGERY

## 2024-04-10 PROCEDURE — 71046 X-RAY EXAM CHEST 2 VIEWS: CPT

## 2024-04-10 PROCEDURE — 93010 ELECTROCARDIOGRAM REPORT: CPT | Performed by: INTERNAL MEDICINE

## 2024-04-10 PROCEDURE — 85025 COMPLETE CBC W/AUTO DIFF WBC: CPT

## 2024-04-10 PROCEDURE — 80048 BASIC METABOLIC PNL TOTAL CA: CPT

## 2024-04-10 PROCEDURE — 87086 URINE CULTURE/COLONY COUNT: CPT

## 2024-04-10 RX ORDER — SODIUM CHLORIDE, SODIUM LACTATE, POTASSIUM CHLORIDE, CALCIUM CHLORIDE 600; 310; 30; 20 MG/100ML; MG/100ML; MG/100ML; MG/100ML
INJECTION, SOLUTION INTRAVENOUS CONTINUOUS
Status: CANCELLED | OUTPATIENT
Start: 2024-04-25

## 2024-04-10 ASSESSMENT — PAIN SCALES - GENERAL: PAINLEVEL_OUTOF10: 10

## 2024-04-10 ASSESSMENT — PAIN DESCRIPTION - PAIN TYPE: TYPE: ACUTE PAIN

## 2024-04-10 ASSESSMENT — PAIN - FUNCTIONAL ASSESSMENT: PAIN_FUNCTIONAL_ASSESSMENT: PREVENTS OR INTERFERES WITH MANY ACTIVE NOT PASSIVE ACTIVITIES

## 2024-04-10 ASSESSMENT — PAIN DESCRIPTION - FREQUENCY: FREQUENCY: CONTINUOUS

## 2024-04-10 ASSESSMENT — PAIN DESCRIPTION - ORIENTATION: ORIENTATION: LEFT

## 2024-04-10 ASSESSMENT — PAIN DESCRIPTION - DESCRIPTORS: DESCRIPTORS: STABBING

## 2024-04-10 ASSESSMENT — PAIN DESCRIPTION - LOCATION: LOCATION: KNEE

## 2024-04-10 NOTE — PROGRESS NOTES
.Surgery @ [facility] on 4/25/24 you will be called 4/24/24 with times    NOTHING TO EAT OR DRINK AFTER MIDNIGHT DAY OF SURGERY    1. Enter thru the hospital main entrance on day of surgery, check in at the Information Desk. If you arrive prior to 6:00am, enter thru the ER entrance.    2. Follow the directions as prescribed by the doctor for your procedure and medications.         Morning of surgery take:  Amlodipine, Gabapentin, Pantoprazole with sips of water, use your inhalers and bring with you          Stop vitamins, supplements and NSAIDS: 4/17/24     3. Check with your Doctor regarding stopping blood thinners and follow their instructions.    4. Do not smoke, vape or use chewing tobacco morning of surgery. Do not drink any alcoholic beverages 24 hours prior to surgery.       This includes NA Beer. No street drugs 7 days prior to surgery.    5. If you have dentures, contacts of glasses they will be removed before going to the OR; please bring a case.    6. Please bring picture ID, insurance card, paperwork from the doctor’s office (H & P, Consent, & card for implantable devices).    7. Take a shower with an antibacterial soap the night before surgery and the morning of surgery. Do not put anything on your skin      After your morning shower.    8. You will need a responsible adult to drive you home and check on you after surgery.

## 2024-04-11 LAB
CULTURE: NORMAL
Lab: NORMAL
SPECIMEN: NORMAL

## 2024-04-17 ENCOUNTER — PREP FOR PROCEDURE (OUTPATIENT)
Dept: ORTHOPEDIC SURGERY | Age: 74
End: 2024-04-17

## 2024-04-22 PROBLEM — G47.33 SEVERE OBSTRUCTIVE SLEEP APNEA: Status: ACTIVE | Noted: 2024-04-22

## 2024-04-24 ENCOUNTER — TELEPHONE (OUTPATIENT)
Dept: ORTHOPEDIC SURGERY | Age: 74
End: 2024-04-24

## 2024-04-24 NOTE — TELEPHONE ENCOUNTER
Spoke with pt regarding canceling sx date of 4/25/24 d/t not having pulmonary clearance and the importance of wearing cpap for the next month. Pt verbalized understanding and stated his follow up appt with Dr. Owens is 5/22/24. He will call our office to r/s if clearance is received. Sanya notified via Teams.

## 2024-05-31 ENCOUNTER — PREP FOR PROCEDURE (OUTPATIENT)
Dept: ORTHOPEDIC SURGERY | Age: 74
End: 2024-05-31

## 2024-05-31 DIAGNOSIS — Z01.818 PREOP EXAMINATION: Primary | ICD-10-CM

## 2024-05-31 PROBLEM — M17.12 OSTEOARTHRITIS OF LEFT KNEE: Status: ACTIVE | Noted: 2024-05-31

## 2024-05-31 PROBLEM — M25.562 LEFT KNEE PAIN: Status: ACTIVE | Noted: 2024-05-31

## 2024-06-10 RX ORDER — SODIUM CHLORIDE 0.9 % (FLUSH) 0.9 %
5-40 SYRINGE (ML) INJECTION PRN
Status: CANCELLED | OUTPATIENT
Start: 2024-06-10

## 2024-06-10 RX ORDER — SODIUM CHLORIDE 0.9 % (FLUSH) 0.9 %
5-40 SYRINGE (ML) INJECTION EVERY 12 HOURS SCHEDULED
Status: CANCELLED | OUTPATIENT
Start: 2024-06-10

## 2024-06-10 RX ORDER — ACETAMINOPHEN 325 MG/1
1000 TABLET ORAL ONCE
Status: CANCELLED | OUTPATIENT
Start: 2024-06-10 | End: 2024-06-10

## 2024-06-10 RX ORDER — SODIUM CHLORIDE, SODIUM LACTATE, POTASSIUM CHLORIDE, CALCIUM CHLORIDE 600; 310; 30; 20 MG/100ML; MG/100ML; MG/100ML; MG/100ML
INJECTION, SOLUTION INTRAVENOUS CONTINUOUS
Status: CANCELLED | OUTPATIENT
Start: 2024-06-10

## 2024-06-10 RX ORDER — SODIUM CHLORIDE 9 MG/ML
INJECTION, SOLUTION INTRAVENOUS PRN
Status: CANCELLED | OUTPATIENT
Start: 2024-06-10

## 2024-06-10 NOTE — PROGRESS NOTES
.Reminded of pre-testing on 6/11/24 between [id number].   Bring insurance card and picture ID. Check in at the information desk in the lobby upon your arrival. You can eat and take morning medications. Patient verbalized understanding.

## 2024-06-11 ENCOUNTER — HOSPITAL ENCOUNTER (OUTPATIENT)
Dept: PREADMISSION TESTING | Age: 74
Discharge: HOME | End: 2024-06-15
Payer: MEDICARE

## 2024-06-11 DIAGNOSIS — Z01.818 PREOP EXAMINATION: ICD-10-CM

## 2024-06-11 LAB
ALBUMIN SERPL-MCNC: 3.8 GM/DL (ref 3.4–5)
ALP BLD-CCNC: 97 IU/L (ref 40–128)
ALT SERPL-CCNC: 34 U/L (ref 10–40)
ANION GAP SERPL CALCULATED.3IONS-SCNC: 12 MMOL/L (ref 7–16)
AST SERPL-CCNC: 28 IU/L (ref 15–37)
BACTERIA: NEGATIVE /HPF
BASOPHILS ABSOLUTE: 0.2 K/CU MM
BASOPHILS RELATIVE PERCENT: 1.3 % (ref 0–1)
BILIRUB SERPL-MCNC: 0.4 MG/DL (ref 0–1)
BILIRUBIN, URINE: NEGATIVE MG/DL
BLOOD, URINE: ABNORMAL
BUN SERPL-MCNC: 11 MG/DL (ref 6–23)
CALCIUM SERPL-MCNC: 8.6 MG/DL (ref 8.3–10.6)
CHLORIDE BLD-SCNC: 100 MMOL/L (ref 99–110)
CLARITY: CLEAR
CO2: 26 MMOL/L (ref 21–32)
COLOR: YELLOW
CREAT SERPL-MCNC: 0.7 MG/DL (ref 0.9–1.3)
DIFFERENTIAL TYPE: ABNORMAL
EOSINOPHILS ABSOLUTE: 0.3 K/CU MM
EOSINOPHILS RELATIVE PERCENT: 2.9 % (ref 0–3)
GFR, ESTIMATED: >90 ML/MIN/1.73M2
GLUCOSE SERPL-MCNC: 340 MG/DL (ref 70–99)
GLUCOSE URINE: >1000 MG/DL
HCT VFR BLD CALC: 54.2 % (ref 42–52)
HEMOGLOBIN: 17.8 GM/DL (ref 13.5–18)
IMMATURE NEUTROPHIL %: 0.8 % (ref 0–0.43)
KETONES, URINE: NEGATIVE MG/DL
LEUKOCYTE ESTERASE, URINE: ABNORMAL
LYMPHOCYTES ABSOLUTE: 4.4 K/CU MM
LYMPHOCYTES RELATIVE PERCENT: 38 % (ref 24–44)
MCH RBC QN AUTO: 30 PG (ref 27–31)
MCHC RBC AUTO-ENTMCNC: 32.8 % (ref 32–36)
MCV RBC AUTO: 91.2 FL (ref 78–100)
MONOCYTES ABSOLUTE: 1.2 K/CU MM
MONOCYTES RELATIVE PERCENT: 10.1 % (ref 0–4)
MUCUS: ABNORMAL HPF
NEUTROPHILS ABSOLUTE: 5.4 K/CU MM
NEUTROPHILS RELATIVE PERCENT: 46.9 % (ref 36–66)
NITRITE URINE, QUANTITATIVE: NEGATIVE
NUCLEATED RBC %: 0 %
PDW BLD-RTO: 12.3 % (ref 11.7–14.9)
PH, URINE: 6 (ref 5–8)
PLATELET # BLD: 148 K/CU MM (ref 140–440)
PMV BLD AUTO: 11.6 FL (ref 7.5–11.1)
POTASSIUM SERPL-SCNC: 5 MMOL/L (ref 3.5–5.1)
PROTEIN UA: ABNORMAL MG/DL
RBC # BLD: 5.94 M/CU MM (ref 4.6–6.2)
RBC URINE: 30 /HPF (ref 0–3)
SODIUM BLD-SCNC: 138 MMOL/L (ref 135–145)
SPECIFIC GRAVITY UA: 1.02 (ref 1–1.03)
SQUAMOUS EPITHELIAL: 6 /HPF
TOTAL IMMATURE NEUTOROPHIL: 0.09 K/CU MM
TOTAL NUCLEATED RBC: 0 K/CU MM
TOTAL PROTEIN: 7.7 GM/DL (ref 6.4–8.2)
TRICHOMONAS: ABNORMAL /HPF
UROBILINOGEN, URINE: 1 MG/DL (ref 0.2–1)
WBC # BLD: 11.4 K/CU MM (ref 4–10.5)
WBC UA: 3 /HPF (ref 0–2)
YEAST: ABNORMAL /HPF

## 2024-06-11 PROCEDURE — 80053 COMPREHEN METABOLIC PANEL: CPT

## 2024-06-11 PROCEDURE — 81001 URINALYSIS AUTO W/SCOPE: CPT

## 2024-06-11 PROCEDURE — 85025 COMPLETE CBC W/AUTO DIFF WBC: CPT

## 2024-06-11 PROCEDURE — 87086 URINE CULTURE/COLONY COUNT: CPT

## 2024-06-11 PROCEDURE — 36415 COLL VENOUS BLD VENIPUNCTURE: CPT

## 2024-06-12 DIAGNOSIS — Z01.818 PREOP EXAMINATION: Primary | ICD-10-CM

## 2024-06-12 LAB
CULTURE: NORMAL
Lab: NORMAL
SPECIMEN: NORMAL

## 2024-06-13 DIAGNOSIS — F43.21 GRIEF: ICD-10-CM

## 2024-06-13 RX ORDER — ESCITALOPRAM OXALATE 10 MG/1
10 TABLET ORAL DAILY
Qty: 30 TABLET | Refills: 0 | OUTPATIENT
Start: 2024-06-13

## 2024-06-13 NOTE — TELEPHONE ENCOUNTER
He was supposed to follow-up with me 1 month after his last appointment which would have been April.  Does the Lexapro work for him at that dose?  Does he have any side effects?

## 2024-06-14 ENCOUNTER — HOSPITAL ENCOUNTER (OUTPATIENT)
Age: 74
Discharge: HOME | End: 2024-06-14
Payer: MEDICARE

## 2024-06-14 DIAGNOSIS — Z01.818 PREOP EXAMINATION: ICD-10-CM

## 2024-06-14 LAB
ESTIMATED AVERAGE GLUCOSE: 289 MG/DL
HBA1C MFR BLD: 11.7 % (ref 4.2–6.3)

## 2024-06-14 PROCEDURE — 83036 HEMOGLOBIN GLYCOSYLATED A1C: CPT

## 2024-06-14 PROCEDURE — 36415 COLL VENOUS BLD VENIPUNCTURE: CPT

## 2024-06-25 ENCOUNTER — OFFICE VISIT (OUTPATIENT)
Dept: FAMILY MEDICINE CLINIC | Age: 74
End: 2024-06-25
Payer: MEDICARE

## 2024-06-25 VITALS
DIASTOLIC BLOOD PRESSURE: 72 MMHG | OXYGEN SATURATION: 93 % | WEIGHT: 304.6 LBS | SYSTOLIC BLOOD PRESSURE: 138 MMHG | HEART RATE: 81 BPM | TEMPERATURE: 96.8 F | BODY MASS INDEX: 43.71 KG/M2

## 2024-06-25 DIAGNOSIS — F43.21 GRIEF: ICD-10-CM

## 2024-06-25 DIAGNOSIS — E11.9 TYPE 2 DIABETES MELLITUS WITHOUT COMPLICATION, WITHOUT LONG-TERM CURRENT USE OF INSULIN (HCC): Primary | ICD-10-CM

## 2024-06-25 DIAGNOSIS — E78.5 HYPERLIPIDEMIA ASSOCIATED WITH TYPE 2 DIABETES MELLITUS (HCC): ICD-10-CM

## 2024-06-25 DIAGNOSIS — M17.0 PRIMARY OSTEOARTHRITIS OF BOTH KNEES: ICD-10-CM

## 2024-06-25 DIAGNOSIS — I10 ESSENTIAL HYPERTENSION: ICD-10-CM

## 2024-06-25 DIAGNOSIS — E11.69 HYPERLIPIDEMIA ASSOCIATED WITH TYPE 2 DIABETES MELLITUS (HCC): ICD-10-CM

## 2024-06-25 PROCEDURE — 4004F PT TOBACCO SCREEN RCVD TLK: CPT | Performed by: FAMILY MEDICINE

## 2024-06-25 PROCEDURE — 1123F ACP DISCUSS/DSCN MKR DOCD: CPT | Performed by: FAMILY MEDICINE

## 2024-06-25 PROCEDURE — 2022F DILAT RTA XM EVC RTNOPTHY: CPT | Performed by: FAMILY MEDICINE

## 2024-06-25 PROCEDURE — 3075F SYST BP GE 130 - 139MM HG: CPT | Performed by: FAMILY MEDICINE

## 2024-06-25 PROCEDURE — 3078F DIAST BP <80 MM HG: CPT | Performed by: FAMILY MEDICINE

## 2024-06-25 PROCEDURE — G8417 CALC BMI ABV UP PARAM F/U: HCPCS | Performed by: FAMILY MEDICINE

## 2024-06-25 PROCEDURE — 3046F HEMOGLOBIN A1C LEVEL >9.0%: CPT | Performed by: FAMILY MEDICINE

## 2024-06-25 PROCEDURE — 99214 OFFICE O/P EST MOD 30 MIN: CPT | Performed by: FAMILY MEDICINE

## 2024-06-25 PROCEDURE — 3017F COLORECTAL CA SCREEN DOC REV: CPT | Performed by: FAMILY MEDICINE

## 2024-06-25 PROCEDURE — G8428 CUR MEDS NOT DOCUMENT: HCPCS | Performed by: FAMILY MEDICINE

## 2024-06-25 RX ORDER — BLOOD-GLUCOSE METER
1 KIT MISCELLANEOUS DAILY
Qty: 1 KIT | Refills: 0 | Status: SHIPPED | OUTPATIENT
Start: 2024-06-25

## 2024-06-25 RX ORDER — LANCETS 30 GAUGE
1 EACH MISCELLANEOUS DAILY
Qty: 100 EACH | Refills: 5 | Status: SHIPPED | OUTPATIENT
Start: 2024-06-25

## 2024-06-25 RX ORDER — GLUCOSAMINE HCL/CHONDROITIN SU 500-400 MG
CAPSULE ORAL
Qty: 100 STRIP | Refills: 3 | Status: SHIPPED | OUTPATIENT
Start: 2024-06-25

## 2024-06-25 RX ORDER — INSULIN GLARGINE 100 [IU]/ML
10-50 INJECTION, SOLUTION SUBCUTANEOUS NIGHTLY
Qty: 5 ADJUSTABLE DOSE PRE-FILLED PEN SYRINGE | Refills: 0 | Status: SHIPPED | OUTPATIENT
Start: 2024-06-25

## 2024-06-25 RX ORDER — ATORVASTATIN CALCIUM 40 MG/1
40 TABLET, FILM COATED ORAL DAILY
Qty: 90 TABLET | Refills: 1 | Status: SHIPPED | OUTPATIENT
Start: 2024-06-25

## 2024-06-25 SDOH — ECONOMIC STABILITY: FOOD INSECURITY: WITHIN THE PAST 12 MONTHS, YOU WORRIED THAT YOUR FOOD WOULD RUN OUT BEFORE YOU GOT MONEY TO BUY MORE.: NEVER TRUE

## 2024-06-25 SDOH — ECONOMIC STABILITY: FOOD INSECURITY: WITHIN THE PAST 12 MONTHS, THE FOOD YOU BOUGHT JUST DIDN'T LAST AND YOU DIDN'T HAVE MONEY TO GET MORE.: NEVER TRUE

## 2024-06-25 SDOH — ECONOMIC STABILITY: INCOME INSECURITY: HOW HARD IS IT FOR YOU TO PAY FOR THE VERY BASICS LIKE FOOD, HOUSING, MEDICAL CARE, AND HEATING?: NOT HARD AT ALL

## 2024-06-25 ASSESSMENT — PATIENT HEALTH QUESTIONNAIRE - PHQ9
SUM OF ALL RESPONSES TO PHQ QUESTIONS 1-9: 0
10. IF YOU CHECKED OFF ANY PROBLEMS, HOW DIFFICULT HAVE THESE PROBLEMS MADE IT FOR YOU TO DO YOUR WORK, TAKE CARE OF THINGS AT HOME, OR GET ALONG WITH OTHER PEOPLE: NOT DIFFICULT AT ALL
SUM OF ALL RESPONSES TO PHQ QUESTIONS 1-9: 0
8. MOVING OR SPEAKING SO SLOWLY THAT OTHER PEOPLE COULD HAVE NOTICED. OR THE OPPOSITE, BEING SO FIGETY OR RESTLESS THAT YOU HAVE BEEN MOVING AROUND A LOT MORE THAN USUAL: NOT AT ALL
SUM OF ALL RESPONSES TO PHQ QUESTIONS 1-9: 0
7. TROUBLE CONCENTRATING ON THINGS, SUCH AS READING THE NEWSPAPER OR WATCHING TELEVISION: NOT AT ALL
6. FEELING BAD ABOUT YOURSELF - OR THAT YOU ARE A FAILURE OR HAVE LET YOURSELF OR YOUR FAMILY DOWN: NOT AT ALL
4. FEELING TIRED OR HAVING LITTLE ENERGY: NOT AT ALL
SUM OF ALL RESPONSES TO PHQ QUESTIONS 1-9: 0
SUM OF ALL RESPONSES TO PHQ9 QUESTIONS 1 & 2: 0
3. TROUBLE FALLING OR STAYING ASLEEP: NOT AT ALL
9. THOUGHTS THAT YOU WOULD BE BETTER OFF DEAD, OR OF HURTING YOURSELF: NOT AT ALL
5. POOR APPETITE OR OVEREATING: NOT AT ALL
2. FEELING DOWN, DEPRESSED OR HOPELESS: NOT AT ALL
1. LITTLE INTEREST OR PLEASURE IN DOING THINGS: NOT AT ALL

## 2024-06-25 NOTE — PROGRESS NOTES
Patient here to follow-up on new onset diabetes.  Patient had preop blood work which showed elevated glucose of 340 so an A1c was ordered which was 11.7%.  He is knee replacement surgery was postponed due to these labs and he was sent here for treatment.  Patient denies polyuria polyphagia or polydipsia.  Has felt tired lately.  No headedness or dizziness.  He has had elevated A1c's in the low sixes over the last couple of years but never above 6.5.  He is never been treated for diabetes.  He has known hypertension currently on lisinopril 5 mg daily and amlodipine 5 mg daily.    O:   Vitals:    06/25/24 1303   BP: 138/72   Pulse: 81   Temp: 96.8 °F (36 °C)   SpO2: 93%     No acute distress.  Alert and Oriented x 3, obese  HEENT: . PERRLA, EOMI, Conjunctiva clear  NECK: without thyromegaly, lymphadenopathy, JVD  LUNGS:Clear to ascultation bilaterally.  Breathing comfortably  CARDIOVASCULAR:  Regular rate and rhythm, no murmurs, rubs, or gallops  NEURO: Cranial nerves II-XII grossly intact.  Strength 5/5, DTR 2/4.  SKIN: Warm, Dry, No rash.  PSYCH: Mood and Affect normal.    A:    Diagnosis Orders   1. Type 2 diabetes mellitus without complication, without long-term current use of insulin ([facility])  insulin glargine (LANTUS SOLOSTAR) 100 UNIT/ML injection pen   New onset Insulin Pen Needle 32G X 4 MM MISC    metFORMIN (GLUCOPHAGE) 1000 MG tablet    glucose monitoring kit    blood glucose monitor strips    Lancets MISC      2. Hyperlipidemia associated with type 2 diabetes mellitus ([facility])  atorvastatin (LIPITOR) 40 MG tablet   Asymptomatic   3. Essential hypertension     Controlled   4. Primary osteoarthritis of both knees     Needs improvement     P: Will begin Lantus 10 mg daily and titrate to keep fasting sugar between 80 and 120.  Will also begin metformin 1000 mg twice daily.  Once patient's blood sugars are less than 120, he can begin titrating his Lantus back down to keep them less than 140.  Patient will be started

## 2024-06-26 RX ORDER — ESCITALOPRAM OXALATE 10 MG/1
10 TABLET ORAL DAILY
Qty: 30 TABLET | Refills: 0 | OUTPATIENT
Start: 2024-06-26

## 2024-07-08 ENCOUNTER — TELEPHONE (OUTPATIENT)
Dept: ORTHOPEDIC SURGERY | Age: 74
End: 2024-07-08

## 2024-07-08 NOTE — TELEPHONE ENCOUNTER
Patient is requesting percocet being sent into his pharmacy. Patient states that the ibuprofen that he is currently taking isn't helping him and he refuses to take Tylenol due to it being bad for his liver.    Please advise if patient is able to have pain medication called into his pharmacy.

## 2024-07-12 ENCOUNTER — TELEPHONE (OUTPATIENT)
Dept: FAMILY MEDICINE CLINIC | Age: 74
End: 2024-07-12

## 2024-07-12 DIAGNOSIS — E66.01 MORBID OBESITY (HCC): Primary | ICD-10-CM

## 2024-07-12 NOTE — TELEPHONE ENCOUNTER
I put a referral in.  It is connected with the problem of morbid obesity.  I could not find his diabetes on his master problem list but a diabetes diet might help with morbid obesity

## 2024-07-12 NOTE — TELEPHONE ENCOUNTER
Pt is wanting to know if  could send in a referral for the patient to go see a nutritionist to learn more about diabetic diets.

## 2024-07-25 ENCOUNTER — OFFICE VISIT (OUTPATIENT)
Dept: FAMILY MEDICINE CLINIC | Age: 74
End: 2024-07-25

## 2024-07-25 VITALS
WEIGHT: 305 LBS | BODY MASS INDEX: 43.76 KG/M2 | SYSTOLIC BLOOD PRESSURE: 136 MMHG | OXYGEN SATURATION: 93 % | HEART RATE: 90 BPM | DIASTOLIC BLOOD PRESSURE: 74 MMHG

## 2024-07-25 DIAGNOSIS — M17.0 PRIMARY OSTEOARTHRITIS OF BOTH KNEES: ICD-10-CM

## 2024-07-25 DIAGNOSIS — E11.9 TYPE 2 DIABETES MELLITUS WITHOUT COMPLICATION, WITHOUT LONG-TERM CURRENT USE OF INSULIN (HCC): Primary | ICD-10-CM

## 2024-07-25 LAB — HBA1C MFR BLD: 10 %

## 2024-07-25 RX ORDER — PIOGLITAZONEHYDROCHLORIDE 45 MG/1
45 TABLET ORAL DAILY
Qty: 30 TABLET | Refills: 3 | Status: SHIPPED | OUTPATIENT
Start: 2024-07-25

## 2024-07-25 NOTE — PROGRESS NOTES
Sai Reyes is a 74 y.o. male who presents for evaluation of hypertension, hyperlipidemia, and diabetes.. He indicates that he is feeling well and denies any symptoms referable to his elevated blood pressure.   Specifically denies chest pain, palpitations, dyspnea, orthopnea, PND or peripheral edema.  No anorexia, arthralgia, or leg cramps noted. Current medication regimen is as listed below. He denies any side effects of medication, and has been taking it regularly. medication compliance:  compliant most of the time, currently on Lantus 20 units qhs but stopped metformin due to diarrhea, diabetic diet compliance:  compliant most of the time, home glucose monitoring: are performed regularly, values range 119-156, further diabetic ROS: no polyuria or polydipsia, no chest pain, dyspnea or TIAs, no numbness, tingling or pain in extremities.    Patient supposed to have a knee replacement last month but was not able to do so due to A1c of 11.4%.    Current Outpatient Medications   Medication Sig Dispense Refill    insulin glargine (LANTUS SOLOSTAR) 100 UNIT/ML injection pen Inject 10-50 Units into the skin nightly 5 Adjustable Dose Pre-filled Pen Syringe 0    Insulin Pen Needle 32G X 4 MM MISC 1 each by Does not apply route daily 100 each 3    metFORMIN (GLUCOPHAGE) 1000 MG tablet Take 1 tablet by mouth 2 times daily (with meals) 180 tablet 1    glucose monitoring kit 1 kit by Does not apply route daily 1 kit 0    blood glucose monitor strips Test 1 times a day & as needed for symptoms of irregular blood glucose. Dispense sufficient amount for indicated testing frequency plus additional to accommodate PRN testing needs. 100 strip 3    Lancets MISC 1 each by Does not apply route daily 100 each 5    atorvastatin (LIPITOR) 40 MG tablet Take 1 tablet by mouth daily 90 tablet 1    Misc. Devices (CPAP MACHINE) MISC by Does not apply route nightly      gabapentin (NEURONTIN) 600 MG tablet Take 2 tablets by mouth 3 times

## 2024-08-07 DIAGNOSIS — I10 ESSENTIAL HYPERTENSION: ICD-10-CM

## 2024-08-07 RX ORDER — AMLODIPINE BESYLATE 5 MG/1
TABLET ORAL
Qty: 90 TABLET | Refills: 1 | Status: SHIPPED | OUTPATIENT
Start: 2024-08-07

## 2024-08-23 DIAGNOSIS — G62.9 PERIPHERAL POLYNEUROPATHY: ICD-10-CM

## 2024-08-23 RX ORDER — GABAPENTIN 800 MG/1
800 TABLET ORAL 3 TIMES DAILY
Qty: 270 TABLET | Refills: 1 | OUTPATIENT
Start: 2024-08-23

## 2024-08-29 DIAGNOSIS — E11.9 TYPE 2 DIABETES MELLITUS WITHOUT COMPLICATION, WITHOUT LONG-TERM CURRENT USE OF INSULIN (HCC): ICD-10-CM

## 2024-08-29 DIAGNOSIS — G62.9 PERIPHERAL POLYNEUROPATHY: ICD-10-CM

## 2024-08-29 RX ORDER — GABAPENTIN 600 MG/1
1200 TABLET ORAL 3 TIMES DAILY
Qty: 180 TABLET | Refills: 3 | Status: SHIPPED | OUTPATIENT
Start: 2024-08-29 | End: 2025-02-25

## 2024-08-29 RX ORDER — INSULIN GLARGINE 100 [IU]/ML
10-50 INJECTION, SOLUTION SUBCUTANEOUS NIGHTLY
Qty: 5 ADJUSTABLE DOSE PRE-FILLED PEN SYRINGE | Refills: 0 | Status: SHIPPED | OUTPATIENT
Start: 2024-08-29

## 2024-08-29 NOTE — TELEPHONE ENCOUNTER
Patient calling for refill of Gabapentin. It was sent on 04/08/2024 for a 30 day supply with 3 refills. Patient also needing Lantus.

## 2024-09-05 DIAGNOSIS — G62.9 PERIPHERAL POLYNEUROPATHY: ICD-10-CM

## 2024-09-05 RX ORDER — GABAPENTIN 600 MG/1
TABLET ORAL
Refills: 0 | OUTPATIENT
Start: 2024-09-05

## 2024-10-08 ENCOUNTER — OFFICE VISIT (OUTPATIENT)
Dept: FAMILY MEDICINE CLINIC | Age: 74
End: 2024-10-08
Payer: MEDICARE

## 2024-10-08 VITALS
SYSTOLIC BLOOD PRESSURE: 136 MMHG | TEMPERATURE: 97.1 F | OXYGEN SATURATION: 95 % | HEART RATE: 77 BPM | BODY MASS INDEX: 44.28 KG/M2 | DIASTOLIC BLOOD PRESSURE: 66 MMHG | WEIGHT: 308.6 LBS

## 2024-10-08 DIAGNOSIS — J41.1 MUCOPURULENT CHRONIC BRONCHITIS (HCC): ICD-10-CM

## 2024-10-08 DIAGNOSIS — F10.20 UNCOMPLICATED ALCOHOL DEPENDENCE (HCC): ICD-10-CM

## 2024-10-08 DIAGNOSIS — E78.5 HYPERLIPIDEMIA ASSOCIATED WITH TYPE 2 DIABETES MELLITUS (HCC): ICD-10-CM

## 2024-10-08 DIAGNOSIS — I10 ESSENTIAL HYPERTENSION: ICD-10-CM

## 2024-10-08 DIAGNOSIS — E66.01 OBESITY, CLASS III, BMI 40-49.9 (MORBID OBESITY): ICD-10-CM

## 2024-10-08 DIAGNOSIS — E11.9 TYPE 2 DIABETES MELLITUS WITHOUT COMPLICATION, WITHOUT LONG-TERM CURRENT USE OF INSULIN (HCC): Primary | ICD-10-CM

## 2024-10-08 DIAGNOSIS — M17.0 PRIMARY OSTEOARTHRITIS OF BOTH KNEES: ICD-10-CM

## 2024-10-08 DIAGNOSIS — E11.69 HYPERLIPIDEMIA ASSOCIATED WITH TYPE 2 DIABETES MELLITUS (HCC): ICD-10-CM

## 2024-10-08 PROBLEM — J96.01 ACUTE RESPIRATORY FAILURE WITH HYPOXIA: Status: RESOLVED | Noted: 2021-11-09 | Resolved: 2024-10-08

## 2024-10-08 LAB — HBA1C MFR BLD: 6.7 %

## 2024-10-08 PROCEDURE — 83036 HEMOGLOBIN GLYCOSYLATED A1C: CPT | Performed by: FAMILY MEDICINE

## 2024-10-08 PROCEDURE — 3044F HG A1C LEVEL LT 7.0%: CPT | Performed by: FAMILY MEDICINE

## 2024-10-08 PROCEDURE — 99214 OFFICE O/P EST MOD 30 MIN: CPT | Performed by: FAMILY MEDICINE

## 2024-10-08 PROCEDURE — 3078F DIAST BP <80 MM HG: CPT | Performed by: FAMILY MEDICINE

## 2024-10-08 PROCEDURE — G8417 CALC BMI ABV UP PARAM F/U: HCPCS | Performed by: FAMILY MEDICINE

## 2024-10-08 PROCEDURE — 3017F COLORECTAL CA SCREEN DOC REV: CPT | Performed by: FAMILY MEDICINE

## 2024-10-08 PROCEDURE — 2022F DILAT RTA XM EVC RTNOPTHY: CPT | Performed by: FAMILY MEDICINE

## 2024-10-08 PROCEDURE — 4004F PT TOBACCO SCREEN RCVD TLK: CPT | Performed by: FAMILY MEDICINE

## 2024-10-08 PROCEDURE — 3023F SPIROM DOC REV: CPT | Performed by: FAMILY MEDICINE

## 2024-10-08 PROCEDURE — G8484 FLU IMMUNIZE NO ADMIN: HCPCS | Performed by: FAMILY MEDICINE

## 2024-10-08 PROCEDURE — 3075F SYST BP GE 130 - 139MM HG: CPT | Performed by: FAMILY MEDICINE

## 2024-10-08 PROCEDURE — 1123F ACP DISCUSS/DSCN MKR DOCD: CPT | Performed by: FAMILY MEDICINE

## 2024-10-08 PROCEDURE — G8428 CUR MEDS NOT DOCUMENT: HCPCS | Performed by: FAMILY MEDICINE

## 2024-10-08 NOTE — PROGRESS NOTES
Adult)   Pulse 77   Temp 97.1 °F (36.2 °C) (Infrared)   Wt (!) 140 kg (308 lb 9.6 oz)   SpO2 95%   BMI 44.28 kg/m²   General: Alert and oriented, in no distress, obese   S1 and S2 normal, no murmurs, clicks, gallops or rubs. Regular rate and rhythm. Chest is clear; no wheezes or rales. No edema or JVD.  feet: normal DP and PT pulses, no trophic changes or ulcerative lesions, and normal monofilament exam   A1c 6.7%  Assessment:       Diagnosis Orders   1. Type 2 diabetes mellitus without complication, without long-term current use of insulin (HCC)  POCT glycosylated hemoglobin (Hb A1C)   Controlled   2. Essential hypertension     Controlled   3. Hyperlipidemia associated with type 2 diabetes mellitus (HCC)     Asymptomatic   4. Mucopurulent chronic bronchitis ([facility])     Controlled   5. Obesity, Class III, BMI 40-49.9 (morbid obesity) ([facility])     Needs improvement   6. Uncomplicated alcohol dependence ([facility])     Controlled   7. Primary osteoarthritis of both knees     Needs an event          Plan:    Will call orthopedics and let them know that he is now medically optimized for knee replacement surgery and he can proceed with this knee replacement.  After knee replacement, will wean back off insulin as tolerated  1)  Medication: continue current medication regimen unchanged due to effectiveness  2)  Recheck in 3 months, sooner should new symptoms or problems arise.     This note is intended for the physician writing it, as well as to communicate findings to other healthcare professionals.  Progress notes use the medical lexicon that may be misunderstood by non-medical persons. Therefore, interpretations of medical notes and terminology should be approached with caution

## 2024-10-10 ENCOUNTER — PREP FOR PROCEDURE (OUTPATIENT)
Dept: ORTHOPEDIC SURGERY | Age: 74
End: 2024-10-10

## 2024-10-10 ENCOUNTER — TELEPHONE (OUTPATIENT)
Dept: ORTHOPEDIC SURGERY | Age: 74
End: 2024-10-10

## 2024-10-10 DIAGNOSIS — M25.562 CHRONIC PAIN OF LEFT KNEE: ICD-10-CM

## 2024-10-10 DIAGNOSIS — G89.29 CHRONIC PAIN OF LEFT KNEE: ICD-10-CM

## 2024-10-10 DIAGNOSIS — Z01.818 PREOP EXAMINATION: Primary | ICD-10-CM

## 2024-10-10 DIAGNOSIS — M17.12 PRIMARY OSTEOARTHRITIS OF LEFT KNEE: Primary | ICD-10-CM

## 2024-10-10 RX ORDER — SODIUM CHLORIDE 0.9 % (FLUSH) 0.9 %
5-40 SYRINGE (ML) INJECTION PRN
Status: CANCELLED | OUTPATIENT
Start: 2024-10-10

## 2024-10-10 RX ORDER — SODIUM CHLORIDE 9 MG/ML
INJECTION, SOLUTION INTRAVENOUS PRN
Status: CANCELLED | OUTPATIENT
Start: 2024-10-10

## 2024-10-10 RX ORDER — SODIUM CHLORIDE 0.9 % (FLUSH) 0.9 %
5-40 SYRINGE (ML) INJECTION EVERY 12 HOURS SCHEDULED
Status: CANCELLED | OUTPATIENT
Start: 2024-10-10

## 2024-10-10 RX ORDER — SODIUM CHLORIDE, SODIUM LACTATE, POTASSIUM CHLORIDE, CALCIUM CHLORIDE 600; 310; 30; 20 MG/100ML; MG/100ML; MG/100ML; MG/100ML
INJECTION, SOLUTION INTRAVENOUS CONTINUOUS
Status: CANCELLED | OUTPATIENT
Start: 2024-10-10

## 2024-10-10 RX ORDER — ACETAMINOPHEN 325 MG/1
1000 TABLET ORAL ONCE
Status: CANCELLED | OUTPATIENT
Start: 2024-10-10 | End: 2024-10-10

## 2024-10-10 NOTE — TELEPHONE ENCOUNTER
Patient scheduled for    Left Total Knee Arthroplasty  Date: 10/24/24  Facility: [facility]  Surgeon: Brennan Erazo DO  Product: Jf    Prep for Proc 10/10/24  Pathway orders scanned 10/10/24  PCP and pulm clearances received  Pre op appt 3/25/24    [employer]  Contacted 10/10/24 via portal with clinicals uploaded  Status: Approved CPT 89839 ICD M17.12/M25.562 for 23hr observation  Auth# [id number]  Date Span: 10/24/24-12/31/24    [facility] PAT    PT order submitted to [facility] 10/10/24

## 2024-10-15 ENCOUNTER — ANESTHESIA EVENT (OUTPATIENT)
Dept: OPERATING ROOM | Age: 74
End: 2024-10-15
Payer: MEDICARE

## 2024-10-15 NOTE — ANESTHESIA PRE PROCEDURE
Department of Anesthesiology  Preprocedure Note       Name:  Sai Reyes   Age:  74 y.o.  :  1950                                          MRN:  [id number]         Date:  10/15/2024      Surgeon: Surgeon(s):  Brennan Erazo DO    Procedure: Procedure(s):  LEFT KNEE TOTAL ARTHROPLASTY    Medications prior to admission:   Prior to Admission medications    Medication Sig Start Date End Date Taking? Authorizing Provider   gabapentin (NEURONTIN) 600 MG tablet Take 2 tablets by mouth 3 times daily for 180 days. 24  Mik Rivas MD   insulin glargine (LANTUS SOLOSTAR) 100 UNIT/ML injection pen Inject 10-50 Units into the skin nightly 24   Mik Rivas MD   amLODIPine (NORVASC) 5 MG tablet TAKE 1 TABLET EVERY MORNING 24   Mik Rivas MD   pioglitazone (ACTOS) 45 MG tablet Take 1 tablet by mouth daily 24   Mik Rivas MD   Insulin Pen Needle 32G X 4 MM MISC 1 each by Does not apply route daily 24   Mik Rivas MD   glucose monitoring kit 1 kit by Does not apply route daily 24   Mik Rivas MD   blood glucose monitor strips Test 1 times a day & as needed for symptoms of irregular blood glucose. Dispense sufficient amount for indicated testing frequency plus additional to accommodate PRN testing needs. 24   Mik Rivas MD   Lancets MISC 1 each by Does not apply route daily 24   Mik Rivas MD   atorvastatin (LIPITOR) 40 MG tablet Take 1 tablet by mouth daily 24   Mik Rivas MD   Misc. Devices (CPAP MACHINE) MISC by Does not apply route nightly    Provider, MD Liyah   escitalopram (LEXAPRO) 10 MG tablet Take 1 tablet by mouth daily 3/14/24   Mik Rivas MD   Compression Stockings MISC by Does not apply route 20-30 mM HG    Wear daily 3/14/24   Mik Rivas MD   lisinopril (PRINIVIL;ZESTRIL) 5 MG tablet TAKE 1 TABLET BY MOUTH EVERY DAY IN THE MORNING 24   Mik Rivas MD   pantoprazole (PROTONIX)

## 2024-10-16 ENCOUNTER — HOSPITAL ENCOUNTER (OUTPATIENT)
Dept: PREADMISSION TESTING | Age: 74
Discharge: HOME | End: 2024-10-20
Payer: MEDICARE

## 2024-10-16 DIAGNOSIS — Z01.818 PREOP EXAMINATION: ICD-10-CM

## 2024-10-16 LAB
ALBUMIN SERPL-MCNC: 3.4 G/DL (ref 3.4–5)
ALBUMIN/GLOB SERPL: 0.9 {RATIO} (ref 1.1–2.2)
ALP SERPL-CCNC: 75 U/L (ref 40–129)
ALT SERPL-CCNC: 17 U/L (ref 10–40)
ANION GAP SERPL CALCULATED.3IONS-SCNC: 9 MMOL/L (ref 9–17)
AST SERPL-CCNC: 20 U/L (ref 15–37)
BASOPHILS # BLD: 0.19 K/UL
BASOPHILS NFR BLD: 2 % (ref 0–1)
BILIRUB SERPL-MCNC: 0.4 MG/DL (ref 0–1)
BILIRUB UR QL STRIP: NEGATIVE
BUN SERPL-MCNC: 14 MG/DL (ref 7–20)
CALCIUM SERPL-MCNC: 9 MG/DL (ref 8.3–10.6)
CHLORIDE SERPL-SCNC: 101 MMOL/L (ref 99–110)
CLARITY UR: CLEAR
CO2 SERPL-SCNC: 26 MMOL/L (ref 21–32)
COLOR UR: YELLOW
CREAT SERPL-MCNC: 0.6 MG/DL (ref 0.8–1.3)
EOSINOPHIL # BLD: 0.43 K/UL
EOSINOPHILS RELATIVE PERCENT: 4 % (ref 0–3)
ERYTHROCYTE [DISTWIDTH] IN BLOOD BY AUTOMATED COUNT: 13.8 % (ref 11.7–14.9)
GFR, ESTIMATED: >90 ML/MIN/1.73M2
GLUCOSE SERPL-MCNC: 147 MG/DL (ref 74–99)
GLUCOSE UR STRIP-MCNC: NEGATIVE MG/DL
HCT VFR BLD AUTO: 47.2 % (ref 42–52)
HGB BLD-MCNC: 15.3 G/DL (ref 13.5–18)
HGB UR QL STRIP.AUTO: NEGATIVE
IMM GRANULOCYTES # BLD AUTO: 0.08 K/UL
IMM GRANULOCYTES NFR BLD: 1 %
KETONES UR STRIP-MCNC: NEGATIVE MG/DL
LEUKOCYTE ESTERASE UR QL STRIP: NEGATIVE
LYMPHOCYTES NFR BLD: 4.73 K/UL
LYMPHOCYTES RELATIVE PERCENT: 40 % (ref 24–44)
MCH RBC QN AUTO: 30.9 PG (ref 27–31)
MCHC RBC AUTO-ENTMCNC: 32.4 G/DL (ref 32–36)
MCV RBC AUTO: 95.4 FL (ref 78–100)
MONOCYTES NFR BLD: 1.54 K/UL
MONOCYTES NFR BLD: 13 % (ref 0–4)
NEUTROPHILS NFR BLD: 41 % (ref 36–66)
NEUTS SEG NFR BLD: 4.85 K/UL
NITRITE UR QL STRIP: NEGATIVE
PH UR STRIP: 6.5 [PH] (ref 5–8)
PLATELET # BLD AUTO: 225 K/UL (ref 140–440)
PMV BLD AUTO: 10.9 FL (ref 7.5–11.1)
POTASSIUM SERPL-SCNC: 4.4 MMOL/L (ref 3.5–5.1)
PROT SERPL-MCNC: 7.1 G/DL (ref 6.4–8.2)
PROT UR STRIP-MCNC: NEGATIVE MG/DL
RBC # BLD AUTO: 4.95 M/UL (ref 4.6–6.2)
SODIUM SERPL-SCNC: 136 MMOL/L (ref 136–145)
SP GR UR STRIP: 1.01 (ref 1–1.03)
UROBILINOGEN UR STRIP-ACNC: 1 EU/DL (ref 0–1)
WBC OTHER # BLD: 11.8 K/UL (ref 4–10.5)

## 2024-10-16 PROCEDURE — 81003 URINALYSIS AUTO W/O SCOPE: CPT

## 2024-10-16 PROCEDURE — 80053 COMPREHEN METABOLIC PANEL: CPT

## 2024-10-16 PROCEDURE — 36415 COLL VENOUS BLD VENIPUNCTURE: CPT

## 2024-10-16 PROCEDURE — 85025 COMPLETE CBC W/AUTO DIFF WBC: CPT

## 2024-10-16 PROCEDURE — 87086 URINE CULTURE/COLONY COUNT: CPT

## 2024-10-17 LAB
MICROORGANISM SPEC CULT: NORMAL
SERVICE CMNT-IMP: NORMAL
SPECIMEN DESCRIPTION: NORMAL

## 2024-10-18 NOTE — PROGRESS NOTES
Sent a message to Dr Erazo's office on messenger that patient may need to get another EKG ordered, there was a change from his previous EKG.

## 2024-10-19 NOTE — PROGRESS NOTES
.Surgery @ [facility] on 10/24/24 you will be called 10/23/24 with times    NOTHING TO EAT OR DRINK AFTER MIDNIGHT DAY OF SURGERY    1. Enter thru the hospital main entrance on day of surgery, check in at the Information Desk. If you arrive prior to 6:00am, enter thru the ER entrance.    2. Follow the directions as prescribed by the doctor for your procedure and medications.         Morning of surgery take: Amlodipine, Gabapentin, Protonix with sips of water          Stop vitamins, supplements and NSAIDS:  today     3. Check with your Doctor regarding stopping blood thinners and follow their instructions.    4. Do not smoke, vape or use chewing tobacco morning of surgery. Do not drink any alcoholic beverages 24 hours prior to surgery.       This includes NA Beer. No street drugs 7 days prior to surgery.    5. If you have dentures, contacts of glasses they will be removed before going to the OR; please bring a case.    6. Please bring picture ID, insurance card, paperwork from the doctor’s office (H & P, Consent, & card for implantable devices).    7. Take a shower with an antibacterial soap the night before surgery and the morning of surgery. Do not put anything on your skin      After your morning shower.    8. You will need a responsible adult to drive you home and check on you after surgery.

## 2024-10-23 NOTE — PROGRESS NOTES
10/23/24 - .Notified patient surgery @ [facility] on  10/24/24 @ 1100, arrival 0800. NPO status and morning medications reviewed. Understanding verbalized.

## 2024-10-24 ENCOUNTER — HOSPITAL ENCOUNTER (OUTPATIENT)
Age: 74
Discharge: HOME | End: 2024-10-26
Attending: ORTHOPAEDIC SURGERY | Admitting: ORTHOPAEDIC SURGERY
Payer: MEDICARE

## 2024-10-24 ENCOUNTER — APPOINTMENT (OUTPATIENT)
Dept: GENERAL RADIOLOGY | Age: 74
End: 2024-10-24
Attending: ORTHOPAEDIC SURGERY
Payer: MEDICARE

## 2024-10-24 ENCOUNTER — ANESTHESIA (OUTPATIENT)
Dept: OPERATING ROOM | Age: 74
End: 2024-10-24
Payer: MEDICARE

## 2024-10-24 DIAGNOSIS — R60.0 BILATERAL LOWER EXTREMITY EDEMA: ICD-10-CM

## 2024-10-24 DIAGNOSIS — Z96.652 HISTORY OF TOTAL LEFT KNEE REPLACEMENT: Primary | ICD-10-CM

## 2024-10-24 PROBLEM — M17.12 ARTHRITIS OF LEFT KNEE: Status: ACTIVE | Noted: 2024-10-24

## 2024-10-24 LAB
GLUCOSE BLD-MCNC: 120 MG/DL (ref 74–99)
GLUCOSE BLD-MCNC: 123 MG/DL (ref 74–99)
GLUCOSE BLD-MCNC: 146 MG/DL (ref 74–99)
GLUCOSE BLD-MCNC: 214 MG/DL (ref 74–99)

## 2024-10-24 PROCEDURE — 2580000003 HC RX 258: Performed by: ORTHOPAEDIC SURGERY

## 2024-10-24 PROCEDURE — 3700000000 HC ANESTHESIA ATTENDED CARE: Performed by: ORTHOPAEDIC SURGERY

## 2024-10-24 PROCEDURE — 6370000000 HC RX 637 (ALT 250 FOR IP): Performed by: ORTHOPAEDIC SURGERY

## 2024-10-24 PROCEDURE — 6360000002 HC RX W HCPCS: Performed by: ORTHOPAEDIC SURGERY

## 2024-10-24 PROCEDURE — 73560 X-RAY EXAM OF KNEE 1 OR 2: CPT

## 2024-10-24 PROCEDURE — 94761 N-INVAS EAR/PLS OXIMETRY MLT: CPT

## 2024-10-24 PROCEDURE — 2720000010 HC SURG SUPPLY STERILE: Performed by: ORTHOPAEDIC SURGERY

## 2024-10-24 PROCEDURE — 7100000000 HC PACU RECOVERY - FIRST 15 MIN: Performed by: ORTHOPAEDIC SURGERY

## 2024-10-24 PROCEDURE — 94664 DEMO&/EVAL PT USE INHALER: CPT

## 2024-10-24 PROCEDURE — 3600000015 HC SURGERY LEVEL 5 ADDTL 15MIN: Performed by: ORTHOPAEDIC SURGERY

## 2024-10-24 PROCEDURE — 3700000001 HC ADD 15 MINUTES (ANESTHESIA): Performed by: ORTHOPAEDIC SURGERY

## 2024-10-24 PROCEDURE — 94640 AIRWAY INHALATION TREATMENT: CPT

## 2024-10-24 PROCEDURE — 7100000001 HC PACU RECOVERY - ADDTL 15 MIN: Performed by: ORTHOPAEDIC SURGERY

## 2024-10-24 PROCEDURE — 6370000000 HC RX 637 (ALT 250 FOR IP): Performed by: ANESTHESIOLOGY

## 2024-10-24 PROCEDURE — 27447 TOTAL KNEE ARTHROPLASTY: CPT | Performed by: ORTHOPAEDIC SURGERY

## 2024-10-24 PROCEDURE — 6360000002 HC RX W HCPCS: Performed by: NURSE ANESTHETIST, CERTIFIED REGISTERED

## 2024-10-24 PROCEDURE — 89220 SPUTUM SPECIMEN COLLECTION: CPT

## 2024-10-24 PROCEDURE — 2500000003 HC RX 250 WO HCPCS: Performed by: ORTHOPAEDIC SURGERY

## 2024-10-24 PROCEDURE — C1713 ANCHOR/SCREW BN/BN,TIS/BN: HCPCS | Performed by: ORTHOPAEDIC SURGERY

## 2024-10-24 PROCEDURE — 2500000003 HC RX 250 WO HCPCS: Performed by: NURSE ANESTHETIST, CERTIFIED REGISTERED

## 2024-10-24 PROCEDURE — 3600000005 HC SURGERY LEVEL 5 BASE: Performed by: ORTHOPAEDIC SURGERY

## 2024-10-24 PROCEDURE — 82962 GLUCOSE BLOOD TEST: CPT

## 2024-10-24 PROCEDURE — A4217 STERILE WATER/SALINE, 500 ML: HCPCS | Performed by: ORTHOPAEDIC SURGERY

## 2024-10-24 PROCEDURE — 2709999900 HC NON-CHARGEABLE SUPPLY: Performed by: ORTHOPAEDIC SURGERY

## 2024-10-24 PROCEDURE — C1776 JOINT DEVICE (IMPLANTABLE): HCPCS | Performed by: ORTHOPAEDIC SURGERY

## 2024-10-24 PROCEDURE — P9045 ALBUMIN (HUMAN), 5%, 250 ML: HCPCS | Performed by: NURSE ANESTHETIST, CERTIFIED REGISTERED

## 2024-10-24 PROCEDURE — 6360000002 HC RX W HCPCS: Performed by: ANESTHESIOLOGY

## 2024-10-24 PROCEDURE — 64447 NJX AA&/STRD FEMORAL NRV IMG: CPT | Performed by: ANESTHESIOLOGY

## 2024-10-24 PROCEDURE — 2700000000 HC OXYGEN THERAPY PER DAY

## 2024-10-24 DEVICE — IMPLANTABLE DEVICE
Type: IMPLANTABLE DEVICE | Status: FUNCTIONAL
Brand: NEXGEN®

## 2024-10-24 DEVICE — IMPLANTABLE DEVICE
Type: IMPLANTABLE DEVICE | Status: FUNCTIONAL
Brand: REFOBACIN® BONE CEMENT R

## 2024-10-24 RX ORDER — SODIUM CHLORIDE 9 MG/ML
INJECTION, SOLUTION INTRAVENOUS PRN
Status: DISCONTINUED | OUTPATIENT
Start: 2024-10-24 | End: 2024-10-24 | Stop reason: HOSPADM

## 2024-10-24 RX ORDER — ASPIRIN 325 MG
325 TABLET ORAL 2 TIMES DAILY
Qty: 28 TABLET | Refills: 0 | Status: SHIPPED | OUTPATIENT
Start: 2024-10-24 | End: 2024-11-07

## 2024-10-24 RX ORDER — FENTANYL CITRATE 50 UG/ML
25 INJECTION, SOLUTION INTRAMUSCULAR; INTRAVENOUS EVERY 5 MIN PRN
Status: DISCONTINUED | OUTPATIENT
Start: 2024-10-24 | End: 2024-10-24 | Stop reason: HOSPADM

## 2024-10-24 RX ORDER — DEXAMETHASONE SODIUM PHOSPHATE 4 MG/ML
INJECTION, SOLUTION INTRA-ARTICULAR; INTRALESIONAL; INTRAMUSCULAR; INTRAVENOUS; SOFT TISSUE
Status: DISCONTINUED | OUTPATIENT
Start: 2024-10-24 | End: 2024-10-24 | Stop reason: SDUPTHER

## 2024-10-24 RX ORDER — METHOCARBAMOL 100 MG/ML
500 INJECTION, SOLUTION INTRAMUSCULAR; INTRAVENOUS
Status: DISCONTINUED | OUTPATIENT
Start: 2024-10-24 | End: 2024-10-24 | Stop reason: HOSPADM

## 2024-10-24 RX ORDER — OXYCODONE HYDROCHLORIDE 5 MG/1
10 TABLET ORAL PRN
Status: DISCONTINUED | OUTPATIENT
Start: 2024-10-24 | End: 2024-10-24 | Stop reason: HOSPADM

## 2024-10-24 RX ORDER — ACETAMINOPHEN 325 MG/1
650 TABLET ORAL EVERY 6 HOURS
Status: DISCONTINUED | OUTPATIENT
Start: 2024-10-24 | End: 2024-10-26 | Stop reason: HOSPADM

## 2024-10-24 RX ORDER — KETAMINE HCL 50MG/ML(1)
SYRINGE (ML) INTRAVENOUS
Status: DISCONTINUED | OUTPATIENT
Start: 2024-10-24 | End: 2024-10-24 | Stop reason: SDUPTHER

## 2024-10-24 RX ORDER — HYDROMORPHONE HYDROCHLORIDE 1 MG/ML
0.5 INJECTION, SOLUTION INTRAMUSCULAR; INTRAVENOUS; SUBCUTANEOUS
Status: DISCONTINUED | OUTPATIENT
Start: 2024-10-24 | End: 2024-10-26 | Stop reason: HOSPADM

## 2024-10-24 RX ORDER — SODIUM CHLORIDE 0.9 % (FLUSH) 0.9 %
5-40 SYRINGE (ML) INJECTION PRN
Status: DISCONTINUED | OUTPATIENT
Start: 2024-10-24 | End: 2024-10-24 | Stop reason: HOSPADM

## 2024-10-24 RX ORDER — TRANEXAMIC ACID 10 MG/ML
1000 INJECTION, SOLUTION INTRAVENOUS
Status: DISCONTINUED | OUTPATIENT
Start: 2024-10-24 | End: 2024-10-24 | Stop reason: HOSPADM

## 2024-10-24 RX ORDER — ONDANSETRON 2 MG/ML
4 INJECTION INTRAMUSCULAR; INTRAVENOUS
Status: DISCONTINUED | OUTPATIENT
Start: 2024-10-24 | End: 2024-10-24 | Stop reason: HOSPADM

## 2024-10-24 RX ORDER — NALOXONE HYDROCHLORIDE 0.4 MG/ML
INJECTION, SOLUTION INTRAMUSCULAR; INTRAVENOUS; SUBCUTANEOUS PRN
Status: DISCONTINUED | OUTPATIENT
Start: 2024-10-24 | End: 2024-10-24 | Stop reason: HOSPADM

## 2024-10-24 RX ORDER — KETOROLAC TROMETHAMINE 30 MG/ML
15 INJECTION, SOLUTION INTRAMUSCULAR; INTRAVENOUS EVERY 6 HOURS
Status: DISCONTINUED | OUTPATIENT
Start: 2024-10-24 | End: 2024-10-26 | Stop reason: HOSPADM

## 2024-10-24 RX ORDER — OXYCODONE HYDROCHLORIDE 5 MG/1
5 TABLET ORAL EVERY 4 HOURS PRN
Status: DISCONTINUED | OUTPATIENT
Start: 2024-10-24 | End: 2024-10-26 | Stop reason: HOSPADM

## 2024-10-24 RX ORDER — IPRATROPIUM BROMIDE AND ALBUTEROL SULFATE 2.5; .5 MG/3ML; MG/3ML
1 SOLUTION RESPIRATORY (INHALATION)
Status: DISCONTINUED | OUTPATIENT
Start: 2024-10-24 | End: 2024-10-24 | Stop reason: HOSPADM

## 2024-10-24 RX ORDER — CYCLOBENZAPRINE HCL 10 MG
10 TABLET ORAL EVERY 12 HOURS PRN
Status: DISCONTINUED | OUTPATIENT
Start: 2024-10-24 | End: 2024-10-26 | Stop reason: HOSPADM

## 2024-10-24 RX ORDER — SODIUM CHLORIDE 0.9 % (FLUSH) 0.9 %
5-40 SYRINGE (ML) INJECTION EVERY 12 HOURS SCHEDULED
Status: DISCONTINUED | OUTPATIENT
Start: 2024-10-24 | End: 2024-10-24 | Stop reason: HOSPADM

## 2024-10-24 RX ORDER — ONDANSETRON 2 MG/ML
INJECTION INTRAMUSCULAR; INTRAVENOUS
Status: DISCONTINUED | OUTPATIENT
Start: 2024-10-24 | End: 2024-10-24 | Stop reason: SDUPTHER

## 2024-10-24 RX ORDER — ROPIVACAINE HYDROCHLORIDE 5 MG/ML
INJECTION, SOLUTION EPIDURAL; INFILTRATION; PERINEURAL
Status: COMPLETED
Start: 2024-10-24 | End: 2024-10-24

## 2024-10-24 RX ORDER — SODIUM CHLORIDE, SODIUM LACTATE, POTASSIUM CHLORIDE, CALCIUM CHLORIDE 600; 310; 30; 20 MG/100ML; MG/100ML; MG/100ML; MG/100ML
INJECTION, SOLUTION INTRAVENOUS CONTINUOUS
Status: DISCONTINUED | OUTPATIENT
Start: 2024-10-24 | End: 2024-10-24 | Stop reason: HOSPADM

## 2024-10-24 RX ORDER — SODIUM CHLORIDE 0.9 % (FLUSH) 0.9 %
5-40 SYRINGE (ML) INJECTION EVERY 12 HOURS SCHEDULED
Status: DISCONTINUED | OUTPATIENT
Start: 2024-10-24 | End: 2024-10-26 | Stop reason: HOSPADM

## 2024-10-24 RX ORDER — ASPIRIN 325 MG
325 TABLET ORAL 2 TIMES DAILY
Status: DISCONTINUED | OUTPATIENT
Start: 2024-10-24 | End: 2024-10-26 | Stop reason: HOSPADM

## 2024-10-24 RX ORDER — OXYCODONE HYDROCHLORIDE 10 MG/1
10 TABLET ORAL EVERY 6 HOURS PRN
Qty: 28 TABLET | Refills: 0 | Status: SHIPPED | OUTPATIENT
Start: 2024-10-24 | End: 2024-10-31

## 2024-10-24 RX ORDER — SODIUM CHLORIDE 0.9 % (FLUSH) 0.9 %
5-40 SYRINGE (ML) INJECTION PRN
Status: DISCONTINUED | OUTPATIENT
Start: 2024-10-24 | End: 2024-10-26 | Stop reason: HOSPADM

## 2024-10-24 RX ORDER — PHENYLEPHRINE HCL IN 0.9% NACL 1 MG/10 ML
SYRINGE (ML) INTRAVENOUS
Status: DISCONTINUED | OUTPATIENT
Start: 2024-10-24 | End: 2024-10-24 | Stop reason: SDUPTHER

## 2024-10-24 RX ORDER — SODIUM CHLORIDE 9 MG/ML
INJECTION, SOLUTION INTRAVENOUS PRN
Status: DISCONTINUED | OUTPATIENT
Start: 2024-10-24 | End: 2024-10-26 | Stop reason: HOSPADM

## 2024-10-24 RX ORDER — LIDOCAINE HYDROCHLORIDE 20 MG/ML
INJECTION, SOLUTION INTRAVENOUS
Status: DISCONTINUED | OUTPATIENT
Start: 2024-10-24 | End: 2024-10-24 | Stop reason: SDUPTHER

## 2024-10-24 RX ORDER — ONDANSETRON 2 MG/ML
4 INJECTION INTRAMUSCULAR; INTRAVENOUS EVERY 6 HOURS PRN
Status: DISCONTINUED | OUTPATIENT
Start: 2024-10-24 | End: 2024-10-26 | Stop reason: HOSPADM

## 2024-10-24 RX ORDER — OXYCODONE HYDROCHLORIDE 5 MG/1
5 TABLET ORAL PRN
Status: DISCONTINUED | OUTPATIENT
Start: 2024-10-24 | End: 2024-10-24 | Stop reason: HOSPADM

## 2024-10-24 RX ORDER — KETOROLAC TROMETHAMINE 30 MG/ML
15 INJECTION, SOLUTION INTRAMUSCULAR; INTRAVENOUS
Status: DISCONTINUED | OUTPATIENT
Start: 2024-10-24 | End: 2024-10-24 | Stop reason: HOSPADM

## 2024-10-24 RX ORDER — TRANEXAMIC ACID 10 MG/ML
1000 INJECTION, SOLUTION INTRAVENOUS
Status: COMPLETED | OUTPATIENT
Start: 2024-10-24 | End: 2024-10-24

## 2024-10-24 RX ORDER — ACETAMINOPHEN 325 MG/1
650 TABLET ORAL ONCE
Status: DISCONTINUED | OUTPATIENT
Start: 2024-10-24 | End: 2024-10-24 | Stop reason: HOSPADM

## 2024-10-24 RX ORDER — SODIUM CHLORIDE 9 MG/ML
INJECTION, SOLUTION INTRAVENOUS PRN
Status: DISCONTINUED | OUTPATIENT
Start: 2024-10-24 | End: 2024-10-24 | Stop reason: SDUPTHER

## 2024-10-24 RX ORDER — HYDROMORPHONE HYDROCHLORIDE 1 MG/ML
0.25 INJECTION, SOLUTION INTRAMUSCULAR; INTRAVENOUS; SUBCUTANEOUS
Status: DISCONTINUED | OUTPATIENT
Start: 2024-10-24 | End: 2024-10-26 | Stop reason: HOSPADM

## 2024-10-24 RX ORDER — ALBUMIN, HUMAN INJ 5% 5 %
SOLUTION INTRAVENOUS
Status: DISCONTINUED | OUTPATIENT
Start: 2024-10-24 | End: 2024-10-24 | Stop reason: SDUPTHER

## 2024-10-24 RX ORDER — BUPIVACAINE HYDROCHLORIDE 5 MG/ML
INJECTION, SOLUTION EPIDURAL; INTRACAUDAL
Status: COMPLETED | OUTPATIENT
Start: 2024-10-24 | End: 2024-10-24

## 2024-10-24 RX ORDER — PROPOFOL 10 MG/ML
INJECTION, EMULSION INTRAVENOUS
Status: DISCONTINUED | OUTPATIENT
Start: 2024-10-24 | End: 2024-10-24 | Stop reason: SDUPTHER

## 2024-10-24 RX ORDER — LIDOCAINE HYDROCHLORIDE 10 MG/ML
INJECTION, SOLUTION EPIDURAL; INFILTRATION; INTRACAUDAL; PERINEURAL
Status: DISPENSED
Start: 2024-10-24 | End: 2024-10-24

## 2024-10-24 RX ORDER — ACETAMINOPHEN 500 MG
1000 TABLET ORAL ONCE
Status: COMPLETED | OUTPATIENT
Start: 2024-10-24 | End: 2024-10-24

## 2024-10-24 RX ORDER — OXYCODONE HYDROCHLORIDE 10 MG/1
10 TABLET ORAL EVERY 4 HOURS PRN
Status: DISCONTINUED | OUTPATIENT
Start: 2024-10-24 | End: 2024-10-26 | Stop reason: HOSPADM

## 2024-10-24 RX ORDER — ACETAMINOPHEN 500 MG
1000 TABLET ORAL ONCE
Status: DISCONTINUED | OUTPATIENT
Start: 2024-10-24 | End: 2024-10-24 | Stop reason: HOSPADM

## 2024-10-24 RX ORDER — CYCLOBENZAPRINE HCL 10 MG
10 TABLET ORAL 3 TIMES DAILY PRN
Qty: 30 TABLET | Refills: 0 | Status: SHIPPED | OUTPATIENT
Start: 2024-10-24 | End: 2024-11-03

## 2024-10-24 RX ORDER — EPHEDRINE SULFATE 50 MG/ML
INJECTION INTRAVENOUS
Status: DISCONTINUED | OUTPATIENT
Start: 2024-10-24 | End: 2024-10-24 | Stop reason: SDUPTHER

## 2024-10-24 RX ORDER — ROPIVACAINE HYDROCHLORIDE 5 MG/ML
INJECTION, SOLUTION EPIDURAL; INFILTRATION; PERINEURAL
Status: DISCONTINUED | OUTPATIENT
Start: 2024-10-24 | End: 2024-10-24 | Stop reason: SDUPTHER

## 2024-10-24 RX ORDER — MIDAZOLAM HYDROCHLORIDE 1 MG/ML
INJECTION, SOLUTION INTRAMUSCULAR; INTRAVENOUS
Status: COMPLETED
Start: 2024-10-24 | End: 2024-10-24

## 2024-10-24 RX ORDER — MIDAZOLAM HYDROCHLORIDE 1 MG/ML
INJECTION, SOLUTION INTRAMUSCULAR; INTRAVENOUS
Status: DISCONTINUED | OUTPATIENT
Start: 2024-10-24 | End: 2024-10-24 | Stop reason: SDUPTHER

## 2024-10-24 RX ORDER — PROCHLORPERAZINE EDISYLATE 5 MG/ML
5 INJECTION INTRAMUSCULAR; INTRAVENOUS
Status: DISCONTINUED | OUTPATIENT
Start: 2024-10-24 | End: 2024-10-24 | Stop reason: HOSPADM

## 2024-10-24 RX ADMIN — ACETAMINOPHEN 650 MG: 325 TABLET ORAL at 15:50

## 2024-10-24 RX ADMIN — KETOROLAC TROMETHAMINE 15 MG: 30 INJECTION, SOLUTION INTRAMUSCULAR; INTRAVENOUS at 22:01

## 2024-10-24 RX ADMIN — HYDROMORPHONE HYDROCHLORIDE 0.5 MG: 1 INJECTION, SOLUTION INTRAMUSCULAR; INTRAVENOUS; SUBCUTANEOUS at 15:45

## 2024-10-24 RX ADMIN — SODIUM CHLORIDE, PRESERVATIVE FREE 10 ML: 5 INJECTION INTRAVENOUS at 20:19

## 2024-10-24 RX ADMIN — MIDAZOLAM 1 MG: 1 INJECTION INTRAMUSCULAR; INTRAVENOUS at 12:02

## 2024-10-24 RX ADMIN — Medication 100 MCG: at 12:39

## 2024-10-24 RX ADMIN — OXYCODONE HYDROCHLORIDE 10 MG: 10 TABLET ORAL at 20:15

## 2024-10-24 RX ADMIN — EPHEDRINE SULFATE 25 MG: 50 INJECTION INTRAVENOUS at 13:18

## 2024-10-24 RX ADMIN — ONDANSETRON 4 MG: 2 INJECTION INTRAMUSCULAR; INTRAVENOUS at 12:36

## 2024-10-24 RX ADMIN — SODIUM CHLORIDE 3000 MG: 900 INJECTION INTRAVENOUS at 12:31

## 2024-10-24 RX ADMIN — SODIUM CHLORIDE, POTASSIUM CHLORIDE, SODIUM LACTATE AND CALCIUM CHLORIDE: 600; 310; 30; 20 INJECTION, SOLUTION INTRAVENOUS at 13:28

## 2024-10-24 RX ADMIN — CYCLOBENZAPRINE 10 MG: 10 TABLET, FILM COATED ORAL at 20:15

## 2024-10-24 RX ADMIN — ROPIVACAINE HYDROCHLORIDE 20 ML: 5 INJECTION, SOLUTION EPIDURAL; INFILTRATION; PERINEURAL at 12:04

## 2024-10-24 RX ADMIN — ACETAMINOPHEN 1000 MG: 500 TABLET ORAL at 08:32

## 2024-10-24 RX ADMIN — Medication 25 MG: at 12:31

## 2024-10-24 RX ADMIN — Medication 100 MCG: at 12:50

## 2024-10-24 RX ADMIN — IPRATROPIUM BROMIDE AND ALBUTEROL SULFATE 1 DOSE: 2.5; .5 SOLUTION RESPIRATORY (INHALATION) at 08:48

## 2024-10-24 RX ADMIN — Medication 100 MCG: at 13:04

## 2024-10-24 RX ADMIN — KETOROLAC TROMETHAMINE 15 MG: 30 INJECTION, SOLUTION INTRAMUSCULAR; INTRAVENOUS at 17:35

## 2024-10-24 RX ADMIN — EPHEDRINE SULFATE 25 MG: 50 INJECTION INTRAVENOUS at 13:15

## 2024-10-24 RX ADMIN — SODIUM CHLORIDE 3000 MG: 900 INJECTION INTRAVENOUS at 20:22

## 2024-10-24 RX ADMIN — Medication 100 MCG: at 12:56

## 2024-10-24 RX ADMIN — ASPIRIN 325 MG: 325 TABLET ORAL at 20:15

## 2024-10-24 RX ADMIN — LIDOCAINE HYDROCHLORIDE 50 MG: 20 INJECTION, SOLUTION INTRAVENOUS at 12:31

## 2024-10-24 RX ADMIN — Medication 100 MCG: at 12:45

## 2024-10-24 RX ADMIN — PROPOFOL 150 MCG/KG/MIN: 10 INJECTION, EMULSION INTRAVENOUS at 12:31

## 2024-10-24 RX ADMIN — DEXAMETHASONE SODIUM PHOSPHATE 8 MG: 4 INJECTION, SOLUTION INTRAMUSCULAR; INTRAVENOUS at 12:36

## 2024-10-24 RX ADMIN — BUPIVACAINE HYDROCHLORIDE 12.5 MG: 5 INJECTION, SOLUTION EPIDURAL; INTRACAUDAL; PERINEURAL at 12:23

## 2024-10-24 RX ADMIN — TRANEXAMIC ACID 1000 MG: 10 INJECTION, SOLUTION INTRAVENOUS at 12:36

## 2024-10-24 RX ADMIN — ALBUMIN (HUMAN) 12.5 G: 12.5 INJECTION, SOLUTION INTRAVENOUS at 12:44

## 2024-10-24 RX ADMIN — MIDAZOLAM 1 MG: 1 INJECTION INTRAMUSCULAR; INTRAVENOUS at 11:59

## 2024-10-24 RX ADMIN — SODIUM CHLORIDE, POTASSIUM CHLORIDE, SODIUM LACTATE AND CALCIUM CHLORIDE: 600; 310; 30; 20 INJECTION, SOLUTION INTRAVENOUS at 12:23

## 2024-10-24 RX ADMIN — ACETAMINOPHEN 650 MG: 325 TABLET ORAL at 21:59

## 2024-10-24 RX ADMIN — Medication 100 MCG: at 13:10

## 2024-10-24 ASSESSMENT — PAIN DESCRIPTION - DESCRIPTORS
DESCRIPTORS: ACHING
DESCRIPTORS: PRESSURE
DESCRIPTORS: PRESSURE
DESCRIPTORS: ACHING
DESCRIPTORS: ACHING
DESCRIPTORS: SHOOTING

## 2024-10-24 ASSESSMENT — PAIN DESCRIPTION - ORIENTATION
ORIENTATION: LEFT

## 2024-10-24 ASSESSMENT — PAIN DESCRIPTION - LOCATION
LOCATION: KNEE

## 2024-10-24 ASSESSMENT — PAIN - FUNCTIONAL ASSESSMENT
PAIN_FUNCTIONAL_ASSESSMENT: PREVENTS OR INTERFERES WITH MANY ACTIVE NOT PASSIVE ACTIVITIES
PAIN_FUNCTIONAL_ASSESSMENT: PREVENTS OR INTERFERES SOME ACTIVE ACTIVITIES AND ADLS
PAIN_FUNCTIONAL_ASSESSMENT: PREVENTS OR INTERFERES WITH ALL ACTIVE AND SOME PASSIVE ACTIVITIES
PAIN_FUNCTIONAL_ASSESSMENT: 0-10

## 2024-10-24 ASSESSMENT — PAIN DESCRIPTION - FREQUENCY
FREQUENCY: CONTINUOUS
FREQUENCY: CONTINUOUS

## 2024-10-24 ASSESSMENT — PAIN SCALES - GENERAL
PAINLEVEL_OUTOF10: 8
PAINLEVEL_OUTOF10: 10
PAINLEVEL_OUTOF10: 0
PAINLEVEL_OUTOF10: 10
PAINLEVEL_OUTOF10: 8
PAINLEVEL_OUTOF10: 10
PAINLEVEL_OUTOF10: 8
PAINLEVEL_OUTOF10: 8

## 2024-10-24 ASSESSMENT — PAIN DESCRIPTION - ONSET
ONSET: ON-GOING
ONSET: ON-GOING

## 2024-10-24 ASSESSMENT — PAIN DESCRIPTION - PAIN TYPE
TYPE: SURGICAL PAIN
TYPE: SURGICAL PAIN

## 2024-10-24 NOTE — PROGRESS NOTES
4 Eyes Skin Assessment     NAME:  Sai Reyes  YOB: 1950  MEDICAL RECORD NUMBER:  [id number]    The patient is being assessed for  Admission    I agree that at least one RN has performed a thorough Head to Toe Skin Assessment on the patient. ALL assessment sites listed below have been assessed.      Areas assessed by both nurses:    Head, Face, Ears, Shoulders, Back, Chest, Arms, Elbows, Hands, Sacrum. Buttock, Coccyx, Ischium, Legs. Feet and Heels, Under Medical Devices , and Other          Does the Patient have a Wound? Yes wound(s) were present on assessment. LDA wound assessment was Initiated and completed by RN       Reji Prevention initiated by RN: No  Wound Care Orders initiated by RN: No    Pressure Injury (Stage 3,4, Unstageable, DTI, NWPT, and Complex wounds) if present, place Wound referral order by RN under : No    New Ostomies, if present place, Ostomy referral order under : No     Nurse 1 eSignature: Electronically signed by Acosta Gonzales RN on 10/24/24 at 7:04 PM EDT    **SHARE this note so that the co-signing nurse can place an eSignature**    Nurse 2 eSignature: Electronically signed by Abena Rosas RN on 10/24/24 at 7:16 PM EDT

## 2024-10-24 NOTE — PROGRESS NOTES
1422: pt arrived to PACU. Monitors applied and alarms on. Report from Bryson CARBALLO and Jeremiah ZHOU. Dermatones at mid thigh and pt drowsy upon arrival. Pt able to follow commands and answer to his name. Pt unable to move toes at this time, but denies any pain.   1430:   1435: radiology called.   1449: pt repositioned, slider sheet pulled out, new under pad placed under pt.

## 2024-10-24 NOTE — BRIEF OP NOTE
Brief Postoperative Note      Patient: Sai Reyes  YOB: 1950  MRN: [id number]    Date of Procedure: 10/24/2024    Pre-Op Diagnosis Codes:      * Osteoarthritis of left knee [M17.12]     * Left knee pain [M25.562]    Post-Op Diagnosis: Same       Procedure(s):  LEFT KNEE TOTAL ARTHROPLASTY    Surgeon(s):  Brennan Erazo DO    Assistant:  * No surgical staff found *    Anesthesia: Spinal    Estimated Blood Loss (mL): 150 mL    Complications: None    Specimens:   * No specimens in log *    Implants:  Implant Name Type Inv. Item Serial No.  Lot No. LRB No. Used Action   PIN DRL L75MM DIA3.2MM HEX 2.5MM TRCR TIP DISP FOR PERSONA - [id number]  PIN DRL L75MM DIA3.2MM HEX 2.5MM TRCR TIP DISP FOR PERSONA  [employer]- [id number] Left 1 Implanted   CEMENT BNE 40GM W/ GENT HI VISC RADPQ FOR REV SURG - [id number]  CEMENT BNE 40GM W/ GENT HI VISC RADPQ FOR REV SURG  [employer]- [id number] Left 1 Implanted   CEMENT BNE 40GM W/ GENT HI VISC RADPQ FOR REV SURG - [id number]  CEMENT BNE 40GM W/ GENT HI VISC RADPQ FOR REV SURG  [employer]- [id number] Left 1 Implanted   PSN FEM CR CMT CCR STD SZ10 L - [id number]  PSN FEM CR CMT CCR STD SZ10 L  [employer]- [id number] Left 1 Implanted   PSN TIB STM 5 DEG SZ F L - [id number]  PSN TIB STM 5 DEG SZ F L  [employer]- [id number] Left 1 Implanted   COMPONENT PAT [id number] THK9MM KNEE POLY KATIE CONVENTIONAL - [id number]  COMPONENT PAT NYU59RS THK9MM KNEE POLY KATIE CONVENTIONAL  [employer]- [id number] Left 1 Implanted   PSN MC VE ASF L 11MM 8-11 EF - [id number]  PSN MC VE ASF L 11MM 8-11 EF  [employer]- [id number] Left 1 Implanted         Drains: * No LDAs found *    Findings:  Infection Present At Time Of Surgery (PATOS) (choose all levels that have infection present):  No infection present  Other Findings: L knee OA    Electronically signed by  HEVER MUHAMMAD DO on 10/24/2024 at 2:13 PM

## 2024-10-24 NOTE — ANESTHESIA PROCEDURE NOTES
Peripheral Block    Patient location during procedure: PACU  Reason for block: post-op pain management and primary anesthetic  Start time: 10/24/2024 11:59 AM  End time: 10/24/2024 12:02 PM  Staffing  Performed: anesthesiologist   Anesthesiologist: Char Noel MD  Performed by: Char Noel MD  Authorized by: Char Noel MD    Preanesthetic Checklist  Completed: patient identified, IV checked, site marked, risks and benefits discussed, surgical/procedural consents, equipment checked, pre-op evaluation, timeout performed, anesthesia consent given, oxygen available, monitors applied/VS acknowledged, fire risk safety assessment completed and verbalized and blood product R/B/A discussed and consented  Peripheral Block   Patient position: supine  Prep: ChloraPrep  Provider prep: mask, sterile gloves and sterile gown  Patient monitoring: continuous pulse ox, cardiac monitor, IV access, oxygen and responsive to questions  Block type: Femoral (adductor)  Laterality: left  Injection technique: single-shot  Guidance: ultrasound guided    Needle   Needle gauge: 21 G  Needle localization: anatomical landmarks and ultrasound guidance  Needle length: 10 cm  Assessment   Injection assessment: negative aspiration for heme, no paresthesia on injection, local visualized surrounding nerve on ultrasound and no intravascular symptoms  Slow fractionated injection: yes  Hemodynamics: stable

## 2024-10-24 NOTE — H&P
beer per week    Drug use: No     Family History   Problem Relation Age of Onset    Stroke Mother     Heart Disease Mother     Cancer Father         Prostate Cancer    Cancer Son         Leukemia       Objective:   Physical Exam  Constitutional:       Appearance: He is well-developed.   HENT:      Head: Normocephalic.   Eyes:      Pupils: Pupils are equal, round, and reactive to light.   Pulmonary:      Effort: Pulmonary effort is normal.   Musculoskeletal:         General: Swelling and tenderness present. No deformity. Normal range of motion.      Cervical back: Normal range of motion.      Right hip: Normal.      Left hip: Normal.      Right knee: No swelling, deformity, effusion, erythema, ecchymosis, lacerations or bony tenderness. Normal range of motion. No tenderness. No medial joint line, lateral joint line, MCL, LCL or patellar tendon tenderness. No LCL laxity or MCL laxity. Normal alignment and normal patellar mobility.      Left knee: Swelling, bony tenderness and crepitus present. No deformity, effusion, erythema, ecchymosis or lacerations. Normal range of motion. Tenderness present over the medial joint line, lateral joint line and patellar tendon. No MCL or LCL tenderness. No LCL laxity or MCL laxity.Normal alignment and normal patellar mobility.   Skin:     General: Skin is warm and dry.      Capillary Refill: Capillary refill takes less than 2 seconds.      Coloration: Skin is not pale.      Findings: No erythema or rash.   Neurological:      Mental Status: He is alert and oriented to person, place, and time.      Left knee-Skin intact with no erythema, ecchymosis or lacerations present.  0-130     Right knee-Incision clean, dry, intact, with no erythema, no drainage, and no signs of infection.  0-130     XR KNEE LEFT (3 VIEWS)     Result Date: 3/25/2024  XRAY X-ray 3 views of the left knee obtained and reviewed by me today in the office demonstrates age appropriate bone density throughout with severe

## 2024-10-24 NOTE — ANESTHESIA PROCEDURE NOTES
Spinal Block    Patient location during procedure: OR  End time: 10/24/2024 12:31 PM  Reason for block: primary anesthetic  Staffing  Performed: resident/CRNA   Anesthesiologist: Char Noel MD  Resident/CRNA: Magi Erazo APRN - CRNA  Performed by: Magi Erazo APRN - CRNA  Authorized by: Char Noel MD    Spinal Block  Patient position: sitting  Prep: Betadine  Patient monitoring: cardiac monitor, continuous pulse ox, continuous capnometry and frequent blood pressure checks  Approach: midline  Location: L4/L5  Guidance: paresthesia technique  Provider prep: mask and sterile gloves  Local infiltration: lidocaine  Needle  Needle type: Quincke   Needle gauge: 21 G  Needle length: 5 in  Assessment  Sensory level: T8  Swirl obtained: Yes  CSF: clear  Attempts: 1  Hemodynamics: stable  Preanesthetic Checklist  Completed: patient identified, IV checked, site marked, risks and benefits discussed, surgical/procedural consents, equipment checked, pre-op evaluation, timeout performed, anesthesia consent given, oxygen available, monitors applied/VS acknowledged, fire risk safety assessment completed and verbalized and blood product R/B/A discussed and consented

## 2024-10-24 NOTE — OP NOTE
DATE OF PROCEDURE:  10/24/2024    PREOPERATIVE DIAGNOSIS:  Left knee DJD.    POSTOPERATIVE DIAGNOSIS:  Left knee DJD.    PROCEDURE:  Left total knee arthroplasty using Jf [employer] Persona  MC knee with size 10 femoral component, size F tibial  component, size 35 patellar component and size 11 tibial poly component  with antibiotic-impregnated cement.    SURGEON:  Brennan Erazo DO    ANESTHESIA:  Spinal with regional block.    ESTIMATED BLOOD LOSS:  150 mL.    TOTAL TOURNIQUET TIME:  41 minutes.    FLUIDS:  900 mL of crystalloids and 250 mL albumin.    INDICATIONS FOR PROCEDURE:  The patient is a 74-year-old male with  long-standing history of left knee pain.  For this, he underwent  conservative treatment with no relief of his symptoms.  X-rays revealed  severe arthritis in the left knee.  Given his persistent symptoms  despite conservative treatment and with his x-ray findings, I  recommended surgical treatment.  I explained the risks, benefits and  possible complications of the procedure to the patient and after  answering all of his questions, he consented to undergo the above  procedure.    REPORT OF PROCEDURE:  The patient was seen and evaluated in the  preoperative holding area where the left lower extremity was signed in  his presence.  At this point, care of the patient was turned over to  anesthesia team who performed a regional block to the left lower  extremity.  He was then transported back to the operative suite.  Spinal  anesthesia was performed and once adequate anesthesia was obtained, the  left lower extremity was prepped and draped in usual sterile fashion.   Preoperative antibiotics were administered, 1 gm of TXA was administered  IV.  At this point, a time-out was performed and all in attendance were  in agreement.    I exsanguinated the left lower extremity with the use of an Esmarch and  tourniquet was inflated to 250 mmHg.  I then made a standard anterior  midline incision overlying the

## 2024-10-24 NOTE — ANESTHESIA POSTPROCEDURE EVALUATION
Department of Anesthesiology  Postprocedure Note    Patient: Sai Reyes  MRN: [id number]  YOB: 1950  Date of evaluation: 10/24/2024    Procedure Summary       Date: 10/24/24 Room / Location: [address]    Anesthesia Start: 1223 Anesthesia Stop: 1427    Procedure: LEFT KNEE TOTAL ARTHROPLASTY (Left: Knee) Diagnosis:       Osteoarthritis of left knee      Left knee pain      (Osteoarthritis of left knee [M17.12])      (Left knee pain [M25.562])    Surgeons: Brennan Erazo DO Responsible Provider: Char Noel MD    Anesthesia Type: general, MAC, regional, spinal ASA Status: 4            Anesthesia Type: No value filed.    Wolf Phase I: Wolf Score: 8    Wolf Phase II:      Anesthesia Post Evaluation    Patient location during evaluation: PACU  Patient participation: complete - patient participated  Level of consciousness: sleepy but conscious  Pain score: 3  Airway patency: patent  Nausea & Vomiting: no nausea and no vomiting  Cardiovascular status: blood pressure returned to baseline  Respiratory status: nasal cannula  Hydration status: euvolemic  Multimodal analgesia pain management approach  Pain management: adequate    No notable events documented.

## 2024-10-25 ENCOUNTER — APPOINTMENT (OUTPATIENT)
Dept: GENERAL RADIOLOGY | Age: 74
End: 2024-10-25
Attending: ORTHOPAEDIC SURGERY
Payer: MEDICARE

## 2024-10-25 LAB
ANION GAP SERPL CALCULATED.3IONS-SCNC: 11 MMOL/L (ref 9–17)
B PARAP IS1001 DNA NPH QL NAA+NON-PROBE: NOT DETECTED
B PERT DNA SPEC QL NAA+PROBE: NOT DETECTED
BASOPHILS # BLD: 0.08 K/UL
BASOPHILS NFR BLD: 0 % (ref 0–1)
BUN SERPL-MCNC: 25 MG/DL (ref 7–20)
C PNEUM DNA NPH QL NAA+NON-PROBE: NOT DETECTED
CALCIUM SERPL-MCNC: 6.6 MG/DL (ref 8.3–10.6)
CHLORIDE SERPL-SCNC: 103 MMOL/L (ref 99–110)
CO2 SERPL-SCNC: 24 MMOL/L (ref 21–32)
CREAT SERPL-MCNC: 0.6 MG/DL (ref 0.8–1.3)
EOSINOPHIL # BLD: 0.03 K/UL
EOSINOPHILS RELATIVE PERCENT: 0 % (ref 0–3)
ERYTHROCYTE [DISTWIDTH] IN BLOOD BY AUTOMATED COUNT: 13.2 % (ref 11.7–14.9)
FLUAV RNA NPH QL NAA+NON-PROBE: NOT DETECTED
FLUBV RNA NPH QL NAA+NON-PROBE: NOT DETECTED
GFR, ESTIMATED: >90 ML/MIN/1.73M2
GLUCOSE BLD-MCNC: 147 MG/DL (ref 74–99)
GLUCOSE BLD-MCNC: 176 MG/DL (ref 74–99)
GLUCOSE BLD-MCNC: 223 MG/DL (ref 74–99)
GLUCOSE SERPL-MCNC: 127 MG/DL (ref 74–99)
HADV DNA NPH QL NAA+NON-PROBE: NOT DETECTED
HCOV 229E RNA NPH QL NAA+NON-PROBE: NOT DETECTED
HCOV HKU1 RNA NPH QL NAA+NON-PROBE: NOT DETECTED
HCOV NL63 RNA NPH QL NAA+NON-PROBE: NOT DETECTED
HCOV OC43 RNA NPH QL NAA+NON-PROBE: NOT DETECTED
HCT VFR BLD AUTO: 43.2 % (ref 42–52)
HCT VFR BLD AUTO: 46.2 % (ref 42–52)
HGB BLD-MCNC: 14 G/DL (ref 13.5–18)
HGB BLD-MCNC: 14.8 G/DL (ref 13.5–18)
HMPV RNA NPH QL NAA+NON-PROBE: NOT DETECTED
HPIV1 RNA NPH QL NAA+NON-PROBE: NOT DETECTED
HPIV2 RNA NPH QL NAA+NON-PROBE: NOT DETECTED
HPIV3 RNA NPH QL NAA+NON-PROBE: NOT DETECTED
HPIV4 RNA NPH QL NAA+NON-PROBE: NOT DETECTED
IMM GRANULOCYTES # BLD AUTO: 0.1 K/UL
IMM GRANULOCYTES NFR BLD: 1 %
LYMPHOCYTES NFR BLD: 3.75 K/UL
LYMPHOCYTES RELATIVE PERCENT: 19 % (ref 24–44)
M PNEUMO DNA NPH QL NAA+NON-PROBE: NOT DETECTED
MCH RBC QN AUTO: 30.6 PG (ref 27–31)
MCHC RBC AUTO-ENTMCNC: 32.4 G/DL (ref 32–36)
MCV RBC AUTO: 94.5 FL (ref 78–100)
MONOCYTES NFR BLD: 12 % (ref 0–4)
MONOCYTES NFR BLD: 2.29 K/UL
NEUTROPHILS NFR BLD: 68 % (ref 36–66)
NEUTS SEG NFR BLD: 13.13 K/UL
PLATELET # BLD AUTO: 224 K/UL (ref 140–440)
PMV BLD AUTO: 10.4 FL (ref 7.5–11.1)
POTASSIUM SERPL-SCNC: 4.5 MMOL/L (ref 3.5–5.1)
PROCALCITONIN SERPL-MCNC: 0.03 NG/ML
RBC # BLD AUTO: 4.57 M/UL (ref 4.6–6.2)
RSV RNA NPH QL NAA+NON-PROBE: NOT DETECTED
RV+EV RNA NPH QL NAA+NON-PROBE: NOT DETECTED
SARS-COV-2 RNA NPH QL NAA+NON-PROBE: NOT DETECTED
SODIUM SERPL-SCNC: 138 MMOL/L (ref 136–145)
SPECIMEN DESCRIPTION: NORMAL
WBC OTHER # BLD: 19.4 K/UL (ref 4–10.5)

## 2024-10-25 PROCEDURE — 97162 PT EVAL MOD COMPLEX 30 MIN: CPT

## 2024-10-25 PROCEDURE — 6370000000 HC RX 637 (ALT 250 FOR IP): Performed by: ORTHOPAEDIC SURGERY

## 2024-10-25 PROCEDURE — 84145 PROCALCITONIN (PCT): CPT

## 2024-10-25 PROCEDURE — 94761 N-INVAS EAR/PLS OXIMETRY MLT: CPT

## 2024-10-25 PROCEDURE — 80048 BASIC METABOLIC PNL TOTAL CA: CPT

## 2024-10-25 PROCEDURE — 85018 HEMOGLOBIN: CPT

## 2024-10-25 PROCEDURE — 6370000000 HC RX 637 (ALT 250 FOR IP): Performed by: INTERNAL MEDICINE

## 2024-10-25 PROCEDURE — 85014 HEMATOCRIT: CPT

## 2024-10-25 PROCEDURE — 36415 COLL VENOUS BLD VENIPUNCTURE: CPT

## 2024-10-25 PROCEDURE — 97116 GAIT TRAINING THERAPY: CPT

## 2024-10-25 PROCEDURE — 82962 GLUCOSE BLOOD TEST: CPT

## 2024-10-25 PROCEDURE — 94640 AIRWAY INHALATION TREATMENT: CPT

## 2024-10-25 PROCEDURE — 6370000000 HC RX 637 (ALT 250 FOR IP)

## 2024-10-25 PROCEDURE — 2700000000 HC OXYGEN THERAPY PER DAY

## 2024-10-25 PROCEDURE — 6360000002 HC RX W HCPCS: Performed by: ORTHOPAEDIC SURGERY

## 2024-10-25 PROCEDURE — 6360000002 HC RX W HCPCS

## 2024-10-25 PROCEDURE — 0202U NFCT DS 22 TRGT SARS-COV-2: CPT

## 2024-10-25 PROCEDURE — 85025 COMPLETE CBC W/AUTO DIFF WBC: CPT

## 2024-10-25 PROCEDURE — 94618 PULMONARY STRESS TESTING: CPT

## 2024-10-25 PROCEDURE — 2580000003 HC RX 258: Performed by: ORTHOPAEDIC SURGERY

## 2024-10-25 PROCEDURE — 71045 X-RAY EXAM CHEST 1 VIEW: CPT

## 2024-10-25 RX ORDER — ESCITALOPRAM OXALATE 10 MG/1
10 TABLET ORAL DAILY
Status: DISCONTINUED | OUTPATIENT
Start: 2024-10-26 | End: 2024-10-26 | Stop reason: HOSPADM

## 2024-10-25 RX ORDER — INSULIN GLARGINE 100 [IU]/ML
10 INJECTION, SOLUTION SUBCUTANEOUS DAILY
Status: DISCONTINUED | OUTPATIENT
Start: 2024-10-25 | End: 2024-10-26 | Stop reason: HOSPADM

## 2024-10-25 RX ORDER — DEXTROSE MONOHYDRATE 100 MG/ML
INJECTION, SOLUTION INTRAVENOUS CONTINUOUS PRN
Status: DISCONTINUED | OUTPATIENT
Start: 2024-10-25 | End: 2024-10-26 | Stop reason: HOSPADM

## 2024-10-25 RX ORDER — PANTOPRAZOLE SODIUM 40 MG/1
40 TABLET, DELAYED RELEASE ORAL
Status: DISCONTINUED | OUTPATIENT
Start: 2024-10-26 | End: 2024-10-26 | Stop reason: HOSPADM

## 2024-10-25 RX ORDER — FUROSEMIDE 10 MG/ML
20 INJECTION INTRAMUSCULAR; INTRAVENOUS ONCE
Status: COMPLETED | OUTPATIENT
Start: 2024-10-25 | End: 2024-10-25

## 2024-10-25 RX ORDER — ALBUTEROL SULFATE 90 UG/1
2 INHALANT RESPIRATORY (INHALATION) EVERY 6 HOURS PRN
Status: DISCONTINUED | OUTPATIENT
Start: 2024-10-25 | End: 2024-10-26 | Stop reason: HOSPADM

## 2024-10-25 RX ORDER — AMLODIPINE BESYLATE 5 MG/1
5 TABLET ORAL DAILY
Status: DISCONTINUED | OUTPATIENT
Start: 2024-10-26 | End: 2024-10-26 | Stop reason: HOSPADM

## 2024-10-25 RX ORDER — GLUCAGON 1 MG/ML
1 KIT INJECTION PRN
Status: DISCONTINUED | OUTPATIENT
Start: 2024-10-25 | End: 2024-10-26 | Stop reason: HOSPADM

## 2024-10-25 RX ORDER — ATORVASTATIN CALCIUM 40 MG/1
40 TABLET, FILM COATED ORAL NIGHTLY
Status: DISCONTINUED | OUTPATIENT
Start: 2024-10-25 | End: 2024-10-26 | Stop reason: HOSPADM

## 2024-10-25 RX ORDER — INSULIN LISPRO 100 [IU]/ML
0-8 INJECTION, SOLUTION INTRAVENOUS; SUBCUTANEOUS
Status: DISCONTINUED | OUTPATIENT
Start: 2024-10-25 | End: 2024-10-26 | Stop reason: HOSPADM

## 2024-10-25 RX ORDER — GUAIFENESIN 200 MG/10ML
200 LIQUID ORAL EVERY 4 HOURS PRN
Status: DISCONTINUED | OUTPATIENT
Start: 2024-10-25 | End: 2024-10-26 | Stop reason: HOSPADM

## 2024-10-25 RX ORDER — CALCIUM CARBONATE 500 MG/1
500 TABLET, CHEWABLE ORAL 3 TIMES DAILY PRN
Status: DISCONTINUED | OUTPATIENT
Start: 2024-10-25 | End: 2024-10-26 | Stop reason: HOSPADM

## 2024-10-25 RX ADMIN — SODIUM CHLORIDE, PRESERVATIVE FREE 10 ML: 5 INJECTION INTRAVENOUS at 09:19

## 2024-10-25 RX ADMIN — INSULIN GLARGINE 10 UNITS: 100 INJECTION, SOLUTION SUBCUTANEOUS at 12:39

## 2024-10-25 RX ADMIN — OXYCODONE HYDROCHLORIDE 10 MG: 10 TABLET ORAL at 11:19

## 2024-10-25 RX ADMIN — ASPIRIN 325 MG: 325 TABLET ORAL at 09:18

## 2024-10-25 RX ADMIN — OXYCODONE HYDROCHLORIDE 10 MG: 10 TABLET ORAL at 06:50

## 2024-10-25 RX ADMIN — ACETAMINOPHEN 650 MG: 325 TABLET ORAL at 03:45

## 2024-10-25 RX ADMIN — ATORVASTATIN CALCIUM 40 MG: 40 TABLET, FILM COATED ORAL at 21:01

## 2024-10-25 RX ADMIN — INSULIN LISPRO 2 UNITS: 100 INJECTION, SOLUTION INTRAVENOUS; SUBCUTANEOUS at 21:02

## 2024-10-25 RX ADMIN — ACETAMINOPHEN 650 MG: 325 TABLET ORAL at 09:18

## 2024-10-25 RX ADMIN — SODIUM CHLORIDE 3000 MG: 900 INJECTION INTRAVENOUS at 03:50

## 2024-10-25 RX ADMIN — FUROSEMIDE 20 MG: 10 INJECTION, SOLUTION INTRAMUSCULAR; INTRAVENOUS at 17:50

## 2024-10-25 RX ADMIN — KETOROLAC TROMETHAMINE 15 MG: 30 INJECTION, SOLUTION INTRAMUSCULAR; INTRAVENOUS at 17:38

## 2024-10-25 RX ADMIN — OXYCODONE HYDROCHLORIDE 10 MG: 10 TABLET ORAL at 21:01

## 2024-10-25 RX ADMIN — SODIUM CHLORIDE, PRESERVATIVE FREE 10 ML: 5 INJECTION INTRAVENOUS at 21:02

## 2024-10-25 RX ADMIN — ACETAMINOPHEN 650 MG: 325 TABLET ORAL at 21:01

## 2024-10-25 RX ADMIN — CALCIUM CARBONATE 500 MG: 500 TABLET, CHEWABLE ORAL at 18:10

## 2024-10-25 RX ADMIN — KETOROLAC TROMETHAMINE 15 MG: 30 INJECTION, SOLUTION INTRAMUSCULAR; INTRAVENOUS at 03:45

## 2024-10-25 RX ADMIN — ALBUTEROL SULFATE 2 PUFF: 90 AEROSOL, METERED RESPIRATORY (INHALATION) at 17:03

## 2024-10-25 RX ADMIN — ASPIRIN 325 MG: 325 TABLET ORAL at 21:01

## 2024-10-25 RX ADMIN — OXYCODONE HYDROCHLORIDE 5 MG: 5 TABLET ORAL at 15:51

## 2024-10-25 RX ADMIN — KETOROLAC TROMETHAMINE 15 MG: 30 INJECTION, SOLUTION INTRAMUSCULAR; INTRAVENOUS at 09:49

## 2024-10-25 ASSESSMENT — PAIN DESCRIPTION - ORIENTATION
ORIENTATION: LEFT

## 2024-10-25 ASSESSMENT — PAIN DESCRIPTION - LOCATION
LOCATION: KNEE

## 2024-10-25 ASSESSMENT — PAIN SCALES - GENERAL
PAINLEVEL_OUTOF10: 8
PAINLEVEL_OUTOF10: 7
PAINLEVEL_OUTOF10: 8
PAINLEVEL_OUTOF10: 4
PAINLEVEL_OUTOF10: 0
PAINLEVEL_OUTOF10: 8
PAINLEVEL_OUTOF10: 8

## 2024-10-25 ASSESSMENT — PAIN DESCRIPTION - ONSET
ONSET: ON-GOING
ONSET: AWAKENED FROM SLEEP
ONSET: ON-GOING
ONSET: ON-GOING

## 2024-10-25 ASSESSMENT — PAIN DESCRIPTION - PAIN TYPE
TYPE: SURGICAL PAIN

## 2024-10-25 ASSESSMENT — PAIN - FUNCTIONAL ASSESSMENT
PAIN_FUNCTIONAL_ASSESSMENT: PREVENTS OR INTERFERES SOME ACTIVE ACTIVITIES AND ADLS
PAIN_FUNCTIONAL_ASSESSMENT: ACTIVITIES ARE NOT PREVENTED

## 2024-10-25 ASSESSMENT — PAIN DESCRIPTION - FREQUENCY
FREQUENCY: CONTINUOUS

## 2024-10-25 ASSESSMENT — PAIN DESCRIPTION - DESCRIPTORS
DESCRIPTORS: ACHING;THROBBING
DESCRIPTORS: SHARP
DESCRIPTORS: ACHING
DESCRIPTORS: THROBBING
DESCRIPTORS: ACHING;THROBBING
DESCRIPTORS: SHARP
DESCRIPTORS: ACHING;THROBBING

## 2024-10-25 NOTE — PLAN OF CARE
Problem: Discharge Planning  Goal: Discharge to home or other facility with appropriate resources  10/25/2024 0936 by Alexis Centeno LPN  Outcome: Progressing  10/24/2024 2339 by Candi Nelson, RN  Outcome: Progressing     Problem: Pain  Goal: Verbalizes/displays adequate comfort level or baseline comfort level  10/25/2024 0936 by Alexis Centeno LPN  Outcome: Progressing  10/24/2024 2339 by Candi Nelson, RN  Outcome: Progressing     Problem: Safety - Adult  Goal: Free from fall injury  10/25/2024 0936 by Alexis Centeno LPN  Outcome: Progressing  10/24/2024 2339 by Candi Nelson, RN  Outcome: Progressing

## 2024-10-25 NOTE — PROGRESS NOTES
Physical Therapy Treatment Note  Name: Sai Reyes MRN: [id number] :   1950   Date:  10/25/2024   Admission Date: 10/24/2024 Room:  [address] [place] [address]     Restrictions/Precautions:          WBAT L LE, general precautions, fall risk    Communication with other providers:  RN    Subjective:  Patient states:  \"That was better!\"  Pain:   Location, Type, Intensity (0/10 to 10/10):  6/10 pain in L LE during ambulation    Objective:    Observation:  pt supine in bed upon entry and agreeable to session    Treatment, including education/measures:    Bed mobility: pt completed supine to sitting EOB SBA    Transfers: pt completed STS to/from EOB SBA with cues for hand placement    Gait: pt ambulated 50' with RW CGA progressing to SBA with decreased olga and decreased stance phase on L LE. Cues provided for gait pattern and pursed lip breathing    Education: pt educated on AP, LAQ, heel slides, and quad sets    Assessment / Impression:    Pt sitting EOB at end of session with alarm on and needs in reach    Patient's tolerance of treatment:  well   Adverse Reaction: n/a  Significant change in status and impact:  n/a  Barriers to improvement:  decreased endurance    Plan for Next Session:    Continue to address transfers and gait training in future sessions    Time in:  1525  Time out:  1540  Timed treatment minutes:  15  Total treatment time:  15    Previously filed items:  Social/Functional History  Lives With: Significant other  Type of Home: House  Home Layout: One level  Home Access: Level entry  Bathroom Shower/Tub: Tub/Shower unit  Bathroom Toilet: Handicap height  Bathroom Equipment: Grab bars around toilet  Home Equipment: Cane, Walker - Rolling  ADL Assistance: Independent  Homemaking Assistance: Independent  Homemaking Responsibilities: Yes  Ambulation Assistance: Independent (does not use AD at baseline)  Transfer Assistance: Independent        Short Term Goals  Time Frame for Short Term Goals: 1 week  Short  Term Goal 1: pt to complete all bed mobility mod I  Short Term Goal 2: pt to complete all STS transfers to/from bed, commode, and chair mod I  Short Term Goal 3: pt to ambulate 50' with LRAD mod I    Electronically signed by:    Leyla Gregory, PT  10/25/2024, 4:26 PM

## 2024-10-25 NOTE — CONSULTS
V2.0  [facility] Consult Note      Name:  Sai Reyes /Age/Sex: 1950  (74 y.o. male)   MRN & CSN:  [id number] & [id number] Encounter Date/Time: 10/25/2024 11:32 AM EDT   [facility] PCP: Mik Rivas MD     Attending:Brennan Erazo DO  Consulting Provider: BERNARDINO Mtz - CNP      Hospital Day: 2    Assessment and Recommendations   Sai Reyes is a 74 y.o. male with pmh of Diverticulosis, Asthma, Hepatitis, HLD, HTN who presents with Arthritis of left knee. HM consulted for Acute Hypoxemia.     Hospital Problems             Last Modified POA    * (Principal) Arthritis of left knee 10/25/2024 Yes    Osteoarthritis of left knee 10/25/2024 Unknown    Left knee pain 2024 Unknown    History of total left knee replacement 10/24/2024 Yes       Recommendations:   Post op acute hypoxemia   --RA sat 88%, baseline on RA.   --Currently 95-97%on 2L   --CXR ordered,  if results are non concerning, will order home o2 eval  -- Guaifenesin cough syrup as needed.   --IS  -- Will order CBC, BMP, Pro-Ish, respiratory panel  --As needed albuterol      BHARGAVI   -- not compliant with CPAP  at home     Left Knee Arthritis  LTK replacement   --POD #1 per Dr. Erazo   --DVT Prophylaxis per primary     Class IV Obesity  BMI  47.13    Diet ADULT DIET; Regular   DVT Prophylaxis [] Lovenox, []  Heparin, [] SCDs, [] Ambulation,  [] Eliquis, [] Xarelto   Code Status Full Code   Surrogate Decision Maker/ POA  Lv- child     Personally reviewed Lab Studies and Imaging         History From:    History obtained from chart review.     History of Present Illness:      Chief Complaint: Left total knee repair    Sai Reyes is a 74 y.o. male who presents left knee arthritis, had elective left total knee repair per Dr. Erazo on 10/20/2024.  Hospital medicine consulted to manage hypoxemia.  Patient is on 2 L nasal cannula, on exam, was noted to be 88% on room air.  Patient does have history of BHARGAVI, and 
WFL  Cardiopulmonary:  2L O2, does not use at baseline  Cognition: WFL, see OT/SLP note for further evaluation.    Musculoskeletal  ROM L knee: -15-80    Strength L knee 3-/5, all else 5/5; minimal impairment in function and endurance.    Neuro:  WFL      Mobility:  Rolling L/R:  NT, pt sitting at beginning and end of session  Supine to sit:  NT, pt sitting at beginning and end of session  Transfers: pt completed STS to/from EOB CGA with cues for hand placement and sequencing  Sitting balance:  good.    Standing balance:  fair+.    Gait: pt ambulated 25' with RW CGA with decreased olga and decreased stance phase on L LE. Cues provided for gait pattern.  Education: pt educated on Wb'ing precautions, Iceman, PT role, PT plan, PT recommendations    [facility] 6 Clicks Inpatient Mobility:  AM-PAC Inpatient Mobility Raw Score : 18    Safety: patient left sitting EOB, call light within reach, RN notified, gait belt used.    Assessment:  Pt is a 74 y.o. male admitted to the hospital for arthritis of left knee. Pt underwent a L total knee on 10/25/24. Pt is typically independent with all ambulation and transfers. Pt is currently performing transfers CGA and ambulating 25' with RW CGA. Pt is presenting with decreased endurance, impaired transfers, impaired gait, impaired ROM, increased pain, impaired strength. Pt would benefit from continued acute care PT as well as  PT upon discharge.      Complexity: moderate    Prognosis: Good, no significant barriers to participation at this time.     General Plan:  (BID)         Goals:  Short Term Goals  Time Frame for Short Term Goals: 1 week  Short Term Goal 1: pt to complete all bed mobility mod I  Short Term Goal 2: pt to complete all STS transfers to/from bed, commode, and chair mod I  Short Term Goal 3: pt to ambulate 50' with LRAD mod I       Treatment plan:  Bed mobility, transfers, balance, gait, TA, TX    Recommendations for NURSING mobility: amb with gait belt and RW    Time:

## 2024-10-25 NOTE — PROGRESS NOTES
Pt qualified for home oxygen. Paperwork faxed to [facility]. Please do not discharge pt without oxygen. This testing will  and have to be repeated if pt has not discharged 48 hours from time testing was ordered. Please call [facility] @ [phone] if oxygen has not been delivered prior to pt discharging. Thanks.

## 2024-10-25 NOTE — PROGRESS NOTES
Sai Reyes (1950)    Daily Progress Note-  Estevan Sanford DO                   Today's Date:   10/25/2024          Subjective:   Patient seen and examined resting comfortably in bed  Doing well postoperatively.    Pain controlled  No issues overnight per nursing staff  Patient has not yet been up and ambulating with physical therapy.  He states he only sat the edge of the bed    Objective:    Patient Vitals for the past 4 hrs:   BP Temp Temp src Pulse Resp SpO2   10/25/24 0330 (!) 143/61 97.8 °F (36.6 °C) Oral 74 18 90 %        Vitals:    10/25/24 0330   BP: (!) 143/61   Pulse: 74   Resp: 18   Temp: 97.8 °F (36.6 °C)   SpO2: 90%        Physical Exam:     The patient is awake and alert  Resting comfortably in bed    Operative extremity:    The dressing is clean dry and intact  Sensation and motor function intact distally  Leg lengths equal and appropriately aligned  Minimally tender over incision sites with no hematoma    No additional areas of tenderness or pain in the bilateral upper extremities or contralateral lower extremity.  Neurovascularly intact throughout bilateral upper extremities and contralateral lower extremity    Brisk capillary refill and negative Homans bilaterally.  Compartments are soft and compressible      LABS   CBC:   Recent Labs     10/25/24  0049   HGB 14.8       IMAGING   2 views left knee demonstrate:  No acute fracture or dislocation. Left knee arthroplasty appears intact.     IMPRESSION:  No acute findings    Assessment and Plan     1.  POD # 1 left total knee arthroplasty    1:  Physical, Occupational therapy consult for mobilization   -WBAT   -No additional precautions  2:  DVT prophylaxis   -As ordered per Dr. Ballard  3:  Continue Pain Control   -wean to PO meds  4.  Medical management per hospitalist service  5.  D/C Planning:     -Per case  Follow up after rubber band ligation x 2. Complains of increased burn and itching. Has been using topical medication. Also notice some mucinous discharge. Up until today her has had pain with bowel movements. IMP/PLAN  1) Pain better today. Hopefully that will continue  2) Avoid topical agents. Could make pruritis worse. 3) Stick with sitz baths and ice packs  4) Recheck in about 2 weeks. Depending on findings and her desired could consider additional treatment. management  6.  Orthopedics to sign off at this time.  Discharge order placed.  He needs to be seen by therapy before being discharged he will follow-up with Dr. Ballard as scheduled         Estevan Sanford DO

## 2024-10-25 NOTE — PROGRESS NOTES
I personally saw the patient and have reviewed all lab and imaging.  I discussed the patient with the MARIVEL and was available for questions and consultation as needed.  I personally saw the patient and performed a substantive portion of the the visit including all aspects of the medical decision making.    History: Consulted for medical management, specifically respiratory failure unable to wean from oxygen.  Resting comfortably in bed on my exam, denies any SOB or chest pains and states that overall he is doing well.  I did review his pulm history and appears to have BHARGAVI but not compliant with CPAP due to cost and supply issues.    Exam: GEN: Resting comfortably on NC in bed, vitals stable   CV: Regular rate   RESP: Even and non-labored on 2L NC, did have some intermittent wheezing over the right lower lobe   GI: Non-distended   : No rojas present   SKIN: Warm and dry, no significant edema     MDM: ?BHARGAVI likely contributing as he is not using CPAP at home, coupled with anesthesia effect; no SOB; CXR ordered and pending; he states he was taking Albuterol inhalers at home but again stopped using them because he couldn't afford them, this has been re-ordered.  For his diabetes did start SSI and 10 units nightly Lantus that can be titrated.  Will f/u CXR and if clear will order a home oxygen eval and can likely be cleared for d/c by tomorrow/possibly tonight and continue to wean oxygen throughout the day today.    I independently interpreted the following study(s): Awaiting CXR ordered this AM

## 2024-10-25 NOTE — PROGRESS NOTES
10/25/2024 3:47 PM  Patient Room #: 1127/1127-A  Patient Name: Sai Reyes    (Step 1 Done by RN if possible otherwise call Pulmonary Diagnostics)  Place patient on room air at rest for at least 30 minutes.  If patient falls below 88% before 30 minutes then you can record the level and stop.  Record room air saturation level _88_ %.  If patient is at 88% or below, they will qualify for home oxygen and you can stop.  If level does not fall below 88%, fill in level above. If indicated continue to Step 2.   Signature:__Maykel Griggs___ Date: _10/25/2024__  (Step 2&3 Done by [facility])  Ambulate patient on room air until saturation falls below 89%.  Record level of room air saturation with ambulation___ %.  Next, place patient back on ___lpm oxygen and ambulate, record level __%.  (Note:  this level must show improvement from room air level done with ambulation.)  If patient’s saturation on room air with ambulation is 88% or below AND patient shows improvement with oxygen during ambulation, they will qualify for home oxygen and you can stop.  If patient does not drop below 89%, then patient should have an overnight oximetry trending on room air to see if level falls below 88%.  Complete level in Step 3 below.    Room air overnight oximetry level 88 % for__OSA_  cumulative minutes.  If patient’s room air oxygen level is below <89% for any amount of time they will qualify for nocturnal home oxygen.        (Attach Night Trending Report)    Complete order below: Diagnosis:__COPD__  Home oxygen at:  Length of Need: X? Lifetime ? 3 Months     _2__lpm or __%   via  [x] nasal cannula  []mask  [] other         [x]continuous [x]  with activity  [x]  Nocturnal   [x] Portable Tanks [x]  Concentrator  [x] Conserving Device        Therapist Signature:__Maykel Griggs RRT_____     Date:  ___  Physician Signature:  __Electronically Signed in EMR_    Date:___  Physician Printed Name:  ___Eva Kendrick, BERNARDINO - CNP

## 2024-10-25 NOTE — PROGRESS NOTES
Pt identified as a candidate for COPD Gold assessment.  Patients last recorded pft was in  2017 .  Patient would benefit if a new pft were to be ordered, after discharged and able to complete PFT testing as an out-patient.

## 2024-10-25 NOTE — DISCHARGE SUMMARY
DISCHARGE SUMMARY:  Estevan Sanford DO,    Date of Admission:      10/24/2024  Date of Discharge:    10/25/2024     Admission Diagnosis   Osteoarthritis of left knee [M17.12]  Left knee pain [M25.562]  Arthritis of left knee [M17.12]  History of total left knee replacement [Z96.652]   Discharge Diagnosis   Same    Procedure:  Left total knee arthroplasty t 10/24/2024    Consultations:  IP CONSULT TO HOSPITALIST  IP CONSULT TO CASE MANAGEMENT  IP CONSULT TO SPIRITUAL SERVICES    Brief history and hospital stay.    Sai Reyes is a 74 y.o. male who underwent left total knee arthroplasty arthroplasty procedure without complication by my partner Brennan Erazo DO.  Sai Reyes was admitted to the floor following surgery.    Appropriate consults were obtained as outlined in progress notes. The patient’s diet was advanced as tolerated.  The patient remained hemodynamically stable and neurovascularly intact in the bilateral lower and upper extremities throughout the hospital course.    On the day of discharge the patient's calf remained soft and showed no evidence of DVT.      Physical therapy and occupational therapy were consulted.  The patient progressed well with PT/OT throughout the stay.      DVT prophylaxis was initiated and will continue per his surgeon's discretion    The patients pain was controlled initially with IV pain medications and then was transitioned to oral pain medications prior to discharge    The patient was discharged to Home and will continue to follow the precautions outlined to them by us and PT/OT.     Condition on Discharge: Stable    Medications       Medication List        START taking these medications      aspirin 325 MG tablet  Take 1 tablet by mouth in the morning and at bedtime for 14 days     cyclobenzaprine 10 MG tablet  Commonly known as: FLEXERIL  Take 1 tablet by mouth 3 times daily as needed for Muscle spasms     oxyCODONE HCl 10 
known as: Lasix  Take 1 tablet by mouth 2 times daily     polyethylene glycol-electrolytes 420 g solution  Commonly known as: NULYTELY     potassium chloride 10 MEQ extended release tablet  Commonly known as: KLOR-CON M  Take 1 tablet by mouth 2 times daily     tamsulosin 0.4 MG capsule  Commonly known as: FLOMAX  Take 1 capsule by mouth daily               Where to Get Your Medications        These medications were sent to [facility] [id number] - [address] - P [phone] - F [phone]  [facility] [address]      Phone: [phone]   aspirin 325 MG tablet  cyclobenzaprine 10 MG tablet  oxyCODONE HCl 10 MG immediate release tablet           Plan  Discharge instructions were given along with prescriptions for pain medications as well as DVT prophylaxis.    Plan is to follow up in approximately 2 weeks as scheduled for a wound check and x-rays.  Patient was instructed on the use of pain medications, the signs and symptoms of infection or blood clot, and was given our number to call should they have any questions or concerns following discharge.    Estevan Sanford DO,

## 2024-10-26 VITALS
SYSTOLIC BLOOD PRESSURE: 118 MMHG | WEIGHT: 315 LBS | HEIGHT: 70 IN | HEART RATE: 84 BPM | BODY MASS INDEX: 45.1 KG/M2 | RESPIRATION RATE: 18 BRPM | TEMPERATURE: 98.1 F | OXYGEN SATURATION: 91 % | DIASTOLIC BLOOD PRESSURE: 64 MMHG

## 2024-10-26 LAB
GLUCOSE BLD-MCNC: 129 MG/DL (ref 74–99)
GLUCOSE BLD-MCNC: 137 MG/DL (ref 74–99)

## 2024-10-26 PROCEDURE — 6360000002 HC RX W HCPCS: Performed by: ORTHOPAEDIC SURGERY

## 2024-10-26 PROCEDURE — 94150 VITAL CAPACITY TEST: CPT

## 2024-10-26 PROCEDURE — 94761 N-INVAS EAR/PLS OXIMETRY MLT: CPT

## 2024-10-26 PROCEDURE — 6370000000 HC RX 637 (ALT 250 FOR IP): Performed by: ORTHOPAEDIC SURGERY

## 2024-10-26 PROCEDURE — 2580000003 HC RX 258: Performed by: ORTHOPAEDIC SURGERY

## 2024-10-26 PROCEDURE — 97110 THERAPEUTIC EXERCISES: CPT

## 2024-10-26 PROCEDURE — 6370000000 HC RX 637 (ALT 250 FOR IP): Performed by: INTERNAL MEDICINE

## 2024-10-26 PROCEDURE — 97116 GAIT TRAINING THERAPY: CPT

## 2024-10-26 PROCEDURE — 2700000000 HC OXYGEN THERAPY PER DAY

## 2024-10-26 PROCEDURE — 97530 THERAPEUTIC ACTIVITIES: CPT

## 2024-10-26 PROCEDURE — 82962 GLUCOSE BLOOD TEST: CPT

## 2024-10-26 RX ORDER — DOXYCYCLINE HYCLATE 100 MG
100 TABLET ORAL 2 TIMES DAILY
Qty: 10 TABLET | Refills: 0 | Status: SHIPPED | OUTPATIENT
Start: 2024-10-26 | End: 2024-10-31

## 2024-10-26 RX ORDER — FUROSEMIDE 20 MG/1
20 TABLET ORAL 2 TIMES DAILY
Qty: 60 TABLET | Refills: 2 | Status: SHIPPED | OUTPATIENT
Start: 2024-10-26

## 2024-10-26 RX ORDER — ALBUTEROL SULFATE 90 UG/1
2 INHALANT RESPIRATORY (INHALATION) 4 TIMES DAILY PRN
Qty: 54 G | Refills: 1 | Status: SHIPPED | OUTPATIENT
Start: 2024-10-26

## 2024-10-26 RX ORDER — METHYLPREDNISOLONE 4 MG/1
TABLET ORAL
Qty: 1 KIT | Refills: 0 | Status: SHIPPED | OUTPATIENT
Start: 2024-10-26 | End: 2024-11-01

## 2024-10-26 RX ADMIN — SODIUM CHLORIDE, PRESERVATIVE FREE 10 ML: 5 INJECTION INTRAVENOUS at 09:54

## 2024-10-26 RX ADMIN — OXYCODONE HYDROCHLORIDE 10 MG: 10 TABLET ORAL at 09:50

## 2024-10-26 RX ADMIN — ESCITALOPRAM OXALATE 10 MG: 10 TABLET ORAL at 09:49

## 2024-10-26 RX ADMIN — ACETAMINOPHEN 650 MG: 325 TABLET ORAL at 09:49

## 2024-10-26 RX ADMIN — ASPIRIN 325 MG: 325 TABLET ORAL at 09:49

## 2024-10-26 RX ADMIN — INSULIN GLARGINE 10 UNITS: 100 INJECTION, SOLUTION SUBCUTANEOUS at 09:52

## 2024-10-26 RX ADMIN — PANTOPRAZOLE SODIUM 40 MG: 40 TABLET, DELAYED RELEASE ORAL at 05:37

## 2024-10-26 RX ADMIN — SODIUM CHLORIDE, PRESERVATIVE FREE 10 ML: 5 INJECTION INTRAVENOUS at 00:26

## 2024-10-26 RX ADMIN — KETOROLAC TROMETHAMINE 15 MG: 30 INJECTION, SOLUTION INTRAMUSCULAR; INTRAVENOUS at 05:36

## 2024-10-26 RX ADMIN — ACETAMINOPHEN 650 MG: 325 TABLET ORAL at 05:37

## 2024-10-26 RX ADMIN — KETOROLAC TROMETHAMINE 15 MG: 30 INJECTION, SOLUTION INTRAMUSCULAR; INTRAVENOUS at 09:53

## 2024-10-26 RX ADMIN — AMLODIPINE BESYLATE 5 MG: 5 TABLET ORAL at 09:50

## 2024-10-26 RX ADMIN — KETOROLAC TROMETHAMINE 15 MG: 30 INJECTION, SOLUTION INTRAMUSCULAR; INTRAVENOUS at 00:25

## 2024-10-26 ASSESSMENT — PAIN DESCRIPTION - ORIENTATION
ORIENTATION: LEFT
ORIENTATION: LEFT
ORIENTATION: LEFT;RIGHT

## 2024-10-26 ASSESSMENT — PAIN SCALES - GENERAL
PAINLEVEL_OUTOF10: 8
PAINLEVEL_OUTOF10: 4
PAINLEVEL_OUTOF10: 9

## 2024-10-26 ASSESSMENT — PAIN DESCRIPTION - DESCRIPTORS
DESCRIPTORS: ACHING
DESCRIPTORS: DISCOMFORT
DESCRIPTORS: ACHING

## 2024-10-26 ASSESSMENT — PAIN - FUNCTIONAL ASSESSMENT
PAIN_FUNCTIONAL_ASSESSMENT: ACTIVITIES ARE NOT PREVENTED
PAIN_FUNCTIONAL_ASSESSMENT: ACTIVITIES ARE NOT PREVENTED

## 2024-10-26 ASSESSMENT — PAIN DESCRIPTION - LOCATION
LOCATION: LEG;KNEE
LOCATION: KNEE
LOCATION: KNEE

## 2024-10-26 NOTE — CARE COORDINATION
KERRI RN reviewed chart and saw pt at bedside.  Pt had Lt knee surgery and new to home oxygen, set up with [employer].  Pt states [facility] has [facility] past.  [facility]  KERRI RN called in referral and faxed referral.  Pt does walker at home.   KERRI RN sent ps to requesting [facility] order.    Upon d/c please call [facility] [phone] and fax [facility] order to [phone]

## 2024-10-26 NOTE — PROGRESS NOTES
V2.0    [facility] Progress Note      Name:  Sai Reyes /Age/Sex: 1950  (74 y.o. male)   MRN & CSN:  [id number] & [id number] Encounter Date/Time: 10/26/2024 9:43 AM EDT   Location:  [address] [place] [address] PCP: Mik Rivas MD     Attending:Brennan Erazo DO       Hospital Day: 3    Assessment and Recommendations   Sai Reyes is a 74 y.o. male  who presents with Arthritis of left knee    1.  Acute hypoxemic respiratory failure  -Likely secondary to COPD/CHF exacerbation  -Desaturated on room to room air to the 80s postop  -Chest x-ray showed cardiomegaly with pulmonary edema  -Started on diuretics.  Patient noncompliant with home diuretics.  -Patient can discharge on Medrol Dosepak/doxycycline 100 mg twice daily for 5 days/albuterol inhaler as needed  -Completed home O2 eval and patient will be discharged on home oxygen  -2D echo not available over the weekend.  Recommend outpatient follow-up with cardiology for further workup  -Counseled on smoking cessation    2.  Left knee osteoarthritis status post total knee replacement  -Continue postop management per orthopedic surgery    Continue management for rest of chronic medical conditions  -BHARGAVI-counseled on compliance with home CPAP  -Diabetes mellitus-on pioglitazone.  Follow-up with PCP.  Follow-up on echo results.  If low EF consider discontinuing pioglitazone  -Essential hypertension-resume home medications.  Follow-up outpatient for BP monitoring.            Personally reviewed Lab Studies and Imaging           Subjective:     Chief Complaint:     Patient seen and examined at bedside this morning.  Reports productive cough.  Reports active tobacco use.  Denies chest pain, nausea vomiting, fever or chills      Review of Systems:      Pertinent positives and negatives discussed in HPI    Objective:     Intake/Output Summary (Last 24 hours) at 10/26/2024 0943  Last data filed at 10/26/2024 0028  Gross per 24 hour   Intake --   Output 625 ml   Net -625 ml

## 2024-10-26 NOTE — PROGRESS NOTES
Clarification. This RN has confirmed Home O2 company contact and home O2 is planned to be setup today before pt discharge home. Family to notify pt when setup done. Referral for home care [facility] still needs completed. Not done by this RN. Case management notified. Message left with  Liz.

## 2024-10-26 NOTE — PROGRESS NOTES
Sai Reyes (1950)    Daily Progress Note-  Estevan Sanford DO                   Today's Date:   10/26/2024          Subjective:   Patient seen and examined resting comfortably at side of bed, oxygen cannula placed at this time, beginning to start physical therapy this a.m.  Doing better postoperatively.    Pain controlled  No issues overnight per nursing staff  States his therapy is going well so    Objective:    No data found.       Vitals:    10/25/24 2045   BP: (!) 140/49   Pulse: 87   Resp: 18   Temp: 98.1 °F (36.7 °C)   SpO2: 92%        Physical Exam:     The patient is awake and alert  Resting comfortably in bed    Operative extremity:    The dressing is clean dry and intact  Sensation and motor function intact distally  Leg lengths equal and appropriately aligned  Minimally tender over incision sites with no hematoma    No additional areas of tenderness or pain in the bilateral upper extremities or contralateral lower extremity.  Neurovascularly intact throughout bilateral upper extremities and contralateral lower extremity    Brisk capillary refill and negative Homans bilaterally.  Compartments are soft and compressible      LABS   CBC:   Recent Labs     10/25/24  0049 10/25/24  1757   WBC  --  19.4*   HGB 14.8 14.0   PLT  --  224       IMAGING   2 views left knee demonstrate:  No acute fracture or dislocation. Left knee arthroplasty appears intact.     IMPRESSION:  No acute findings    Assessment and Plan     1.  POD # 2 left total knee arthroplasty    1:  Physical, Occupational therapy consult for mobilization   -WBAT   -No additional precautions  2:  DVT prophylaxis   -As ordered per Dr. Ballard  3:  Continue Pain Control   -wean to PO meds  4.  Medical management per hospitalist service   -Plan is for home O2   -Awaiting clearance from medicine  5.  D/C Planning:     -Per

## 2024-10-26 NOTE — PROGRESS NOTES
Physical Therapy    Physical Therapy Treatment Note  Name: Sai Reyes MRN: [id number] :   1950   Date:  10/26/2024   Admission Date: 10/24/2024 Room:  [address] [place] [address]   Restrictions/Precautions:         total left knee replacement  WBAT L LE, general precautions, fall risk   Communication with other providers:  per chart pt is appropriate for tx session  Subjective:  Patient states:  pt motivated and agreeable to tx. Pt states \" I can't lift my leg\"  Pain:   Location, Type, Intensity (0/10 to 10/10):  rates pain at end of tx 8 and applied polar pk  Objective:    Observation:  alert and oriented  Objective Measures: on room air O2 is 92%  Treatment, including education/measures:  Pt dependent to don sock before amb  Pt given leg  and educated on use for ex and assist to bend left knee.  Sup to sit sba but needing increased time and effort  Sit<=>stand from bed to chair sba and cues for safety  Amb with rw 80' sba and cues for walker safety with turning. Pt has SOB needing cues for PLB.  Pt given written copy of ex:  Trunk stretches with deep breathing  10 reps aps  10 reps quad sets  10 reps glut sets  10 reps heel slide with leg  assist  10 reps laqs with leg  assist  Safety  Patient left safely in the chair, with call light/phone in reach with alarm applied. Gait belt was used for transfers and gait.   Assessment / Impression:      Patient's tolerance of treatment:  good   Adverse Reaction: na  Significant change in status and impact:  na  Barriers to improvement:  strength and safety  Plan for Next Session:    Cont. POC                Time in:  825  Time out:  905  Timed treatment minutes:  40  Total treatment time:  40    Previously filed items:  Social/Functional History  Lives With: Significant other  Type of Home: House  Home Layout: One level  Home Access: Level entry  Bathroom Shower/Tub: Tub/Shower unit  Bathroom Toilet: Handicap height  Bathroom Equipment: Grab bars around

## 2024-10-26 NOTE — PROGRESS NOTES
Physical Therapy  Att. Pt refused at this time stating \" I am not in the mood !\". Nurse Maci is aware of pt's refusal.

## 2024-10-28 ENCOUNTER — TELEPHONE (OUTPATIENT)
Dept: FAMILY MEDICINE CLINIC | Age: 74
End: 2024-10-28

## 2024-10-28 NOTE — TELEPHONE ENCOUNTER
[facility] called stating they received a referral for home care from [facility]. She stated patient just had a total knee replacement. She wants to know if you will follow orders.

## 2024-10-31 ENCOUNTER — TELEPHONE (OUTPATIENT)
Dept: FAMILY MEDICINE CLINIC | Age: 74
End: 2024-10-31

## 2024-11-04 DIAGNOSIS — E11.9 TYPE 2 DIABETES MELLITUS WITHOUT COMPLICATION, WITHOUT LONG-TERM CURRENT USE OF INSULIN (HCC): ICD-10-CM

## 2024-11-05 ENCOUNTER — TELEPHONE (OUTPATIENT)
Dept: ORTHOPEDIC SURGERY | Age: 74
End: 2024-11-05

## 2024-11-05 DIAGNOSIS — Z96.652 STATUS POST LEFT KNEE REPLACEMENT: Primary | ICD-10-CM

## 2024-11-05 RX ORDER — OXYCODONE HYDROCHLORIDE 10 MG/1
10 TABLET ORAL EVERY 6 HOURS PRN
Qty: 28 TABLET | Refills: 0 | Status: SHIPPED | OUTPATIENT
Start: 2024-11-05 | End: 2024-11-12

## 2024-11-05 NOTE — TELEPHONE ENCOUNTER
Called and informed patient RX was sent to [facility] pharmacy. No other questions or concerns at this time.

## 2024-11-05 NOTE — TELEPHONE ENCOUNTER
Sai called into the office requesting a refill on his pain medication. He stated he had his surgery on 10/24/24 and requesting a refill on oxyCODONE HCl 10 MG.

## 2024-11-07 RX ORDER — INSULIN GLARGINE 100 [IU]/ML
10-50 INJECTION, SOLUTION SUBCUTANEOUS NIGHTLY
Qty: 15 ML | Refills: 2 | Status: SHIPPED | OUTPATIENT
Start: 2024-11-07

## 2024-11-11 ENCOUNTER — APPOINTMENT (OUTPATIENT)
Dept: GENERAL RADIOLOGY | Age: 74
End: 2024-11-11
Payer: MEDICARE

## 2024-11-11 ENCOUNTER — TELEPHONE (OUTPATIENT)
Dept: ORTHOPEDIC SURGERY | Age: 74
End: 2024-11-11

## 2024-11-11 ENCOUNTER — HOSPITAL ENCOUNTER (EMERGENCY)
Age: 74
Discharge: HOME | End: 2024-11-12
Attending: EMERGENCY MEDICINE
Payer: MEDICARE

## 2024-11-11 VITALS
SYSTOLIC BLOOD PRESSURE: 141 MMHG | RESPIRATION RATE: 17 BRPM | WEIGHT: 315 LBS | BODY MASS INDEX: 47.06 KG/M2 | OXYGEN SATURATION: 99 % | TEMPERATURE: 97.2 F | HEART RATE: 95 BPM | DIASTOLIC BLOOD PRESSURE: 71 MMHG

## 2024-11-11 DIAGNOSIS — M25.562 ACUTE PAIN OF LEFT KNEE: Primary | ICD-10-CM

## 2024-11-11 PROCEDURE — 73564 X-RAY EXAM KNEE 4 OR MORE: CPT

## 2024-11-11 PROCEDURE — 99283 EMERGENCY DEPT VISIT LOW MDM: CPT

## 2024-11-11 ASSESSMENT — PAIN DESCRIPTION - ORIENTATION: ORIENTATION: LEFT

## 2024-11-11 ASSESSMENT — PAIN - FUNCTIONAL ASSESSMENT
PAIN_FUNCTIONAL_ASSESSMENT: NONE - DENIES PAIN
PAIN_FUNCTIONAL_ASSESSMENT: 0-10

## 2024-11-11 ASSESSMENT — LIFESTYLE VARIABLES
HOW MANY STANDARD DRINKS CONTAINING ALCOHOL DO YOU HAVE ON A TYPICAL DAY: PATIENT DOES NOT DRINK
HOW OFTEN DO YOU HAVE A DRINK CONTAINING ALCOHOL: NEVER

## 2024-11-11 ASSESSMENT — PAIN DESCRIPTION - LOCATION: LOCATION: BACK;SHOULDER;KNEE

## 2024-11-11 ASSESSMENT — PAIN SCALES - GENERAL: PAINLEVEL_OUTOF10: 10

## 2024-11-11 ASSESSMENT — PAIN DESCRIPTION - FREQUENCY: FREQUENCY: CONTINUOUS

## 2024-11-11 NOTE — TELEPHONE ENCOUNTER
Spooke to the patients girlfriend today. She states that the patient fell last night and landed on his back and knee. She also stated that he is now having hallucinations after the fall. I highly advised the patient to go to the ED to be evaluated. Girlfriend stated she will have to call the squad to come get him and verbally understood

## 2024-11-11 NOTE — ED PROVIDER NOTES
1    polyethylene glycol-electrolytes (NULYTELY) 420 g solution  (Patient not taking: Reported on 10/19/2024)      Compression Stockings MISC by Does not apply route 20-30 mM HG    Wear daily 2 each 0    sildenafil (VIAGRA) 100 MG tablet Take 1 tablet by mouth as needed for Erectile Dysfunction 30 tablet 2     Allergies   Allergen Reactions    Sulfa Antibiotics Rash       Nursing Notes Reviewed    Physical Exam:  Triage VS:    ED Triage Vitals   Encounter Vitals Group      BP 11/11/24 1235 (!) 141/71      Systolic BP Percentile --       Diastolic BP Percentile --       Pulse 11/11/24 1235 95      Respirations 11/11/24 1235 17      Temp 11/11/24 1235 97.2 °F (36.2 °C)      Temp src --       SpO2 11/11/24 1235 99 %      Weight - Scale 11/11/24 1228 (!) 148.8 kg (328 lb)      Height --       Head Circumference --       Peak Flow --       Pain Score --       Pain Loc --       Pain Education --       Exclude from Growth Chart --        My pulse ox interpretation is - normal    General appearance:  No acute distress.   Skin:  Warm. Dry.   Eye:  Extraocular movements intact.     Ears, nose, mouth and throat:  Oral mucosa moist   Neck: Neck supple without tenderness  Extremity: Some diffuse swelling noted of the left knee but no increased warmth to touch or erythema or crepitance.  Incision intact with dressing on.  Range of motion is intact.  Extremities neurovascular intact.  Bilateral hip examination and as well as the examination of the upper extremity and chest and belly are completely unremarkable     Heart:  Regular rate and rhythm, normal S1 & S2, no extra heart sounds.    Perfusion:  intact  Respiratory:  Lungs clear to auscultation bilaterally.  Respirations nonlabored.     Abdominal:  Normal bowel sounds.  Soft.  Nontender.  Non distended.  Back:  No CVA tenderness to palpation     Neurological:  Alert and oriented times 3.  No focal neuro deficits.             Psychiatric:  Appropriate    I have reviewed and

## 2024-11-12 ENCOUNTER — TELEPHONE (OUTPATIENT)
Dept: FAMILY MEDICINE CLINIC | Age: 74
End: 2024-11-12

## 2024-11-12 ENCOUNTER — TELEPHONE (OUTPATIENT)
Dept: ORTHOPEDIC SURGERY | Age: 74
End: 2024-11-12

## 2024-11-12 NOTE — TELEPHONE ENCOUNTER
Delta with [facility] called stated they are working  with patient after knee surgery.  Patient told Delta he had a fall on Sunday he was getting into truck and slipped off side step landing on knees, back and hitting head.  Patient stated he went to ED on Monday for x-rays and all was ok

## 2024-11-12 NOTE — TELEPHONE ENCOUNTER
Spoke with patient girlfrienfrankie Cabrera. Sunday night the patient fell and injuring the knee. Patient states that he is still having hallucinations and he is not wanting to keep the ice on the knee for no longer than 10 mins because he wants to go outside and smoke. Patient girlienfrankie is upset with the hospital because the patient fell flat on his back and the hospital refused to do any blood work. No x-rays of the back, head, or anything that was hurting on the patient. Patient girlfrienfrankie is very upset.

## 2024-11-18 ENCOUNTER — TELEPHONE (OUTPATIENT)
Dept: FAMILY MEDICINE CLINIC | Age: 74
End: 2024-11-18

## 2024-11-18 NOTE — TELEPHONE ENCOUNTER
----- Message from Sandra JIN sent at 11/18/2024 11:55 AM EST -----  Regarding: ECC Appointment Request  ECC Appointment Request    Patient needs appointment for ECC Appointment Type: ED Follow-Up.    Patient Requested Dates(s): Earliest appointment available   Patient Requested Time: afternoon time   Provider Name:  Mik Rivas MD    Reason for Appointment Request: Other     ED follow up / patient fell and went to the emergency department last week at [facility] and patient wants to know if his insulin can be taken off.  --------------------------------------------------------------------------------------------------------------------------    Relationship to Patient: Self     Call Back Information: OK to leave message on [email or web address]  Preferred Call Back Number: Phone [phone]

## 2024-11-19 ENCOUNTER — TELEPHONE (OUTPATIENT)
Dept: ORTHOPEDIC SURGERY | Age: 74
End: 2024-11-19

## 2024-11-19 DIAGNOSIS — Z96.652 STATUS POST LEFT KNEE REPLACEMENT: Primary | ICD-10-CM

## 2024-11-19 RX ORDER — OXYCODONE HYDROCHLORIDE 5 MG/1
5 TABLET ORAL EVERY 6 HOURS PRN
Qty: 28 TABLET | Refills: 0 | Status: SHIPPED | OUTPATIENT
Start: 2024-11-19 | End: 2024-11-26

## 2024-11-19 NOTE — TELEPHONE ENCOUNTER
Patient LVM for pain med refill, Left TKA DOS 10/24/2024. Please send to:   [facility] [id number] - [place], [place] - [address] - P [phone] - F [phone]   [facility] [address]  Phone: [phone]  Fax: [phone]

## 2024-11-25 ENCOUNTER — OFFICE VISIT (OUTPATIENT)
Dept: FAMILY MEDICINE CLINIC | Age: 74
End: 2024-11-25

## 2024-11-25 VITALS
SYSTOLIC BLOOD PRESSURE: 118 MMHG | WEIGHT: 314.4 LBS | DIASTOLIC BLOOD PRESSURE: 72 MMHG | TEMPERATURE: 97.3 F | OXYGEN SATURATION: 97 % | BODY MASS INDEX: 45.11 KG/M2 | HEART RATE: 111 BPM

## 2024-11-25 DIAGNOSIS — E11.69 HYPERLIPIDEMIA ASSOCIATED WITH TYPE 2 DIABETES MELLITUS (HCC): ICD-10-CM

## 2024-11-25 DIAGNOSIS — E78.5 HYPERLIPIDEMIA ASSOCIATED WITH TYPE 2 DIABETES MELLITUS (HCC): ICD-10-CM

## 2024-11-25 DIAGNOSIS — G62.9 PERIPHERAL POLYNEUROPATHY: ICD-10-CM

## 2024-11-25 DIAGNOSIS — K21.9 GASTROESOPHAGEAL REFLUX DISEASE WITHOUT ESOPHAGITIS: ICD-10-CM
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
## 2024-11-25 DIAGNOSIS — E66.01 OBESITY, CLASS III, BMI 40-49.9 (MORBID OBESITY): ICD-10-CM

## 2024-11-25 DIAGNOSIS — I10 ESSENTIAL HYPERTENSION: ICD-10-CM

## 2024-11-25 DIAGNOSIS — E11.9 TYPE 2 DIABETES MELLITUS WITHOUT COMPLICATION, WITHOUT LONG-TERM CURRENT USE OF INSULIN (HCC): Primary | ICD-10-CM

## 2024-11-25 LAB — HBA1C MFR BLD: 6.2 %

## 2024-11-25 RX ORDER — ATORVASTATIN CALCIUM 40 MG/1
40 TABLET, FILM COATED ORAL DAILY
Qty: 90 TABLET | Refills: 1 | Status: SHIPPED | OUTPATIENT
Start: 2024-11-25

## 2024-11-25 RX ORDER — TIRZEPATIDE 2.5 MG/.5ML
2.5 INJECTION, SOLUTION SUBCUTANEOUS WEEKLY
Qty: 2 ML | Refills: 2 | Status: SHIPPED | OUTPATIENT
Start: 2024-11-25

## 2024-11-25 RX ORDER — GABAPENTIN 600 MG/1
1200 TABLET ORAL 3 TIMES DAILY
Qty: 540 TABLET | Refills: 1 | Status: SHIPPED | OUTPATIENT
Start: 2024-11-25 | End: 2025-05-24

## 2024-11-25 RX ORDER — AMLODIPINE BESYLATE 5 MG/1
TABLET ORAL
Qty: 90 TABLET | Refills: 1 | Status: SHIPPED | OUTPATIENT
Start: 2024-11-25

## 2024-11-25 RX ORDER — PIOGLITAZONE 45 MG/1
45 TABLET ORAL DAILY
Qty: 90 TABLET | Refills: 1 | Status: SHIPPED | OUTPATIENT
Start: 2024-11-25

## 2024-11-25 RX ORDER — PANTOPRAZOLE SODIUM 40 MG/1
TABLET, DELAYED RELEASE ORAL
Qty: 90 TABLET | Refills: 1 | Status: SHIPPED | OUTPATIENT
Start: 2024-11-25

## 2024-11-25 RX ORDER — LISINOPRIL 5 MG/1
TABLET ORAL
Qty: 90 TABLET | Refills: 1 | Status: SHIPPED | OUTPATIENT
Start: 2024-11-25

## 2024-11-25 NOTE — PROGRESS NOTES
History of Present Illness  The patient presents for evaluation of multiple medical concerns.    He experienced a fall while entering his truck two weeks ago, resulting in a locked knee. Despite an ER visit the following day, where no fractures were detected, he continues to experience pain in his back, shoulders, and both knees. He is seeking additional home therapy for his knee, which was previously provided by orthopedics post-surgery. He has an upcoming appointment with Dr. Babin on 12/04/2024.    He is managing his diabetes with insulin, currently at a dosage of 22 units, and Actos. His blood sugar levels are around 150.    He is also taking furosemide twice daily, lisinopril, amlodipine, and atorvastatin. He reports no issues with atorvastatin. He has not taken furosemide in a few weeks due to difficulty handling it. He has discontinued Lexapro, prostate medication, and potassium.    He is requesting a refill of gabapentin, which he takes 2 tablets 3 times daily. He uses albuterol inhalers and oxygen primarily during sleep.         Current Outpatient Medications   Medication Sig Dispense Refill    oxyCODONE (ROXICODONE) 5 MG immediate release tablet Take 1 tablet by mouth every 6 hours as needed for Pain for up to 7 days. Intended supply: 3 days. Take lowest dose possible to manage pain Max Daily Amount: 20 mg 28 tablet 0    insulin glargine (LANTUS SOLOSTAR) 100 UNIT/ML injection pen INJECT 10-50 UNITS INTO THE SKIN NIGHTLY 15 mL 2    albuterol sulfate HFA (VENTOLIN HFA) 108 (90 Base) MCG/ACT inhaler Inhale 2 puffs into the lungs 4 times daily as needed for Wheezing 54 g 1    aspirin 325 MG tablet Take 1 tablet by mouth in the morning and at bedtime for 14 days 28 tablet 0    gabapentin (NEURONTIN) 600 MG tablet Take 2 tablets by mouth 3 times daily for 180 days. 180 tablet 3    amLODIPine (NORVASC) 5 MG tablet TAKE 1 TABLET EVERY MORNING 90 tablet 1    pioglitazone (ACTOS) 45 MG tablet Take 1 tablet by

## 2024-12-04 ENCOUNTER — OFFICE VISIT (OUTPATIENT)
Dept: ORTHOPEDIC SURGERY | Age: 74
End: 2024-12-04

## 2024-12-04 VITALS — HEART RATE: 64 BPM | RESPIRATION RATE: 14 BRPM | OXYGEN SATURATION: 93 %

## 2024-12-04 DIAGNOSIS — Z96.652 S/P TOTAL KNEE ARTHROPLASTY, LEFT: Primary | ICD-10-CM

## 2024-12-04 PROCEDURE — 99024 POSTOP FOLLOW-UP VISIT: CPT | Performed by: PHYSICIAN ASSISTANT

## 2024-12-04 NOTE — PROGRESS NOTES
Date of surgery:   10/24/2024  Surgeon: Dr. Erazo    History:  MrNanci Reyes is here in follow up regarding his left total knee arthroplasty.  He states that he did have an injury several weeks back where it felt like his left knee locked up and then he had a fall.  Since that time he has been a little bit behind in his extension but his flexion is doing well.  He is working with home physical therapy.    Physical:  Vitals:    12/04/24 1540   Pulse: 64   Resp: 14   SpO2: 93%        He demonstrates appropriate mood and affect.   Left knee incision is well-healed, no erythema.  No active drainage.  Range of motion of the left knee 5-115 degrees.    Imaging studies:  3 views of the left knee taken and reviewed in the office today show a left total knee arthroplasty in good position with no signs of loosening or fracture.    Impression: Status post above, doing well       Plan:   Patient Instructions   Continue doing physical therapy  Continue weight bear as tolerated  Ice and elevate as needed  Tylenol or Motrin for pain  Follow up in 6 weeks with Ck June    If you have any question post operatively. Please contact office to speak with Danay Ortho Navigator.     We are committed to providing you the best care possible.  If you receive a survey after visiting one of our offices, please take time to share your experience concerning your physician office visit.  These surveys are confidential and no health information about you is shared.  We are eager to improve for you and we are counting on your feedback to help make that happen.

## 2024-12-04 NOTE — PATIENT INSTRUCTIONS
Continue doing physical therapy  Continue weight bear as tolerated  Ice and elevate as needed  Tylenol or Motrin for pain  Follow up in 6 weeks with Ck June    If you have any question post operatively. Please contact office to speak with Danay Ortho Navigator.     We are committed to providing you the best care possible.  If you receive a survey after visiting one of our offices, please take time to share your experience concerning your physician office visit.  These surveys are confidential and no health information about you is shared.  We are eager to improve for you and we are counting on your feedback to help make that happen.

## 2024-12-10 ENCOUNTER — TELEPHONE (OUTPATIENT)
Dept: ORTHOPEDIC SURGERY | Age: 74
End: 2024-12-10

## 2024-12-10 NOTE — TELEPHONE ENCOUNTER
Please advise patient is wanting a medication refill, but he is wanting percocet sent in due to him having to still do PT.   I advised patient that there might be a chance that we might have to do OTC meds due to him being 6 weeks out from surgery.    Last refill was on 11/19/2024 Roxicodone  Pharm[facility]: [facility]  Left TKA DOS 10/24/2024

## 2024-12-23 ENCOUNTER — TELEPHONE (OUTPATIENT)
Dept: FAMILY MEDICINE CLINIC | Age: 74
End: 2024-12-23

## 2024-12-23 NOTE — TELEPHONE ENCOUNTER
Physical Therapist with [facility], Mik called for a verbal to extend physical therapy in the home.   Call back [phone]

## 2025-01-07 ENCOUNTER — TELEPHONE (OUTPATIENT)
Dept: FAMILY MEDICINE CLINIC | Age: 75
End: 2025-01-07

## 2025-01-07 NOTE — TELEPHONE ENCOUNTER
[facility]ze[facility] called stating they would like receive a called to schedule a peer to peer by 4 pm today before they deny the home health services. They stated the clinical documentation from the home health care company did not justify needs. She stated they needs to receive the call to schedule it by 4 pm today and have the peer to peer completed by 12 pm tomorrow.

## 2025-01-15 ENCOUNTER — OFFICE VISIT (OUTPATIENT)
Dept: ORTHOPEDIC SURGERY | Age: 75
End: 2025-01-15

## 2025-01-15 DIAGNOSIS — Z96.652 S/P TOTAL KNEE ARTHROPLASTY, LEFT: Primary | ICD-10-CM

## 2025-01-15 PROCEDURE — 99024 POSTOP FOLLOW-UP VISIT: CPT | Performed by: PHYSICIAN ASSISTANT

## 2025-01-15 NOTE — PATIENT INSTRUCTIONS
Recommend exercise and strengthening as much as possible.   Call this office in one year for a follow up.

## 2025-01-17 NOTE — PROGRESS NOTES
Date of surgery:   10/24/2024  Surgeon: Dr. Erazo    History:  Mr. Sai Reyes is here in follow up regarding his left total knee arthroplasty.  He states that the left knee is a little bit more stiff than the right one wants.  He is has good flexion but his extension is still lacking.  There were no vitals filed for this visit.       He demonstrates appropriate mood and affect.   Left knee incision is well-healed, no erythema.  No active drainage.  Range of motion of the left knee 7-115 degrees.    Imaging studies:  3 views of the left knee taken and reviewed in the office today show a left total knee arthroplasty in good position with no signs of loosening or fracture.    Impression: Status post above, doing well       Plan:   Patient Instructions   Recommend exercise and strengthening as much as possible.   Call this office in one year for a follow up.

## 2025-02-15 DIAGNOSIS — E11.9 TYPE 2 DIABETES MELLITUS WITHOUT COMPLICATION, WITHOUT LONG-TERM CURRENT USE OF INSULIN (HCC): ICD-10-CM

## 2025-02-15 RX ORDER — TIRZEPATIDE 2.5 MG/.5ML
INJECTION, SOLUTION SUBCUTANEOUS
Qty: 2 ML | Refills: 2 | OUTPATIENT
Start: 2025-02-15

## 2025-02-15 RX ORDER — TIRZEPATIDE 2.5 MG/.5ML
2.5 INJECTION, SOLUTION SUBCUTANEOUS WEEKLY
Qty: 2 ML | Refills: 0 | Status: SHIPPED | OUTPATIENT
Start: 2025-02-15

## 2025-02-25 ENCOUNTER — OFFICE VISIT (OUTPATIENT)
Dept: FAMILY MEDICINE CLINIC | Age: 75
End: 2025-02-25

## 2025-02-25 VITALS
BODY MASS INDEX: 44.82 KG/M2 | HEART RATE: 86 BPM | OXYGEN SATURATION: 94 % | SYSTOLIC BLOOD PRESSURE: 118 MMHG | DIASTOLIC BLOOD PRESSURE: 78 MMHG | TEMPERATURE: 98 F | WEIGHT: 312.4 LBS

## 2025-02-25 DIAGNOSIS — Z12.11 SCREENING FOR COLORECTAL CANCER: ICD-10-CM

## 2025-02-25 DIAGNOSIS — J41.1 MUCOPURULENT CHRONIC BRONCHITIS (HCC): ICD-10-CM

## 2025-02-25 DIAGNOSIS — Z87.891 PERSONAL HISTORY OF TOBACCO USE: ICD-10-CM

## 2025-02-25 DIAGNOSIS — E11.9 TYPE 2 DIABETES MELLITUS WITHOUT COMPLICATION, WITHOUT LONG-TERM CURRENT USE OF INSULIN (HCC): Primary | ICD-10-CM

## 2025-02-25 DIAGNOSIS — Z12.12 SCREENING FOR COLORECTAL CANCER: ICD-10-CM

## 2025-02-25 DIAGNOSIS — F10.20 UNCOMPLICATED ALCOHOL DEPENDENCE (HCC): ICD-10-CM

## 2025-02-25 DIAGNOSIS — S46.812A STRAIN OF LEFT TRAPEZIUS MUSCLE, INITIAL ENCOUNTER: ICD-10-CM

## 2025-02-25 DIAGNOSIS — E11.69 HYPERLIPIDEMIA ASSOCIATED WITH TYPE 2 DIABETES MELLITUS (HCC): ICD-10-CM

## 2025-02-25 DIAGNOSIS — Z00.00 MEDICARE ANNUAL WELLNESS VISIT, SUBSEQUENT: ICD-10-CM

## 2025-02-25 DIAGNOSIS — E78.5 HYPERLIPIDEMIA ASSOCIATED WITH TYPE 2 DIABETES MELLITUS (HCC): ICD-10-CM

## 2025-02-25 DIAGNOSIS — E66.813 OBESITY, CLASS 3: ICD-10-CM

## 2025-02-25 LAB — HBA1C MFR BLD: 5.9 %

## 2025-02-25 RX ORDER — BACLOFEN 10 MG/1
10 TABLET ORAL 3 TIMES DAILY
Qty: 30 TABLET | Refills: 0 | Status: SHIPPED | OUTPATIENT
Start: 2025-02-25

## 2025-02-25 ASSESSMENT — PATIENT HEALTH QUESTIONNAIRE - PHQ9
6. FEELING BAD ABOUT YOURSELF - OR THAT YOU ARE A FAILURE OR HAVE LET YOURSELF OR YOUR FAMILY DOWN: NOT AT ALL
SUM OF ALL RESPONSES TO PHQ QUESTIONS 1-9: 6
SUM OF ALL RESPONSES TO PHQ9 QUESTIONS 1 & 2: 0
4. FEELING TIRED OR HAVING LITTLE ENERGY: MORE THAN HALF THE DAYS
9. THOUGHTS THAT YOU WOULD BE BETTER OFF DEAD, OR OF HURTING YOURSELF: NOT AT ALL
SUM OF ALL RESPONSES TO PHQ QUESTIONS 1-9: 6
10. IF YOU CHECKED OFF ANY PROBLEMS, HOW DIFFICULT HAVE THESE PROBLEMS MADE IT FOR YOU TO DO YOUR WORK, TAKE CARE OF THINGS AT HOME, OR GET ALONG WITH OTHER PEOPLE: NOT DIFFICULT AT ALL
2. FEELING DOWN, DEPRESSED OR HOPELESS: NOT AT ALL
3. TROUBLE FALLING OR STAYING ASLEEP: MORE THAN HALF THE DAYS
SUM OF ALL RESPONSES TO PHQ QUESTIONS 1-9: 6
7. TROUBLE CONCENTRATING ON THINGS, SUCH AS READING THE NEWSPAPER OR WATCHING TELEVISION: NOT AT ALL
5. POOR APPETITE OR OVEREATING: MORE THAN HALF THE DAYS
8. MOVING OR SPEAKING SO SLOWLY THAT OTHER PEOPLE COULD HAVE NOTICED. OR THE OPPOSITE, BEING SO FIGETY OR RESTLESS THAT YOU HAVE BEEN MOVING AROUND A LOT MORE THAN USUAL: NOT AT ALL
SUM OF ALL RESPONSES TO PHQ QUESTIONS 1-9: 6
1. LITTLE INTEREST OR PLEASURE IN DOING THINGS: NOT AT ALL

## 2025-02-25 NOTE — PROGRESS NOTES
Medicare Annual Wellness Visit    Sai Reyes is here for 3 Month Follow-Up (DM2, HTN ) and Medicare AWV    Assessment & Plan   Type 2 diabetes mellitus without complication, without long-term current use of insulin (HCC)  -     Tirzepatide 5 MG/0.5ML SOAJ; Inject 5 mg into the skin every 7 days, Disp-2 mL, R-0Normal  -     POCT glycosylated hemoglobin (Hb A1C)  Mucopurulent chronic bronchitis (HCC)  Uncomplicated alcohol dependence (HCC)  Hyperlipidemia associated with type 2 diabetes mellitus (HCC)  Strain of left trapezius muscle, initial encounter  -     baclofen (LIORESAL) 10 MG tablet; Take 1 tablet by mouth 3 times daily, Disp-30 tablet, R-0Normal  Obesity, class 3 (E66.813)  Personal history of tobacco use  -     [place] VISIT TO DISCUSS LUNG CA SCREEN W LDCT  -     CT Lung Screen (Initial/Annual/Baseline); Future  Medicare annual wellness visit, subsequent  Screening for colorectal cancer  -     FIT-DNA (Cologuard)       Return in about 3 months (around 5/25/2025) for diabetes, HTN, hyperlipidemia.     Subjective       Patient's complete Health Risk Assessment and screening values have been reviewed and are found in Flowsheets. The following problems were reviewed today and where indicated follow up appointments were made and/or referrals ordered.    Positive Risk Factor Screenings with Interventions:      Depression:  PHQ-2 Score: 0  PHQ-9 Total Score: 6  Total Score Interpretation: 5-9 = mild depression  Interventions:  Patient declines any further evaluation or treatment         Self-assessment of health:  In general, how would you say your health is?: (!) Poor    Interventions:  Patient declines any further evaluation or treatment     Inactivity:  On average, how many days per week do you engage in moderate to strenuous exercise (like a brisk walk)?: 0 days (!) Abnormal  On average, how many minutes do you engage in exercise at this level?: 0 min  Interventions:  Recommend increasing exercise to 30

## 2025-02-25 NOTE — PATIENT INSTRUCTIONS
sure to contact your doctor if you have any problems.  Where can you learn more?  Go to https://[email or web address].net/patientEd and enter F075 to learn more about \"A Healthy Heart: Care Instructions.\"  Current as of: July 31, 2024  Content Version: 14.3  © 2024 [employer].   Care instructions adapted under license by [employer]. If you have questions about a medical condition or this instruction, always ask your healthcare professional. [employer] disclaims any warranty or liability for your use of this information.    Personalized Preventive Plan for Sai Reyes - 2/25/2025  Medicare offers a range of preventive health benefits. Some of the tests and screenings are paid in full while other may be subject to a deductible, co-insurance, and/or copay.  Some of these benefits include a comprehensive review of your medical history including lifestyle, illnesses that may run in your family, and various assessments and screenings as appropriate.  After reviewing your medical record and screening and assessments performed today your provider may have ordered immunizations, labs, imaging, and/or referrals for you.  A list of these orders (if applicable) as well as your Preventive Care list are included within your After Visit Summary for your review.

## 2025-02-25 NOTE — PROGRESS NOTES
History of Present Illness  The patient presents for evaluation of diabetes, neck pain, hypertension, hyperlipidemia, and health maintenance.    He has been managing his diabetes effectively, with blood glucose levels ranging between 140 and 150, as monitored every other day. He reports no episodes of hypoglycemia. He expresses a desire to lose more weight and is considering a dosage adjustment. He initially lost 20 pounds when he started the injections, but his appetite has since returned. His current medication regimen includes Mounjaro 2.5 and pioglitazone.    He has been experiencing persistent neck pain for approximately one month, which has been severe enough to limit his head mobility. The onset of the pain was sudden, occurring upon waking one morning. He reports no associated nausea or heartburn. The application of a heating pad has provided some relief.    He has discontinued the use of his CPAP machine. He reports no sexual activity. He experienced a fall within the past year, resulting in minor bruising, but subsequent evaluation in the emergency room revealed no significant injuries. He rates his overall health as poor, citing decreased mobility and weight-related depression as contributing factors. His diet is irregular and often unhealthy, typically consisting of one meal per day. He has not had a dental examination in the past year and currently uses dentures. His last ophthalmological examination was conducted approximately one year ago. He experiences morning visual blurriness but reports no difficulties with driving, television viewing, or daily activities. He also reports hearing impairment but expresses no interest in obtaining a hearing aid. He has functional smoke detectors at home and uses non-slip surfaces and grab bars in his bathroom. He consistently wears a seatbelt. He requires assistance with personal hygiene tasks such as bathing and toileting. He experiences back pain when standing

## 2025-03-30 ENCOUNTER — APPOINTMENT (OUTPATIENT)
Dept: GENERAL RADIOLOGY | Age: 75
DRG: 292 | End: 2025-03-30
Payer: MEDICARE

## 2025-03-30 ENCOUNTER — HOSPITAL ENCOUNTER (INPATIENT)
Age: 75
LOS: 2 days | Discharge: LEFT BEFORE DISCHARGE | DRG: 292 | End: 2025-04-01
Attending: STUDENT IN AN ORGANIZED HEALTH CARE EDUCATION/TRAINING PROGRAM | Admitting: STUDENT IN AN ORGANIZED HEALTH CARE EDUCATION/TRAINING PROGRAM
Payer: MEDICARE

## 2025-03-30 DIAGNOSIS — I50.9 ACUTE CONGESTIVE HEART FAILURE, UNSPECIFIED HEART FAILURE TYPE (HCC): ICD-10-CM

## 2025-03-30 DIAGNOSIS — I50.9 ACUTE DECOMPENSATED HEART FAILURE (HCC): Primary | ICD-10-CM

## 2025-03-30 DIAGNOSIS — R07.9 CHEST PAIN, UNSPECIFIED TYPE: ICD-10-CM

## 2025-03-30 LAB
ALBUMIN SERPL-MCNC: 3.3 G/DL (ref 3.4–5)
ALBUMIN SERPL-MCNC: NORMAL G/DL
ALBUMIN/GLOB SERPL: 0.9 {RATIO} (ref 1.1–2.2)
ALBUMIN/GLOB SERPL: NORMAL {RATIO}
ALP SERPL-CCNC: 68 U/L (ref 40–129)
ALP SERPL-CCNC: NORMAL U/L
ALT SERPL-CCNC: 16 U/L (ref 10–40)
ALT SERPL-CCNC: NORMAL U/L
ANION GAP SERPL CALCULATED.3IONS-SCNC: 14 MMOL/L (ref 9–17)
ANION GAP SERPL CALCULATED.3IONS-SCNC: NORMAL MMOL/L
AST SERPL-CCNC: 23 U/L (ref 15–37)
AST SERPL-CCNC: NORMAL U/L
BASOPHILS # BLD: 0.17 K/UL
BASOPHILS NFR BLD: 2 % (ref 0–1)
BILIRUB SERPL-MCNC: 0.3 MG/DL (ref 0–1)
BILIRUB SERPL-MCNC: NORMAL MG/DL
BILIRUB UR QL STRIP: NEGATIVE
BNP SERPL-MCNC: 218 PG/ML (ref 0–125)
BUN SERPL-MCNC: 13 MG/DL (ref 7–20)
BUN SERPL-MCNC: NORMAL MG/DL
CALCIUM SERPL-MCNC: 9 MG/DL (ref 8.3–10.6)
CALCIUM SERPL-MCNC: NORMAL MG/DL
CHLORIDE SERPL-SCNC: 106 MMOL/L (ref 99–110)
CHLORIDE SERPL-SCNC: NORMAL MMOL/L
CLARITY UR: CLEAR
CO2 SERPL-SCNC: 20 MMOL/L (ref 21–32)
CO2 SERPL-SCNC: NORMAL MMOL/L
COLOR UR: YELLOW
COMMENT: NORMAL
CREAT SERPL-MCNC: 0.7 MG/DL (ref 0.8–1.3)
CREAT SERPL-MCNC: NORMAL MG/DL
EKG ATRIAL RATE: 68 BPM
EKG DIAGNOSIS: NORMAL
EKG P AXIS: 47 DEGREES
EKG P-R INTERVAL: 168 MS
EKG Q-T INTERVAL: 424 MS
EKG QRS DURATION: 134 MS
EKG QTC CALCULATION (BAZETT): 450 MS
EKG R AXIS: -41 DEGREES
EKG T AXIS: 40 DEGREES
EKG VENTRICULAR RATE: 68 BPM
EOSINOPHIL # BLD: 0.4 K/UL
EOSINOPHILS RELATIVE PERCENT: 4 % (ref 0–3)
ERYTHROCYTE [DISTWIDTH] IN BLOOD BY AUTOMATED COUNT: 14.3 % (ref 11.7–14.9)
GFR, ESTIMATED: >90 ML/MIN/1.73M2
GFR, ESTIMATED: NORMAL ML/MIN/1.73M2
GLUCOSE SERPL-MCNC: 98 MG/DL (ref 74–99)
GLUCOSE SERPL-MCNC: NORMAL MG/DL
GLUCOSE UR STRIP-MCNC: NEGATIVE MG/DL
HCT VFR BLD AUTO: 51.8 % (ref 42–52)
HGB BLD-MCNC: 17.1 G/DL (ref 13.5–18)
HGB UR QL STRIP.AUTO: NEGATIVE
IMM GRANULOCYTES # BLD AUTO: 0.09 K/UL
IMM GRANULOCYTES NFR BLD: 1 %
KETONES UR STRIP-MCNC: NEGATIVE MG/DL
LEUKOCYTE ESTERASE UR QL STRIP: NEGATIVE
LYMPHOCYTES NFR BLD: 4.61 K/UL
LYMPHOCYTES RELATIVE PERCENT: 42 % (ref 24–44)
MAGNESIUM SERPL-MCNC: 1.9 MG/DL (ref 1.8–2.4)
MCH RBC QN AUTO: 30.1 PG (ref 27–31)
MCHC RBC AUTO-ENTMCNC: 33 G/DL (ref 32–36)
MCV RBC AUTO: 91 FL (ref 78–100)
MONOCYTES NFR BLD: 1.27 K/UL
MONOCYTES NFR BLD: 12 % (ref 0–4)
NEUTROPHILS NFR BLD: 40 % (ref 36–66)
NEUTS SEG NFR BLD: 4.35 K/UL
NITRITE UR QL STRIP: NEGATIVE
PH UR STRIP: 6 [PH] (ref 5–8)
PLATELET # BLD AUTO: 264 K/UL (ref 140–440)
PMV BLD AUTO: 11.3 FL (ref 7.5–11.1)
POTASSIUM SERPL-SCNC: 4 MMOL/L (ref 3.5–5.1)
POTASSIUM SERPL-SCNC: NORMAL MMOL/L
PROT SERPL-MCNC: 7.1 G/DL (ref 6.4–8.2)
PROT SERPL-MCNC: NORMAL G/DL
PROT UR STRIP-MCNC: NEGATIVE MG/DL
RBC # BLD AUTO: 5.69 M/UL (ref 4.6–6.2)
SODIUM SERPL-SCNC: 140 MMOL/L (ref 136–145)
SODIUM SERPL-SCNC: NORMAL MMOL/L
SP GR UR STRIP: 1.02 (ref 1–1.03)
TROPONIN I SERPL HS-MCNC: 12 NG/L (ref 0–22)
TROPONIN I SERPL HS-MCNC: 15 NG/L (ref 0–22)
TROPONIN I SERPL HS-MCNC: NORMAL NG/L (ref 0–22)
UROBILINOGEN UR STRIP-ACNC: 0.2 EU/DL (ref 0–1)
WBC OTHER # BLD: 10.9 K/UL (ref 4–10.5)

## 2025-03-30 PROCEDURE — 83735 ASSAY OF MAGNESIUM: CPT

## 2025-03-30 PROCEDURE — 93005 ELECTROCARDIOGRAM TRACING: CPT | Performed by: STUDENT IN AN ORGANIZED HEALTH CARE EDUCATION/TRAINING PROGRAM

## 2025-03-30 PROCEDURE — 71045 X-RAY EXAM CHEST 1 VIEW: CPT

## 2025-03-30 PROCEDURE — 80053 COMPREHEN METABOLIC PANEL: CPT

## 2025-03-30 PROCEDURE — 81003 URINALYSIS AUTO W/O SCOPE: CPT

## 2025-03-30 PROCEDURE — 83880 ASSAY OF NATRIURETIC PEPTIDE: CPT

## 2025-03-30 PROCEDURE — 96374 THER/PROPH/DIAG INJ IV PUSH: CPT

## 2025-03-30 PROCEDURE — 99285 EMERGENCY DEPT VISIT HI MDM: CPT

## 2025-03-30 PROCEDURE — 84484 ASSAY OF TROPONIN QUANT: CPT

## 2025-03-30 PROCEDURE — 6370000000 HC RX 637 (ALT 250 FOR IP)

## 2025-03-30 PROCEDURE — 76937 US GUIDE VASCULAR ACCESS: CPT

## 2025-03-30 PROCEDURE — 85025 COMPLETE CBC W/AUTO DIFF WBC: CPT

## 2025-03-30 PROCEDURE — 6360000002 HC RX W HCPCS

## 2025-03-30 PROCEDURE — 93010 ELECTROCARDIOGRAM REPORT: CPT | Performed by: INTERNAL MEDICINE

## 2025-03-30 PROCEDURE — 1200000000 HC SEMI PRIVATE

## 2025-03-30 RX ORDER — FUROSEMIDE 10 MG/ML
40 INJECTION INTRAMUSCULAR; INTRAVENOUS ONCE
Status: COMPLETED | OUTPATIENT
Start: 2025-03-30 | End: 2025-03-30

## 2025-03-30 RX ORDER — GLUCAGON 1 MG/ML
1 KIT INJECTION PRN
Status: DISCONTINUED | OUTPATIENT
Start: 2025-03-30 | End: 2025-04-01 | Stop reason: HOSPADM

## 2025-03-30 RX ORDER — ONDANSETRON 4 MG/1
4 TABLET, ORALLY DISINTEGRATING ORAL EVERY 8 HOURS PRN
Status: DISCONTINUED | OUTPATIENT
Start: 2025-03-30 | End: 2025-04-01 | Stop reason: HOSPADM

## 2025-03-30 RX ORDER — ENOXAPARIN SODIUM 100 MG/ML
30 INJECTION SUBCUTANEOUS 2 TIMES DAILY
Status: DISCONTINUED | OUTPATIENT
Start: 2025-03-31 | End: 2025-04-01 | Stop reason: HOSPADM

## 2025-03-30 RX ORDER — ACETAMINOPHEN 650 MG/1
650 SUPPOSITORY RECTAL EVERY 6 HOURS PRN
Status: DISCONTINUED | OUTPATIENT
Start: 2025-03-30 | End: 2025-04-01 | Stop reason: HOSPADM

## 2025-03-30 RX ORDER — GABAPENTIN 400 MG/1
1200 CAPSULE ORAL 3 TIMES DAILY
Status: DISCONTINUED | OUTPATIENT
Start: 2025-03-31 | End: 2025-04-01 | Stop reason: HOSPADM

## 2025-03-30 RX ORDER — ALBUTEROL SULFATE 90 UG/1
2 INHALANT RESPIRATORY (INHALATION) 4 TIMES DAILY PRN
Status: DISCONTINUED | OUTPATIENT
Start: 2025-03-30 | End: 2025-04-01 | Stop reason: HOSPADM

## 2025-03-30 RX ORDER — AMLODIPINE BESYLATE 5 MG/1
5 TABLET ORAL DAILY
Status: DISCONTINUED | OUTPATIENT
Start: 2025-03-31 | End: 2025-04-01 | Stop reason: HOSPADM

## 2025-03-30 RX ORDER — SODIUM CHLORIDE 0.9 % (FLUSH) 0.9 %
5-40 SYRINGE (ML) INJECTION PRN
Status: DISCONTINUED | OUTPATIENT
Start: 2025-03-30 | End: 2025-04-01 | Stop reason: HOSPADM

## 2025-03-30 RX ORDER — NITROGLYCERIN 0.4 MG/1
0.4 TABLET SUBLINGUAL ONCE
Status: COMPLETED | OUTPATIENT
Start: 2025-03-30 | End: 2025-03-30

## 2025-03-30 RX ORDER — NICOTINE 21 MG/24HR
1 PATCH, TRANSDERMAL 24 HOURS TRANSDERMAL DAILY
Status: DISCONTINUED | OUTPATIENT
Start: 2025-03-31 | End: 2025-04-01 | Stop reason: HOSPADM

## 2025-03-30 RX ORDER — POTASSIUM CHLORIDE 7.45 MG/ML
10 INJECTION INTRAVENOUS PRN
Status: DISCONTINUED | OUTPATIENT
Start: 2025-03-30 | End: 2025-04-01 | Stop reason: HOSPADM

## 2025-03-30 RX ORDER — FUROSEMIDE 10 MG/ML
40 INJECTION INTRAMUSCULAR; INTRAVENOUS 2 TIMES DAILY
Status: DISCONTINUED | OUTPATIENT
Start: 2025-03-31 | End: 2025-04-01 | Stop reason: HOSPADM

## 2025-03-30 RX ORDER — DEXTROSE MONOHYDRATE 100 MG/ML
INJECTION, SOLUTION INTRAVENOUS CONTINUOUS PRN
Status: DISCONTINUED | OUTPATIENT
Start: 2025-03-30 | End: 2025-04-01 | Stop reason: HOSPADM

## 2025-03-30 RX ORDER — MAGNESIUM SULFATE IN WATER 40 MG/ML
2000 INJECTION, SOLUTION INTRAVENOUS PRN
Status: DISCONTINUED | OUTPATIENT
Start: 2025-03-30 | End: 2025-04-01 | Stop reason: HOSPADM

## 2025-03-30 RX ORDER — ASPIRIN 81 MG/1
324 TABLET, CHEWABLE ORAL ONCE
Status: COMPLETED | OUTPATIENT
Start: 2025-03-30 | End: 2025-03-30

## 2025-03-30 RX ORDER — SODIUM CHLORIDE 9 MG/ML
INJECTION, SOLUTION INTRAVENOUS PRN
Status: DISCONTINUED | OUTPATIENT
Start: 2025-03-30 | End: 2025-04-01 | Stop reason: HOSPADM

## 2025-03-30 RX ORDER — ONDANSETRON 2 MG/ML
4 INJECTION INTRAMUSCULAR; INTRAVENOUS EVERY 6 HOURS PRN
Status: DISCONTINUED | OUTPATIENT
Start: 2025-03-30 | End: 2025-04-01 | Stop reason: HOSPADM

## 2025-03-30 RX ORDER — POLYETHYLENE GLYCOL 3350 17 G/17G
17 POWDER, FOR SOLUTION ORAL DAILY PRN
Status: DISCONTINUED | OUTPATIENT
Start: 2025-03-30 | End: 2025-04-01 | Stop reason: HOSPADM

## 2025-03-30 RX ORDER — INSULIN LISPRO 100 [IU]/ML
0-4 INJECTION, SOLUTION INTRAVENOUS; SUBCUTANEOUS
Status: DISCONTINUED | OUTPATIENT
Start: 2025-03-31 | End: 2025-04-01 | Stop reason: HOSPADM

## 2025-03-30 RX ORDER — ATORVASTATIN CALCIUM 40 MG/1
40 TABLET, FILM COATED ORAL NIGHTLY
Status: DISCONTINUED | OUTPATIENT
Start: 2025-03-31 | End: 2025-04-01 | Stop reason: HOSPADM

## 2025-03-30 RX ORDER — ACETAMINOPHEN 325 MG/1
650 TABLET ORAL EVERY 6 HOURS PRN
Status: DISCONTINUED | OUTPATIENT
Start: 2025-03-30 | End: 2025-04-01 | Stop reason: HOSPADM

## 2025-03-30 RX ORDER — SODIUM CHLORIDE 0.9 % (FLUSH) 0.9 %
5-40 SYRINGE (ML) INJECTION EVERY 12 HOURS SCHEDULED
Status: DISCONTINUED | OUTPATIENT
Start: 2025-03-31 | End: 2025-04-01 | Stop reason: HOSPADM

## 2025-03-30 RX ORDER — LISINOPRIL 5 MG/1
5 TABLET ORAL DAILY
Status: DISCONTINUED | OUTPATIENT
Start: 2025-03-31 | End: 2025-04-01 | Stop reason: HOSPADM

## 2025-03-30 RX ORDER — PANTOPRAZOLE SODIUM 40 MG/1
40 TABLET, DELAYED RELEASE ORAL
Status: DISCONTINUED | OUTPATIENT
Start: 2025-03-31 | End: 2025-04-01 | Stop reason: HOSPADM

## 2025-03-30 RX ADMIN — NITROGLYCERIN 0.4 MG: 0.4 TABLET SUBLINGUAL at 17:27

## 2025-03-30 RX ADMIN — FUROSEMIDE 40 MG: 10 INJECTION, SOLUTION INTRAMUSCULAR; INTRAVENOUS at 20:45

## 2025-03-30 RX ADMIN — ASPIRIN 324 MG: 81 TABLET, CHEWABLE ORAL at 17:22

## 2025-03-30 ASSESSMENT — PAIN SCALES - GENERAL
PAINLEVEL_OUTOF10: 3
PAINLEVEL_OUTOF10: 6
PAINLEVEL_OUTOF10: 3

## 2025-03-30 ASSESSMENT — PAIN DESCRIPTION - ORIENTATION
ORIENTATION: MID
ORIENTATION: MID

## 2025-03-30 ASSESSMENT — PAIN - FUNCTIONAL ASSESSMENT
PAIN_FUNCTIONAL_ASSESSMENT: NONE - DENIES PAIN
PAIN_FUNCTIONAL_ASSESSMENT: 0-10

## 2025-03-30 ASSESSMENT — PAIN DESCRIPTION - LOCATION
LOCATION: CHEST

## 2025-03-30 ASSESSMENT — PAIN DESCRIPTION - DESCRIPTORS
DESCRIPTORS: PRESSURE
DESCRIPTORS: ACHING
DESCRIPTORS: ACHING

## 2025-03-30 NOTE — ED NOTES
RN attempted to place IV x 3; RN consulted Josh CARBALLO for US IV access x 2. RN notified provider of need for consult to vascular access team.

## 2025-03-30 NOTE — CONSULTS
----- Message from Vivek Mckinnon MD sent at 4/30/2020  3:31 PM CDT -----  Please notify parent of negative strep culture result.     Consult completed. Procedure/rationale explained to pt & consent obtained. Labs drawn/sent via US-guided venipuncture. #20ga Nexiva extra-long, 1.75\" PIV initiated using UltraSound-guided technique without difficulty/complications. Pt tolerated well and no other c/o or needs noted or reported. RN notified.

## 2025-03-30 NOTE — ED PROVIDER NOTES
[facility]  EMERGENCY DEPARTMENT ENCOUNTER        Pt Name: Sai Reyes  MRN: [id number]  Birthdate 1950  Date of evaluation: 3/30/2025  Provider: BERNARDINO Hobson - JAVIER  PCP: Mik Rivas MD  Note Started: 4:21 PM EDT 3/30/25      MARIVEL. I have evaluated this patient.        CHIEF COMPLAINT       Chief Complaint   Patient presents with    Chest Pain     Worsening chest pain x2-3 days    Leg Swelling     Bilateral leg swelling       HISTORY OF PRESENT ILLNESS: 1 or more Elements     History From: Patient    Limitations to history : None    Social Determinants Significantly Affecting Health : None    Chief Complaint: Chest pain leg swelling    Sai Reyes is a 74 y.o. male type 2 diabetes bronchitis alcohol dependence HLD left trapezius muscle strain on baclofen smoking discontinued CPAP machine who presents to ED stating for the past week he has had worsening swelling in his legs and chest pressure.  He states today the chest pain was worse more like a tightness in his upper chest.  States it is constant nothing makes it worse nothing makes it better.  Describes it as a tightness.  States he has been taking all of his medications as prescribed.  Does not use a CPAP and also stopped taking his Lasix because it was keeping him up at night.  Denied any shortness of breath or increase in cough.  Does have a history of COPD.  Denies any recent fevers illnesses infections.  Denies any abdominal pain nausea vomiting or diarrhea.  States he feels very swollen all over his legs his arms and even into his stomach.    Nursing Notes were all reviewed and agreed with or any disagreements were addressed in the HPI.    REVIEW OF SYSTEMS :      Review of Systems    Positives and Pertinent negatives as per HPI.     SURGICAL HISTORY     Past Surgical History:   Procedure Laterality Date    APPENDECTOMY  1960's    CARPAL TUNNEL RELEASE Right 06/22/2017    COLONOSCOPY  Last Done Early 2000's    Polyps Removed In  given to answer questions, a plan was proposed and they agreed with plan.    I am the Primary Clinician of Record.  FINAL IMPRESSION      1. Acute decompensated heart failure (HCC)    2. Acute congestive heart failure, unspecified heart failure type (HCC)    3. Chest pain, unspecified type          DISPOSITION/PLAN     DISPOSITION Admitted 03/30/2025 09:50:47 PM               PATIENT REFERRED TO:  No follow-up provider specified.    DISCHARGE MEDICATIONS:  Discharge Medication List as of 4/1/2025  2:16 PM          DISCONTINUED MEDICATIONS:  Discharge Medication List as of 4/1/2025  2:16 PM                 (Please note that portions of this note were completed with a voice recognition program.  Efforts were made to edit the dictations but occasionally words are mis-transcribed.)    BERNARDINO Hobson CNP (electronically signed)        Esperanza Billingsley APRN - CNP  03/31/25 1231       Esperanza Billingsley APRN - CNP  04/04/25 5302

## 2025-03-30 NOTE — ED TRIAGE NOTES
Patient presents with c/o chest pain, generalized swelling, light headedness and dizziness. Patient adds chest pain began 2-3 days ago and worsened today. Denies cardiac history.

## 2025-03-31 ENCOUNTER — APPOINTMENT (OUTPATIENT)
Dept: NON INVASIVE DIAGNOSTICS | Age: 75
DRG: 292 | End: 2025-03-31
Attending: STUDENT IN AN ORGANIZED HEALTH CARE EDUCATION/TRAINING PROGRAM
Payer: MEDICARE

## 2025-03-31 LAB
AMPHET UR QL SCN: NEGATIVE
ANION GAP SERPL CALCULATED.3IONS-SCNC: 9 MMOL/L (ref 9–17)
BARBITURATES UR QL SCN: NEGATIVE
BASOPHILS # BLD: 0.14 K/UL
BASOPHILS NFR BLD: 1 % (ref 0–1)
BENZODIAZ UR QL: NEGATIVE
BUN SERPL-MCNC: 13 MG/DL (ref 7–20)
CALCIUM SERPL-MCNC: 8.4 MG/DL (ref 8.3–10.6)
CANNABINOIDS UR QL SCN: NEGATIVE
CHLORIDE SERPL-SCNC: 105 MMOL/L (ref 99–110)
CO2 SERPL-SCNC: 24 MMOL/L (ref 21–32)
COCAINE UR QL SCN: NEGATIVE
CREAT SERPL-MCNC: 0.6 MG/DL (ref 0.8–1.3)
ECHO AO ROOT DIAM: 3.3 CM
ECHO AO ROOT INDEX: 1.29 CM/M2
ECHO AV AREA PEAK VELOCITY: 2.8 CM2
ECHO AV AREA VTI: 3 CM2
ECHO AV AREA/BSA PEAK VELOCITY: 1.1 CM2/M2
ECHO AV AREA/BSA VTI: 1.2 CM2/M2
ECHO AV MEAN GRADIENT: 4 MMHG
ECHO AV MEAN VELOCITY: 0.9 M/S
ECHO AV PEAK GRADIENT: 7 MMHG
ECHO AV PEAK VELOCITY: 1.4 M/S
ECHO AV VELOCITY RATIO: 0.71
ECHO AV VTI: 23.1 CM
ECHO BSA: 2.59 M2
ECHO IVC PROX: 1.5 CM
ECHO LA AREA 4C: 15 CM2
ECHO LA DIAMETER INDEX: 1.49 CM/M2
ECHO LA DIAMETER: 3.8 CM
ECHO LA MAJOR AXIS: 6.6 CM
ECHO LA TO AORTIC ROOT RATIO: 1.15
ECHO LA VOL MOD A4C: 29 ML (ref 18–58)
ECHO LA VOLUME INDEX MOD A4C: 11 ML/M2 (ref 16–34)
ECHO LV E' LATERAL VELOCITY: 5.5 CM/S
ECHO LV E' SEPTAL VELOCITY: 5.6 CM/S
ECHO LV EDV A4C: 95 ML
ECHO LV EDV INDEX A4C: 37 ML/M2
ECHO LV EF PHYSICIAN: 55 %
ECHO LV EJECTION FRACTION A4C: 62 %
ECHO LV ESV A4C: 36 ML
ECHO LV ESV INDEX A4C: 14 ML/M2
ECHO LV FRACTIONAL SHORTENING: 24 % (ref 28–44)
ECHO LV INTERNAL DIMENSION DIASTOLE INDEX: 1.45 CM/M2
ECHO LV INTERNAL DIMENSION DIASTOLIC: 3.7 CM (ref 4.2–5.9)
ECHO LV INTERNAL DIMENSION SYSTOLIC INDEX: 1.1 CM/M2
ECHO LV INTERNAL DIMENSION SYSTOLIC: 2.8 CM
ECHO LV IVSD: 1.3 CM (ref 0.6–1)
ECHO LV MASS 2D: 156.7 G (ref 88–224)
ECHO LV MASS INDEX 2D: 61.5 G/M2 (ref 49–115)
ECHO LV POSTERIOR WALL DIASTOLIC: 1.2 CM (ref 0.6–1)
ECHO LV RELATIVE WALL THICKNESS RATIO: 0.65
ECHO LVOT AREA: 3.8 CM2
ECHO LVOT AV VTI INDEX: 0.8
ECHO LVOT DIAM: 2.2 CM
ECHO LVOT MEAN GRADIENT: 2 MMHG
ECHO LVOT PEAK GRADIENT: 4 MMHG
ECHO LVOT PEAK VELOCITY: 1 M/S
ECHO LVOT STROKE VOLUME INDEX: 27.4 ML/M2
ECHO LVOT SV: 69.9 ML
ECHO LVOT VTI: 18.4 CM
ECHO MV A VELOCITY: 1.08 M/S
ECHO MV E VELOCITY: 0.81 M/S
ECHO MV E/A RATIO: 0.75
ECHO MV E/E' LATERAL: 14.73
ECHO MV E/E' RATIO (AVERAGED): 14.6
ECHO MV E/E' SEPTAL: 14.46
ECHO RV MID DIMENSION: 3.8 CM
ECHO TV REGURGITANT MAX VELOCITY: 1.31 M/S
ECHO TV REGURGITANT PEAK GRADIENT: 7 MMHG
EOSINOPHIL # BLD: 0.43 K/UL
EOSINOPHILS RELATIVE PERCENT: 3 % (ref 0–3)
ERYTHROCYTE [DISTWIDTH] IN BLOOD BY AUTOMATED COUNT: 14.2 % (ref 11.7–14.9)
FENTANYL UR QL: NEGATIVE
GFR, ESTIMATED: >90 ML/MIN/1.73M2
GLUCOSE BLD-MCNC: 121 MG/DL (ref 74–99)
GLUCOSE BLD-MCNC: 133 MG/DL (ref 74–99)
GLUCOSE BLD-MCNC: 136 MG/DL (ref 74–99)
GLUCOSE BLD-MCNC: 162 MG/DL (ref 74–99)
GLUCOSE BLD-MCNC: 99 MG/DL (ref 74–99)
GLUCOSE SERPL-MCNC: 103 MG/DL (ref 74–99)
HCT VFR BLD AUTO: 46.9 % (ref 42–52)
HGB BLD-MCNC: 15.3 G/DL (ref 13.5–18)
IMM GRANULOCYTES # BLD AUTO: 0.09 K/UL
IMM GRANULOCYTES NFR BLD: 1 %
LYMPHOCYTES NFR BLD: 4.74 K/UL
LYMPHOCYTES RELATIVE PERCENT: 37 % (ref 24–44)
MCH RBC QN AUTO: 30.1 PG (ref 27–31)
MCHC RBC AUTO-ENTMCNC: 32.6 G/DL (ref 32–36)
MCV RBC AUTO: 92.3 FL (ref 78–100)
MONOCYTES NFR BLD: 1.63 K/UL
MONOCYTES NFR BLD: 13 % (ref 0–4)
NEUTROPHILS NFR BLD: 45 % (ref 36–66)
NEUTS SEG NFR BLD: 5.84 K/UL
OPIATES UR QL SCN: NEGATIVE
OXYCODONE UR QL SCN: NEGATIVE
PLATELET # BLD AUTO: 227 K/UL (ref 140–440)
PMV BLD AUTO: 10.8 FL (ref 7.5–11.1)
POTASSIUM SERPL-SCNC: 3.6 MMOL/L (ref 3.5–5.1)
RBC # BLD AUTO: 5.08 M/UL (ref 4.6–6.2)
SODIUM SERPL-SCNC: 138 MMOL/L (ref 136–145)
TEST INFORMATION: NORMAL
TSH SERPL DL<=0.05 MIU/L-ACNC: 1.47 UIU/ML (ref 0.27–4.2)
WBC OTHER # BLD: 12.9 K/UL (ref 4–10.5)

## 2025-03-31 PROCEDURE — 6370000000 HC RX 637 (ALT 250 FOR IP): Performed by: STUDENT IN AN ORGANIZED HEALTH CARE EDUCATION/TRAINING PROGRAM

## 2025-03-31 PROCEDURE — 1200000000 HC SEMI PRIVATE

## 2025-03-31 PROCEDURE — 84443 ASSAY THYROID STIM HORMONE: CPT

## 2025-03-31 PROCEDURE — 6360000002 HC RX W HCPCS: Performed by: STUDENT IN AN ORGANIZED HEALTH CARE EDUCATION/TRAINING PROGRAM

## 2025-03-31 PROCEDURE — 2500000003 HC RX 250 WO HCPCS: Performed by: STUDENT IN AN ORGANIZED HEALTH CARE EDUCATION/TRAINING PROGRAM

## 2025-03-31 PROCEDURE — 76937 US GUIDE VASCULAR ACCESS: CPT

## 2025-03-31 PROCEDURE — 82962 GLUCOSE BLOOD TEST: CPT

## 2025-03-31 PROCEDURE — 85610 PROTHROMBIN TIME: CPT

## 2025-03-31 PROCEDURE — 93306 TTE W/DOPPLER COMPLETE: CPT | Performed by: INTERNAL MEDICINE

## 2025-03-31 PROCEDURE — 85025 COMPLETE CBC W/AUTO DIFF WBC: CPT

## 2025-03-31 PROCEDURE — 80307 DRUG TEST PRSMV CHEM ANLYZR: CPT

## 2025-03-31 PROCEDURE — 94761 N-INVAS EAR/PLS OXIMETRY MLT: CPT

## 2025-03-31 PROCEDURE — 94010 BREATHING CAPACITY TEST: CPT

## 2025-03-31 PROCEDURE — 94640 AIRWAY INHALATION TREATMENT: CPT

## 2025-03-31 PROCEDURE — 80048 BASIC METABOLIC PNL TOTAL CA: CPT

## 2025-03-31 PROCEDURE — 36415 COLL VENOUS BLD VENIPUNCTURE: CPT

## 2025-03-31 PROCEDURE — C8929 TTE W OR WO FOL WCON,DOPPLER: HCPCS

## 2025-03-31 PROCEDURE — 6360000004 HC RX CONTRAST MEDICATION: Performed by: INTERNAL MEDICINE

## 2025-03-31 RX ORDER — IPRATROPIUM BROMIDE AND ALBUTEROL SULFATE 2.5; .5 MG/3ML; MG/3ML
1 SOLUTION RESPIRATORY (INHALATION)
Status: DISCONTINUED | OUTPATIENT
Start: 2025-03-31 | End: 2025-04-01 | Stop reason: HOSPADM

## 2025-03-31 RX ADMIN — MELATONIN TAB 3 MG 3 MG: 3 TAB at 20:42

## 2025-03-31 RX ADMIN — ATORVASTATIN CALCIUM 40 MG: 40 TABLET, FILM COATED ORAL at 20:36

## 2025-03-31 RX ADMIN — ENOXAPARIN SODIUM 30 MG: 100 INJECTION SUBCUTANEOUS at 08:20

## 2025-03-31 RX ADMIN — WATER 40 MG: 1 INJECTION INTRAMUSCULAR; INTRAVENOUS; SUBCUTANEOUS at 13:39

## 2025-03-31 RX ADMIN — PANTOPRAZOLE SODIUM 40 MG: 40 TABLET, DELAYED RELEASE ORAL at 08:20

## 2025-03-31 RX ADMIN — AMLODIPINE BESYLATE 5 MG: 5 TABLET ORAL at 08:20

## 2025-03-31 RX ADMIN — GABAPENTIN 1200 MG: 400 CAPSULE ORAL at 00:49

## 2025-03-31 RX ADMIN — SODIUM CHLORIDE, PRESERVATIVE FREE 10 ML: 5 INJECTION INTRAVENOUS at 08:19

## 2025-03-31 RX ADMIN — GABAPENTIN 1200 MG: 400 CAPSULE ORAL at 08:20

## 2025-03-31 RX ADMIN — GABAPENTIN 1200 MG: 400 CAPSULE ORAL at 13:39

## 2025-03-31 RX ADMIN — ENOXAPARIN SODIUM 30 MG: 100 INJECTION SUBCUTANEOUS at 20:36

## 2025-03-31 RX ADMIN — MELATONIN TAB 3 MG 3 MG: 3 TAB at 00:50

## 2025-03-31 RX ADMIN — IPRATROPIUM BROMIDE AND ALBUTEROL SULFATE 1 DOSE: .5; 3 SOLUTION RESPIRATORY (INHALATION) at 19:33

## 2025-03-31 RX ADMIN — SODIUM CHLORIDE, PRESERVATIVE FREE 10 ML: 5 INJECTION INTRAVENOUS at 20:37

## 2025-03-31 RX ADMIN — FUROSEMIDE 40 MG: 10 INJECTION, SOLUTION INTRAMUSCULAR; INTRAVENOUS at 17:17

## 2025-03-31 RX ADMIN — SULFUR HEXAFLUORIDE 2 ML: 60.7; .19; .19 INJECTION, POWDER, LYOPHILIZED, FOR SUSPENSION INTRAVENOUS; INTRAVESICAL at 10:44

## 2025-03-31 RX ADMIN — ATORVASTATIN CALCIUM 40 MG: 40 TABLET, FILM COATED ORAL at 00:50

## 2025-03-31 RX ADMIN — GABAPENTIN 1200 MG: 400 CAPSULE ORAL at 20:36

## 2025-03-31 RX ADMIN — IPRATROPIUM BROMIDE AND ALBUTEROL SULFATE 1 DOSE: .5; 3 SOLUTION RESPIRATORY (INHALATION) at 17:15

## 2025-03-31 RX ADMIN — LISINOPRIL 5 MG: 5 TABLET ORAL at 08:20

## 2025-03-31 RX ADMIN — FUROSEMIDE 40 MG: 10 INJECTION, SOLUTION INTRAMUSCULAR; INTRAVENOUS at 08:20

## 2025-03-31 ASSESSMENT — COPD QUESTIONNAIRES
QUESTION1_COUGHFREQUENCY: 5
GOLD_GROUP: GROUP B
CAT_TOTALSCORE: 30
QUESTION6_LEAVINGHOUSE: 0
TOTAL_EXACERBATIONS_PASTYEAR: 0
QUESTION7_SLEEPQUALITY: 2
QUESTION2_CHESTPHLEGM: 5
QUESTION3_CHESTTIGHTNESS: 3
QUESTION8_ENERGYLEVEL: 5
QUESTION4_WALKINCLINE: 5
QUESTION5_HOMEACTIVITIES: 5

## 2025-03-31 ASSESSMENT — PAIN SCALES - GENERAL
PAINLEVEL_OUTOF10: 0

## 2025-03-31 ASSESSMENT — HEART SCORE: ECG: NORMAL

## 2025-03-31 ASSESSMENT — PULMONARY FUNCTION TESTS
PIF_VALUE: 90
FEV1 (%PREDICTED): 92
POST BRONCHODILATOR FEV1/FVC: 78

## 2025-03-31 NOTE — ED NOTES
ED TO INPATIENT SBAR HANDOFF    Patient Name: Sai Reyes   :  1950  74 y.o.   Preferred Name  Roque  Family/Caregiver Present no   Restraints no   C-SSRS: Risk of Suicide: No Risk  Sitter no   Sepsis Risk Score        Situation  Chief Complaint   Patient presents with    Chest Pain     Worsening chest pain x2-3 days    Leg Swelling     Bilateral leg swelling     Brief Description of Patient's Condition: Pt reported to ED w/complaints of chest pain over the past 2 days. Past has SOB when moving in bed. PT has generalized swelling and dizziness.  Mental Status: oriented, alert, coherent, logical, thought processes intact, and able to concentrate and follow conversation  Arrived from: home    Imaging:   XR CHEST PORTABLE   Final Result        Abnormal labs:   Abnormal Labs Reviewed   CBC WITH AUTO DIFFERENTIAL - Abnormal; Notable for the following components:       Result Value    WBC 10.9 (*)     MPV 11.3 (*)     Monocytes % 12 (*)     Eosinophils % 4 (*)     Basophils % 2 (*)     Immature Granulocytes % 1 (*)     All other components within normal limits   BRAIN NATRIURETIC PEPTIDE - Abnormal; Notable for the following components:    NT Pro- (*)     All other components within normal limits   COMPREHENSIVE METABOLIC PANEL - Abnormal; Notable for the following components:    CO2 20 (*)     Creatinine 0.7 (*)     Albumin 3.3 (*)     Albumin/Globulin Ratio 0.9 (*)     All other components within normal limits        Background  History:   Past Medical History:   Diagnosis Date    Acid reflux     Anxiety     Arthritis     \"Right Knee\"    Asthma     Chronic back pain     Cough     Occ Productive Cough \"Milky\" Sputum    Diverticulitis Dx Early 's    Hepatitis Dx 1970's    \"In The Hospital A Few Days\"    Yocha Dehe (hard of hearing)     Bilateral Ears    HTN (hypertension)     Hyperlipidemia     Nocturia     Shortness of breath on exertion     Sleep apnea     Slow urinary stream     Tingling     \"Tingling  Right Hand\"    Wears dentures     Full Upper, Partial Lower    Wears glasses        Assessment    Vitals: MEWS Score: 1  Level of Consciousness: Alert (0)   Vitals:    03/30/25 2045 03/30/25 2105 03/30/25 2135 03/30/25 2205   BP: (!) 153/81 139/67 122/87 137/81   Pulse: 74 69 70 72   Resp: 17 14 20    Temp:       TempSrc:       SpO2: 94% 92% 94%    Weight:       Height:           PO Status: Nothing by Mouth    O2 Flow Rate: O2 Device: None (Room air)      Cardiac Rhythm: normal sinus Rythmn    Last documented pain medication administered:    NIH Score: NIH       Active LDA's:   Peripheral IV 03/30/25 Left;Anterior;Upper Forearm (Active)   Site Assessment Clean, dry & intact 03/30/25 1720   Line Status Brisk blood return;Capped;Flushed;Normal saline locked 03/30/25 1720   Line Care Cap changed;Connections checked and tightened 03/30/25 1720   Phlebitis Assessment No symptoms 03/30/25 1720   Infiltration Assessment 0 03/30/25 1720   Alcohol Cap Used Yes 03/30/25 1720   Dressing Status New dressing applied;Clean, dry & intact 03/30/25 1720   Dressing Type Transparent 03/30/25 1720   Dressing Intervention New 03/30/25 1720       Pertinent or High Risk Medications/Drips: no   If Yes, please provide details:       Blood Product Administration: no  If Yes, please provide details:     Recommendation    Incomplete orders   Additional Comments:  If any further questions, please call Sending RN at [phone]    Electronically signed by: Electronically signed by Alexa Hernandez RN on 3/30/2025 at 10:38 PM

## 2025-03-31 NOTE — PROGRESS NOTES
4 Eyes Skin Assessment     NAME:  Sai Reyes  YOB: 1950  MEDICAL RECORD NUMBER:  [id number]    The patient is being assessed for  Admission    I agree that at least one RN has performed a thorough Head to Toe Skin Assessment on the patient. ALL assessment sites listed below have been assessed.      Areas assessed by both nurses:    Refused by pt.States his skin if fine         Does the Patient have a Wound?        Reji Prevention initiated by RN:   Wound Care Orders initiated by RN:     Pressure Injury (Stage 3,4, Unstageable, DTI, NWPT, and Complex wounds) if present, place Wound referral order by RN under :     New Ostomies, if present place, Ostomy referral order under :      Nurse 1 eSignature: Electronically signed by Funmilayo Casanova RN on 3/31/25 at 12:28 AM EDT    **SHARE this note so that the co-signing nurse can place an eSignature**    Nurse 2 eSignature: Electronically signed by Leticia Lombardi RN on 3/31/25 at 12:58 AM EDT

## 2025-03-31 NOTE — PROGRESS NOTES
Current GOLD classification       GOLD Stage:    Group:  B  Recorded domestic exacerbations past 12 months:  0  Current recorded COPD Assessment Tool (CAT) score of  30  Current eosinophil count: 0.4     Inhaler Device   Acceptable for Use   Respimat  Not Breath Actuated Yes   MDI  Not Breath Actuated Yes           DPI  Observed PIF   using  In-Check Meter   Optimal PIF   Acceptable for Use   HANDIHALER 90 >30 Y   Pressair 90 >45 Y   NEOHALER 90 >50 Y   Diskus 90 >60 Y   ELLIPTA 90 >60 Y     Records show this patient was using ALBUTEROL / CPAP maintenance therapy prior to admission.          LONG-ACTING (LABA)   Arformoterol (Brovana) NEBULIZER   Indacaterol (Arcapta) NEOHALER   Olodaterol (Striverdi) Respimat   Salmeterol (Serevent) MDI, DISKUS   LONG-ACTING (LAMA)   Aclidinium bromide (Tudorza) PRESSAIR   Glycopyrronium bromide (Seebri) NEOHALER   Tiotropium (Spiriva) Respimat, HANDIHALER   Umeclidinium (Incruse) ELLIPTA   (LABA/LAMA)   Formoterol/glycopyrronium (Bevespi) MDI   Indacaterol/glycopyrronium (Utibron) NEOHALER   Vilanterol/umeclidinium (Anoro) ELLIPTA   Olodaterol/tiotropium (Stiolto) Respimat   (LABA/ICS)   Formoterol/budesomide (Symbicort) MDI   Formoterol/mometasone (Dulera) MDI   Salmeterol/fluticasone (Advair) MDI, DISKUS   Vilanterol/fluticasone (Breo) ELLIPTA   (LABA/LAMA/ICS)   Fluticasone/umeclidinium/vilanterol (Trelegy) ELLIPTA   Budesonide/glycopyrrolate/formoterol fumarate (Beztri) aerosphere      03/31/25 0106   Spirometry Assessment   FEV1 (%PRED) 92   Post Bronchodilator FEV/FVC 78   COPD Exacerbations in last year 0   PIF 90 L/min   COPD Assessment (CAT Score)   Cough Assessment 5   Phlegm Assessment 5   Chest tightness 3   Walking on an incline 5   Home Activities 5   Confident Leaving The Home 0   Sleeping Soundly 2   Have Energy 5   Assessment Score 30   $RT COPD Assessment Yes   GOLD Staging   Group Group B

## 2025-03-31 NOTE — PROGRESS NOTES
V2.0    [facility] Progress Note      Name:  Sai Reyes /Age/Sex: 1950  (74 y.o. male)   MRN & CSN:  [id number] & [id number] Encounter Date/Time: 3/31/2025 12:52 PM EDT   Location:  [facility] PCP: Mik Rivas MD     Attending:Boris Morales MD       Hospital Day: 2    Assessment and Recommendations   Sai Reyes is a 74 y.o. male  who presents with Acute decompensated heart failure (HCC)    # Acute decompensated heart failure  #COPD exacerbation  - Reported worsening SOB, NGUYEN after 20-30 feet, orthopnea and LE edema over past weeks. Has been off Lasix for over a year due to frequent urination. Not complaint with low-salt diet or daily weights. Last TTE in 2021 with LVEF of 55-60%.  - Clinically hypervolemic. Pro- ( likely higher in the setting of morbid obesity). CXR with pulmonary congestion.   - Started on steroids/breathing treatments/ IV diuresis, repeat TTE, strict I/O's, daily weights, telemetry, follow-up UDS (positive for cocaine in 10/2015).      # Essential hypertension  - Continue Norvasc and lisinopril.     # Mixed hyperlipidemia  - Continue Lipitor.     # T2DM with hyperglycemia and peripheral neuropathy  - Last A1c 5.9% in 2025.  - Held Actos, continue gabapentin. LCSI.     # GERD  - Continue PPI.     # BHARGAVI  - Not compliant with CPAP.     # Tobacco abuse  - Smokes 0.5-1 PPD for over 50 years.  - Nicotine patches prn.     # Class III obesity  - BMI 45.9.      Diet ADULT DIET; Regular; 3 carb choices (45 gm/meal); Low Sodium (2 gm); 1500 ml   DVT Prophylaxis [] Lovenox, []  Heparin, [] SCDs, [] Ambulation,  [] Eliquis, [] Xarelto  [] Coumadin   Code Status Full Code   Disposition From: Home  Expected Disposition: Home  Estimated Date of Discharge: 1 to 2 days  Patient requires continued admission due to COPD/CHF exacerbation   Surrogate Decision Maker/ POA       Personally reviewed Lab Studies and Imaging           Subjective:     Chief Complaint:     Patient seen and

## 2025-03-31 NOTE — H&P
History and Physical      Name:  Sai Reyes /Age/Sex: 1950  (74 y.o. male)   MRN & CSN:  [id number] & [id number] Encounter Date/Time: 3/30/2025 9:46 PM   Location:  [facility] PCP: Mik Rivas MD       Hospital Day: 1    Assessment and Plan:     Patient is a 74 y.o. male who presented with SOB.     # Acute decompensated heart failure  - Reported worsening SOB, NGUYEN after 20-30 feet, orthopnea and LE edema over past weeks. Has been off Lasix for over a year due to frequent urination. Not complaint with low-salt diet or daily weights. Last TTE in 2021 with LVEF of 55-60%.  - Clinically hypervolemic. Pro- ( likely higher in the setting of morbid obesity). CXR with pulmonary congestion.   - Started on IV diuresis, repeat TTE, strict I/O's, daily weights, telemetry, follow-up UDS (positive for cocaine in 10/2015).     # Essential hypertension  - Continue Norvasc and lisinopril.    # Mixed hyperlipidemia  - Continue Lipitor.    # T2DM with hyperglycemia and peripheral neuropathy  - Last A1c 5.9% in 2025.  - Held Actos, continue gabapentin. LCSI.    # GERD  - Continue PPI.    # BHARGAVI  - Not compliant with CPAP.    # Tobacco abuse  - Smokes 0.5-1 PPD for over 50 years.  - Nicotine patches prn.    # Class III obesity  - BMI 45.9.    Checklist:  Advanced care planning: full  Diet: Diabetic and cardiac with fluid and salt restriction    Sugar: BG goal of 140-180 while inpatient  VTE ppx: Lovenox    Disposition: admit to inpatient.  Estimated discharge: 2-3 day(s).  Current living situation: home.  Expected disposition: home.    Spoke with ED provider who recommended admission for the patient and I agree with that plan.  Personally reviewed lab studies and imaging.  EKG interpreted personally and results as stated above.  Imaging that was interpreted personally and results as stated above.    History of Present Illness:     Chief Complaint: Shortness of breath    Patient is a 74 y.o. male with a  CHEST PORTABLE   Final Result          Kristian López MD  03/30/25 9:46 PM

## 2025-03-31 NOTE — CONSULTS
IV Consult complete.  Nexiva 20g 1.75\" PIV Inserted in Right Forearm  x 1 attempt using sterile ultrasound guided technique.  Brisk blood return, flushes easy.

## 2025-03-31 NOTE — PROGRESS NOTES
Nutrition Education    Pt sleeping during visit  Provided Heart Failure Nutrition Therapy (Nutrition Care Manual) to bedside  Will continue to follow as per protocol and educate personally over stay as able  Contact name and number provided    Terri Oleary RD, EUNICE  Contact Number: [phone]

## 2025-04-01 ENCOUNTER — TELEPHONE (OUTPATIENT)
Dept: FAMILY MEDICINE CLINIC | Age: 75
End: 2025-04-01

## 2025-04-01 VITALS
OXYGEN SATURATION: 96 % | RESPIRATION RATE: 16 BRPM | SYSTOLIC BLOOD PRESSURE: 124 MMHG | DIASTOLIC BLOOD PRESSURE: 76 MMHG | BODY MASS INDEX: 45.1 KG/M2 | HEIGHT: 70 IN | TEMPERATURE: 97.6 F | WEIGHT: 315 LBS | HEART RATE: 75 BPM

## 2025-04-01 LAB
GLUCOSE BLD-MCNC: 105 MG/DL (ref 74–99)
GLUCOSE BLD-MCNC: 106 MG/DL (ref 74–99)
INR PPP: NORMAL
PROTHROMBIN TIME: NORMAL SEC (ref 9.1–12.3)

## 2025-04-01 PROCEDURE — 6360000002 HC RX W HCPCS: Performed by: STUDENT IN AN ORGANIZED HEALTH CARE EDUCATION/TRAINING PROGRAM

## 2025-04-01 PROCEDURE — 6370000000 HC RX 637 (ALT 250 FOR IP): Performed by: STUDENT IN AN ORGANIZED HEALTH CARE EDUCATION/TRAINING PROGRAM

## 2025-04-01 PROCEDURE — 94761 N-INVAS EAR/PLS OXIMETRY MLT: CPT

## 2025-04-01 PROCEDURE — 82962 GLUCOSE BLOOD TEST: CPT

## 2025-04-01 PROCEDURE — 2500000003 HC RX 250 WO HCPCS: Performed by: STUDENT IN AN ORGANIZED HEALTH CARE EDUCATION/TRAINING PROGRAM

## 2025-04-01 PROCEDURE — 94640 AIRWAY INHALATION TREATMENT: CPT

## 2025-04-01 RX ORDER — PREDNISONE 20 MG/1
40 TABLET ORAL DAILY
Status: DISCONTINUED | OUTPATIENT
Start: 2025-04-01 | End: 2025-04-01 | Stop reason: HOSPADM

## 2025-04-01 RX ADMIN — PANTOPRAZOLE SODIUM 40 MG: 40 TABLET, DELAYED RELEASE ORAL at 06:24

## 2025-04-01 RX ADMIN — SODIUM CHLORIDE, PRESERVATIVE FREE 10 ML: 5 INJECTION INTRAVENOUS at 09:33

## 2025-04-01 RX ADMIN — GABAPENTIN 1200 MG: 400 CAPSULE ORAL at 09:34

## 2025-04-01 RX ADMIN — AMLODIPINE BESYLATE 5 MG: 5 TABLET ORAL at 09:34

## 2025-04-01 RX ADMIN — LISINOPRIL 5 MG: 5 TABLET ORAL at 09:34

## 2025-04-01 RX ADMIN — ENOXAPARIN SODIUM 30 MG: 100 INJECTION SUBCUTANEOUS at 09:34

## 2025-04-01 RX ADMIN — IPRATROPIUM BROMIDE AND ALBUTEROL SULFATE 1 DOSE: .5; 3 SOLUTION RESPIRATORY (INHALATION) at 07:41

## 2025-04-01 RX ADMIN — FUROSEMIDE 40 MG: 10 INJECTION, SOLUTION INTRAMUSCULAR; INTRAVENOUS at 09:34

## 2025-04-01 RX ADMIN — IPRATROPIUM BROMIDE AND ALBUTEROL SULFATE 1 DOSE: .5; 3 SOLUTION RESPIRATORY (INHALATION) at 10:56

## 2025-04-01 RX ADMIN — PREDNISONE 40 MG: 20 TABLET ORAL at 09:34

## 2025-04-01 ASSESSMENT — PAIN SCALES - GENERAL: PAINLEVEL_OUTOF10: 0

## 2025-04-01 NOTE — CARE COORDINATION
04/01/25 0947   Service Assessment   Patient Orientation Alert and Oriented   Cognition Alert   History Provided By Patient   Primary Caregiver Self   Support Systems Spouse/Significant Other   Patient's Healthcare Decision Maker is: Legal Next of Kin   PCP Verified by CM Yes   Prior Functional Level Assistance with the following:;Mobility   Current Functional Level Assistance with the following:;Mobility   Can patient return to prior living arrangement Unknown at present   Ability to make needs known: Good   Family able to assist with home care needs: Yes   Would you like for me to discuss the discharge plan with any other family members/significant others, and if so, who? No   Financial Resources Medicare   Community Resources None     CM in to see Pt to initiate discharge planning.  Pt is from home with his significant other.  Pt states he has all needed DME, O2 supplies through [employer].  Pt denies the need for home care.      Pt denies any needs at this time, CM following

## 2025-04-01 NOTE — PROGRESS NOTES
Pt requesting to leave AMA, Dr Meza aware. Pt did sign AMA paperwork. Education provided regarding IV lasix he is on for CHF.Pt voiced understanding but still requesting to leave  Nadine Bermudez RN

## 2025-04-01 NOTE — PROGRESS NOTES
Lab Studies and Imaging           Subjective:     Chief Complaint:     Patient seen and examined at bedside this morning.  Patient reports improving symptoms.  Denies any shortness of breath for me today.  Denies any other active complaints.  Plan of care discussed at length.  All questions answered.      Review of Systems:      Pertinent positives and negatives discussed in HPI    Objective:     Intake/Output Summary (Last 24 hours) at 4/1/2025 0840  Last data filed at 4/1/2025 0623  Gross per 24 hour   Intake --   Output 1450 ml   Net -1450 ml      Vitals:   Vitals:    03/31/25 1930 03/31/25 2030 04/01/25 0245 04/01/25 0600   BP:  90/74 104/62    Pulse:  93 82    Resp:  21 18    Temp:  98.2 °F (36.8 °C) 98 °F (36.7 °C)    TempSrc:  Oral Oral    SpO2: 94% 90% 90%    Weight:    (!) 145.9 kg (321 lb 11.2 oz)   Height:             Physical Exam:      General: NAD  Eyes: EOMI  ENT: neck supple  Cardiovascular: Regular rate.  Respiratory: mild wheezing  Gastrointestinal: Soft, non tender  Genitourinary: no suprapubic tenderness  Musculoskeletal: Bilateral lower extremity edema +2  Skin: warm, dry  Neuro: Alert.  Psych: Mood appropriate.         Medications:   Medications:    predniSONE  40 mg Oral Daily    ipratropium 0.5 mg-albuterol 2.5 mg  1 Dose Inhalation Q4H WA RT    amLODIPine  5 mg Oral Daily    atorvastatin  40 mg Oral Nightly    gabapentin  1,200 mg Oral TID    lisinopril  5 mg Oral Daily    pantoprazole  40 mg Oral QAM AC    furosemide  40 mg IntraVENous BID    insulin lispro  0-4 Units SubCUTAneous 4x Daily AC & HS    sodium chloride flush  5-40 mL IntraVENous 2 times per day    enoxaparin  30 mg SubCUTAneous BID    nicotine  1 patch TransDERmal Daily      Infusions:    dextrose      sodium chloride       PRN Meds: albuterol sulfate HFA, 2 puff, 4x Daily PRN  glucose, 4 tablet, PRN  dextrose bolus, 125 mL, PRN   Or  dextrose bolus, 250 mL, PRN  glucagon (rDNA), 1 mg, PRN  dextrose, , Continuous PRN  sodium  chloride flush, 5-40 mL, PRN  sodium chloride, , PRN  potassium chloride, 10 mEq, PRN  magnesium sulfate, 2,000 mg, PRN  ondansetron, 4 mg, Q8H PRN   Or  ondansetron, 4 mg, Q6H PRN  melatonin, 3 mg, Nightly PRN  polyethylene glycol, 17 g, Daily PRN  acetaminophen, 650 mg, Q6H PRN   Or  acetaminophen, 650 mg, Q6H PRN        Labs and Imaging   XR CHEST PORTABLE  Result Date: 3/30/2025  XR CHEST PORTABLE 3/30/2025 17:35 DEMOGRAPHICS: 74 years old Male COMPARISON: There is no prior examination for comparison. REASON FOR EXAM: chest pain IMPRESSION: 1. Enlarged cardiac silhouette and vascular congestion. Possible mild interstitial edema. No focal consolidation or definite effusions seen. 2. No evidence of pneumothorax.  Dictated and Electronically Signed By: Antwon Kovacs MD 3/30/2025 17:43          CBC:   Recent Labs     03/30/25  1724 03/31/25  0231   WBC 10.9* 12.9*   HGB 17.1 15.3    227     BMP:    Recent Labs     03/30/25  1724 03/30/25  1900 03/31/25  0231   NA PENDING 140 138   K PENDING 4.0 3.6   CL PENDING 106 105   CO2 PENDING 20* 24   BUN PENDING 13 13   CREATININE PENDING 0.7* 0.6*   GLUCOSE PENDING 98 103*     Hepatic:   Recent Labs     03/30/25  1724 03/30/25  1900   AST PENDING 23   ALT PENDING 16   BILITOT PENDING 0.3   ALKPHOS PENDING 68     Lipids:   Lab Results   Component Value Date/Time    CHOL 179 07/19/2023 02:24 PM    HDL 34 07/19/2023 02:24 PM    TRIG 147 07/19/2023 02:24 PM     Hemoglobin A1C:   Lab Results   Component Value Date/Time    LABA1C 5.9 02/25/2025 10:00 AM     TSH:   Lab Results   Component Value Date/Time    TSH 1.47 03/31/2025 02:31 AM     Troponin:   Lab Results   Component Value Date/Time    TROPONINT <0.010 11/03/2021 10:45 AM    TROPONINT <0.010 10/08/2015 12:36 AM     Lactic Acid: No results for input(s): \"LACTA\" in the last 72 hours.  BNP:   Recent Labs     03/30/25  1707   PROBNP 218*     UA:  Lab Results   Component Value Date/Time    NITRU NEGATIVE 03/30/2025

## 2025-04-02 ENCOUNTER — OFFICE VISIT (OUTPATIENT)
Dept: FAMILY MEDICINE CLINIC | Age: 75
End: 2025-04-02
Payer: MEDICARE

## 2025-04-02 ENCOUNTER — CARE COORDINATION (OUTPATIENT)
Dept: CASE MANAGEMENT | Age: 75
End: 2025-04-02

## 2025-04-02 VITALS
SYSTOLIC BLOOD PRESSURE: 110 MMHG | WEIGHT: 315 LBS | HEART RATE: 59 BPM | TEMPERATURE: 97.3 F | DIASTOLIC BLOOD PRESSURE: 80 MMHG | BODY MASS INDEX: 45.63 KG/M2 | OXYGEN SATURATION: 97 %

## 2025-04-02 DIAGNOSIS — J41.1 MUCOPURULENT CHRONIC BRONCHITIS (HCC): ICD-10-CM

## 2025-04-02 DIAGNOSIS — E66.813 OBESITY, CLASS 3: ICD-10-CM

## 2025-04-02 DIAGNOSIS — E11.9 TYPE 2 DIABETES MELLITUS WITHOUT COMPLICATION, WITHOUT LONG-TERM CURRENT USE OF INSULIN: ICD-10-CM

## 2025-04-02 DIAGNOSIS — Z87.891 PERSONAL HISTORY OF TOBACCO USE: ICD-10-CM

## 2025-04-02 DIAGNOSIS — I50.9 ACUTE DECOMPENSATED HEART FAILURE (HCC): Primary | ICD-10-CM

## 2025-04-02 PROCEDURE — G8417 CALC BMI ABV UP PARAM F/U: HCPCS | Performed by: FAMILY MEDICINE

## 2025-04-02 PROCEDURE — 1123F ACP DISCUSS/DSCN MKR DOCD: CPT | Performed by: FAMILY MEDICINE

## 2025-04-02 PROCEDURE — G8428 CUR MEDS NOT DOCUMENT: HCPCS | Performed by: FAMILY MEDICINE

## 2025-04-02 PROCEDURE — 3074F SYST BP LT 130 MM HG: CPT | Performed by: FAMILY MEDICINE

## 2025-04-02 PROCEDURE — 3044F HG A1C LEVEL LT 7.0%: CPT | Performed by: FAMILY MEDICINE

## 2025-04-02 PROCEDURE — 3079F DIAST BP 80-89 MM HG: CPT | Performed by: FAMILY MEDICINE

## 2025-04-02 PROCEDURE — 4004F PT TOBACCO SCREEN RCVD TLK: CPT | Performed by: FAMILY MEDICINE

## 2025-04-02 PROCEDURE — 99215 OFFICE O/P EST HI 40 MIN: CPT | Performed by: FAMILY MEDICINE

## 2025-04-02 PROCEDURE — 3017F COLORECTAL CA SCREEN DOC REV: CPT | Performed by: FAMILY MEDICINE

## 2025-04-02 PROCEDURE — 2022F DILAT RTA XM EVC RTNOPTHY: CPT | Performed by: FAMILY MEDICINE

## 2025-04-02 PROCEDURE — 3023F SPIROM DOC REV: CPT | Performed by: FAMILY MEDICINE

## 2025-04-02 PROCEDURE — 1111F DSCHRG MED/CURRENT MED MERGE: CPT | Performed by: FAMILY MEDICINE

## 2025-04-02 RX ORDER — POTASSIUM CHLORIDE 750 MG/1
10 TABLET, EXTENDED RELEASE ORAL DAILY
Qty: 30 TABLET | Refills: 0 | Status: SHIPPED | OUTPATIENT
Start: 2025-04-02

## 2025-04-02 RX ORDER — NICOTINE 21 MG/24HR
1 PATCH, TRANSDERMAL 24 HOURS TRANSDERMAL DAILY
Qty: 30 PATCH | Refills: 0 | Status: SHIPPED | OUTPATIENT
Start: 2025-04-02 | End: 2025-05-02

## 2025-04-02 RX ORDER — ALBUTEROL SULFATE 90 UG/1
2 INHALANT RESPIRATORY (INHALATION) 4 TIMES DAILY PRN
Qty: 54 G | Refills: 1 | Status: SHIPPED | OUTPATIENT
Start: 2025-04-02

## 2025-04-02 RX ORDER — FUROSEMIDE 20 MG/1
20 TABLET ORAL DAILY
Qty: 60 TABLET | Refills: 0 | Status: SHIPPED | OUTPATIENT
Start: 2025-04-02

## 2025-04-02 ASSESSMENT — PATIENT HEALTH QUESTIONNAIRE - PHQ9
6. FEELING BAD ABOUT YOURSELF - OR THAT YOU ARE A FAILURE OR HAVE LET YOURSELF OR YOUR FAMILY DOWN: NOT AT ALL
7. TROUBLE CONCENTRATING ON THINGS, SUCH AS READING THE NEWSPAPER OR WATCHING TELEVISION: NOT AT ALL
10. IF YOU CHECKED OFF ANY PROBLEMS, HOW DIFFICULT HAVE THESE PROBLEMS MADE IT FOR YOU TO DO YOUR WORK, TAKE CARE OF THINGS AT HOME, OR GET ALONG WITH OTHER PEOPLE: NOT DIFFICULT AT ALL
SUM OF ALL RESPONSES TO PHQ QUESTIONS 1-9: 0
3. TROUBLE FALLING OR STAYING ASLEEP: NOT AT ALL
8. MOVING OR SPEAKING SO SLOWLY THAT OTHER PEOPLE COULD HAVE NOTICED. OR THE OPPOSITE, BEING SO FIGETY OR RESTLESS THAT YOU HAVE BEEN MOVING AROUND A LOT MORE THAN USUAL: NOT AT ALL
SUM OF ALL RESPONSES TO PHQ QUESTIONS 1-9: 0
4. FEELING TIRED OR HAVING LITTLE ENERGY: NOT AT ALL
5. POOR APPETITE OR OVEREATING: NOT AT ALL
9. THOUGHTS THAT YOU WOULD BE BETTER OFF DEAD, OR OF HURTING YOURSELF: NOT AT ALL
2. FEELING DOWN, DEPRESSED OR HOPELESS: NOT AT ALL
SUM OF ALL RESPONSES TO PHQ QUESTIONS 1-9: 0
SUM OF ALL RESPONSES TO PHQ QUESTIONS 1-9: 0
1. LITTLE INTEREST OR PLEASURE IN DOING THINGS: NOT AT ALL

## 2025-04-02 NOTE — PROGRESS NOTES
History of Present Illness  The patient presents for evaluation of congestive heart failure, chronic obstructive pulmonary disease, smoking cessation, and obesity.    He was recently hospitalized due to significant edema and pulmonary fluid accumulation, indicative of congestive heart failure. He left the hospital yesterday against medical advice due to dissatisfaction with the nursing staff and financial concerns. During his hospital stay, intravenous Lasix was administered, which significantly reduced his swelling, although not completely. An echocardiogram and EKG were performed, both of which were unremarkable. There has not been a recent consultation with a cardiologist. Lasix medication was discontinued due to frequent urination every 15 to 20 minutes, which disrupted sleep. Dyspnea is experienced when lying down at night. Efforts are made to adhere to a low-sodium diet, and physical activity is gradually increasing following knee surgery.    Shortness of breath is reported, and treatments were received in the hospital. Inhalers prescribed last fall are reported as effective. A survey was completed this morning with [employer], who informed him that they would contact us regarding the potential need for additional inhalers. Steroids were prescribed for the lungs, significantly improving respiratory function.    Currently, a 5 mg injection for blood sugar is administered, aiding in weight loss. However, no perceived difference in condition is reported. No nausea from the injection is experienced, but gas and burping are noted. Interest in increasing the dosage to 7.5 mg is expressed.    A desire to quit smoking is expressed, with interest in trying nicotine patches. Approximately three-quarters of a pack per day is smoked, but intake has been reduced to 5 cigarettes since leaving the hospital yesterday.    Knee surgery was performed, and consultation with Cynthia, an assistant under the orthopedic surgeon, was

## 2025-04-02 NOTE — CARE COORDINATION
Care Transitions Note    Initial Call - Call within 2 business days of discharge: Yes    Attempted to reach patient for transitions of care follow up. Unable to reach patient.    Outreach Attempts:   HIPAA compliant voicemail left for patient, son.   [employer] message sent.     Patient: Sai Reyes    Patient : 1950   MRN: [id number]    Reason for Admission: Ac CHF d/t Non-Compliance   Discharge Date: 25  RURS: Readmission Risk Score: 11.7  Facility: [facility] 3/31/25-25    Last Discharge Facility       Date Complaint Diagnosis Description Type Department Provider    3/30/25 Chest Pain; Leg Swelling Acute decompensated heart failure (HCC) ... ED [facility]; Kristian López...     Was this an external facility discharge? No    Follow Up Appointment:   Patient has hospital follow up appointment scheduled within 7 days of discharge.    Future Appointments         Provider Specialty Dept Phone    2025 2:45 PM Mik Rivas MD Family Medicine [phone]    2025 1:30 PM Raul Owens MD Pulmonology [phone]            Plan for follow-up on next business day. 2nd attempt.      Bianca Anaya RN

## 2025-04-02 NOTE — DISCHARGE SUMMARY
AMA/discharge summary    Patient left AGAINST MEDICAL ADVICE despite extensive counseling.  Please see my progress note from earlier today for details regarding medical care and physical exam.

## 2025-04-03 ENCOUNTER — CARE COORDINATION (OUTPATIENT)
Dept: CASE MANAGEMENT | Age: 75
End: 2025-04-03

## 2025-04-03 NOTE — CARE COORDINATION
Care Transitions Note    Initial Call - Call within 2 business days of discharge: Yes    2nd unsuccessful attempt to reach for Care Transition discharge call. No call back from  and [employer] messages left 25. Was noted to have been seen by PCP yesterday w/ new orders for Lasix, Potassium, Nicotine Patches and rto 25. CT program closed per protocol, no further outreach scheduled.       Outreach Attempts:   Multiple attempts to contact patient at phone numbers on file.     Patient: Sai Reyes    Patient : 1950   MRN: [id number]    Reason for Admission: Ac CHF d/t Non-Compliance   Discharge Date: 25  RURS: Readmission Risk Score: 11.7  Facility: [facility] 3/31/25-25     Last Discharge Facility       Date Complaint Diagnosis Description Type Department Provider    3/30/25 Chest Pain; Leg Swelling Acute decompensated heart failure (HCC) ... [facility]; Kristian López...            Was this an external facility discharge? No    Follow Up Appointment:   Patient has hospital follow up appointment scheduled within 7 days of discharge.--completed 25   Future Appointments         Provider Specialty Dept Phone    2025 1:30 PM Mik Rivas MD Family Medicine [phone]    2025 1:30 PM Raul Owens MD Pulmonology [phone]            No further follow-up call indicated     Bianca Anaya RN

## 2025-04-05 NOTE — PROGRESS NOTES
Keesha Moreau on 4/4/2025 2:59 AM      Electronically signed by:  Alicia Meza MD 4/5/2025 10:29 AM

## 2025-04-09 ENCOUNTER — TELEPHONE (OUTPATIENT)
Dept: FAMILY MEDICINE CLINIC | Age: 75
End: 2025-04-09

## 2025-04-09 NOTE — TELEPHONE ENCOUNTER
Graciela Gann called stating the pharmacist noticed issue during medication reconciliation postdischarge. She stated patient is not on a controller medication to help prevent COPD symptoms. They recommended Stiolto Respimat.    5 days

## 2025-04-28 ENCOUNTER — RESULTS FOLLOW-UP (OUTPATIENT)
Dept: FAMILY MEDICINE CLINIC | Age: 75
End: 2025-04-28

## 2025-04-30 ENCOUNTER — OFFICE VISIT (OUTPATIENT)
Dept: FAMILY MEDICINE CLINIC | Age: 75
End: 2025-04-30
Payer: MEDICARE

## 2025-04-30 VITALS
TEMPERATURE: 97.8 F | BODY MASS INDEX: 45 KG/M2 | DIASTOLIC BLOOD PRESSURE: 80 MMHG | OXYGEN SATURATION: 95 % | SYSTOLIC BLOOD PRESSURE: 118 MMHG | WEIGHT: 313.6 LBS | HEART RATE: 98 BPM

## 2025-04-30 DIAGNOSIS — E11.9 TYPE 2 DIABETES MELLITUS WITHOUT COMPLICATION, WITHOUT LONG-TERM CURRENT USE OF INSULIN (HCC): Primary | ICD-10-CM

## 2025-04-30 DIAGNOSIS — I50.9 ACUTE DECOMPENSATED HEART FAILURE (HCC): ICD-10-CM

## 2025-04-30 DIAGNOSIS — E78.5 HYPERLIPIDEMIA ASSOCIATED WITH TYPE 2 DIABETES MELLITUS (HCC): ICD-10-CM

## 2025-04-30 DIAGNOSIS — I10 ESSENTIAL HYPERTENSION: ICD-10-CM

## 2025-04-30 DIAGNOSIS — K21.9 GASTROESOPHAGEAL REFLUX DISEASE WITHOUT ESOPHAGITIS: ICD-10-CM

## 2025-04-30 DIAGNOSIS — G62.9 PERIPHERAL POLYNEUROPATHY: ICD-10-CM

## 2025-04-30 DIAGNOSIS — E11.69 HYPERLIPIDEMIA ASSOCIATED WITH TYPE 2 DIABETES MELLITUS (HCC): ICD-10-CM

## 2025-04-30 DIAGNOSIS — Z87.891 PERSONAL HISTORY OF TOBACCO USE: ICD-10-CM

## 2025-04-30 DIAGNOSIS — Z53.20 LUNG CANCER SCREENING DECLINED BY PATIENT: ICD-10-CM

## 2025-04-30 LAB — HBA1C MFR BLD: 5.5 %

## 2025-04-30 PROCEDURE — G0296 VISIT TO DETERM LDCT ELIG: HCPCS | Performed by: FAMILY MEDICINE

## 2025-04-30 PROCEDURE — G2211 COMPLEX E/M VISIT ADD ON: HCPCS | Performed by: FAMILY MEDICINE

## 2025-04-30 PROCEDURE — 4004F PT TOBACCO SCREEN RCVD TLK: CPT | Performed by: FAMILY MEDICINE

## 2025-04-30 PROCEDURE — 1123F ACP DISCUSS/DSCN MKR DOCD: CPT | Performed by: FAMILY MEDICINE

## 2025-04-30 PROCEDURE — 3078F DIAST BP <80 MM HG: CPT | Performed by: FAMILY MEDICINE

## 2025-04-30 PROCEDURE — 1111F DSCHRG MED/CURRENT MED MERGE: CPT | Performed by: FAMILY MEDICINE

## 2025-04-30 PROCEDURE — 99214 OFFICE O/P EST MOD 30 MIN: CPT | Performed by: FAMILY MEDICINE

## 2025-04-30 PROCEDURE — 3044F HG A1C LEVEL LT 7.0%: CPT | Performed by: FAMILY MEDICINE

## 2025-04-30 PROCEDURE — 3074F SYST BP LT 130 MM HG: CPT | Performed by: FAMILY MEDICINE

## 2025-04-30 PROCEDURE — G8427 DOCREV CUR MEDS BY ELIG CLIN: HCPCS | Performed by: FAMILY MEDICINE

## 2025-04-30 PROCEDURE — G8417 CALC BMI ABV UP PARAM F/U: HCPCS | Performed by: FAMILY MEDICINE

## 2025-04-30 PROCEDURE — 1159F MED LIST DOCD IN RCRD: CPT | Performed by: FAMILY MEDICINE

## 2025-04-30 PROCEDURE — 2022F DILAT RTA XM EVC RTNOPTHY: CPT | Performed by: FAMILY MEDICINE

## 2025-04-30 PROCEDURE — 3017F COLORECTAL CA SCREEN DOC REV: CPT | Performed by: FAMILY MEDICINE

## 2025-04-30 RX ORDER — ATORVASTATIN CALCIUM 40 MG/1
40 TABLET, FILM COATED ORAL DAILY
Qty: 90 TABLET | Refills: 1 | Status: SHIPPED | OUTPATIENT
Start: 2025-04-30

## 2025-04-30 RX ORDER — GABAPENTIN 600 MG/1
1200 TABLET ORAL 3 TIMES DAILY
Qty: 540 TABLET | Refills: 1 | Status: SHIPPED | OUTPATIENT
Start: 2025-04-30 | End: 2025-10-27

## 2025-04-30 RX ORDER — FUROSEMIDE 20 MG/1
20 TABLET ORAL DAILY
Qty: 90 TABLET | Refills: 1 | Status: SHIPPED | OUTPATIENT
Start: 2025-04-30

## 2025-04-30 RX ORDER — PANTOPRAZOLE SODIUM 40 MG/1
TABLET, DELAYED RELEASE ORAL
Qty: 90 TABLET | Refills: 1 | Status: SHIPPED | OUTPATIENT
Start: 2025-04-30

## 2025-04-30 RX ORDER — POTASSIUM CHLORIDE 750 MG/1
10 TABLET, EXTENDED RELEASE ORAL DAILY
Qty: 90 TABLET | Refills: 1 | Status: SHIPPED | OUTPATIENT
Start: 2025-04-30

## 2025-04-30 RX ORDER — LISINOPRIL 5 MG/1
TABLET ORAL
Qty: 90 TABLET | Refills: 1 | Status: SHIPPED | OUTPATIENT
Start: 2025-04-30

## 2025-04-30 RX ORDER — AMLODIPINE BESYLATE 5 MG/1
TABLET ORAL
Qty: 90 TABLET | Refills: 1 | Status: SHIPPED | OUTPATIENT
Start: 2025-04-30

## 2025-04-30 NOTE — PROGRESS NOTES
screening at this timeDiscussed with the patient the current USPSTF guidelines released March 9, 2021 for screening for lung cancer.    For adults aged 50 to 80 years who have a 20 pack-year smoking history and currently smoke or have quit within the past 15 years the grade B recommendation is to:  Screen for lung cancer with low-dose computed tomography (LDCT) every year.  Stop screening once a person has not smoked for 15 years or has a health problem that limits life expectancy or the ability to have lung surgery.    The patient  reports that he has been smoking cigarettes. He started smoking about 60 years ago. He has a 30.2 pack-year smoking history. He quit smokeless tobacco use about 3 years ago.. Discussed with patient the risks and benefits of screening, including over-diagnosis, false positive rate, and total radiation exposure.  The patient currently exhibits no signs or symptoms suggestive of lung cancer.  Discussed with patient the importance of compliance with yearly annual lung cancer screenings and willingness to undergo diagnosis and treatment if screening scan is positive.  In addition, the patient was counseled regarding the importance of remaining smoke free and/or total smoking cessation.    Also reviewed the following if the patient has Medicare that as of February 10, 2022, Medicare only covers LDCT screening in patients aged 50-77 with at least a 20 pack-year smoking history who currently smoke or have quit in the last 15 years    This note is intended for the physician writing it, as well as to communicate findings to other healthcare professionals.  Progress notes use the medical lexicon that may be misunderstood by non-medical persons. Therefore, interpretations of medical notes and terminology should be approached with caution

## 2025-05-01 ENCOUNTER — RESULTS FOLLOW-UP (OUTPATIENT)
Dept: FAMILY MEDICINE CLINIC | Age: 75
End: 2025-05-01

## 2025-05-01 LAB
CREAT UR-MCNC: 139 MG/DL (ref 39–259)
MICROALBUMIN UR DL<=1MG/L-MCNC: 1.5 MG/DL
MICROALBUMIN/CREAT UR: 10.8 MG/G (ref 0–30)

## 2025-05-07 ENCOUNTER — TELEPHONE (OUTPATIENT)
Dept: FAMILY MEDICINE CLINIC | Age: 75
End: 2025-05-07

## 2025-05-07 NOTE — TELEPHONE ENCOUNTER
Patient called stated he is out of gabapentin and stated we needed to call and ok for him to pay out of pocket.    Called [facility] they stated he is 20 days early and he picked up 540 capsules on 2/26/2025    Please advise

## 2025-06-05 ENCOUNTER — TELEPHONE (OUTPATIENT)
Dept: FAMILY MEDICINE CLINIC | Age: 75
End: 2025-06-05

## 2025-06-05 DIAGNOSIS — E11.9 TYPE 2 DIABETES MELLITUS WITHOUT COMPLICATION, WITHOUT LONG-TERM CURRENT USE OF INSULIN (HCC): ICD-10-CM

## 2025-06-05 DIAGNOSIS — G62.9 PERIPHERAL POLYNEUROPATHY: Primary | ICD-10-CM

## 2025-06-05 DIAGNOSIS — E66.813 OBESITY, CLASS 3 (HCC): ICD-10-CM

## 2025-06-05 NOTE — TELEPHONE ENCOUNTER
The easiest way to get insurance to pay for it is for him to find where he wants to get a scooter from, and they will send me the proper paperwork so I can fill it out.  Without the proper paperwork, insurance will not cover it.

## 2025-06-05 NOTE — TELEPHONE ENCOUNTER
Patient is requesting an order for a mobility scooter. Patient states he needs this because he cannot walk due to severe neuropathy, COPD, SOB and bad knees. Patient does not know who he would like to get it through, he would like the order mailed to him so when he finds a place he can give it to them.

## 2025-06-05 NOTE — TELEPHONE ENCOUNTER
Patient called stated he was told that if he had a written prescription for the scooter it would speed up this process.  Will you write a paper RX for the scooter and he can come up and pick it up      Please advise

## 2025-07-29 ENCOUNTER — TELEPHONE (OUTPATIENT)
Dept: FAMILY MEDICINE CLINIC | Age: 75
End: 2025-07-29

## 2025-07-29 NOTE — TELEPHONE ENCOUNTER
Patient scheduled to be seen on 08/07/2025. Bilateral leg swelling. Confirmed his medication was called in the pharmacy on 04/30/2025 at 148 pm. Patient is calling the pharmacy and requesting his refill.

## 2025-08-07 ENCOUNTER — OFFICE VISIT (OUTPATIENT)
Dept: FAMILY MEDICINE CLINIC | Age: 75
End: 2025-08-07
Payer: MEDICARE

## 2025-08-07 VITALS
HEIGHT: 70 IN | TEMPERATURE: 97.3 F | DIASTOLIC BLOOD PRESSURE: 72 MMHG | WEIGHT: 315 LBS | HEART RATE: 102 BPM | OXYGEN SATURATION: 94 % | SYSTOLIC BLOOD PRESSURE: 120 MMHG | BODY MASS INDEX: 45.1 KG/M2

## 2025-08-07 DIAGNOSIS — E11.9 TYPE 2 DIABETES MELLITUS WITHOUT COMPLICATION, WITHOUT LONG-TERM CURRENT USE OF INSULIN (HCC): Primary | ICD-10-CM

## 2025-08-07 DIAGNOSIS — I50.9 ACUTE DECOMPENSATED HEART FAILURE (HCC): ICD-10-CM

## 2025-08-07 PROCEDURE — G8428 CUR MEDS NOT DOCUMENT: HCPCS | Performed by: FAMILY MEDICINE

## 2025-08-07 PROCEDURE — 1123F ACP DISCUSS/DSCN MKR DOCD: CPT | Performed by: FAMILY MEDICINE

## 2025-08-07 PROCEDURE — 3078F DIAST BP <80 MM HG: CPT | Performed by: FAMILY MEDICINE

## 2025-08-07 PROCEDURE — G8417 CALC BMI ABV UP PARAM F/U: HCPCS | Performed by: FAMILY MEDICINE

## 2025-08-07 PROCEDURE — 99214 OFFICE O/P EST MOD 30 MIN: CPT | Performed by: FAMILY MEDICINE

## 2025-08-07 PROCEDURE — 3074F SYST BP LT 130 MM HG: CPT | Performed by: FAMILY MEDICINE

## 2025-08-07 PROCEDURE — 3044F HG A1C LEVEL LT 7.0%: CPT | Performed by: FAMILY MEDICINE

## 2025-08-07 PROCEDURE — 3017F COLORECTAL CA SCREEN DOC REV: CPT | Performed by: FAMILY MEDICINE

## 2025-08-07 PROCEDURE — 2022F DILAT RTA XM EVC RTNOPTHY: CPT | Performed by: FAMILY MEDICINE

## 2025-08-07 PROCEDURE — 4004F PT TOBACCO SCREEN RCVD TLK: CPT | Performed by: FAMILY MEDICINE

## 2025-08-07 RX ORDER — FUROSEMIDE 20 MG/1
20 TABLET ORAL 2 TIMES DAILY
Qty: 180 TABLET | Refills: 1 | Status: SHIPPED | OUTPATIENT
Start: 2025-08-07

## 2025-08-12 ENCOUNTER — TELEPHONE (OUTPATIENT)
Dept: FAMILY MEDICINE CLINIC | Age: 75
End: 2025-08-12

## (undated) DEVICE — GLOVE SURG SZ 8 L12IN THK75MIL DK GRN LTX FREE

## (undated) DEVICE — SHEET PT TRANSFER 80X40 IN LAT AIR NYL GRY COMFORT GLIDE

## (undated) DEVICE — ELECTRODE ES AD CRDLSS PT RET REM POLYHESIVE

## (undated) DEVICE — Device

## (undated) DEVICE — GLOVE ORANGE PI 8   MSG9080

## (undated) DEVICE — 2108 SERIES SAGITTAL FAN BLADE (33.0 X 0.88 X 64.0MM)

## (undated) DEVICE — ZIMMER® STERILE DISPOSABLE TOURNIQUET CUFF WITH PLC, DUAL PORT, SINGLE BLADDER, 30 IN. (76 CM)

## (undated) DEVICE — APPLICATOR MEDICATED 26 CC SOLUTION HI LT ORNG CHLORAPREP

## (undated) DEVICE — SUTURE FIBERWIRE SZ 5 L38IN NONABSORBABLE BLU L48MM 1/2 AR7211

## (undated) DEVICE — SUTURE MONOCRYL ABSORBABLE 3-0 PS-1 18 IN UD MONOCRYL + STRATAFIX SXMP1B102

## (undated) DEVICE — SURGICAL PROCEDURE PACK TOT KNEE LF

## (undated) DEVICE — HOOD: Brand: T7PLUS

## (undated) DEVICE — CLASSIC CLD THER SYS

## (undated) DEVICE — STERILE LATEX POWDER-FREE SURGICAL GLOVESWITH NITRILE COATING: Brand: PROTEXIS

## (undated) DEVICE — ZIMMER® STERILE DISPOSABLE TOURNIQUET CUFF WITH PLC, DUAL PORT, SINGLE BLADDER, 34 IN. (86 CM)

## (undated) DEVICE — TOWEL,OR,DSP,ST,BLUE,STD,6/PK,12PK/CS: Brand: MEDLINE

## (undated) DEVICE — SUTURE VCRL SZ 1 L27IN ABSRB UD L36MM CT-1 1/2 CIR JJ40G

## (undated) DEVICE — CURETTE SURG FOR BNE CEM REM QUIK USE

## (undated) DEVICE — SUTURE VICRYL SZ 2-0 L18IN ABSRB UD CT-1 L36MM 1/2 CIR J839D

## (undated) DEVICE — HOOD WITH PEEL AWAY FACE SHIELD: Brand: T7PLUS

## (undated) DEVICE — GLOVE ORANGE PI 7 1/2   MSG9075

## (undated) DEVICE — COVER,TABLE,44X90,STERILE: Brand: MEDLINE

## (undated) DEVICE — INSTRUMENT SCREW BNE L25MM DIA2.5MM KNEE FULL THRD HEX FEM PERSONA

## (undated) DEVICE — Z DISCONTINUED USE 2220326 SUTURE VICRYL SZ 1 L27IN ABSRB UD L36MM CT-1 1/2 CIR JJ40G

## (undated) DEVICE — SYSTEM SKIN CLSR 22CM DERMBND PRINEO

## (undated) DEVICE — Device: Brand: POWER-FLO®

## (undated) DEVICE — Z INACTIVE USE 2660663 SOLUTION IRRIG 1000ML STRIL H2O USP PLAS POUR BTL

## (undated) DEVICE — 2108 SERIES SAGITTAL BLADE (19.5 X 1.27 X 90.0MM)

## (undated) DEVICE — STRYKER PERFORMANCE SERIES SAGITTAL BLADE: Brand: STRYKER PERFORMANCE SERIES

## (undated) DEVICE — TOTAL KNEE PK

## (undated) DEVICE — SUTURE ABSORBABLE 3-0 PS-1 18 IN UD MONOCRYL + STRATAFIX SXMP1B102

## (undated) DEVICE — HOOD, PEEL-AWAY: Brand: FLYTE

## (undated) DEVICE — SUTURE VICRYL + SZ 1 L27IN ABSRB UD CT-1 L36MM 1/2 CIR TAPR VCPP40D

## (undated) DEVICE — 4-PORT MANIFOLD: Brand: NEPTUNE 2

## (undated) DEVICE — FAN SPRAY KIT: Brand: PULSAVAC®

## (undated) DEVICE — SUTURE VCRL SZ 2-0 L18IN ABSRB UD CT-1 L36MM 1/2 CIR J839D